# Patient Record
Sex: FEMALE | Race: WHITE | NOT HISPANIC OR LATINO | Employment: OTHER | URBAN - METROPOLITAN AREA
[De-identification: names, ages, dates, MRNs, and addresses within clinical notes are randomized per-mention and may not be internally consistent; named-entity substitution may affect disease eponyms.]

---

## 2017-01-11 ENCOUNTER — RECORDS - HEALTHEAST (OUTPATIENT)
Dept: LAB | Facility: CLINIC | Age: 77
End: 2017-01-11

## 2017-01-11 LAB
CHOLEST SERPL-MCNC: 233 MG/DL
FASTING STATUS PATIENT QL REPORTED: NO
HDLC SERPL-MCNC: 64 MG/DL
LDLC SERPL CALC-MCNC: 151 MG/DL
TRIGL SERPL-MCNC: 91 MG/DL

## 2017-04-21 ENCOUNTER — RECORDS - HEALTHEAST (OUTPATIENT)
Dept: LAB | Facility: CLINIC | Age: 77
End: 2017-04-21

## 2017-04-21 LAB
CHOLEST SERPL-MCNC: 229 MG/DL
FASTING STATUS PATIENT QL REPORTED: NO
HDLC SERPL-MCNC: 63 MG/DL
LDLC SERPL CALC-MCNC: 150 MG/DL
TRIGL SERPL-MCNC: 80 MG/DL

## 2017-07-13 ENCOUNTER — OFFICE VISIT - HEALTHEAST (OUTPATIENT)
Dept: VASCULAR SURGERY | Facility: CLINIC | Age: 77
End: 2017-07-13

## 2017-07-13 DIAGNOSIS — M21.42 ACQUIRED BILATERAL FLAT FEET: ICD-10-CM

## 2017-07-13 DIAGNOSIS — I89.0 ACQUIRED LYMPHEDEMA OF LEG: ICD-10-CM

## 2017-07-13 DIAGNOSIS — I87.2 VENOUS INSUFFICIENCY OF BOTH LOWER EXTREMITIES: ICD-10-CM

## 2017-07-13 DIAGNOSIS — M21.41 ACQUIRED BILATERAL FLAT FEET: ICD-10-CM

## 2017-07-13 DIAGNOSIS — I87.8 VENOUS STASIS: ICD-10-CM

## 2017-07-13 DIAGNOSIS — M79.89 SWELLING OF LIMB: ICD-10-CM

## 2017-07-13 DIAGNOSIS — L90.5 SCAR CONDITION AND FIBROSIS OF SKIN: ICD-10-CM

## 2017-07-13 ASSESSMENT — MIFFLIN-ST. JEOR: SCORE: 1530.9

## 2017-12-18 ENCOUNTER — OFFICE VISIT - HEALTHEAST (OUTPATIENT)
Dept: VASCULAR SURGERY | Facility: CLINIC | Age: 77
End: 2017-12-18

## 2017-12-18 DIAGNOSIS — M79.605 LEG PAIN, LEFT: ICD-10-CM

## 2017-12-18 DIAGNOSIS — I87.2 VENOUS INSUFFICIENCY OF BOTH LOWER EXTREMITIES: ICD-10-CM

## 2017-12-18 DIAGNOSIS — Z87.828 HISTORY OF BURN, THIRD DEGREE: ICD-10-CM

## 2017-12-18 DIAGNOSIS — L03.116 LEFT LEG CELLULITIS: ICD-10-CM

## 2017-12-18 DIAGNOSIS — E66.812 CLASS 2 OBESITY IN ADULT: ICD-10-CM

## 2017-12-18 DIAGNOSIS — I89.0 ACQUIRED LYMPHEDEMA OF LEG: ICD-10-CM

## 2017-12-18 ASSESSMENT — MIFFLIN-ST. JEOR: SCORE: 1594.41

## 2017-12-19 ENCOUNTER — COMMUNICATION - HEALTHEAST (OUTPATIENT)
Dept: VASCULAR SURGERY | Facility: CLINIC | Age: 77
End: 2017-12-19

## 2017-12-20 ENCOUNTER — COMMUNICATION - HEALTHEAST (OUTPATIENT)
Dept: VASCULAR SURGERY | Facility: CLINIC | Age: 77
End: 2017-12-20

## 2017-12-20 ENCOUNTER — AMBULATORY - HEALTHEAST (OUTPATIENT)
Dept: VASCULAR SURGERY | Facility: CLINIC | Age: 77
End: 2017-12-20

## 2017-12-20 ENCOUNTER — COMMUNICATION - HEALTHEAST (OUTPATIENT)
Dept: SURGERY | Facility: CLINIC | Age: 77
End: 2017-12-20

## 2017-12-20 DIAGNOSIS — B99.9 INFECTION: ICD-10-CM

## 2018-03-12 ENCOUNTER — COMMUNICATION - HEALTHEAST (OUTPATIENT)
Dept: TELEHEALTH | Facility: CLINIC | Age: 78
End: 2018-03-12

## 2018-03-12 ENCOUNTER — OFFICE VISIT - HEALTHEAST (OUTPATIENT)
Dept: VASCULAR SURGERY | Facility: CLINIC | Age: 78
End: 2018-03-12

## 2018-03-12 DIAGNOSIS — M21.41 ACQUIRED BILATERAL FLAT FEET: ICD-10-CM

## 2018-03-12 DIAGNOSIS — M79.89 SWELLING OF LIMB: ICD-10-CM

## 2018-03-12 DIAGNOSIS — I89.0 ACQUIRED LYMPHEDEMA OF LEG: ICD-10-CM

## 2018-03-12 DIAGNOSIS — L03.116 LEFT LEG CELLULITIS: ICD-10-CM

## 2018-03-12 DIAGNOSIS — I87.2 VENOUS INSUFFICIENCY OF BOTH LOWER EXTREMITIES: ICD-10-CM

## 2018-03-12 DIAGNOSIS — I87.8 VENOUS STASIS: ICD-10-CM

## 2018-03-12 DIAGNOSIS — M21.42 ACQUIRED BILATERAL FLAT FEET: ICD-10-CM

## 2018-03-12 DIAGNOSIS — M79.605 LEG PAIN, BILATERAL: ICD-10-CM

## 2018-03-12 DIAGNOSIS — M79.604 LEG PAIN, BILATERAL: ICD-10-CM

## 2018-04-13 ENCOUNTER — OFFICE VISIT - HEALTHEAST (OUTPATIENT)
Dept: SURGERY | Facility: CLINIC | Age: 78
End: 2018-04-13

## 2018-04-13 DIAGNOSIS — G47.33 OSA ON CPAP: ICD-10-CM

## 2018-04-13 DIAGNOSIS — M19.90 OSTEOARTHRITIS: ICD-10-CM

## 2018-04-13 DIAGNOSIS — I10 HYPERTENSION: ICD-10-CM

## 2018-04-13 ASSESSMENT — MIFFLIN-ST. JEOR: SCORE: 1557.89

## 2018-04-16 ENCOUNTER — RECORDS - HEALTHEAST (OUTPATIENT)
Dept: LAB | Facility: CLINIC | Age: 78
End: 2018-04-16

## 2018-04-16 LAB
ANION GAP SERPL CALCULATED.3IONS-SCNC: 10 MMOL/L (ref 5–18)
BUN SERPL-MCNC: 14 MG/DL (ref 8–28)
CALCIUM SERPL-MCNC: 9.7 MG/DL (ref 8.5–10.5)
CHLORIDE BLD-SCNC: 107 MMOL/L (ref 98–107)
CHOLEST SERPL-MCNC: 251 MG/DL
CO2 SERPL-SCNC: 24 MMOL/L (ref 22–31)
CREAT SERPL-MCNC: 0.66 MG/DL (ref 0.6–1.1)
FASTING STATUS PATIENT QL REPORTED: YES
GFR SERPL CREATININE-BSD FRML MDRD: >60 ML/MIN/1.73M2
GLUCOSE BLD-MCNC: 86 MG/DL (ref 70–125)
HDLC SERPL-MCNC: 57 MG/DL
LDLC SERPL CALC-MCNC: 174 MG/DL
MAGNESIUM SERPL-MCNC: 1.9 MG/DL (ref 1.8–2.6)
POTASSIUM BLD-SCNC: 4.3 MMOL/L (ref 3.5–5)
PTH-INTACT SERPL-MCNC: 61 PG/ML (ref 10–86)
SODIUM SERPL-SCNC: 141 MMOL/L (ref 136–145)
TRIGL SERPL-MCNC: 102 MG/DL
VIT B12 SERPL-MCNC: 864 PG/ML (ref 213–816)

## 2018-04-17 LAB — 25(OH)D3 SERPL-MCNC: 67.6 NG/ML (ref 30–80)

## 2018-05-16 ENCOUNTER — OFFICE VISIT - HEALTHEAST (OUTPATIENT)
Dept: SURGERY | Facility: CLINIC | Age: 78
End: 2018-05-16

## 2018-05-16 DIAGNOSIS — M81.0 OSTEOPOROSIS, UNSPECIFIED OSTEOPOROSIS TYPE, UNSPECIFIED PATHOLOGICAL FRACTURE PRESENCE: ICD-10-CM

## 2018-05-16 DIAGNOSIS — E66.9 OBESITY (BMI 30-39.9): ICD-10-CM

## 2018-05-16 DIAGNOSIS — Z71.3 DIETARY COUNSELING: ICD-10-CM

## 2018-05-16 ASSESSMENT — MIFFLIN-ST. JEOR: SCORE: 1547.91

## 2018-05-17 ENCOUNTER — AMBULATORY - HEALTHEAST (OUTPATIENT)
Dept: CARDIOLOGY | Facility: CLINIC | Age: 78
End: 2018-05-17

## 2018-05-17 ENCOUNTER — RECORDS - HEALTHEAST (OUTPATIENT)
Dept: ADMINISTRATIVE | Facility: OTHER | Age: 78
End: 2018-05-17

## 2018-05-18 ENCOUNTER — OFFICE VISIT - HEALTHEAST (OUTPATIENT)
Dept: CARDIOLOGY | Facility: CLINIC | Age: 78
End: 2018-05-18

## 2018-05-18 DIAGNOSIS — E78.5 HYPERLIPIDEMIA, UNSPECIFIED HYPERLIPIDEMIA TYPE: ICD-10-CM

## 2018-05-18 DIAGNOSIS — R06.09 DOE (DYSPNEA ON EXERTION): ICD-10-CM

## 2018-05-18 ASSESSMENT — MIFFLIN-ST. JEOR: SCORE: 1552.44

## 2018-05-30 ENCOUNTER — HOSPITAL ENCOUNTER (OUTPATIENT)
Dept: CARDIOLOGY | Facility: CLINIC | Age: 78
Discharge: HOME | End: 2018-05-30
Attending: INTERNAL MEDICINE

## 2018-05-30 DIAGNOSIS — E78.5 HYPERLIPIDEMIA, UNSPECIFIED HYPERLIPIDEMIA TYPE: ICD-10-CM

## 2018-05-30 DIAGNOSIS — R06.09 DOE (DYSPNEA ON EXERTION): ICD-10-CM

## 2018-05-30 DIAGNOSIS — R06.09 OTHER FORMS OF DYSPNEA: ICD-10-CM

## 2018-05-30 LAB
AORTIC ROOT: 3.4 CM
AORTIC VALVE MEAN VELOCITY: 123 CM/S
ASCENDING AORTA: 3.4 CM
AV CUSP SEPERATION: 2 CM
AV CUSP SEPERATION: 2 CM
AV DIMENSIONLESS INDEX VTI: 0.8
AV MEAN GRADIENT: 7 MMHG
AV PEAK GRADIENT: 12.8 MMHG
AV VALVE AREA: 2.7 CM2
AV VELOCITY RATIO: 0.8
BSA FOR ECHO PROCEDURE: 2.24 M2
CV ECHO HEIGHT: 65.5 IN
CV ECHO WEIGHT: 240 LBS
DOP CALC AO PEAK VEL: 179 CM/S
DOP CALC AO VTI: 37.8 CM
DOP CALC LVOT AREA: 3.46 CM2
DOP CALC LVOT DIAMETER: 2.1 CM
DOP CALC LVOT PEAK VEL: 143 CM/S
DOP CALC LVOT STROKE VOLUME: 102.5 CM3
DOP CALC MV VTI: 43.9 CM
DOP CALCLVOT PEAK VEL VTI: 29.6 CM
EJECTION FRACTION: 71 % (ref 55–75)
FRACTIONAL SHORTENING: 38.3 % (ref 28–44)
INTERVENTRICULAR SEPTUM IN END DIASTOLE: 1.4 CM (ref 0.6–0.9)
IVS/PW RATIO: 1.1
LA AREA 1: 19 CM2
LA AREA 2: 19 CM2
LEFT ATRIUM LENGTH: 4.5 CM
LEFT ATRIUM SIZE: 4.1 CM
LEFT ATRIUM VOLUME INDEX: 30.4 ML/M2
LEFT ATRIUM VOLUME: 68.2 ML
LEFT VENTRICLE CARDIAC INDEX: 2.7 L/MIN/M2
LEFT VENTRICLE CARDIAC OUTPUT: 6.1 L/MIN
LEFT VENTRICLE DIASTOLIC VOLUME INDEX: 25.9 CM3/M2 (ref 34–74)
LEFT VENTRICLE DIASTOLIC VOLUME: 58 CM3 (ref 46–106)
LEFT VENTRICLE HEART RATE: 60 BPM
LEFT VENTRICLE MASS INDEX: 112.2 G/M2
LEFT VENTRICLE SYSTOLIC VOLUME INDEX: 7.6 CM3/M2 (ref 11–31)
LEFT VENTRICLE SYSTOLIC VOLUME: 17 CM3 (ref 14–42)
LEFT VENTRICULAR INTERNAL DIMENSION IN DIASTOLE: 4.7 CM (ref 3.8–5.2)
LEFT VENTRICULAR INTERNAL DIMENSION IN SYSTOLE: 2.9 CM (ref 2.2–3.5)
LEFT VENTRICULAR MASS: 251.4 G
LEFT VENTRICULAR OUTFLOW TRACT MEAN GRADIENT: 3 MMHG
LEFT VENTRICULAR OUTFLOW TRACT MEAN VELOCITY: 81 CM/S
LEFT VENTRICULAR OUTFLOW TRACT PEAK GRADIENT: 8 MMHG
LEFT VENTRICULAR POSTERIOR WALL IN END DIASTOLE: 1.3 CM (ref 0.6–0.9)
LV STROKE VOLUME INDEX: 45.7 ML/M2
MITRAL VALVE DECELERATION SLOPE: 2660 MM/S2
MITRAL VALVE E/A RATIO: 0.7
MITRAL VALVE MEAN INFLOW VELOCITY: 72.3 CM/S
MITRAL VALVE PEAK VELOCITY: 136 CM/S
MITRAL VALVE PRESSURE HALF-TIME: 119 MS
MV AREA VTI: 2.33 CM2
MV AVERAGE E/E' RATIO: 21.6 CM/S
MV DECELERATION TIME: 257 MS
MV E'TISSUE VEL-LAT: 4.87 CM/S
MV E'TISSUE VEL-MED: 3.02 CM/S
MV LATERAL E/E' RATIO: 17.5
MV MEAN GRADIENT: 3 MMHG
MV MEDIAL E/E' RATIO: 28.3
MV PEAK A VELOCITY: 121 CM/S
MV PEAK E VELOCITY: 85.4 CM/S
MV PEAK GRADIENT: 7.4 MMHG
MV VALVE AREA BY CONTINUITY EQUATION: 2.3 CM2
MV VALVE AREA PRESSURE 1/2 METHOD: 1.8 CM2
NUC REST DIASTOLIC VOLUME INDEX: 3840 LBS
NUC REST SYSTOLIC VOLUME INDEX: 65.5 IN
TRICUSPID REGURGITATION PEAK PRESSURE GRADIENT: 30.9 MMHG
TRICUSPID VALVE ANULAR PLANE SYSTOLIC EXCURSION: 2.3 CM
TRICUSPID VALVE PEAK REGURGITANT VELOCITY: 278 CM/S

## 2018-05-30 ASSESSMENT — MIFFLIN-ST. JEOR: SCORE: 1552.44

## 2018-06-15 ENCOUNTER — OFFICE VISIT - HEALTHEAST (OUTPATIENT)
Dept: SURGERY | Facility: CLINIC | Age: 78
End: 2018-06-15

## 2018-06-15 DIAGNOSIS — E78.00 HYPERCHOLESTEROLEMIA: ICD-10-CM

## 2018-06-15 DIAGNOSIS — E66.812 CLASS 2 OBESITY IN ADULT: ICD-10-CM

## 2018-06-21 ENCOUNTER — RECORDS - HEALTHEAST (OUTPATIENT)
Dept: ADMINISTRATIVE | Facility: OTHER | Age: 78
End: 2018-06-21

## 2018-06-25 ENCOUNTER — OFFICE VISIT - HEALTHEAST (OUTPATIENT)
Dept: CARDIOLOGY | Facility: CLINIC | Age: 78
End: 2018-06-25

## 2018-06-25 DIAGNOSIS — I50.30 (HFPEF) HEART FAILURE WITH PRESERVED EJECTION FRACTION (H): ICD-10-CM

## 2018-06-25 ASSESSMENT — MIFFLIN-ST. JEOR: SCORE: 1547.91

## 2018-06-26 ENCOUNTER — COMMUNICATION - HEALTHEAST (OUTPATIENT)
Dept: CARDIOLOGY | Facility: CLINIC | Age: 78
End: 2018-06-26

## 2018-06-26 DIAGNOSIS — I50.30 (HFPEF) HEART FAILURE WITH PRESERVED EJECTION FRACTION (H): ICD-10-CM

## 2018-06-27 ENCOUNTER — COMMUNICATION - HEALTHEAST (OUTPATIENT)
Dept: CARDIOLOGY | Facility: CLINIC | Age: 78
End: 2018-06-27

## 2018-06-27 DIAGNOSIS — I50.30 (HFPEF) HEART FAILURE WITH PRESERVED EJECTION FRACTION (H): ICD-10-CM

## 2018-07-02 ENCOUNTER — RECORDS - HEALTHEAST (OUTPATIENT)
Dept: LAB | Facility: CLINIC | Age: 78
End: 2018-07-02

## 2018-07-02 LAB
CHOLEST SERPL-MCNC: 199 MG/DL
FASTING STATUS PATIENT QL REPORTED: YES
HDLC SERPL-MCNC: 52 MG/DL
LDLC SERPL CALC-MCNC: 134 MG/DL
TRIGL SERPL-MCNC: 66 MG/DL

## 2018-07-12 ENCOUNTER — COMMUNICATION - HEALTHEAST (OUTPATIENT)
Dept: ADMINISTRATIVE | Facility: CLINIC | Age: 78
End: 2018-07-12

## 2018-07-13 ENCOUNTER — OFFICE VISIT - HEALTHEAST (OUTPATIENT)
Dept: SURGERY | Facility: CLINIC | Age: 78
End: 2018-07-13

## 2018-07-13 DIAGNOSIS — Z71.3 DIETARY COUNSELING: ICD-10-CM

## 2018-07-13 DIAGNOSIS — E66.9 OBESITY (BMI 30-39.9): ICD-10-CM

## 2018-07-13 ASSESSMENT — MIFFLIN-ST. JEOR: SCORE: 1516.16

## 2018-09-26 ENCOUNTER — AMBULATORY - HEALTHEAST (OUTPATIENT)
Dept: CARDIOLOGY | Facility: CLINIC | Age: 78
End: 2018-09-26

## 2018-09-26 ENCOUNTER — RECORDS - HEALTHEAST (OUTPATIENT)
Dept: ADMINISTRATIVE | Facility: OTHER | Age: 78
End: 2018-09-26

## 2018-10-01 ENCOUNTER — OFFICE VISIT - HEALTHEAST (OUTPATIENT)
Dept: CARDIOLOGY | Facility: CLINIC | Age: 78
End: 2018-10-01

## 2018-10-01 DIAGNOSIS — E78.2 MIXED HYPERLIPIDEMIA: ICD-10-CM

## 2018-10-01 LAB
CHOLEST SERPL-MCNC: 233 MG/DL
FASTING STATUS PATIENT QL REPORTED: YES
HDLC SERPL-MCNC: 64 MG/DL
LDLC SERPL CALC-MCNC: 154 MG/DL
TRIGL SERPL-MCNC: 75 MG/DL

## 2018-10-01 ASSESSMENT — MIFFLIN-ST. JEOR: SCORE: 1571.95

## 2018-10-19 ENCOUNTER — RECORDS - HEALTHEAST (OUTPATIENT)
Dept: LAB | Facility: CLINIC | Age: 78
End: 2018-10-19

## 2018-10-19 LAB
ANION GAP SERPL CALCULATED.3IONS-SCNC: 12 MMOL/L (ref 5–18)
BUN SERPL-MCNC: 18 MG/DL (ref 8–28)
CALCIUM SERPL-MCNC: 9.9 MG/DL (ref 8.5–10.5)
CHLORIDE BLD-SCNC: 107 MMOL/L (ref 98–107)
CO2 SERPL-SCNC: 22 MMOL/L (ref 22–31)
CREAT SERPL-MCNC: 0.73 MG/DL (ref 0.6–1.1)
GFR SERPL CREATININE-BSD FRML MDRD: >60 ML/MIN/1.73M2
GLUCOSE BLD-MCNC: 99 MG/DL (ref 70–125)
POTASSIUM BLD-SCNC: 3.8 MMOL/L (ref 3.5–5)
SODIUM SERPL-SCNC: 141 MMOL/L (ref 136–145)

## 2019-01-23 ENCOUNTER — OFFICE VISIT - HEALTHEAST (OUTPATIENT)
Dept: SURGERY | Facility: CLINIC | Age: 79
End: 2019-01-23

## 2019-01-23 DIAGNOSIS — Z91.81 AT HIGH RISK FOR INJURY RELATED TO FALL: ICD-10-CM

## 2019-01-23 DIAGNOSIS — M81.6 LOCALIZED OSTEOPOROSIS WITHOUT CURRENT PATHOLOGICAL FRACTURE: ICD-10-CM

## 2019-01-23 ASSESSMENT — MIFFLIN-ST. JEOR: SCORE: 1547

## 2019-02-01 ENCOUNTER — OFFICE VISIT - HEALTHEAST (OUTPATIENT)
Dept: PHYSICAL THERAPY | Facility: REHABILITATION | Age: 79
End: 2019-02-01

## 2019-02-01 DIAGNOSIS — M54.5 CHRONIC BILATERAL LOW BACK PAIN, WITH SCIATICA PRESENCE UNSPECIFIED: ICD-10-CM

## 2019-02-01 DIAGNOSIS — M81.0 OSTEOPOROSIS: ICD-10-CM

## 2019-02-01 DIAGNOSIS — G89.29 CHRONIC BILATERAL LOW BACK PAIN, WITH SCIATICA PRESENCE UNSPECIFIED: ICD-10-CM

## 2019-02-01 DIAGNOSIS — R29.818 DIFFICULTY BALANCING: ICD-10-CM

## 2019-02-01 DIAGNOSIS — Z91.81 AT RISK FOR FALLS: ICD-10-CM

## 2019-03-01 ENCOUNTER — COMMUNICATION - HEALTHEAST (OUTPATIENT)
Dept: PHYSICAL THERAPY | Facility: REHABILITATION | Age: 79
End: 2019-03-01

## 2019-03-04 ENCOUNTER — OFFICE VISIT - HEALTHEAST (OUTPATIENT)
Dept: VASCULAR SURGERY | Facility: CLINIC | Age: 79
End: 2019-03-04

## 2019-03-04 DIAGNOSIS — I87.2 VENOUS INSUFFICIENCY OF BOTH LOWER EXTREMITIES: ICD-10-CM

## 2019-03-04 DIAGNOSIS — L90.5 SCAR CONDITION AND FIBROSIS OF SKIN: ICD-10-CM

## 2019-03-04 DIAGNOSIS — M81.0 AGE-RELATED OSTEOPOROSIS WITHOUT CURRENT PATHOLOGICAL FRACTURE: ICD-10-CM

## 2019-03-04 DIAGNOSIS — I89.0 ACQUIRED LYMPHEDEMA OF LEG: ICD-10-CM

## 2019-03-04 DIAGNOSIS — R26.89 FUNCTIONAL GAIT ABNORMALITY: ICD-10-CM

## 2019-03-04 DIAGNOSIS — G60.9 IDIOPATHIC PERIPHERAL NEUROPATHY: ICD-10-CM

## 2019-03-04 DIAGNOSIS — M21.41 ACQUIRED BILATERAL FLAT FEET: ICD-10-CM

## 2019-03-04 DIAGNOSIS — E66.01 MORBID OBESITY (H): ICD-10-CM

## 2019-03-04 DIAGNOSIS — M21.42 ACQUIRED BILATERAL FLAT FEET: ICD-10-CM

## 2019-03-04 ASSESSMENT — MIFFLIN-ST. JEOR: SCORE: 1570.59

## 2019-04-24 ENCOUNTER — AMBULATORY - HEALTHEAST (OUTPATIENT)
Dept: LAB | Facility: CLINIC | Age: 79
End: 2019-04-24

## 2019-04-24 ENCOUNTER — OFFICE VISIT - HEALTHEAST (OUTPATIENT)
Dept: SURGERY | Facility: CLINIC | Age: 79
End: 2019-04-24

## 2019-04-24 DIAGNOSIS — R26.89 IMBALANCE: ICD-10-CM

## 2019-04-24 DIAGNOSIS — G62.9 NEUROPATHY: ICD-10-CM

## 2019-04-24 DIAGNOSIS — R63.5 WEIGHT GAIN: ICD-10-CM

## 2019-04-24 DIAGNOSIS — T56.1X4A TOXIC EFFECT OF MERCURY AND ITS COMPOUNDS, UNDETERMINED, INITIAL ENCOUNTER: ICD-10-CM

## 2019-04-24 DIAGNOSIS — T56.0X4A TOXIC EFFECT OF LEAD AND ITS COMPOUNDS, UNDETERMINED, INITIAL ENCOUNTER: ICD-10-CM

## 2019-04-24 DIAGNOSIS — T57.0X4A TOXIC EFFECT OF ARSENIC AND ITS COMPOUNDS, UNDETERMINED, INITIAL ENCOUNTER: ICD-10-CM

## 2019-04-24 DIAGNOSIS — Z77.018 EXPOSURE TO HEAVY METALS: ICD-10-CM

## 2019-04-24 DIAGNOSIS — T56.5X4A TOXIC EFFECT OF ZINC AND ITS COMPOUNDS, UNDETERMINED, INITIAL ENCOUNTER: ICD-10-CM

## 2019-04-24 ASSESSMENT — MIFFLIN-ST. JEOR: SCORE: 1552.44

## 2019-04-26 ENCOUNTER — RECORDS - HEALTHEAST (OUTPATIENT)
Dept: LAB | Facility: CLINIC | Age: 79
End: 2019-04-26

## 2019-04-26 ENCOUNTER — AMBULATORY - HEALTHEAST (OUTPATIENT)
Dept: LAB | Facility: CLINIC | Age: 79
End: 2019-04-26

## 2019-04-26 DIAGNOSIS — G62.9 NEUROPATHY: ICD-10-CM

## 2019-04-26 DIAGNOSIS — R63.5 WEIGHT GAIN: ICD-10-CM

## 2019-04-26 DIAGNOSIS — T56.5X4A TOXIC EFFECT OF ZINC AND ITS COMPOUNDS, UNDETERMINED, INITIAL ENCOUNTER: ICD-10-CM

## 2019-04-26 LAB
ANION GAP SERPL CALCULATED.3IONS-SCNC: 10 MMOL/L (ref 5–18)
BUN SERPL-MCNC: 19 MG/DL (ref 8–28)
CALCIUM SERPL-MCNC: 9.8 MG/DL (ref 8.5–10.5)
CHLORIDE BLD-SCNC: 109 MMOL/L (ref 98–107)
CHOLEST SERPL-MCNC: 222 MG/DL
CO2 SERPL-SCNC: 23 MMOL/L (ref 22–31)
CREAT SERPL-MCNC: 0.69 MG/DL (ref 0.6–1.1)
FASTING STATUS PATIENT QL REPORTED: ABNORMAL
FOLATE SERPL-MCNC: 18.9 NG/ML
GFR SERPL CREATININE-BSD FRML MDRD: >60 ML/MIN/1.73M2
GLUCOSE BLD-MCNC: 85 MG/DL (ref 70–125)
HDLC SERPL-MCNC: 66 MG/DL
LDLC SERPL CALC-MCNC: 142 MG/DL
MAGNESIUM SERPL-MCNC: 2.2 MG/DL (ref 1.8–2.6)
POTASSIUM BLD-SCNC: 4.5 MMOL/L (ref 3.5–5)
SODIUM SERPL-SCNC: 142 MMOL/L (ref 136–145)
TRIGL SERPL-MCNC: 70 MG/DL

## 2019-04-27 LAB
PYRIDOXAL PHOS SERPL-SCNC: 272.7 NMOL/L (ref 20–125)
VIT C SERPL-MCNC: 120 UMOL/L (ref 23–114)

## 2019-04-29 LAB
25(OH)D3 SERPL-MCNC: 68 NG/ML (ref 30–80)
ARSENIC, WHOLE BLOOD: <10 NG/ML
LAB SAMPLE TYPE: NORMAL
LAB STATE REPORTED TO: NORMAL
LEAD, WHOLE BLOOD - HISTORICAL: <1 UG/DL
MERCURY, WHOLE BLOOD - HISTORICAL: <5 NG/ML
SPECIMEN STATUS: NORMAL

## 2019-04-30 LAB
CORTIS SAL-MCNC: 0.44 UG/DL
VIT B1 PYROPHOSHATE BLD-SCNC: 114 NMOL/L (ref 70–180)
ZINC SERPL-MCNC: 79 UG/DL (ref 60–120)

## 2019-05-01 ENCOUNTER — COMMUNICATION - HEALTHEAST (OUTPATIENT)
Dept: SURGERY | Facility: CLINIC | Age: 79
End: 2019-05-01

## 2019-06-25 ENCOUNTER — OFFICE VISIT - HEALTHEAST (OUTPATIENT)
Dept: SURGERY | Facility: CLINIC | Age: 79
End: 2019-06-25

## 2019-06-25 DIAGNOSIS — Z71.3 NUTRITIONAL COUNSELING: ICD-10-CM

## 2019-06-25 DIAGNOSIS — E56.9 VITAMIN DEFICIENCY: ICD-10-CM

## 2019-06-25 DIAGNOSIS — E66.9 OBESITY WITH BODY MASS INDEX OF 30.0-39.9: ICD-10-CM

## 2019-06-25 ASSESSMENT — MIFFLIN-ST. JEOR: SCORE: 1551.99

## 2019-08-15 RX ORDER — PERPHENAZINE 16 MG
600 TABLET ORAL DAILY
COMMUNITY
End: 2023-03-15

## 2019-08-15 RX ORDER — FUROSEMIDE 20 MG
20 TABLET ORAL DAILY
COMMUNITY

## 2019-08-15 RX ORDER — LOSARTAN POTASSIUM 50 MG/1
50 TABLET ORAL 2 TIMES DAILY
COMMUNITY

## 2019-08-15 RX ORDER — BIOTIN 1 MG
1000 TABLET ORAL DAILY
COMMUNITY
End: 2023-03-15

## 2019-08-15 RX ORDER — PYRIDOXINE HCL (VITAMIN B6) 100 MG
100 TABLET ORAL DAILY
COMMUNITY
End: 2023-03-15

## 2019-08-15 RX ORDER — FOLIC ACID 0.8 MG
800 TABLET ORAL DAILY
COMMUNITY
End: 2023-03-15

## 2019-08-16 ENCOUNTER — ANESTHESIA EVENT (OUTPATIENT)
Dept: SURGERY | Facility: CLINIC | Age: 79
End: 2019-08-16
Payer: MEDICARE

## 2019-08-16 ENCOUNTER — ANESTHESIA (OUTPATIENT)
Dept: SURGERY | Facility: CLINIC | Age: 79
End: 2019-08-16
Payer: MEDICARE

## 2019-08-16 ENCOUNTER — HOSPITAL ENCOUNTER (OUTPATIENT)
Facility: CLINIC | Age: 79
Discharge: HOME | End: 2019-08-16
Attending: PODIATRIST | Admitting: PODIATRIST
Payer: MEDICARE

## 2019-08-16 VITALS
TEMPERATURE: 94.8 F | WEIGHT: 243 LBS | DIASTOLIC BLOOD PRESSURE: 105 MMHG | SYSTOLIC BLOOD PRESSURE: 177 MMHG | RESPIRATION RATE: 12 BRPM | HEIGHT: 66 IN | HEART RATE: 64 BPM | BODY MASS INDEX: 39.05 KG/M2 | OXYGEN SATURATION: 97 %

## 2019-08-16 DIAGNOSIS — L60.0 INGROWING NAIL: Primary | ICD-10-CM

## 2019-08-16 PROCEDURE — 36000052 ZZH SURGERY LEVEL 2 EA 15 ADDTL MIN: Performed by: PODIATRIST

## 2019-08-16 PROCEDURE — 25000125 ZZHC RX 250: Performed by: NURSE ANESTHETIST, CERTIFIED REGISTERED

## 2019-08-16 PROCEDURE — 71000012 ZZH RECOVERY PHASE 1 LEVEL 1 FIRST HR: Performed by: PODIATRIST

## 2019-08-16 PROCEDURE — 37000009 ZZH ANESTHESIA TECHNICAL FEE, EACH ADDTL 15 MIN: Performed by: PODIATRIST

## 2019-08-16 PROCEDURE — 37000008 ZZH ANESTHESIA TECHNICAL FEE, 1ST 30 MIN: Performed by: PODIATRIST

## 2019-08-16 PROCEDURE — 25000128 H RX IP 250 OP 636: Performed by: PODIATRIST

## 2019-08-16 PROCEDURE — 27210794 ZZH OR GENERAL SUPPLY STERILE: Performed by: PODIATRIST

## 2019-08-16 PROCEDURE — 40000170 ZZH STATISTIC PRE-PROCEDURE ASSESSMENT II: Performed by: PODIATRIST

## 2019-08-16 PROCEDURE — 27210995 ZZH RX 272: Performed by: PODIATRIST

## 2019-08-16 PROCEDURE — 25000132 ZZH RX MED GY IP 250 OP 250 PS 637: Mod: GY | Performed by: ANESTHESIOLOGY

## 2019-08-16 PROCEDURE — 71000027 ZZH RECOVERY PHASE 2 EACH 15 MINS: Performed by: PODIATRIST

## 2019-08-16 PROCEDURE — 36000050 ZZH SURGERY LEVEL 2 1ST 30 MIN: Performed by: PODIATRIST

## 2019-08-16 PROCEDURE — 25000125 ZZHC RX 250: Performed by: PODIATRIST

## 2019-08-16 PROCEDURE — 25000128 H RX IP 250 OP 636: Performed by: NURSE ANESTHETIST, CERTIFIED REGISTERED

## 2019-08-16 PROCEDURE — 25800030 ZZH RX IP 258 OP 636: Performed by: NURSE ANESTHETIST, CERTIFIED REGISTERED

## 2019-08-16 RX ORDER — FENTANYL CITRATE 50 UG/ML
INJECTION, SOLUTION INTRAMUSCULAR; INTRAVENOUS PRN
Status: DISCONTINUED | OUTPATIENT
Start: 2019-08-16 | End: 2019-08-16

## 2019-08-16 RX ORDER — GINSENG 100 MG
CAPSULE ORAL
Status: DISCONTINUED
Start: 2019-08-16 | End: 2019-08-16 | Stop reason: HOSPADM

## 2019-08-16 RX ORDER — HYDROCODONE BITARTRATE AND ACETAMINOPHEN 5; 325 MG/1; MG/1
1-2 TABLET ORAL EVERY 4 HOURS PRN
Qty: 10 TABLET | Refills: 0 | Status: SHIPPED | OUTPATIENT
Start: 2019-08-16 | End: 2021-08-25

## 2019-08-16 RX ORDER — PROPOFOL 10 MG/ML
INJECTION, EMULSION INTRAVENOUS CONTINUOUS PRN
Status: DISCONTINUED | OUTPATIENT
Start: 2019-08-16 | End: 2019-08-16

## 2019-08-16 RX ORDER — BUPIVACAINE HYDROCHLORIDE AND EPINEPHRINE 5; 5 MG/ML; UG/ML
INJECTION, SOLUTION EPIDURAL; INTRACAUDAL; PERINEURAL
Status: DISCONTINUED
Start: 2019-08-16 | End: 2019-08-16 | Stop reason: WASHOUT

## 2019-08-16 RX ORDER — NALOXONE HYDROCHLORIDE 0.4 MG/ML
.1-.4 INJECTION, SOLUTION INTRAMUSCULAR; INTRAVENOUS; SUBCUTANEOUS
Status: DISCONTINUED | OUTPATIENT
Start: 2019-08-16 | End: 2019-08-16 | Stop reason: HOSPADM

## 2019-08-16 RX ORDER — ONDANSETRON 2 MG/ML
INJECTION INTRAMUSCULAR; INTRAVENOUS PRN
Status: DISCONTINUED | OUTPATIENT
Start: 2019-08-16 | End: 2019-08-16

## 2019-08-16 RX ORDER — FENTANYL CITRATE 50 UG/ML
25-50 INJECTION, SOLUTION INTRAMUSCULAR; INTRAVENOUS
Status: DISCONTINUED | OUTPATIENT
Start: 2019-08-16 | End: 2019-08-16 | Stop reason: HOSPADM

## 2019-08-16 RX ORDER — EPHEDRINE SULFATE 50 MG/ML
INJECTION, SOLUTION INTRAMUSCULAR; INTRAVENOUS; SUBCUTANEOUS PRN
Status: DISCONTINUED | OUTPATIENT
Start: 2019-08-16 | End: 2019-08-16

## 2019-08-16 RX ORDER — ONDANSETRON 2 MG/ML
4 INJECTION INTRAMUSCULAR; INTRAVENOUS EVERY 30 MIN PRN
Status: DISCONTINUED | OUTPATIENT
Start: 2019-08-16 | End: 2019-08-16 | Stop reason: HOSPADM

## 2019-08-16 RX ORDER — TRIAMCINOLONE ACETONIDE 40 MG/ML
INJECTION, SUSPENSION INTRA-ARTICULAR; INTRAMUSCULAR PRN
Status: DISCONTINUED | OUTPATIENT
Start: 2019-08-16 | End: 2019-08-16 | Stop reason: HOSPADM

## 2019-08-16 RX ORDER — MEPERIDINE HYDROCHLORIDE 25 MG/ML
12.5 INJECTION INTRAMUSCULAR; INTRAVENOUS; SUBCUTANEOUS
Status: DISCONTINUED | OUTPATIENT
Start: 2019-08-16 | End: 2019-08-16 | Stop reason: HOSPADM

## 2019-08-16 RX ORDER — SODIUM CHLORIDE, SODIUM LACTATE, POTASSIUM CHLORIDE, CALCIUM CHLORIDE 600; 310; 30; 20 MG/100ML; MG/100ML; MG/100ML; MG/100ML
INJECTION, SOLUTION INTRAVENOUS CONTINUOUS PRN
Status: DISCONTINUED | OUTPATIENT
Start: 2019-08-16 | End: 2019-08-16

## 2019-08-16 RX ORDER — TRIAMCINOLONE ACETONIDE 40 MG/ML
INJECTION, SUSPENSION INTRA-ARTICULAR; INTRAMUSCULAR
Status: DISCONTINUED
Start: 2019-08-16 | End: 2019-08-16 | Stop reason: HOSPADM

## 2019-08-16 RX ORDER — OXYCODONE HYDROCHLORIDE 5 MG/1
5 TABLET ORAL EVERY 4 HOURS PRN
Status: DISCONTINUED | OUTPATIENT
Start: 2019-08-16 | End: 2019-08-16 | Stop reason: HOSPADM

## 2019-08-16 RX ORDER — MAGNESIUM HYDROXIDE 1200 MG/15ML
LIQUID ORAL PRN
Status: DISCONTINUED | OUTPATIENT
Start: 2019-08-16 | End: 2019-08-16 | Stop reason: HOSPADM

## 2019-08-16 RX ORDER — DEXAMETHASONE SODIUM PHOSPHATE 4 MG/ML
INJECTION, SOLUTION INTRA-ARTICULAR; INTRALESIONAL; INTRAMUSCULAR; INTRAVENOUS; SOFT TISSUE PRN
Status: DISCONTINUED | OUTPATIENT
Start: 2019-08-16 | End: 2019-08-16

## 2019-08-16 RX ORDER — BACITRACIN ZINC 500 [USP'U]/G
OINTMENT TOPICAL PRN
Status: DISCONTINUED | OUTPATIENT
Start: 2019-08-16 | End: 2019-08-16 | Stop reason: HOSPADM

## 2019-08-16 RX ORDER — SODIUM CHLORIDE, SODIUM LACTATE, POTASSIUM CHLORIDE, CALCIUM CHLORIDE 600; 310; 30; 20 MG/100ML; MG/100ML; MG/100ML; MG/100ML
INJECTION, SOLUTION INTRAVENOUS CONTINUOUS
Status: DISCONTINUED | OUTPATIENT
Start: 2019-08-16 | End: 2019-08-16 | Stop reason: HOSPADM

## 2019-08-16 RX ORDER — CEFAZOLIN SODIUM 2 G/100ML
2 INJECTION, SOLUTION INTRAVENOUS
Status: COMPLETED | OUTPATIENT
Start: 2019-08-16 | End: 2019-08-16

## 2019-08-16 RX ORDER — BUPIVACAINE HYDROCHLORIDE 5 MG/ML
INJECTION, SOLUTION PERINEURAL PRN
Status: DISCONTINUED | OUTPATIENT
Start: 2019-08-16 | End: 2019-08-16 | Stop reason: HOSPADM

## 2019-08-16 RX ORDER — GLYCOPYRROLATE 0.2 MG/ML
INJECTION, SOLUTION INTRAMUSCULAR; INTRAVENOUS PRN
Status: DISCONTINUED | OUTPATIENT
Start: 2019-08-16 | End: 2019-08-16

## 2019-08-16 RX ORDER — CEFAZOLIN SODIUM 1 G/3ML
1 INJECTION, POWDER, FOR SOLUTION INTRAMUSCULAR; INTRAVENOUS SEE ADMIN INSTRUCTIONS
Status: DISCONTINUED | OUTPATIENT
Start: 2019-08-16 | End: 2019-08-16 | Stop reason: HOSPADM

## 2019-08-16 RX ORDER — BUPIVACAINE HYDROCHLORIDE 5 MG/ML
INJECTION, SOLUTION EPIDURAL; INTRACAUDAL
Status: DISCONTINUED
Start: 2019-08-16 | End: 2019-08-16 | Stop reason: HOSPADM

## 2019-08-16 RX ORDER — LOSARTAN POTASSIUM 50 MG/1
50 TABLET ORAL ONCE
Status: DISCONTINUED | OUTPATIENT
Start: 2019-08-16 | End: 2019-08-16 | Stop reason: HOSPADM

## 2019-08-16 RX ORDER — HYDROMORPHONE HYDROCHLORIDE 1 MG/ML
.3-.5 INJECTION, SOLUTION INTRAMUSCULAR; INTRAVENOUS; SUBCUTANEOUS EVERY 10 MIN PRN
Status: DISCONTINUED | OUTPATIENT
Start: 2019-08-16 | End: 2019-08-16 | Stop reason: HOSPADM

## 2019-08-16 RX ORDER — ISOPROPYL ALCOHOL 70 ML/100ML
LIQUID TOPICAL PRN
Status: DISCONTINUED | OUTPATIENT
Start: 2019-08-16 | End: 2019-08-16 | Stop reason: HOSPADM

## 2019-08-16 RX ORDER — ONDANSETRON 4 MG/1
4 TABLET, ORALLY DISINTEGRATING ORAL EVERY 30 MIN PRN
Status: DISCONTINUED | OUTPATIENT
Start: 2019-08-16 | End: 2019-08-16 | Stop reason: HOSPADM

## 2019-08-16 RX ADMIN — Medication 5 MG: at 08:23

## 2019-08-16 RX ADMIN — CEFAZOLIN SODIUM 2 G: 2 INJECTION, SOLUTION INTRAVENOUS at 07:45

## 2019-08-16 RX ADMIN — GLYCOPYRROLATE 0.1 MG: 0.2 INJECTION, SOLUTION INTRAMUSCULAR; INTRAVENOUS at 08:07

## 2019-08-16 RX ADMIN — FENTANYL CITRATE 50 MCG: 50 INJECTION, SOLUTION INTRAMUSCULAR; INTRAVENOUS at 07:38

## 2019-08-16 RX ADMIN — Medication 5 MG: at 08:17

## 2019-08-16 RX ADMIN — DEXAMETHASONE SODIUM PHOSPHATE 4 MG: 4 INJECTION, SOLUTION INTRA-ARTICULAR; INTRALESIONAL; INTRAMUSCULAR; INTRAVENOUS; SOFT TISSUE at 07:45

## 2019-08-16 RX ADMIN — Medication 5 MG: at 08:07

## 2019-08-16 RX ADMIN — OXYCODONE HYDROCHLORIDE 5 MG: 5 TABLET ORAL at 09:42

## 2019-08-16 RX ADMIN — ONDANSETRON 4 MG: 2 INJECTION INTRAMUSCULAR; INTRAVENOUS at 07:45

## 2019-08-16 RX ADMIN — PROPOFOL 100 MCG/KG/MIN: 10 INJECTION, EMULSION INTRAVENOUS at 07:41

## 2019-08-16 RX ADMIN — SODIUM CHLORIDE, POTASSIUM CHLORIDE, SODIUM LACTATE AND CALCIUM CHLORIDE: 600; 310; 30; 20 INJECTION, SOLUTION INTRAVENOUS at 07:38

## 2019-08-16 RX ADMIN — GLYCOPYRROLATE 0.1 MG: 0.2 INJECTION, SOLUTION INTRAMUSCULAR; INTRAVENOUS at 08:04

## 2019-08-16 ASSESSMENT — MIFFLIN-ST. JEOR: SCORE: 1583.99
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
## 2019-08-16 NOTE — ANESTHESIA PREPROCEDURE EVALUATION
Anesthesia Pre-Procedure Evaluation    Patient: Leslie Manriquez   MRN: [id number] : 1940          Preoperative Diagnosis: INGROWN TOE NAILS; PLANTAR FASCITIS RIGHT HEEL    Procedure(s):  MATRIXECTOMY BILATERAL FEET; TOES 1,2,3,4; BOTH NAIL BORDERS  CORTIZONE INJECTION RIGHT HEEL    Past Medical History:   Diagnosis Date     Chronic acquired lymphedema      Falls      Fibrocystic breast disease      Gastroesophageal reflux disease      Hyperlipidemia      Hypertension      Mitral valve disorder      Obese      Skin cancer      Sleep apnea      Thyroid nodule      Past Surgical History:   Procedure Laterality Date     EYE SURGERY      cataract     GI SURGERY       GYN SURGERY      hysterectomy     ORTHOPEDIC SURGERY Left     knee replacement       Anesthesia Evaluation     .             ROS/MED HX    ENT/Pulmonary:     (+)sleep apnea, uses CPAP , . .    Neurologic:       Cardiovascular:     (+) Dyslipidemia, hypertension----. : . . . :. .       METS/Exercise Tolerance:     Hematologic:         Musculoskeletal:         GI/Hepatic:     (+) GERD (asymptomatic, rare, no meds)       Renal/Genitourinary:         Endo:     (+) Obesity, .      Psychiatric:         Infectious Disease:         Malignancy:         Other:                          Physical Exam  Normal systems: dental    Airway   Mallampati: II  TM distance: >3 FB  Neck ROM: full    Dental     Cardiovascular   Rhythm and rate: regular      Pulmonary             No results found for: WBC, HGB, HCT, PLT, CRP, SED, NA, POTASSIUM, CHLORIDE, CO2, BUN, CR, GLC, DAMARIS, PHOS, MAG, ALBUMIN, PROTTOTAL, ALT, AST, GGT, ALKPHOS, BILITOTAL, BILIDIRECT, LIPASE, AMYLASE, KRISTIN, PTT, INR, FIBR, TSH, T4, T3, HCG, HCGS, CKTOTAL, CKMB, TROPN    Preop Vitals  BP Readings from Last 3 Encounters:   19 (!) 164/69    Pulse Readings from Last 3 Encounters:   No data found for Pulse      Resp Readings from Last 3 Encounters:   19 18    SpO2 Readings from Last 3 Encounters:  "  08/16/19 95%      Temp Readings from Last 1 Encounters:   08/16/19 36.4  C (97.6  F) (Oral)    Ht Readings from Last 1 Encounters:   08/16/19 1.676 m (5' 6\")      Wt Readings from Last 1 Encounters:   08/16/19 110.2 kg (243 lb)    Estimated body mass index is 39.22 kg/m  as calculated from the following:    Height as of this encounter: 1.676 m (5' 6\").    Weight as of this encounter: 110.2 kg (243 lb).       Anesthesia Plan      History & Physical Review  History and physical reviewed and following examination; no interval change.    ASA Status:  2 .    NPO Status:  > 8 hours    Plan for MAC Reason for MAC:  Deep or markedly invasive procedure (G8)         Postoperative Care  Postoperative pain management:  Oral pain medications and IV analgesics.      Consents  Anesthetic plan, risks, benefits and alternatives discussed with:  Patient..                 Ganga Keene MD  "

## 2019-08-16 NOTE — OR NURSING
Dr. Keene updated regarding Blood pressure. Pt did not take losartan today. Order obtained for 50 mg of Losartan STAT and to hold pt for at least 30 min to watch for improvement in blood pressure.

## 2019-08-16 NOTE — OR NURSING
Pt refuses to stay, Requests to go home and take home med. Pt asymptomatic. Spoke with Dr. Keene, Ok with pt going home and taking home Losartan as soon as possible and lay down and relax. RN informed patient to recheck blood pressure at home in 30min-1hr after taking home losartan to verify improvement. Pt verbalizes upstanding and agrees to plan.

## 2019-08-16 NOTE — DISCHARGE INSTRUCTIONS
Same Day Surgery Discharge Instructions for  Sedation and General Anesthesia       It's not unusual to feel dizzy, light-headed or faint for up to 24 hours after surgery or while taking pain medication.  If you have these symptoms: sit for a few minutes before standing and have someone assist you when you get up to walk or use the bathroom.      You should rest and relax for the next 24 hours. We recommend you make arrangements to have an adult stay with you for at least 24 hours after your discharge.  Avoid hazardous and strenuous activity.      DO NOT DRIVE any vehicle or operate mechanical equipment for 24 hours following the end of your surgery.  Even though you may feel normal, your reactions may be affected by the medication you have received.      Do not drink alcoholic beverages for 24 hours following surgery.       Slowly progress to your regular diet as you feel able. It's not unusual to feel nauseated and/or vomit after receiving anesthesia.  If you develop these symptoms, drink clear liquids (apple juice, ginger ale, broth, 7-up, etc. ) until you feel better.  If your nausea and vomiting persists for 24 hours, please notify your surgeon.        All narcotic pain medications, along with inactivity and anesthesia, can cause constipation. Drinking plenty of liquids and increasing fiber intake will help.      For any questions of a medical nature, call your surgeon.      Do not make important decisions for 24 hours.      If you had general anesthesia, you may have a sore throat for a couple of days related to the breathing tube used during surgery.  You may use Cepacol lozenges to help with this discomfort.  If it worsens or if you develop a fever, contact your surgeon.       If you feel your pain is not well managed with the pain medications prescribed by your surgeon, please contact your surgeon's office to let them know so they can address your concerns.     **If you have questions or concerns about your  procedure,  call Dr. Kam at [phone]**    Start soaks tomorrow with soap that was given twice a day.  Able to drive tomorrow.  Weight bearing   Make sure to schedule follow up appointment on Monday for sometime next week    Last dose of pain medication given at 9:45. You are able to take your hydrocodone with acetaminophen at 1:45pm

## 2019-08-16 NOTE — ANESTHESIA POSTPROCEDURE EVALUATION
Patient: Leslie Manriquez    Procedure(s):  MATRIXECTOMY BILATERAL FEET; TOES 1,2,3,4 ON LEFT FOOT,  1,2 ON RIGHT FOOT;  BOTH NAIL BORDERS  CORTIZONE INJECTION RIGHT HEEL    Diagnosis:INGROWN TOE NAILS; PLANTAR FASCITIS RIGHT HEEL  Diagnosis Additional Information: No value filed.    Anesthesia Type:  MAC    Note:  Anesthesia Post Evaluation    Patient location during evaluation: PACU  Patient participation: Able to fully participate in evaluation  Level of consciousness: awake and alert  Pain management: adequate  Airway patency: patent  Cardiovascular status: acceptable and hemodynamically stable  Respiratory status: acceptable  Hydration status: euvolemic  PONV: none     Anesthetic complications: None          Last vitals:  Vitals:    08/16/19 1039 08/16/19 1045 08/16/19 1048   BP: (!) 185/108 (!) 190/101 (!) 177/105   Pulse: 69  64   Resp:      Temp:      SpO2:            Electronically Signed By: Ganga Keene MD  August 16, 2019  4:56 PM

## 2019-08-16 NOTE — OP NOTE
Procedure Date: 08/16/2019      PROCEDURE:  Matrixectomy at digits 1, 2, 3 and 4, left foot and 1 and 2, right foot, and cortisone injection, right heel.      SURGEON:  Opal Kam DPM      ANESTHESIA:  Monitored anesthesia care with local anesthetic consisting of 20 mL of 0.5% Marcaine plain.      PROCEDURE:  Matrixectomy as noted above and injection as noted above.      PREOPERATIVE DIAGNOSIS:  Onychocryptosis, digits 1, 2, 3, and 4, left foot and 1 and 2, right foot; plantar fasciitis, right foot.      HEMOSTASIS:  Direct pressure.      ESTIMATED BLOOD LOSS:  3 mL.      MATERIALS:  None.      INJECTABLES:  None.      COMPLICATIONS:  None.      JUSTIFICATION FOR PROCEDURE:  The patient is an 79-year-old female with a long history of painful ingrown toenails on most of her digits.  She has chronic onychomycosis and onychocryptosis, which has been very painful for her for many years.  She feels she has exhausted conservative measures on this and is interested today in having all of the affected toes treated at the same time.  She has also agreed to a cortisone injection in her right heel.      PROCEDURE IN DETAIL:  The patient was identified, brought into the operating room and placed on the operating table in the supine position.  After the induction of anesthesia, both feet were scrubbed, prepped and draped in the usual sterile technique.      Procedure #1:  Attention was directed to the left hallux where incurvated margins were noted at medial and lateral nail plate.  Using a Hingham elevator, the nail was freed of its soft tissue attachments, and then the nail nipper was used to resect the incurvated and hypertrophic nail plate, both medially and laterally.  Attention was then directed to the matrix area, where aggressive curettage was performed to remove all remaining matrix cells here, and the application of carbolic acid 89% was used to ablate the matrix here.  The sites on the nail were then irrigated  with rubbing alcohol.  This procedure was performed on digits 1, 2, 3 and 4 of the left foot and 1 and 2 on the right foot.  The toes were dressed with bacitracin ointment, Adaptic, 4 x 4 gauze, cast padding and Ace bandage.  At this time, an injection was given at the right heel at the level of the insertion of the medial band of the plantar fascia using Kenalog 40, 0.5 mL of this and 1 mL of 0.5% Marcaine plain.  The ends the procedures for Debra Hutton.  The patient tolerated the procedure and the anesthesia well and was transported to recovery with vital signs stable and vascular status intact to bilateral feet.  Following a period of postoperative monitoring, the patient will be discharged home, was given postoperative instructions for daily soaking of her toes and will follow up in my office next week.         SADIQ ANG DPM             D: 2019   T: 2019   MT: DYLAN      Name:     DEBRA HUTTON   MRN:      [id number]        Account:        [id number]   :      1940           Procedure Date: 2019      Document: [id number]

## 2019-10-25 ENCOUNTER — OFFICE VISIT - HEALTHEAST (OUTPATIENT)
Dept: SURGERY | Facility: CLINIC | Age: 79
End: 2019-10-25

## 2019-10-25 DIAGNOSIS — E66.01 OBESITY, CLASS III, BMI 40-49.9 (MORBID OBESITY) (H): ICD-10-CM

## 2019-10-25 DIAGNOSIS — M19.90 ARTHRITIS: ICD-10-CM

## 2019-10-25 ASSESSMENT — MIFFLIN-ST. JEOR: SCORE: 1573.76

## 2019-11-11 ENCOUNTER — RECORDS - HEALTHEAST (OUTPATIENT)
Dept: ADMINISTRATIVE | Facility: OTHER | Age: 79
End: 2019-11-11

## 2019-11-11 ENCOUNTER — COMMUNICATION - HEALTHEAST (OUTPATIENT)
Dept: VASCULAR SURGERY | Facility: CLINIC | Age: 79
End: 2019-11-11

## 2019-11-11 ENCOUNTER — AMBULATORY - HEALTHEAST (OUTPATIENT)
Dept: CARDIOLOGY | Facility: CLINIC | Age: 79
End: 2019-11-11

## 2019-11-11 ENCOUNTER — OFFICE VISIT - HEALTHEAST (OUTPATIENT)
Dept: VASCULAR SURGERY | Facility: CLINIC | Age: 79
End: 2019-11-11

## 2019-11-11 DIAGNOSIS — I87.8 VENOUS STASIS: ICD-10-CM

## 2019-11-11 DIAGNOSIS — E66.01 CLASS 2 SEVERE OBESITY DUE TO EXCESS CALORIES WITH SERIOUS COMORBIDITY AND BODY MASS INDEX (BMI) OF 39.0 TO 39.9 IN ADULT (H): ICD-10-CM

## 2019-11-11 DIAGNOSIS — S81.811A NONINFECTED SKIN TEAR OF LEG, RIGHT, INITIAL ENCOUNTER: ICD-10-CM

## 2019-11-11 DIAGNOSIS — E66.812 CLASS 2 SEVERE OBESITY DUE TO EXCESS CALORIES WITH SERIOUS COMORBIDITY AND BODY MASS INDEX (BMI) OF 39.0 TO 39.9 IN ADULT (H): ICD-10-CM

## 2019-11-11 DIAGNOSIS — I89.0 ACQUIRED LYMPHEDEMA OF LEG: ICD-10-CM

## 2019-11-11 DIAGNOSIS — G60.9 IDIOPATHIC PERIPHERAL NEUROPATHY: ICD-10-CM

## 2019-11-11 DIAGNOSIS — I87.2 VENOUS INSUFFICIENCY OF BOTH LOWER EXTREMITIES: ICD-10-CM

## 2019-11-11 ASSESSMENT — MIFFLIN-ST. JEOR: SCORE: 1567.19

## 2019-11-14 ENCOUNTER — OFFICE VISIT - HEALTHEAST (OUTPATIENT)
Dept: CARDIOLOGY | Facility: CLINIC | Age: 79
End: 2019-11-14

## 2019-11-14 DIAGNOSIS — I10 ESSENTIAL HYPERTENSION: ICD-10-CM

## 2019-11-14 ASSESSMENT — MIFFLIN-ST. JEOR: SCORE: 1567.19

## 2019-11-15 ENCOUNTER — COMMUNICATION - HEALTHEAST (OUTPATIENT)
Dept: CARDIOLOGY | Facility: CLINIC | Age: 79
End: 2019-11-15

## 2019-11-15 DIAGNOSIS — I10 ESSENTIAL HYPERTENSION: ICD-10-CM

## 2020-02-12 ENCOUNTER — RECORDS - HEALTHEAST (OUTPATIENT)
Dept: LAB | Facility: CLINIC | Age: 80
End: 2020-02-12

## 2020-02-12 LAB — POTASSIUM BLD-SCNC: 4.2 MMOL/L (ref 3.5–5)

## 2020-04-03 ENCOUNTER — OFFICE VISIT - HEALTHEAST (OUTPATIENT)
Dept: SURGERY | Facility: CLINIC | Age: 80
End: 2020-04-03

## 2020-04-03 DIAGNOSIS — E66.01 MORBID OBESITY (H): ICD-10-CM

## 2020-05-04 ENCOUNTER — RECORDS - HEALTHEAST (OUTPATIENT)
Dept: LAB | Facility: CLINIC | Age: 80
End: 2020-05-04

## 2020-05-04 LAB
ANION GAP SERPL CALCULATED.3IONS-SCNC: 9 MMOL/L (ref 5–18)
BUN SERPL-MCNC: 18 MG/DL (ref 8–28)
CALCIUM SERPL-MCNC: 9.8 MG/DL (ref 8.5–10.5)
CHLORIDE BLD-SCNC: 108 MMOL/L (ref 98–107)
CHOLEST SERPL-MCNC: 240 MG/DL
CO2 SERPL-SCNC: 26 MMOL/L (ref 22–31)
CREAT SERPL-MCNC: 0.69 MG/DL (ref 0.6–1.1)
FASTING STATUS PATIENT QL REPORTED: ABNORMAL
GFR SERPL CREATININE-BSD FRML MDRD: >60 ML/MIN/1.73M2
GLUCOSE BLD-MCNC: 88 MG/DL (ref 70–125)
HDLC SERPL-MCNC: 55 MG/DL
LDLC SERPL CALC-MCNC: 167 MG/DL
POTASSIUM BLD-SCNC: 4.7 MMOL/L (ref 3.5–5)
SODIUM SERPL-SCNC: 143 MMOL/L (ref 136–145)
TRIGL SERPL-MCNC: 91 MG/DL

## 2020-05-05 LAB — 25(OH)D3 SERPL-MCNC: 58.4 NG/ML (ref 30–80)

## 2020-06-10 ENCOUNTER — COMMUNICATION - HEALTHEAST (OUTPATIENT)
Dept: CARDIOLOGY | Facility: CLINIC | Age: 80
End: 2020-06-10

## 2020-06-11 ENCOUNTER — COMMUNICATION - HEALTHEAST (OUTPATIENT)
Dept: VASCULAR SURGERY | Facility: CLINIC | Age: 80
End: 2020-06-11

## 2020-06-17 ENCOUNTER — OFFICE VISIT - HEALTHEAST (OUTPATIENT)
Dept: VASCULAR SURGERY | Facility: CLINIC | Age: 80
End: 2020-06-17

## 2020-06-17 DIAGNOSIS — I89.0 ACQUIRED LYMPHEDEMA OF LEG: ICD-10-CM

## 2020-06-17 DIAGNOSIS — I87.2 VENOUS INSUFFICIENCY OF BOTH LOWER EXTREMITIES: ICD-10-CM

## 2020-06-17 DIAGNOSIS — G60.9 IDIOPATHIC PERIPHERAL NEUROPATHY: ICD-10-CM

## 2020-06-17 DIAGNOSIS — L97.211 ULCER OF RIGHT CALF, LIMITED TO BREAKDOWN OF SKIN (H): ICD-10-CM

## 2020-11-23 ENCOUNTER — AMBULATORY - HEALTHEAST (OUTPATIENT)
Dept: CARDIOLOGY | Facility: CLINIC | Age: 80
End: 2020-11-23

## 2020-11-23 ENCOUNTER — RECORDS - HEALTHEAST (OUTPATIENT)
Dept: ADMINISTRATIVE | Facility: OTHER | Age: 80
End: 2020-11-23

## 2020-11-30 ENCOUNTER — TRANSFERRED RECORDS (OUTPATIENT)
Dept: HEALTH INFORMATION MANAGEMENT | Facility: CLINIC | Age: 80
End: 2020-11-30

## 2020-11-30 ENCOUNTER — RECORDS - HEALTHEAST (OUTPATIENT)
Dept: LAB | Facility: CLINIC | Age: 80
End: 2020-11-30

## 2020-11-30 LAB
ANION GAP SERPL CALCULATED.3IONS-SCNC: 9 MMOL/L (ref 5–18)
BUN SERPL-MCNC: 24 MG/DL (ref 8–28)
CALCIUM SERPL-MCNC: 9.6 MG/DL (ref 8.5–10.5)
CHLORIDE BLD-SCNC: 105 MMOL/L (ref 98–107)
CHOLEST SERPL-MCNC: 206 MG/DL
CO2 SERPL-SCNC: 28 MMOL/L (ref 22–31)
CREAT SERPL-MCNC: 0.69 MG/DL (ref 0.6–1.1)
FASTING STATUS PATIENT QL REPORTED: YES
GFR SERPL CREATININE-BSD FRML MDRD: >60 ML/MIN/1.73M2
GLUCOSE BLD-MCNC: 100 MG/DL (ref 70–125)
HDLC SERPL-MCNC: 61 MG/DL
LDLC SERPL CALC-MCNC: 124 MG/DL
POTASSIUM BLD-SCNC: 4 MMOL/L (ref 3.5–5)
SODIUM SERPL-SCNC: 142 MMOL/L (ref 136–145)
TRIGL SERPL-MCNC: 105 MG/DL

## 2020-12-01 ENCOUNTER — AMBULATORY - HEALTHEAST (OUTPATIENT)
Dept: CARDIOLOGY | Facility: CLINIC | Age: 80
End: 2020-12-01

## 2020-12-12 ENCOUNTER — RECORDS - HEALTHEAST (OUTPATIENT)
Dept: LAB | Facility: CLINIC | Age: 80
End: 2020-12-12

## 2020-12-12 LAB
SARS-COV-2 PCR COMMENT: NORMAL
SARS-COV-2 RNA SPEC QL NAA+PROBE: NEGATIVE
SARS-COV-2 VIRUS SPECIMEN SOURCE: NORMAL

## 2020-12-14 ENCOUNTER — COMMUNICATION - HEALTHEAST (OUTPATIENT)
Dept: CARDIOLOGY | Facility: CLINIC | Age: 80
End: 2020-12-14

## 2020-12-14 DIAGNOSIS — I10 ESSENTIAL HYPERTENSION: ICD-10-CM

## 2020-12-28 ENCOUNTER — AMBULATORY - HEALTHEAST (OUTPATIENT)
Dept: CARDIOLOGY | Facility: CLINIC | Age: 80
End: 2020-12-28

## 2020-12-30 ENCOUNTER — AMBULATORY - HEALTHEAST (OUTPATIENT)
Dept: CARDIOLOGY | Facility: CLINIC | Age: 80
End: 2020-12-30

## 2020-12-30 ENCOUNTER — RECORDS - HEALTHEAST (OUTPATIENT)
Dept: ADMINISTRATIVE | Facility: OTHER | Age: 80
End: 2020-12-30

## 2021-01-03 ENCOUNTER — HEALTH MAINTENANCE LETTER (OUTPATIENT)
Age: 81
End: 2021-01-03

## 2021-01-06 ENCOUNTER — OFFICE VISIT - HEALTHEAST (OUTPATIENT)
Dept: CARDIOLOGY | Facility: CLINIC | Age: 81
End: 2021-01-06

## 2021-01-06 DIAGNOSIS — I10 ESSENTIAL HYPERTENSION: ICD-10-CM

## 2021-01-27 ENCOUNTER — AMBULATORY - HEALTHEAST (OUTPATIENT)
Dept: VASCULAR SURGERY | Facility: CLINIC | Age: 81
End: 2021-01-27

## 2021-01-27 DIAGNOSIS — I87.2 VENOUS INSUFFICIENCY: ICD-10-CM

## 2021-02-02 ENCOUNTER — OFFICE VISIT - HEALTHEAST (OUTPATIENT)
Dept: VASCULAR SURGERY | Facility: CLINIC | Age: 81
End: 2021-02-02

## 2021-02-02 DIAGNOSIS — I87.2 VENOUS INSUFFICIENCY OF BOTH LOWER EXTREMITIES: ICD-10-CM

## 2021-02-02 DIAGNOSIS — M79.669 PAIN AND SWELLING OF LOWER LEG, UNSPECIFIED LATERALITY: ICD-10-CM

## 2021-02-02 DIAGNOSIS — M79.89 PAIN AND SWELLING OF LOWER LEG, UNSPECIFIED LATERALITY: ICD-10-CM

## 2021-02-22 ENCOUNTER — COMMUNICATION - HEALTHEAST (OUTPATIENT)
Dept: CARDIOLOGY | Facility: CLINIC | Age: 81
End: 2021-02-22

## 2021-02-22 DIAGNOSIS — I10 ESSENTIAL HYPERTENSION: ICD-10-CM

## 2021-03-05 ENCOUNTER — HOSPITAL ENCOUNTER (OUTPATIENT)
Dept: CARDIOLOGY | Facility: CLINIC | Age: 81
Discharge: HOME | End: 2021-03-05
Attending: INTERNAL MEDICINE

## 2021-03-05 DIAGNOSIS — I10 ESSENTIAL HYPERTENSION: ICD-10-CM

## 2021-03-05 LAB
AORTIC ROOT: 2.5 CM
AORTIC VALVE MEAN VELOCITY: 111 CM/S
AV DIMENSIONLESS INDEX VTI: 0.7
AV MEAN GRADIENT: 5 MMHG
AV PEAK GRADIENT: 7.3 MMHG
AV VALVE AREA: 2.2 CM2
AV VELOCITY RATIO: 0.8
BSA FOR ECHO PROCEDURE: 2.26 M2
CV BLOOD PRESSURE: ABNORMAL MMHG
CV ECHO HEIGHT: 65 IN
CV ECHO WEIGHT: 245 LBS
DOP CALC AO PEAK VEL: 135 CM/S
DOP CALC AO VTI: 38.7 CM
DOP CALC LVOT AREA: 3.14 CM2
DOP CALC LVOT DIAMETER: 2 CM
DOP CALC LVOT PEAK VEL: 108 CM/S
DOP CALC LVOT STROKE VOLUME: 86.7 CM3
DOP CALC MV VTI: 40.4 CM
DOP CALCLVOT PEAK VEL VTI: 27.6 CM
EJECTION FRACTION: 67 % (ref 55–75)
FRACTIONAL SHORTENING: 45.4 % (ref 28–44)
INTERVENTRICULAR SEPTUM IN END DIASTOLE: 1.39 CM (ref 0.6–0.9)
IVS/PW RATIO: 1.1
LA AREA 1: 22.5 CM2
LEFT ATRIUM LENGTH: 5.58 CM
LEFT ATRIUM SIZE: 4.4 CM
LEFT ATRIUM TO AORTIC ROOT RATIO: 1.76 NO UNITS
LEFT VENTRICLE CARDIAC INDEX: 1.9 L/MIN/M2
LEFT VENTRICLE CARDIAC OUTPUT: 4.2 L/MIN
LEFT VENTRICLE DIASTOLIC VOLUME INDEX: 34.5 CM3/M2 (ref 29–61)
LEFT VENTRICLE DIASTOLIC VOLUME: 78 CM3 (ref 46–106)
LEFT VENTRICLE HEART RATE: 49 BPM
LEFT VENTRICLE MASS INDEX: 147.7 G/M2
LEFT VENTRICLE SYSTOLIC VOLUME INDEX: 11.5 CM3/M2 (ref 8–24)
LEFT VENTRICLE SYSTOLIC VOLUME: 26 CM3 (ref 14–42)
LEFT VENTRICULAR INTERNAL DIMENSION IN DIASTOLE: 5.73 CM (ref 3.8–5.2)
LEFT VENTRICULAR INTERNAL DIMENSION IN SYSTOLE: 3.13 CM (ref 2.2–3.5)
LEFT VENTRICULAR MASS: 333.7 G
LEFT VENTRICULAR OUTFLOW TRACT MEAN GRADIENT: 3 MMHG
LEFT VENTRICULAR OUTFLOW TRACT MEAN VELOCITY: 75.3 CM/S
LEFT VENTRICULAR OUTFLOW TRACT PEAK GRADIENT: 5 MMHG
LEFT VENTRICULAR POSTERIOR WALL IN END DIASTOLE: 1.26 CM (ref 0.6–0.9)
LV STROKE VOLUME INDEX: 38.3 ML/M2
MITRAL VALVE E/A RATIO: 1
MITRAL VALVE MEAN INFLOW VELOCITY: 78.2 CM/S
MITRAL VALVE PEAK VELOCITY: 123 CM/S
MV AREA VTI: 2.15 CM2
MV AVERAGE E/E' RATIO: 12.7 CM/S
MV DECELERATION TIME: 201 MS
MV E'TISSUE VEL-LAT: 9.26 CM/S
MV E'TISSUE VEL-MED: 5.95 CM/S
MV LATERAL E/E' RATIO: 10.4
MV MEAN GRADIENT: 3 MMHG
MV MEDIAL E/E' RATIO: 16.3
MV PEAK A VELOCITY: 101 CM/S
MV PEAK E VELOCITY: 96.7 CM/S
MV PEAK GRADIENT: 6.1 MMHG
MV VALVE AREA BY CONTINUITY EQUATION: 2.1 CM2
NUC REST DIASTOLIC VOLUME INDEX: 3920 LBS
NUC REST SYSTOLIC VOLUME INDEX: 65 IN
TRICUSPID REGURGITATION PEAK PRESSURE GRADIENT: 33.4 MMHG
TRICUSPID VALVE ANULAR PLANE SYSTOLIC EXCURSION: 1.9 CM
TRICUSPID VALVE PEAK REGURGITANT VELOCITY: 289 CM/S

## 2021-03-05 ASSESSMENT — MIFFLIN-ST. JEOR: SCORE: 1567.19

## 2021-03-08 ENCOUNTER — COMMUNICATION - HEALTHEAST (OUTPATIENT)
Dept: CARDIOLOGY | Facility: CLINIC | Age: 81
End: 2021-03-08

## 2021-04-19 ENCOUNTER — OFFICE VISIT - HEALTHEAST (OUTPATIENT)
Dept: VASCULAR SURGERY | Facility: CLINIC | Age: 81
End: 2021-04-19

## 2021-04-19 ENCOUNTER — RECORDS - HEALTHEAST (OUTPATIENT)
Dept: VASCULAR ULTRASOUND | Facility: CLINIC | Age: 81
End: 2021-04-19

## 2021-04-19 ENCOUNTER — COMMUNICATION - HEALTHEAST (OUTPATIENT)
Dept: VASCULAR SURGERY | Facility: CLINIC | Age: 81
End: 2021-04-19

## 2021-04-19 DIAGNOSIS — E66.01 CLASS 2 SEVERE OBESITY DUE TO EXCESS CALORIES WITH SERIOUS COMORBIDITY AND BODY MASS INDEX (BMI) OF 39.0 TO 39.9 IN ADULT (H): ICD-10-CM

## 2021-04-19 DIAGNOSIS — I87.2 VENOUS INSUFFICIENCY (CHRONIC) (PERIPHERAL): ICD-10-CM

## 2021-04-19 DIAGNOSIS — G89.29 CHRONIC PAIN OF RIGHT KNEE: ICD-10-CM

## 2021-04-19 DIAGNOSIS — M79.89 LEG SWELLING: ICD-10-CM

## 2021-04-19 DIAGNOSIS — E66.812 CLASS 2 SEVERE OBESITY DUE TO EXCESS CALORIES WITH SERIOUS COMORBIDITY AND BODY MASS INDEX (BMI) OF 39.0 TO 39.9 IN ADULT (H): ICD-10-CM

## 2021-04-19 DIAGNOSIS — I89.0 ACQUIRED LYMPHEDEMA OF LEG: ICD-10-CM

## 2021-04-19 DIAGNOSIS — I87.2 VENOUS INSUFFICIENCY OF BOTH LOWER EXTREMITIES: ICD-10-CM

## 2021-04-19 DIAGNOSIS — I87.303 VENOUS HYPERTENSION OF LOWER EXTREMITY, BILATERAL: ICD-10-CM

## 2021-04-19 DIAGNOSIS — G60.9 IDIOPATHIC PERIPHERAL NEUROPATHY: ICD-10-CM

## 2021-04-19 DIAGNOSIS — M79.89 OTHER SPECIFIED SOFT TISSUE DISORDERS: ICD-10-CM

## 2021-04-19 DIAGNOSIS — M79.669 PAIN IN UNSPECIFIED LOWER LEG: ICD-10-CM

## 2021-04-19 DIAGNOSIS — M25.561 CHRONIC PAIN OF RIGHT KNEE: ICD-10-CM

## 2021-05-04 ENCOUNTER — RECORDS - HEALTHEAST (OUTPATIENT)
Dept: ADMINISTRATIVE | Facility: OTHER | Age: 81
End: 2021-05-04

## 2021-05-26 ENCOUNTER — RECORDS - HEALTHEAST (OUTPATIENT)
Dept: LAB | Facility: CLINIC | Age: 81
End: 2021-05-26

## 2021-05-26 LAB
ALBUMIN SERPL-MCNC: 3.9 G/DL (ref 3.5–5)
ALP SERPL-CCNC: 65 U/L (ref 45–120)
ALT SERPL W P-5'-P-CCNC: 41 U/L (ref 0–45)
ANION GAP SERPL CALCULATED.3IONS-SCNC: 12 MMOL/L (ref 5–18)
AST SERPL W P-5'-P-CCNC: NORMAL U/L
BILIRUB SERPL-MCNC: 0.6 MG/DL (ref 0–1)
BUN SERPL-MCNC: 22 MG/DL (ref 8–28)
CALCIUM SERPL-MCNC: 9 MG/DL (ref 8.5–10.5)
CHLORIDE BLD-SCNC: 105 MMOL/L (ref 98–107)
CHOLEST SERPL-MCNC: 168 MG/DL
CO2 SERPL-SCNC: 23 MMOL/L (ref 22–31)
CREAT SERPL-MCNC: 0.66 MG/DL (ref 0.6–1.1)
FASTING STATUS PATIENT QL REPORTED: YES
GFR SERPL CREATININE-BSD FRML MDRD: >60 ML/MIN/1.73M2
GLUCOSE BLD-MCNC: 88 MG/DL (ref 70–125)
HDLC SERPL-MCNC: 66 MG/DL
LDLC SERPL CALC-MCNC: 88 MG/DL
POTASSIUM BLD-SCNC: NORMAL MMOL/L
PROT SERPL-MCNC: 7.1 G/DL (ref 6–8)
SODIUM SERPL-SCNC: 140 MMOL/L (ref 136–145)
TRIGL SERPL-MCNC: 68 MG/DL

## 2021-05-27 VITALS — SYSTOLIC BLOOD PRESSURE: 150 MMHG | HEART RATE: 60 BPM | TEMPERATURE: 97.8 F | DIASTOLIC BLOOD PRESSURE: 84 MMHG

## 2021-05-27 LAB — 25(OH)D3 SERPL-MCNC: 59.1 NG/ML (ref 30–80)

## 2021-05-28 NOTE — PATIENT INSTRUCTIONS - HE
Plan:  1. Given difficulty in seeing meaningful weight loss over the last year, I'd recommend a switch to more of a dietician led intervention with a goal of optimizing your strength/independence and quality of life.  Your resting metabolic rate of 1569 kcal suggests that following a 1300 kcal diet, if getting 60-80 grams of protein daily (20-25 grams per meal) should produce some weight loss but maintain strength.  3 meals daily,  by about 5 hours would be ideal and getting to bed within 4-5 hours after supper can curb extra snacking and make it easier to avoid any evening food beyond your supper.  Hydrate well with 50-70 oz of water daily, ideally taken in between meals and no closer than 60 minutes prior to bed to avoid night time urination urges and improve sleep quality.    2. Continuing to get 2 days a week or more of some gentle strength training and walking/stationary bike or pool based aerobic activity and consideration for gentle movement/stretching exercises such as Armand Chi or chair Yoga for seniors are helpful for maintaining balance/joint movements and stress reduction.  Those sorts of activities can be done daily.  [employer] is a great resource for beginning videos for each of these and are free. [employer] gyms also have such programming.     3. Given previous heavy metal issues in the past and ongoing neuropathy issues along with higher than average fish intake, heavy metal screening, B vitamins, other nutritional tests ordered today.    4. Salivary cortisol test take sample after waking before you get out of bed for most accurate results.      . Some thoughts on ongoing weight maintenance [facility]  WEIGHT MAINTENANCE STRATEGIES AFTER WEIGHT LOSS SEASON    According to the National Weight Control Registry there are several things that people who have lost weight and kept it off have in common. Some of them are...    1. 3 MEALS A DAY:  Make sure you are eating 3 meals each day.  No skipping  "meals.  80% of people who skip meals are overweight or obese.  Missing meals slows the metabolism, making it harder to maintain a healthy weight.  Starting the day with some protein (10-20 Grams minimum like an egg and some yogurt) is crucial for avoiding afternoon hunger.     2. FOOD DIARY:  Much like keeping a ledger for your checkbook, keeping a food and exercise diary helps you \"keep track\" of the balance of energy (calories) in and energy out. It also helps you recognize potential unhealthy deviations from healthy patterns before they become habit. It's a nice way to monitor whether you are getting the protein, fiber, and other nutrients that your body needs.  It also provides a reference for figuring out why things are getting off track and learning how YOUR body loses/hold on to it's weight.  This tool is cheap and research has proven it to be one of the biggest helps when making a diet/behavior change.    3. FOLLOW-UP:  Studies show that those who follow up with their health professional regularly maintained their weight loss and those who are \"lost to follow-up \"are at risk for regain.  Moreover, it is essential to monitor vitamin levels with laboratory studies for life following gastric bypass surgery. If your frustrated or feeling defeated that is the time to work with us rather than stop the program.    4.  HIGH FIBER/LOW FAT:  Lean sources of protein (skim milk, skinless, baked or broiled chicken breast, fish, etc.)  will help you meet your protein needs while fruits, vegetables, and whole grains will help you get the fiber that your body needs.  This is heart healthy eating and helps to keep calorie levels in balance.  Some fat is important in maintaining good so 2-3 tablespoons of olive oil, 1 oz of unflavored nuts (almonds, brazil nuts, walnuts) daily is a good way to get the good stuff or taking a teaspoon of fish oil daily ([employer] is a good brand, lemon flavored isn't too fishy).    5.  " "8,000 STEPS PER DAY AND AT LEAST TWICE WEEKLY STRENGTH TRAINING:  This is a \"weight maintenance dose. \"  It is essential to get your steps in every day, 7 days a week.  You don't have to \"work out \" 7 days a week, but throughout the day, getting 8000 steps will help you maintain the weight you have lost. Trying to make at least 2359-8545 steps daily at a moderate to brisk pace (about to miss the bus pace). Parking far away, taking the stairs instead of the elevator, and pacing while on the phone are some ways to help achieve this goal.  A good rule of thumb is that it takes about 100 kilocalories of exercise each day for every 5% reduction in weight during weight loss season to off set the slowed metabolism and reduce the risk of weight regain.  Since we can usually only restrict our diet so much for so long, exercise makes it easier to maintain on the days that we're not perfect with our diet.    6.  Eat at home 90% OF THE TIME:  People who maintain a healthy weight eat at home, or meal prepared at home, 90% of the time.  Studies show that people consume an average of 770 fernandez when eating out at a restaurant and 440 fernandez when eating a meal prepared at home.  This equates to almost 35 pounds of excess weight for a person who eats out once a day for 1 year.      7.  Have a reward.  People that are working towards a major habit/behavior change like weight loss do better and complete the program when they have a reward they are working towards.  It should be special, something you wouldn't otherwise allow your self and be a non-food related reward. For example: a trip, a piece of jewelry, a special outfit, an adventure or outing (ideally one that uses your fitter/new body), a new bicycle...Ideally, it celebrates your weight loss and promotes a healthy life style.    8.  Eat Breakfast, bigger is better.  A study last year showed that people lose more weight if they have a large Breakfast, medium Lunch and smaller Dinner " "rather than vice versa.  When fueled well during the day, we tend to move more and snack less in the evening hours.  Try it!    OTHER HELPFUL HABITS    -Minimize liquid calories.  Water is hiral, unsweetened or brewed tea is also minimal calorie.   Try to minimize reliance on artificial sweeteners, less or none is probably better.    -Avoiding \"mindless\" eating, i.e., eating at the TV, and the car, in front of the computer.  Eating should have a purpose of nourishment rather than something you do when bored.   By avoiding distracted eating, this generally cuts portions/servings/snacks 10-15%.      -For treats, stop eating when you no longer taste the treat.  Think about each bite and enjoy each bite rather than racing to the finish.    -Protect your sleep and Manage your stress.  Getting out for 20-60 minutes of walking/exercise/cycling/gardening/yardwork etc is a great way to shed the frustrations of the day and improve stress reduction and bedtime relaxation.    -Weigh Yourself every day if possible when trying to lose weight. Do it at the same time everyday, ideally in the morning before or after getting out of the shower.  Weight loss bounces up and down but over a 5-6 day period a downtrend should occur and if not, go back to the food and activity tracking and give me a call if any mysteries as to why things aren't going well and we'll look at your serving size estimations or other factors that might be impeding weight loss.    -Let supper be your last food of the day and stick to water in the evenings at least 5 days out of the week.  Having a 12-14 hour period of fasting from supper to breakfast is quite beneficial for health/longevity/blood sugar levels and weight management as long as you're adequately fueled the other 12 hours of the day.    -For people with anxiety, stress or relaxation problems, I recommend the following breathing technique:    4,7,8 breathing is a non medication based way to decrease " stress and anxiety symptoms, improve sleeping issues.  It can be done as often as necessary but I recommend at to perform it idealy at bedtime and first thing in the morning to keep your stress response in a reasonable range.  To perform 4, 7, 8 breathin.  Place the tip of your tongue lightly behind the gum of your front teeth.   2.  Breath in through your mouth for 4 seconds.  3.  Hold your breath for 7 seconds.  4.  Keeping the tongue in place, breath out through your mouth with a slight hissing noise for 8 seconds.    Repeat 4 times.  Do this when going to bed and waking up each day, or if any high stress situation arises and your stress hormones will come down.          OPTIMIZING YOUR METABOLISM FOR LIFE    1.  MUSCLE MAINTENANCE: Muscle burns calories up to 70% better than fat.  As we age our body composition changes. We lose muscle mass.  Weight training can help us keep and even build muscle mass.  Dumbbells, pushups, rubber band training, weight machines are all examples of ways to keep and/or build muscle mass.    2.  MOVE: 8000 steps daily has been shown to be a weight maintenance dose.  Aim for 10,000 steps each day. Helpfull habits include taking the stairs instead of the elevator when possible, parking at the far end of the parking lot, pacing while on the phone, and taking the dog for a walk.  Of course using a treadmill, stair climber, elliptical , and bicycle are all ways of getting 10,000 steps.  If you track activity it helps motivate further activity. It's recommended to make 20-25% of your steps each day at a brisk pace (about to miss the bus pace).  If you've lost a lot of weight, we need about  kcal of exercise most days of the week for every 5% total body weight reduction achieved to reduce the risk of regaining significant weight.  There are many activity/calorie counters available on the Internet for free.    3.  3 MEALS EACH DAY: Make sure to get your 3 meals each day.   "Skipping breakfast, working through your lunch, or not eating dinner will lead to a slowing of your metabolism, increased hunger and difficulty w/ portion control/cravings. Studies show that 80% of people who skip meals are overweight or obese. Using food as medicine throughout the day to balance hunger drives/urges helps greatly.    4.  ADEQUATE PROTEIN INTAKE: Getting adequate protein is beneficial for a number of reasons: to aid the healing process, to blunt cravings immediately after eating and for a period of time after eating, to help keep blood sugars level, and to help you maintain your muscle mass.  See #1 above.  Eggs/meat/yogurt (greek/low carb) in the a.m.  Tuna/meat/fish/yogurt at lunch.  Meat, fish, beans, lentils are all good dinner options or other meals.  Women should get 60-90 grams of protein daily, Men  grams depending on size/muscle mass and goals as well as health history/kidney function.      10 Rules to Live By    1. Come back to earth   Try to choose the least processed forms of food--fruits, vegetables, whole grains, and high-fiber carbohydrates. Learn about \"the dirty dozen and clean fifteen\" regarding which foods may be best to spend extra on organic sourced foods.    2. Eat a rainbow often   Eat fruits or vegetables with each meal. Choose a wide variety of colors for the biggest benefit.    3. Choose lean protein  Include a lean protein source (15-20gm) with each meal. Snacks: pair a protein source with carbohydrate ie: sting cheese with a pear; peanut butter with an apple; or tuna with whole grain crackers.  If you can afford it, grass fed animal proteins have a healthier fat content than grain fed animals.    4. Pick fats that give you something back   Include healthy fats in your diet, such as olive oil, tree nuts such as almonds, walnuts, brazil nuts, cashews, pecans and pistachios; seeds (sunflower/pumpkin/[place]),  avocado, and fish (salmon,sardines, tuna)  It's hard to overeat " these if in their natural forms. Generally, the more you eat, the healthier you are and the rasheed you feel.    5. Eat breakfast every day!  Start your day out right. Include a protein source, whole grains, with fruit or vegetable.     6. Choose 3 foods group with each meal  Aim for all three nutrients (whole carbs, lean protein, and healthy fat).  Example: Turkey sandwich (Whole wheat bread, 2oz turkey breast, 1 slice reduced fat cheese) with small side salad.    7. Stay hydrated   Dehydration means decreased performance (0.5-1.0 fluid ounces [fl oz]×body weight=fl oz of water/day). Start drinking fluids early and often!    8. Do not waste your workout   Have a pre workout snack, if you didn t just eat a balanced meal. After the workout or game, if it s longer than 60 minutes of activity, have a carbohydrate/protein recovery snack, such as 16-24 fl oz of low-fat chocolate milk or greek yogurt, followed by a balanced meal of protein and vegetable and whole grains. Don't overestimate your needs.    9. Supplement wisely  Fuel first and supplement second. If you are not getting what you need through food; and before you take any supplement, check with a registered dietitian or physician for further evaluation. He or she can recommend additional supplementation if needed, based upon your individual intake and lab results. Supplements are not for everyone and can do more harm than good without proper medical advisement.    10.  Sleep   The body recovers and repairs best when at rest! Aim for 7-8 hours of sleep.  If you snore loudly, wake but don't feel refreshed or fall asleep during the day, need many naps, you may have a sleep disorder or sleep apnea and a sleep study may be helpful.      The 80/20 rule for Maintenance  Each meal and snack is an opportunity to fuel your body optimally. Choose foods that are best for you 80% of the time and incorporate some of those foods that are maybe not the best, but may maybe your  "favorite, 20% of the time once you've reached your weight goals!        What makes a person succeed with dramatic and sustained weight loss?    It's being at the right point in your life where you feel the need to lose the weight, not because anyone told you so but because of a voice inside of you that says, \"I am ready for this\".  You're now at a point where you may be feeling anxious, irritable and when you look in the mirror you do not recognize the person looking back.  Your true self is buried somewhere in that reflexion and you want to free it again.  This is the sort of motivation that leads to success, and it comes from you.    Because the only person that can lose that extra weight is you, I like my patients to focus on the mindset of being in Weight Loss Season.  This gives you permission to make the changes necessary to be consistent with the diet/activity and behavior changes that lead to successful, healthy weight loss.  Nearly any diet plan can work for weight loss, but keeping it healthy and nutrient based to prevent deficiencies/hair loss/fatigue or irritability is vital.  If you have a plan you want to try, we'll work with you to make sure no adjustments are needed to keep you healthy through your weight loss season and working with our Bariatric Nutritionists you'll be given expert guidance to customize your diet plan to suit your particular needs. If you don't have your own ideas in mind, we are always happy to suggest well researched and validated plans that provide enough food to prevent hunger but still tap into your excess fat reserves and lose weight in a sustainable fashion.  There is great evidence that lean protein/healthy fat intake with good fiber intake while minimizing simple starches/carbs produces reliable/sustainable weight loss in most people. But some feel more connected to an intermittent fasting/fast mimicking or ketogenic diet.  These protocols can be hard for many to stick " "with and that's why we prefer the protein/healthy fat focused diets but if these alternative strategies appeal to you, we can work with you to optimize your knowledge and results with these tools.    Losing weight is a temporary commitment, but you need to be \"All In\" to have a good weight loss season.  To avoid frustration, you have to be willing to be nearly perfect 19 out of 20 days or even better than that. But, weight loss season is generally only 4-8 months in length. After that length of time, it can be hard to maintain a negative calorie balance and our brain, motivations and metabolism will usually bring you to a plateau that cannot be broken in this modern world where other commitments start to take priority. That's when we look to stabilize the weight loss you've achieved.  If you've reached your goal by that time, fantastic, and job well done.  If there is more to go, then after a few months of stabilization, we can usually attack that previous plateau and break into new territory.    Because of this time limitation, we want to really get to work right away and get into a sustainable routine ASAP.  One of the best predictors of how much weight you're going to lose throughout the season is how much you lose in the first 6 weeks, so prepare well and jump in with both feet.      Occasionally, people may feel like they cannot commit fully to the changes necessary and may want to change one thing at a time and \"get used to\" the idea of losing weight.  That is OK because that is where they are in their life, and they cannot fully commit for any number of reasons.  It's part of that internal motivation and they just haven't reached PRIORTY NUMBER ONE status yet. It's possible that what they need is more time to reach that point and I am always willing to work with people that want to \"dabble\", but understand, the amount of success obtained with half measures, is much less than half results. But Behavior Change " "cannot occur until a person goes through the contemplation and preparation phases necessary to have successful action and we are more than willing to give you targets to move along the spectrum and get to that point where you feel ready to  commit fully to the weight loss season.      As you go through your plan, look for things to keep your motivation rolling.  The most successful people have a goal or target/reward that they are working towards.  Having a reward that celebrates your new fitness, mobility and energy is the best sort because it will encourage you to do well with the weight maintenance phase and long term lifestyle changes that promotes keeping the weight off for the long term.  Usually, \"getting healthier\" or improving blood tests or losing weight so your clothes fit better is not as internally motivating as having a tangible reward.  A more motivating reward is one that isn't food based, is affordable, but something special:  Something you won't be getting unless you achieve your goal.   It s important to keep to the rule of success:  in order to get the reward, your goal MUST be achieved. Write this reward down, where you can look at it daily and keep it in the front of your mind as you go through your weight loss season and it will help keep you on track.    Tools that help change behavior are vital for success. The most studied and most supported tool for weight loss is nothing more than writing down your food and weight every day.  Every Day.  Accurately and completely.  When you commit to weight loss season, this information tells you whether you're getting ENOUGH food to fuel your weight loss properly as well as teaches you the interaction between different foods you eat and how your body responds with weight loss.  You'll see that sometimes after a heavy workout you don't see the scale move until 2-3 days later.  How saltier meals (chili for example) may make you retain water for 4-5 days " before you see the weight come off, you'll get used to the mini-plateaus that develop after a good 3-8 lb drop in weight as well as how you break through if you keep working the diet as you should.    Weight loss is not a linear process, there are mini ups and downs.  Learning how your body loses weight and getting comfortable with that is very rewarding. The act of writing words on paper also solidifies your will power and commitment to the season of weight loss and that by itself changes your brain chemistry/appetite, motivation and prepares you for maximal success.      Behavior change is all about getting into a new routine.  The old habits and routine have to change because without changing the circumstances of how you gained your weight, it's unlikely you'll enjoy satisfying results. If you have snacking habits, like every time you walk through the kitchen you grab a little something, well, that habit has to change and be replaced by a new habit.  It can be something as simple as keeping a doodle pad on the counter that you make a few scribbles and then walk through the kitchen having not opened the cupboard, or starting with a glass of water and leaving the kitchen without anything else, or checking your food journal to see how many calories you have left for the day.  Boredom is the enemy as are the old habits. Break new ground and try to push those old habits into a deep hole.      Finally, exercise always helps.  While not mandatory to lose weight, every little bit helps and exercise has so many other benefits that to not work it into your plan is to miss out on all the mood, sleep, stress and general health benefits that come from making yourself a little short of breath and sweaty at least 3-4 days out of the week.  The metabolism and calorie burn benefits aside, almost every chronic ailment in medicine gets better with proper, aerobic exercise.  Allow yourself to start slow and let your body prepare  "itself to accept harder training 4-6 weeks down the road, but start now and commit to a plan.  Whether you have the means to hire a , join a gym or just walk out your front door or go down to your basement for a video workout, get into a exercise routine and  after 3 weeks of at least 3 times a week exercise you should be at a point where you can slowly start ramping up 10% each week to our goal of at least 150-300minutes weekly of aerobic exercise and at least twice weekly resistance training/strenghtening with weights/bands or body weight exercises.     I am a big fan of modifying the free training plan, \"Couch to 5k\" for almost all of my patients. Just type it into Embedded Chat or look it up on your smart phone sunil store.  To modify the plan,  you can use the training plan for whatever aerobic activity you do (bike/treadmill/elliptical/rower/pool/etc). During the \"jog\" intervals you just move a little faster or harder, or increase the tension or incline.  You use those little intervals to switch up the workout and recruit more muscles and pump the blood a little more and then recover again in the \"walk\" intervals by slowing down, decreasing the incline or turning down the tension.  3-4 days a week is not that much to ask and the benefits are enormous.  Start slow and develop the base from which you can then build on and reduce the risk of injury.  It's much more important 2-4 months from now to be enjoying your exercise then it is to over exert yourself at the start and hurting yourself.  Starting slowly allows your body to accept the training better down the road when the exercise becomes crucial for weight maintenance.  Without exercise down the road during your maintenance phase, all this hard work you are about to put in can be undone. It usually takes about 100-300 calories a day of exercise to maintain a weight loss and our focus during weight loss season is to generate the routine/activities and hobbies " that make that enjoyable/sustainable.    Thanks for taking this first and most important step in your weight loss season.  Commit to it and we will cheerlead you all the way to success.  When things get tough or off track we'll offer guidance and analysis and when you reach your goal we'll celebrate your success.  In the end, it is all about your success and what you do with it.      Andrei Myles MD  [facility]  [phone]      To help lose weight in a safe way and sustainable way, I'd like to start you on a 1300 calorie diet each day.  Understanding that every 3500 calorie deficit adds up to a pound of weight reduction, with the combination of light aerobic exercise, some modest weight training and diet, we should be able to lose around 1 to 2.5lbs weekly depending on your starting height and weight.    Hunger and fatigue are the enemies of weight loss and behavior change in general.  We become much more habit and reactive in our eating when we're hungry and/or tired and frequently have less self control.  It's not a personal failing, it's just body chemistry.   We can combat this by trying to avoid being tired and hungry at the same time.  To help this,  try to front load your calories in the first 10 hours of you day so you get into the fatigued evening hours reasonably full and you can control impulses/mindless eating a lot better and avoid those bedtime snacks/evening treats that the tired brain craves.    Weight loss goes through ups and downs and plateaus but if you stay on the program, you will enjoy success.   Commit to the process, try to be good at least 19 days out of 20 and continue to think about why you're doing this and what you're working towards. If you haven't thought of your reward for hitting your weight loss goals, think about it now. Using these little victory bribes along the way helps a lot.    Finding a diet that is satisfying and repeatable-- day in and  day out, improves success.  An outline of how to break up the day's food is given below.  It is a starting point and example of what a 1300 calorie diet looks like and you can modify the listed foods to suite your particular tastes, but pay attention to portions.   Do not skip breakfast as it will leave you hungry and lead to overeating at some point during the rest of the day.  If you can make Supper the last food for the day more days of the week then not it can help.  That means that those first 10-12 hours of the day and hitting your protein/intake targets for all three meals is vital to your success and evening hunger.    Start reading labels so you know that you're getting what you think you are and start measuring foods so you can eventually look at a portion and understand how it will provide the fuel you want.    Prepping raw veggies after you buy them (washing and putting into bags/tupperware for easy access), cooking several chicken breasts for the next 2-3 lunches and generally being able to grab and go what will keep you on target when time is short will greatly aid your success.  Prepare and plan and success will follow.    Read labels:  for protein portions/yogurt, protein bars etc looking for items with more than 10 grams of protein and less than 10 grams of sugar is very helpful.  Frozen meals should have at least 18 grams of protein, under 10 grams of sugar as well (typically around 300 calories).    Please note: if you've had previous bariatric surgery: wait 20-30 minutes before/after eating to drink your beverages to avoid early fullness/dumping syndrome/worsening malabsorption, early loss of fullness and hunger.  Start with eating your protein first, slow down your meals and chew thoroughly.          1300 calorie diet:  Think Big Breakfast, Medium Lunch, smaller dinner.    Breakfast goal of 300 calories-350 calories (egg, 1/3 to 1/2 cup cooked old fashioned oatmeal or steel cut oats and  berries,3-4oz greek yogurt.)Glass of water and if you like coffee (black) or tea.        Mid morning Snack or part of lunch, about 2-2.5 hrs later: 100 calories (cottage cheese/string cheese/ fruit or banana) and a handful of cruchy/green veggies (cucumber/celery/green peppers/broccoli).  Small glass of water    Lunch:  300 calories (tuna with little perkins (no bread), apple, salad with drizzle of olive oil/balsamic vinegar for example)  Water    Snack:  100 calories.  4-5 oz Greek Yogurt with at least 17 grams of protein per serving.    Or Cottage cheese (lower sodium version preferable). Get this in about 2 hrs before you plan to have dinner. Protein drinks with at least 15-20 grams of protein and less than 6 grams of sugar could be used here to hit protein goals and decrease afternoon/evening hunger.  Glass of water    Dinner:  350 calories (4 oz meat or fish with cooked veggies or salad with minimal dressing, one piece of bread).  Glass of water or unsweetened tea with lemon    Snack: 100 calories Medium Apple or Small handful of nuts, about 2/3 to 3/4 an ounce (almonds/walnuts/cashews or pistachios are ideal).   Glass of water.    Try to avoid all soda and juice (low sodium V8 ok once a day).   You can do it!    Choose an activity that is fun/interesting and available to you such as  Going for a swim for 15 minutes, walking 40 minutes, elliptical 20 minutes or cycling 20 minutes as many days a week as you can.  If those times are too long for your fitness level, start at 10 minutes of movement and each week try to increase by 2 minutes each week.  The first 70 minutes a week (10 minutes a day only) of exercise drops the mortality rate of a sedentary person 30%!!!!  Our goal is to work up to 150 minutes or more per week of moderate to vigorous exercise  to optimize metabolism and prevent weight regain during maintenance.     10 minutes of weight lifting can be helpful as well or using some body weight exercises  like wall squats, wall pushups, seat presses, yoga moves. At least twice weekly helps maintain a good strength to weight ratio, more days is better.    If you are into strenuous weight lifting or prolonged exercise, use an online calculator for how many calories you've burned and if your exercise is lasting over 60 minutes, replace 25-30% of those calories burned immediately after exercise .  For the more limited exercise (less than 500 calories burned), there is no need to add extra food unless you notice a lot of hunger on your food journal.  Usually  Even a  calorie load after longer workouts is more than adequate.  For longer efforts, hunger will increase if you don't refuel afterwards and can get meal plan off track due to hunger, so replenish immediately after the workout to keep on the diet plan and feeling good.    Exercise example:  If you burned 1000 calories during the exercise, immediately (within 30 minutes) have a snack/replacement beverage totaling 250-300 calories and ideally have a 3:1 ratio of carbohydrate grams to protein grams to keep muscles ready for exercise the next day.  Have your next, full meal within 2-3 hours of exercise.    Tips for success:  KEEP A FOOD JOURNAL and a log of daily weights.  1.  Prepare proteins ahead of time (broil chicken breasts in bulk so you can grab and go), steel cut oats can be stored in casserole dish/bowl in the fridge for quick scoop in the morning and rewarm in microwave, make use of crock pot recipes (watch salt content).    2.  Drink a 8-12 oz glass of water every 2-3 hours when awake.  We often mistake hunger for thirst, especially when losing weight.    3. Remember your Reward and Motivation when things get hard.    4.  Weigh yourself every morning and record, you'll stay on track better and learn how your body loses weight. Don't worry about 1 or 2 day patterns, but when on track you'll notice good trend downward of weight over 3-4 day  "segments.    5. Call or use [employer] messaging if problems/concerns .    6.  Find a handful of meals/foods that keep you on track and get into a boring routine that is sustainable for you.    7.  Take a complete multivitamin just to make sure all micronutrients are adequate during weight loss.    8. If losing hair/brittle nails it usually means you are not taking enough protein.  Minimum goal is 60 grams daily of protein, most people with normal kidneys do well with upwards of 100-120 grams/day of protein. Consider taking Biotin as supplement or a \"Hair and Nail\" multivitamin.  If you are hypothyroid and losing hair, see you doctor for a check up of your levels if you haven't had one recently.    9.  Getting adequate sleep is very important for starting your day properly, when we are sleep deprived, our morning appetite is suppressed and without eating an adequate breakfast, we overeat later in the day when we're tired.  Aim for 7 hours of sleep nightly if possible.  If you sleep is disturbed, perform some introspection on stressors/depression/anxiety/PTSD events or possible sleep disorders and we can trouble shoot solutions/evaulations if issues persist.    Exercise during the day, meditative breathing before bed and after waking and removing the television from the bedroom are easy ways to improve quality of sleep.  Most people can tolerate 1-5 mg of melatonin about an 60-90 minutes before they plan to go to bed if these measures aren't helping.    10. Relaxation.  Controlled breathing exercises can lower stress levels in the brain.  One technique is 4, 7, 8 breathing:  Place the tip of your tongue behind your front teeth, breath in through your mouth for 4 seconds, Hold it for 7 seconds and breath out slowly, making a leaky tire noise for 8 seconds.  Repeat 4 times.  Ideally do at the start and end of your day or if feeling stressed.  It works and it's why meditation/yoga/martial arts are often very breath based " activities.  There are breathing techniques for alertness as well as relaxation out there and they can be quite helpful.
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
## 2021-05-28 NOTE — TELEPHONE ENCOUNTER
Left message reporting good lab values and no need for new/additional or decreased supplementation. Cortisol levels also within normal range/no excess and no suggestion for cushing's. Previous heavy metal exposure does not appear to be ongoing.  Andrei Myles MD
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
## 2021-05-31 VITALS — BODY MASS INDEX: 36.1 KG/M2 | HEIGHT: 67 IN | WEIGHT: 230 LBS

## 2021-05-31 VITALS — BODY MASS INDEX: 38.3 KG/M2 | WEIGHT: 244 LBS | HEIGHT: 67 IN

## 2021-06-01 VITALS — WEIGHT: 236.6 LBS | BODY MASS INDEX: 38.77 KG/M2

## 2021-06-01 VITALS — HEIGHT: 66 IN | WEIGHT: 241.2 LBS | BODY MASS INDEX: 38.76 KG/M2

## 2021-06-01 VITALS — BODY MASS INDEX: 37.28 KG/M2 | HEIGHT: 66 IN | WEIGHT: 232 LBS

## 2021-06-01 VITALS — WEIGHT: 242 LBS | BODY MASS INDEX: 37.9 KG/M2

## 2021-06-01 VITALS — WEIGHT: 239 LBS | HEIGHT: 66 IN | BODY MASS INDEX: 38.41 KG/M2

## 2021-06-01 VITALS — BODY MASS INDEX: 38.57 KG/M2 | HEIGHT: 66 IN | WEIGHT: 240 LBS

## 2021-06-01 VITALS — HEIGHT: 66 IN | WEIGHT: 240 LBS | BODY MASS INDEX: 38.57 KG/M2

## 2021-06-02 VITALS — HEIGHT: 66 IN | WEIGHT: 244 LBS | BODY MASS INDEX: 39.21 KG/M2

## 2021-06-02 VITALS — WEIGHT: 238.8 LBS | BODY MASS INDEX: 38.38 KG/M2 | HEIGHT: 66 IN

## 2021-06-02 VITALS — BODY MASS INDEX: 39.26 KG/M2 | WEIGHT: 244.3 LBS | HEIGHT: 66 IN
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
## 2021-06-03 VITALS
DIASTOLIC BLOOD PRESSURE: 70 MMHG | SYSTOLIC BLOOD PRESSURE: 124 MMHG | OXYGEN SATURATION: 94 % | BODY MASS INDEX: 39.32 KG/M2 | WEIGHT: 244.7 LBS | HEIGHT: 66 IN | RESPIRATION RATE: 14 BRPM | HEART RATE: 63 BPM

## 2021-06-03 VITALS
DIASTOLIC BLOOD PRESSURE: 76 MMHG | BODY MASS INDEX: 40.82 KG/M2 | WEIGHT: 245 LBS | HEIGHT: 65 IN | RESPIRATION RATE: 16 BRPM | HEART RATE: 68 BPM | SYSTOLIC BLOOD PRESSURE: 142 MMHG

## 2021-06-03 VITALS
DIASTOLIC BLOOD PRESSURE: 72 MMHG | BODY MASS INDEX: 40.82 KG/M2 | TEMPERATURE: 97.7 F | HEART RATE: 75 BPM | SYSTOLIC BLOOD PRESSURE: 118 MMHG | HEIGHT: 65 IN | WEIGHT: 245 LBS | RESPIRATION RATE: 20 BRPM

## 2021-06-03 VITALS — BODY MASS INDEX: 38.57 KG/M2 | WEIGHT: 240 LBS | HEIGHT: 66 IN

## 2021-06-03 VITALS — BODY MASS INDEX: 38.56 KG/M2 | WEIGHT: 239.9 LBS | HEIGHT: 66 IN
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
## 2021-06-03 NOTE — TELEPHONE ENCOUNTER
Patient left the clinic before wound care could be completed on left calf skin tear. Called patient and left voicemail asking her to call back with who her home care agency is, if she has it.      Update: Dr. Le said patient does not have home care and can change wound on own three times a week. If patient calls back she may need help ordering supplies.
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
## 2021-06-03 NOTE — PATIENT INSTRUCTIONS - HE
- overall, I am glad to hear that you seem to be doing better than when I first met you  - I think we can now start a low dose beta blocker - this may modestly help with your shortness of breath on exertion, but continued weight loss is still very much desirable  - also, at any point in the future, whenever you are ready, I would be open to starting you on a statin (still my recommendation)  - lt's follow up in 1 year (or as needed) and we will re-check an echo then

## 2021-06-05 VITALS
OXYGEN SATURATION: 98 % | SYSTOLIC BLOOD PRESSURE: 128 MMHG | BODY MASS INDEX: 39.94 KG/M2 | WEIGHT: 240 LBS | DIASTOLIC BLOOD PRESSURE: 80 MMHG | TEMPERATURE: 97.8 F | HEART RATE: 50 BPM

## 2021-06-05 VITALS — HEIGHT: 65 IN | WEIGHT: 245 LBS | BODY MASS INDEX: 40.82 KG/M2

## 2021-06-07 NOTE — PATIENT INSTRUCTIONS - HE
Plan: Continue mindful eating, aiming for 1300 fernandez daily with 60 to 80 g of protein.  Incorporate walking regularly 5 to 15 minutes to start with a goal of adding 1 minute duration each week until able to sustain 35 to 40-minute walks.  When her gym reopens she will continue to work with her  for weightbearing exercise to further improve her metabolic burn as well as protect bone density.    To help lose weight in a safe way and sustainable way, I'd like to start you on a 1300 calorie diet each day.  Understanding that every 3500 calorie deficit adds up to a pound of weight reduction, with the combination of light aerobic exercise, some modest weight training and diet, we should be able to lose around 1 to 2.5lbs weekly depending on your starting height and weight and exercise routine with this goal intake.    Hunger and fatigue are the enemies of weight loss and behavior change in general.  We become much more reactive in our eating when we're hungry and tired and decrease our levels of self control.  It's not a personal failing, it's just body chemistry.   We can combat this by trying to avoid being tired and hungry at the same time.  To help this,  try to front load your calories in the first 10 hours of you day so you get into the fatigued evening hours reasonably full and you can control impulses/mindless eating a lot better and avoid those bedtime snacks/evening treats that the tired brain craves.  If you can getting your exercise in the beginning of your day has also been shown to have superior results (but anytime is better than none).    Weight loss goes through ups and downs and plateaus but if you stay on the program, you will enjoy success.   Commit to the process, try to be good at least 19 days out of 20 and continue to think about why you're doing this and what you're working towards. If you haven't thought of your reward for hitting your weight loss goals, think about it now. Using these  little victory bribes along the way helps a lot.    Finding a diet that is satisfying and repeatable-- day in and day out, improves success.  An outline of how to break up the day's food is given below.  It is a starting point and example of what a 1300 calorie diet looks like.  You can modify the listed foods to suite your particular tastes, but pay attention to portions and protein content.   Do not skip breakfast on this plan as it will leave you hungry and lead to overeating at some point during the rest of the day.  If you can make supper the last food for the day more days of the week then not, it will help a lot.  That means that the intake during those first 10-12 hours of the day and hitting your protein/intake targets for all three meals is vital to your success and evening hunger.    Start reading labels so you know that you're getting what you think you are and start measuring foods so you can eventually look at a portion and understand how it will provide the fuel you want.    Prepping raw veggies after you buy them (washing and putting into bags/tupperware for easy access), cooking several chicken breasts/proteins for the next 2-3 lunches and generally being able to grab and go what will keep you on target when time is short will greatly aid your success.  Prepare and plan and success will follow.    Read labels:  for protein portions/yogurt, protein bars etc looking for items with more than 10 grams of protein and less than 10 grams of sugar is very helpful.  Frozen meals should have at least 18 grams of protein, under 10 grams of sugar as well (typically around 300-380 calories).    Please note: if you've had previous bariatric surgery: wait 20-30 minutes before/after eating to drink your beverages to avoid early fullness/dumping syndrome/worsening malabsorption, early loss of fullness and hunger.  Start with eating your protein first, slow down your meals and chew thoroughly.          1300 calorie  diet:  Think Big Breakfast, Medium Lunch, smaller dinner.    Breakfast goal of 300 calories-350 calories (egg, 1/3 to 1/2 cup cooked old fashioned oatmeal or steel cut oats and berries,3-4oz greek yogurt.)Glass of water and if you like coffee (black) or tea.        Mid morning Snack or part of lunch, about 2-2.5 hrs later: 100 calories (cottage cheese/string cheese/ fruit or banana) and a handful of cruchy/green veggies (cucumber/celery/green peppers/broccoli).  Small glass of water    Lunch:  300 calories (3.5-4 oz tuna with little perkins (no bread), apple, salad with drizzle of olive oil/balsamic vinegar for example)  Water    Snack:  100 calories.  4-5 oz Greek Yogurt with at least 17 grams of protein per serving.    Or Cottage cheese (lower sodium version preferable). Get this in about 2 hrs before you plan to have dinner. Protein drinks with at least 15-20 grams of protein and less than 6 grams of sugar could be used here to hit protein goals and decrease afternoon/evening hunger.  Glass of water    Dinner:  350 calories (4 oz meat or fish with cooked veggies or salad with minimal dressing, one piece of bread).  Glass of water or unsweetened tea with lemon  Dessert: 100 calories Medium Apple or Small handful of nuts, about 2/3 of an ounce (almonds/walnuts/cashews or pistachios are ideal).   Glass of water.    Try to avoid all soda and juice (low sodium V8 ok once a day).   You can do it!    Choose an activity that is fun/interesting and available to you such as  Going for a swim for 15 minutes, walking 40 minutes, elliptical 20 minutes or cycling 20 minutes as many days a week as you can.  If those times are too long for your fitness level, start at 10 minutes of movement and each week try to increase by 2 minutes each week.  The first 70 minutes a week (10 minutes a day only) of exercise drops the mortality rate of a sedentary person 30%!!!!  Our goal is to work up to 150 minutes or more per week of moderate to  vigorous exercise  to optimize metabolism and prevent weight regain during maintenance. If time is short and your fitness allows it, high intensity interval training can be a nice way to cut the workout time in half:  warm up 2-4 minutes then 3-6 intervals of increased intensity effort (70% of max heart rate) followed by an equal amount or more of recovery before repeating, then 1-3 minutes of cooldown.     10 minutes of weight lifting can be helpful as well or using some body weight exercises like wall squats, pushups (with assistance as needed or standing/wall pushups), seat presses, yoga moves. At least twice weekly helps maintain a good strength to weight ratio, more days is better.  Crowdly is a great resource for free video demonstrations.    If you are into strenuous weight lifting or prolonged exercise, use an online calculator for how many calories you've burned and if your exercise is lasting over 60 minutes, replace 20-30% of those calories burned immediately after exercise .  For the more limited exercise (less than 500 calories burned), there is no need to add extra food unless you notice a lot of hunger on your food journal.  Usually  Even a  calorie load after longer workouts is more than adequate.  For longer efforts, hunger will increase if you don't refuel afterwards and can get meal plan off track due to hunger, so replenish immediately after the workout to keep on the diet plan and feeling good.    Exercise example:  If you burned 1000 calories during the exercise, immediately (within 30 minutes) have a snack/replacement beverage totaling 200-300 calories and ideally have a 3:1 ratio of carbohydrate grams to protein grams to keep muscles ready for exercise the next day.  Have your next, full meal within 2-3 hours of exercise.    Tips for success:  KEEP A FOOD JOURNAL and a log of daily weights.  Pencil and paper works fine for most. Otherwise, Myfitnesspal, fitbit, spark, Loseit, garmin are  "all good tracker apps/programs or websites for food/fitness.  Remembering to ask yourself, \"How did my nourishment affect me today\" and comparing \"good days\" to \"hungry days\" to solidify what helps your body, in your life, feel it's best while losing weight.    1.  Prepare proteins ahead of time (broil chicken breasts in bulk so you can grab and go), steel cut oats can be stored in casserole dish/bowl in the fridge for quick scoop in the morning and rewarm in microwave, make use of crock pot recipes (watch salt content).    2.  Drink a 8-12 oz glass of water every 2-3 hours when awake.  We often mistake hunger for thirst, especially when losing weight.    3. Remember your Reward and Motivation when things get hard.    4.  Weigh yourself every morning and record, you'll stay on track better and learn how your body loses weight. Don't worry about 1 or 2 day patterns, but when on track you'll notice good trend downward of weight over 3-4 day segments.    5. Call or use [employer] messaging if problems/concerns .    6.  Find a handful of meals/foods that keep you on track and get into a boring routine that is sustainable for you.    7.  Take a complete multivitamin just to make sure all micronutrients are adequate during weight loss.    8. If losing hair/brittle nails it often means you are not taking enough protein.  Minimum goal is 60 grams daily of protein, most people with normal kidneys do well with upwards of 100-120 grams/day of protein. Consider taking Biotin as supplement or a \"Hair and Nail\" multivitamin.  If you are hypothyroid and losing hair, see you doctor for a check up of your levels if you haven't had one recently.    9.  Getting adequate sleep is very important for starting your day properly, when we are sleep deprived, our morning appetite is suppressed and without eating an adequate breakfast, we overeat later in the day when we're tired.  Our body heals in our sleep and our mental and immune health " depends on this rest.  Aim for 7-8 hours of sleep nightly if possible.  If you sleep is disturbed, perform some introspection on stressors/depression/anxiety/PTSD events or possible sleep disorders and we can trouble shoot solutions/evaulations if issues persist.    Exercise during the day, meditative breathing before bed and after waking and removing the television from the bedroom are easy ways to improve quality of sleep.      10. Relaxation.  Controlled breathing exercises can lower stress levels in the brain.  One technique is 4, 7, 8 breathing:  Place the tip of your tongue behind your front teeth, breath in through your mouth for 4 seconds, Hold it for 7 seconds and breath out slowly, making a leaky tire noise for 8 seconds.  Repeat 4 times.  Ideally do at the start and end of your day or if feeling stressed.  It works and it's why meditation/yoga/martial arts are often very breath based activities.  There are breathing techniques for alertness as well as relaxation out there and they can be quite helpful.      Protein Supplements    Look for (per serving):  15-30 grams protein  Less than 10 grams total carbohydrate    Product Type Serving Calories Protein Grams Sugar Grams Where Available   Premier Protein Shake 1 can 160 30 1 [place]/[employer]   AdvantEdge Carb (EAS) Shake 1 can 110 17 0 [facility], [employer], Grocery/Pharmacy   Pure Protein Shake 1 can 110 20 1 Target,  [place]   Slim Fast   High Protein Shake 1 can 190 20 1 [facility], Target, Grocery/Pharmacy   Atkins Advantage Shake 1 can 160 15-17 1 [facility], Target, Grocery/Pharmacy   Isopure Zero Carb Juice   bottle 80 20 0 [employer], vitamin shoppe, online   Muscle Milk Light Shake 1 can 110 15 0 [facility], [employer], [facility], Grocery/Pharmacy    Whey Shake 1 bottle 100 18 2 www.designerwhey.com   Unjury Protein Powder 1 scoop 90 20 0 www.BlockSpring.[place]ie  1-844.409.2590   Ace Nutrition (gluten-free) Powder 1 scoop 180 27 2 www.Resolvyx PharmaceuticalskenCoverMyMedstionOne [place]    Nectar Protein Powder  (fruit-flavors) 1 scoop or packet 90 23 0 www.dietdirect.com  www.bariatriceating.com  [facility], Vitamin Shoppe    Whey Powder 1 scoop 100 18 2 Target, [facility], [facility], [email or web address] [email or web address].[email or web address]   Myoplex Lite Powder 1 scoop 180 25 1 www.Elevator Labs.com  [facility], [employer]  [facility]   Nikhil Skinner Whey Protein Powder Powder 1 scoop 110 25 0 [facility], Vitamin Shoppe, etc     Protein powders can be used and many brands exist that can provide the 20-30 grams of protein per serving: [facility], Manolo's Whey, BiPro [place], BulletProof Collagen and many more are examples in many stores. Cost can be a concern.    LEAN PROTEIN SOURCES  Getting 20-30 grams of protein, 3 meals daily, is appropriate for most people, some need more but more than about 40 grams per meal is not useful.  General rule is drinking one ounce of water per gram of protein eaten over the course of the day:  70 grams of protein each day, drink 70 oz of water.  Protein Source Portion Calories Grams of Protein                           Nonfat, plain Greek yogurt    (10 grams sugar or less) 3/4 cup (6 oz)  12-17   Light Yogurt (10 grams sugar or less) 3/4 cup (6 oz)  6-8   Protein Shake 1 shake 110-180 15-30   Skim/1% Milk or lactose-free milk 1 cup ( 8 oz)  8   Plain or light, flavored soymilk 1 cup  7-8   Plain or light, hemp milk 1 cup 110 6   Fat Free or 1% Cottage Cheese 1/2 cup 90 15   Part skim ricotta cheese 1/2 cup 100 14   Part skim or reduced fat cheese slices 1 ounce 65-80 8     Mozzarella String Cheese 1 80 8   Canned tuna, chicken, crab or salmon  (canned in water)  1/2 cup 100 15-20   White fish (broiled, grilled, baked) 3 ounces 100 21   [place]/Tuna (broiled, grilled, baked) 3 ounces 150-180 21   Shrimp, Scallops, Lobster, Crab 3 ounces 100 21   Pork loin, Pork Tenderloin 3 ounces 150 21   Boneless, skinless chicken /turkey breast                          (broiled, grilled, baked) 3 ounces 120 21   [place] Buffalo,  and Venison 3 ounces 120 21   Lean cuts of red meat and pork (sirloin,   round, tenderloin, flank, ground 93%-96%) 3 ounces 170 21   Lean or Extra Lean Ground Turkey 1/2 cup 150 20   90-95% Lean [place] Burger 1 jacek 140-180 21   Low-fat casserole with lean meat 3/4 cup 200 17   Luncheon Meats                                                        (turkey, lean ham, roast beef, chicken) 3 ounces 100 21   Egg (boiled, poached, scrambled) 1 Egg 60 7   Egg Substitute 1/2 cup 70 10   Nuts (limit to 1 serving per day)  3 Tbsp. 150 7   Nut Bressler (peanut, almond)  Limit to 1 serving or less daily 1 Tbsp. 90 4   Soy Burger (varies) 1  15   Garbanzo, Black, Mariscal Beans 1/2 cup 110 7   Refried Beans 1/2 cup 100 7   Kidney and Lima beans 1/2 cup 110 7   Tempeh 3 oz 175 18   Vegan crumbles 1/2 cup 100 14   Tofu 1/2 cup 110 14   Chili (beans and extra lean beef or turkey) 1 cup 200 23   Lentil Stew/Soup 1 cup 150 12   Black Bean Soup 1 cup 175 12

## 2021-06-07 NOTE — PROGRESS NOTES
"Debra Manriquez is a 80 y.o. female who is being evaluated via a billable telephone visit.      The patient has been notified of following:     \"This telephone visit will be conducted via a call between you and your physician/provider. We have found that certain health care needs can be provided without the need for a physical exam.  This service lets us provide the care you need with a short phone conversation.  If a prescription is necessary we can send it directly to your pharmacy.  If lab work is needed we can place an order for that and you can then stop by our lab to have the test done at a later time.    If during the course of the call the physician/provider feels a telephone visit is not appropriate, you will not be charged for this service.\"     Patient has given verbal consent to a Telephone visit? Yes    Debra Manriquez complains of    Chief Complaint   Patient presents with     Bariatric       I have reviewed and updated the patient's Past Medical History, Social History, Family History and Medication List.    ALLERGIES  Lisinopril; Metoclopramide; and Reglan [metoclopramide hcl]    Additional provider notes: Phone call today was to review progress and nonsurgical weight loss.  She is not using any medications due to her age.  She has self prescribed numerous nutritional supplements that she tolerates well.  She has been followed by her endocrinologist for her osteoporosis and is continuing to receive Prolia injections along with calcium and vitamin D supplementation.  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  She did not relate what her weight was down to.  Her exercise has been limited as she is getting up and running with her remote teaching responsibilities but she feels capable of taking on a " walk next week when the weather turns nice and using her Nordic walking sticks to assist her balance.    Started eating more calcium foods. Plans to get bone scan next year. Getting Prolia w/ Endocrine clinic.  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 Has lost 5 lbs thus far.  Exercise is suffering as her gym closed. But has nordic walking sticks and plans to start walking Monday.   Teaching on line  Plan: Continue mindful eating, aiming for 1300 fernandez daily with 60 to 80 g of protein.  Incorporate walking regularly 5 to 15 minutes to start with a goal of adding 1 minute duration each week until able to sustain 35 to 40-minute walks.  When her gym reopens she will continue to work with her  for weightbearing exercise to further improve her metabolic burn as well as protect bone density.    Phone call duration: 10 minutes    NATHANIEL Garcia MD

## 2021-06-08 NOTE — PROGRESS NOTES
"Debra Manriquez is a 80 y.o. female who is being evaluated via a billable video visit.      The patient has been notified of following:     \"This video visit will be conducted via a call between you and your physician/provider. We have found that certain health care needs can be provided without the need for an in-person physical exam.  This service lets us provide the care you need with a video conversation.  If a prescription is necessary we can send it directly to your pharmacy.  If lab work is needed we can place an order for that and you can then stop by our lab to have the test done at a later time.    Video visits are billed at different rates depending on your insurance coverage. Please reach out to your insurance provider with any questions.    If during the course of the call the physician/provider feels a video visit is not appropriate, you will not be charged for this service.\"    Patient has given verbal consent to a Video visit? Yes    Will anyone else be joining your video visit? No          Additional provider notes: in chart    Video-Visit Details    Type of service:  Video Visit    Originating Location (pt. Location): Home    Distant Location (provider location):  [facility] [place]      Platform used for Video Visit: Kan Pierre LPN    "

## 2021-06-08 NOTE — TELEPHONE ENCOUNTER
----- Message from Kristin Cash sent at 6/10/2020  9:15 AM CDT -----      Caller: Patient    Primary cardiologist: Dr. Mcallister    Detailed reason for call: Patient is wondering how soon she can get tested. She wants to try and lower her cholesterol naturally and is wondering how long that takes. Please advise    Best phone number: [phone]  Best time to contact: today  Ok to leave a detailed message? yes  Device? no
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
## 2021-06-08 NOTE — TELEPHONE ENCOUNTER
Called pt and discussed her concerns. Pt states her pcp Dr. Mcpherson had prescribed atorvastatin 10mg daily d/t recent lipid panel being elevated. Pt was hesitent on starting it. Pt thought she could work on her diet and exercise to better her values. Discussed eating a heart healthy diet and exercise. Pt has decided to try adding the atorvastatin to her medication regimen since she is not sure she would be able to fully work on lifestyle modifications that may help her numbers. Pt was instructed to contact Dr. Mcpherson after starting atorvastatin if she develops new onset muscle aches and pains. Patient verbalizes understanding and agrees with plan. No further questions or concerns at this time.

## 2021-06-08 NOTE — PROGRESS NOTES
Patient requested to have AVS sent to [email or web address], this was done. She will call back after reviewing and verify what wound care supplies she needs ordered.

## 2021-06-10 ENCOUNTER — COMMUNICATION - HEALTHEAST (OUTPATIENT)
Dept: CARDIOLOGY | Facility: CLINIC | Age: 81
End: 2021-06-10

## 2021-06-10 DIAGNOSIS — I10 ESSENTIAL HYPERTENSION: ICD-10-CM
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
## 2021-06-14 NOTE — PROGRESS NOTES
This is a new consult for Varicose Veins. The patient has varicose veins that are problematic in bilateral legs. Symptoms patient has been experiencing are discoloration and  swelling. Patient  has been wearing compression stockings.     Discoloration  is present.  Pt  has been using pain medication or antiinflammatory's.

## 2021-06-14 NOTE — PATIENT INSTRUCTIONS - HE
I am glad to hear how well you've been doing.    As discussed, let's get an echo, add rosuvastatin 5mg daily and re-check labs in 6-8 weeks    If tolerated,  can help further adjust its dose

## 2021-06-14 NOTE — PATIENT INSTRUCTIONS - HE
Swelling and Compression Therapy    Swelling in the legs can be caused by many reasons. No matter what the reason, treatment usually includes some type of compression. This may be done with a support sock, dressing, ace wrap, or layered wraps.     It is important to treat the swelling for many reasons. If the swelling is not treated you may develop blisters that can lead to ulcerations. This is caused when extra fluid goes into tissue causing damage and blocking blood flow to the tissue.     It is important that you wear your compression every day, including days that you will be seen in clinic.     Compression is often the most important part of treating leg wounds. Without controlling the swelling it is often not possible to heal wounds.     Going without compression for even brief periods of time can be damaging to your legs and your health.  Your compression should be put on first thing in the morning. Take the compression off at night only when instructed by your care provider to do so. Sometimes wearing compression 24 hours a day will be recommended.       If you are having difficulty wearing your compression it is important to notify your care provider so that other options may be reviewed.    Please call us if you have any questions [phone].    Thank you for choosing [facility].

## 2021-06-15 NOTE — TELEPHONE ENCOUNTER
Patient called with results of echo and Dr. Morel's recommendations.   She knows to get her labs drawn soon, she plans to go in on Friday of this week.  Patient to call with any concerns or follow up as planned in a year.        Erin Montanez RN, BSN  Valve Clinic Coordinator  [facility]  [phone]  03/08/21 2:55 PM     ------------------------------------------------------------------------------------------------------------------------------------  Julien Mcallister MD Murphy, Christian, RN Chrissy,      Echo still looking unchanged and reassuring.   Let's plan for her to check lipid profile/LFT's as discussed, to adjust statin dose if she is tolerating it.   Follow up as planned in 1 year or as needed.      Thx!   Julien

## 2021-06-16 PROBLEM — C44.310 BASAL CELL CARCINOMA OF FACE: Status: ACTIVE | Noted: 2020-06-17

## 2021-06-16 PROBLEM — E66.01 MORBID OBESITY (H): Status: ACTIVE | Noted: 2019-03-04

## 2021-06-16 PROBLEM — G62.9 PERIPHERAL NEUROPATHY: Status: ACTIVE | Noted: 2019-03-04

## 2021-06-16 PROBLEM — I45.10 RBBB: Status: ACTIVE | Noted: 2020-06-17

## 2021-06-16 PROBLEM — N32.81 OVERACTIVE BLADDER: Status: ACTIVE | Noted: 2020-06-17

## 2021-06-16 PROBLEM — S81.811A NONINFECTED SKIN TEAR OF LEG, RIGHT, INITIAL ENCOUNTER: Status: ACTIVE | Noted: 2019-11-11

## 2021-06-16 PROBLEM — R26.89 FUNCTIONAL GAIT ABNORMALITY: Status: ACTIVE | Noted: 2019-03-04

## 2021-06-16 PROBLEM — M79.605 LEG PAIN, LEFT: Status: ACTIVE | Noted: 2017-12-18

## 2021-06-16 PROBLEM — I87.303 VENOUS HYPERTENSION OF LOWER EXTREMITY, BILATERAL: Status: ACTIVE | Noted: 2021-04-19

## 2021-06-16 PROBLEM — I89.0 LYMPHEDEMA: Status: ACTIVE | Noted: 2020-06-17

## 2021-06-16 PROBLEM — R91.1 SOLITARY PULMONARY NODULE: Status: ACTIVE | Noted: 2020-06-17

## 2021-06-16 PROBLEM — G47.33 OBSTRUCTIVE SLEEP APNEA: Status: ACTIVE | Noted: 2020-06-17

## 2021-06-16 PROBLEM — L97.211: Status: ACTIVE | Noted: 2020-06-17
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
## 2021-06-16 NOTE — TELEPHONE ENCOUNTER
Marianela called from [facility]. She would like to know how many grams pt should use per application for the Diclofenac gel. Please call back for clarification [phone].

## 2021-06-16 NOTE — PATIENT INSTRUCTIONS - HE
Continue compression socks and continue to regularly update.   Continue work on exercise.  Okay to start to use biotab compression pump. Call company to help with settings and usage.  Referral to Dr. Corbin for right knee pain. Diclofenac arthritis gel 1% to try.     Follow up in 6 months, or when needed.   Watch for any increased redness, pain, temperature, drainage and/or any new ulcerations in the leg(s).  Call the clinic with any questions and/or concerns.      Referral to Dr. Devora Corbin  [phone]  [facility]      To order compression socks over the counter through amazon.com or other online site:     Compression needed:  20-30 mmHg    Length:  knee high    Toe Piece:  closed toe    Measures:        Inches (in)  Centimeters (cm)    Ankle  R- 9.2   L - 9.8  R - 23.4   L - 25   Calf (Widest Part)  R - 44.9   L - 44.1  R - 44.9   L- 44.1

## 2021-06-16 NOTE — PROGRESS NOTES
3 month f/u leg swelling. Does wear knee high compression 20-30 mmhg daily. Swelling continues with weight.  Weight up from 212lbs over past year with prednisone course. Has started to exercise and has an apt with bariatrics.
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
## 2021-06-18 NOTE — PATIENT INSTRUCTIONS - HE
Patient Instructions by Luna Crouch, RN at 6/17/2020  9:15 AM     Author: Luna Crouch RN Service: -- Author Type: Registered Nurse    Filed: 6/17/2020 10:40 AM Encounter Date: 6/17/2020 Status: Addendum    : Luna Crouch RN (Registered Nurse)    Related Notes: Original Note by Luna Crouch RN (Registered Nurse) filed at 6/17/2020 10:33 AM       Continue wearing compression, and exercise.  Velcro compression will be easier to get on than compression stockings.    Wound Care:  Right anterior shin ulcer:  Cleanse with diluted Hibiclens wash, (30mls hibiclens to 500mls normal saline)  Rinse with saline and pat dry.  Apply adaptic/oil emulsion dressing,   Cover with foam with silicone border dressing.   Then put on your compression.   Change every 2-3 days until closed.    Follow up with Dr. Le when needed.        Today we ordered wound care supplies for you from [facility]. To reorder supplies or if you have any questions about your order please call [employer] Customer Service at [phone]    If you would like to purchase velcro compression wraps over the counter through [employer]:    Recommended Brand: Compreflex Lite Transition. See sizing chart below. You may call our clinic at [phone] for questions about measuring.
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
## 2021-06-21 NOTE — LETTER
Letter by Donal Hair MD at      Author: Donal Hair MD Service: -- Author Type: --    Filed:  Encounter Date: 2/2/2021 Status: (Other)         Yoanna Mcpherson MD  [address]  [place] [address]                                  February 2, 2021    Patient: Debra Manriquez   MR Number: [id number]   YOB: 1940   Date of Visit: 2/2/2021     Dear Dr. Ky MD:    Thank you for referring Debra Manriquez to me for evaluation. Below are the relevant portions of my assessment and plan of care.    If you have questions, please do not hesitate to call me. I look forward to following Debra along with you.    Sincerely,        Donal Hair MD          CC  No Recipients  Donal Hair MD  2/2/2021  5:42 PM  Sign when Signing Visit      [facility] Vein Consult      Assessment:     1. spider veins, bilateral   2. Leg swelling, bilateral   3. History of lymphedema issues and has seen Dr. Le    Plan:     1. Treatment options of conservative therapy of stockings use, exercise, weight loss, elevating legs when possible.    2. Script for compression stockings 20-30 mm hg  3. Ultrasound to evaluate legs for incompetency of both deep and superficial system . Will order at follow up with Dr. Le for lymphedema and I will get back to her with the results  4. Surgical treatment, discussed briefly today  5. Follow up: with Dr. Le with an ultrasound.   6. Call for any questions concerns or issues    Subjective:      Debra Manriquez is a 80 y.o. female  who was referred by Yoanna Mcpherson MD  for evaluation of varicose veins. Symptoms include pain, aching, fatigue, burning, edema and dermatitis. Patient has history of leg selling, pain and vein issues that have progressed. Pain and symptoms have affected daily living and work activities needing medications. Here for evaluation today. Stocking use with compression stockings of 20-30 mm hg or greater for greater then 3  months    Allergies:Lisinopril, Metoclopramide, and Reglan [metoclopramide hcl]    Past Medical History:   Diagnosis Date   ? Edema    ? Fibrocystic breast disease    ? Foot pain    ? GERD (gastroesophageal reflux disease)    ? Lymphedema    ? JADYN (obstructive sleep apnea)    ? Osteoarthritis of knee     hx of knee replacement and pending second   ? Osteopenia    ? Skin cancer, basal cell    ? Thyroid nodule    ? Vitamin B deficiency        Past Surgical History:   Procedure Laterality Date   ? CATARACT EXTRACTION  2011, 2012   ? COLONOSCOPY     ? PARTIAL HYSTERECTOMY     ? TOTAL KNEE ARTHROPLASTY Left 2011   ? VARICOSE VEIN SURGERY Bilateral 1975       Current Outpatient Medications   Medication Sig   ? acetylcysteine (NAC) 600 mg cap capsule Take 1,800 mg by mouth daily.          ? alpha lipoic acid 200 mg cap Take 400 mg by mouth.          ? ascorbic acid, vitamin C, (VITAMIN C) 500 MG tablet Take 500 mg by mouth.   ? astaxanthin 4 mg cap Take 12 mg by mouth.          ? atorvastatin (LIPITOR) 10 MG tablet Take 10 mg by mouth at bedtime.   ? BERB SPARKS/HERBAL COMPLEX NO.18 (BERBERINE-HERBAL COMB NO.18 ORAL) Take 500 mg by mouth daily.          ? biotin 1 mg cap Take by mouth.   ? cephalexin (KEFLEX) 500 MG capsule    ? cholecalciferol, vitamin D3, 5,000 unit Tab Take 10,000 Units by mouth.   ? coenzyme Q10 (CO Q-10) 200 mg capsule Take 800 mg by mouth.          ? cyanocobalamin (VITAMIN B-12) 100 MCG tablet Take 6,000 mcg by mouth daily.          ? doxycycline (VIBRA-TABS) 100 MG tablet Take 100 mg by mouth daily.   ? folic acid (FOLVITE) 1 MG tablet Take 800 mcg by mouth.          ? furosemide (LASIX) 20 MG tablet Take 1 tablet (20 mg total) by mouth daily.   ? hydrocortisone 2.5 % cream APPLY AS DIRECTED TO RASH ON FACE 3X/DAY FOR WK1, 2X/DAY FOR WK 2 & 3X/DAY FOR WK3 THEN STOP   ? levothyroxine (SYNTHROID, LEVOTHROID) 175 MCG tablet Take 175 mcg by mouth.   ? losartan (COZAAR) 50 MG tablet Take 50 mg by mouth  daily.    ? metoprolol succinate (TOPROL-XL) 25 MG Take 1 tablet (25 mg total) by mouth daily.   ? omega 3-dha-epa-fish oil (FISH OIL) 150-217-840 mg CpDR Take 6,000 mg by mouth daily.          ? RESVERATROL, BULK, MISC Use 2,800 mg As Directed daily.          ? rosuvastatin (CRESTOR) 5 MG tablet Take 1 tablet (5 mg total) by mouth at bedtime.   ? selenium 100 mcg Tab Take 200 mcg by mouth.          ? sour cherry extract (TART CHERRY EXTRACT) 1,000 mg cap Take 1,000 mg by mouth.          ? TURMERIC ROOT EXTRACT ORAL Take by mouth.   ? vit C-rutin-hesper-bioflv-stewart 1,000-50-50 mg TbER Take by mouth.   ? VITAMIN A ORAL Take 10,000 Units by mouth.   ? vitamin E 400 UNIT capsule Take 800 Units by mouth daily as needed.          ? VITAMIN K2 ORAL Take 150 mcg by mouth.              Family History   Problem Relation Age of Onset   ? No Medical Problems Mother    ? No Medical Problems Father    ? Alzheimer's disease Maternal Uncle    ? Heart disease Sister    ? Heart disease Brother    ? No Medical Problems Daughter    ? No Medical Problems Son    ? No Medical Problems Brother    ? No Medical Problems Son    ? No Medical Problems Son         reports that she has never smoked. She has never used smokeless tobacco. She reports current alcohol use of about 0.8 standard drinks of alcohol per week. She reports that she does not use drugs.      Review of Systems:  Pertinent items are noted in HPI.  A 12 point comprehensive review of systems was negative except as noted.   Patient has symptomatic veins and changes of bilateral legs. These have progressed to the point of causing symptoms on a daily basis. This causes issues with daily activities and chores such as washing dishes, vacuuming and standing for long lengths of time       Objective:     Vitals:    02/02/21 1146   BP: 150/84   Pulse: 60   Temp: 97.8  F (36.6  C)   TempSrc: Oral     There is no height or weight on file to calculate BMI.    EXAM:  GENERAL: This is a  well-developed 80 y.o. female who appears her stated age  HEAD: normocephalic  HEENT: Pupils equal and reactive bilaterally  MOUTH: mucus membranes intact. Normal dentation  CARDIAC: RRR without murmur  CHEST/LUNG:  Clear to auscultation bilaterally  ABDOMEN: Soft, nontender, nondistended, no masses noted   NEUROLOGIC: Focally intact, nonfocal, alert and oriented x 3  INTEGUMENT: No open lesions or ulcers  VASCULAR: Pulses intact, symmetrical upper and lower extremities. There areskin changes consistent with chronic venous insufficiency. Varicose veins present in bilateral greater saphenous distribution. Spider veins present bilateral.                Imaging:    pending    Donal Hair MD  General Surgery [phone]  Vascular Surgery [phone]

## 2021-06-22 ENCOUNTER — COMMUNICATION - HEALTHEAST (OUTPATIENT)
Dept: CARDIOLOGY | Facility: CLINIC | Age: 81
End: 2021-06-22

## 2021-06-22 DIAGNOSIS — R00.1 BRADYCARDIA: ICD-10-CM

## 2021-06-23 NOTE — PATIENT INSTRUCTIONS - HE
"Plan:  1. Great job with reducing weight since your peak this Fall/early Winter.  Continue mindful eating, optimizing protein to 20 grams per meal, three times daily and hydrating well with 64 oz of water daily.    2. Referral to PT to help w/ balance/strength training to avoid falls.    3. As far as supplements, I'd advocate for continuing your good fish oil, fiber, probiotic with 25 billion CFUs such as [employer] TherBiotic (keep refrigerated and don't take with hot foods/drinks), vitamin D at [id number] IUs daily.       What makes a person succeed with dramatic and sustained weight loss?    It's being at the right point in your life where you feel the need to lose the weight, not because anyone told you so but because of a voice inside of you that says, \"I am ready for this\".  You're now at a point where you may be feeling anxious, irritable and when you look in the mirror you do not recognize the person looking back.  Your true self is buried somewhere in that reflexion and you want to free it again.  This is the sort of motivation that leads to success, and it comes from you.    Because the only person that can lose that extra weight is you, I like my patients to focus on the mindset of being in Weight Loss Season.  This gives you permission to make the changes necessary to be consistent with the diet/activity and behavior changes that lead to successful, healthy weight loss.  Nearly any diet plan can work for weight loss, but keeping it healthy and nutrient based to prevent deficiencies/hair loss/fatigue or irritability is vital.  If you have a plan you want to try, we'll work with you to make sure no adjustments are needed to keep you healthy through your weight loss season and working with our Bariatric Nutritionists you'll be given expert guidance to customize your diet plan to suit your particular needs. If you don't have your own ideas in mind, we are always happy to suggest well researched and " "validated plans that provide enough food to prevent hunger but still tap into your excess fat reserves and lose weight in a sustainable fashion.  There is great evidence that lean protein/healthy fat intake with good fiber intake while minimizing simple starches/carbs produces reliable/sustainable weight loss in most people. But some feel more connected to an intermittent fasting/fast mimicking or ketogenic diet.  These protocols can be hard for many to stick with and that's why we prefer the protein/healthy fat focused diets but if these alternative strategies appeal to you, we can work with you to optimize your knowledge and results with these tools.    Losing weight is a temporary commitment, but you need to be \"All In\" to have a good weight loss season.  To avoid frustration, you have to be willing to be nearly perfect 19 out of 20 days or even better than that. But, weight loss season is generally only 4-8 months in length. After that length of time, it can be hard to maintain a negative calorie balance and our brain, motivations and metabolism will usually bring you to a plateau that cannot be broken in this modern world where other commitments start to take priority. That's when we look to stabilize the weight loss you've achieved.  If you've reached your goal by that time, fantastic, and job well done.  If there is more to go, then after a few months of stabilization, we can usually attack that previous plateau and break into new territory.    Because of this time limitation, we want to really get to work right away and get into a sustainable routine ASAP.  One of the best predictors of how much weight you're going to lose throughout the season is how much you lose in the first 6 weeks, so prepare well and jump in with both feet.      Occasionally, people may feel like they cannot commit fully to the changes necessary and may want to change one thing at a time and \"get used to\" the idea of losing weight.  " "That is OK because that is where they are in their life, and they cannot fully commit for any number of reasons.  It's part of that internal motivation and they just haven't reached PRIORTY NUMBER ONE status yet. It's possible that what they need is more time to reach that point and I am always willing to work with people that want to \"dabble\", but understand, the amount of success obtained with half measures, is much less than half results. But Behavior Change cannot occur until a person goes through the contemplation and preparation phases necessary to have successful action and we are more than willing to give you targets to move along the spectrum and get to that point where you feel ready to  commit fully to the weight loss season.      As you go through your plan, look for things to keep your motivation rolling.  The most successful people have a goal or target/reward that they are working towards.  Having a reward that celebrates your new fitness, mobility and energy is the best sort because it will encourage you to do well with the weight maintenance phase and long term lifestyle changes that promotes keeping the weight off for the long term.  Usually, \"getting healthier\" or improving blood tests or losing weight so your clothes fit better is not as internally motivating as having a tangible reward.  A more motivating reward is one that isn't food based, is affordable, but something special:  Something you won't be getting unless you achieve your goal.   It s important to keep to the rule of success:  in order to get the reward, your goal MUST be achieved. Write this reward down, where you can look at it daily and keep it in the front of your mind as you go through your weight loss season and it will help keep you on track.    Tools that help change behavior are vital for success. The most studied and most supported tool for weight loss is nothing more than writing down your food and weight every day.  Every " Day.  Accurately and completely.  When you commit to weight loss season, this information tells you whether you're getting ENOUGH food to fuel your weight loss properly as well as teaches you the interaction between different foods you eat and how your body responds with weight loss.  You'll see that sometimes after a heavy workout you don't see the scale move until 2-3 days later.  How saltier meals (chili for example) may make you retain water for 4-5 days before you see the weight come off, you'll get used to the mini-plateaus that develop after a good 3-8 lb drop in weight as well as how you break through if you keep working the diet as you should.    Weight loss is not a linear process, there are mini ups and downs.  Learning how your body loses weight and getting comfortable with that is very rewarding. The act of writing words on paper also solidifies your will power and commitment to the season of weight loss and that by itself changes your brain chemistry/appetite, motivation and prepares you for maximal success.      Behavior change is all about getting into a new routine.  The old habits and routine have to change because without changing the circumstances of how you gained your weight, it's unlikely you'll enjoy satisfying results. If you have snacking habits, like every time you walk through the kitchen you grab a little something, well, that habit has to change and be replaced by a new habit.  It can be something as simple as keeping a doodle pad on the counter that you make a few scribbles and then walk through the kitchen having not opened the cupboard, or starting with a glass of water and leaving the kitchen without anything else, or checking your food journal to see how many calories you have left for the day.  Boredom is the enemy as are the old habits. Break new ground and try to push those old habits into a deep hole.      Finally, exercise always helps.  While not mandatory to lose weight,  "every little bit helps and exercise has so many other benefits that to not work it into your plan is to miss out on all the mood, sleep, stress and general health benefits that come from making yourself a little short of breath and sweaty at least 3-4 days out of the week.  The metabolism and calorie burn benefits aside, almost every chronic ailment in medicine gets better with proper, aerobic exercise.  Allow yourself to start slow and let your body prepare itself to accept harder training 4-6 weeks down the road, but start now and commit to a plan.  Whether you have the means to hire a , join a gym or just walk out your front door or go down to your basement for a video workout, get into a exercise routine and  after 3 weeks of at least 3 times a week exercise you should be at a point where you can slowly start ramping up 10% each week to our goal of at least 150-300minutes weekly of aerobic exercise and at least twice weekly resistance training/strenghtening with weights/bands or body weight exercises.     I am a big fan of modifying the free training plan, \"Couch to 5k\" for almost all of my patients. Just type it into [employer] or look it up on your smart phone sunil store.  To modify the plan,  you can use the training plan for whatever aerobic activity you do (bike/treadmill/elliptical/rower/pool/etc). During the \"jog\" intervals you just move a little faster or harder, or increase the tension or incline.  You use those little intervals to switch up the workout and recruit more muscles and pump the blood a little more and then recover again in the \"walk\" intervals by slowing down, decreasing the incline or turning down the tension.  3-4 days a week is not that much to ask and the benefits are enormous.  Start slow and develop the base from which you can then build on and reduce the risk of injury.  It's much more important 2-4 months from now to be enjoying your exercise then it is to over exert yourself at " the start and hurting yourself.  Starting slowly allows your body to accept the training better down the road when the exercise becomes crucial for weight maintenance.  Without exercise down the road during your maintenance phase, all this hard work you are about to put in can be undone. It usually takes about 100-300 calories a day of exercise to maintain a weight loss and our focus during weight loss season is to generate the routine/activities and hobbies that make that enjoyable/sustainable.    Thanks for taking this first and most important step in your weight loss season.  Commit to it and we will cheerlead you all the way to success.  When things get tough or off track we'll offer guidance and analysis and when you reach your goal we'll celebrate your success.  In the end, it is all about your success and what you do with it.      Andrei Myles MD  [facility]  [phone]    Exercise Guidance    Nearly everything that bothers us gets better when the proper amount of exercise can be done in the proper amounts.  Getting to that level safely and without injury is the key.  When it comes to weight loss, exercise is especially important in maintaining the weight loss as one of the harsh realities is that weight loss slows our metabolism.  Our brain always remembers our heaviest weight and seeks to return us to that level as it doesn't understand the concept of having too much energy, only not having enough.  As such, when we lose weight, the brain thinks we're ill or in a famine and dials back our metabolism to limit further weight loss.  This is why exercise is so important in keeping the weight off and is the main reason people have some weight regain from their low weight point after weight loss.  We have to make up that 10-20% of calories not being burned and because we can restrict our diet only so much, exercise becomes very important in our long term healthy weigh  maintenance.   Generally, for every 5% body weight reduction in their weight loss season, a person needs to add  kilocalories of exercise in their daily routine to keep that weight off for the long term.  This is why it's vital to be starting your fitness regimen during weight loss season, it becomes so vital for long term success in maintaining that weight loss.    Additionally, all sorts of good enzymes and genes turn on with exercise and our stress, sleep, mood and bodies feel better when we can get to the point of making ourselves a little sweaty and short of breath 35-50 minutes most days of the week.    Who isn't ready for exercise? Well, if you get severe dizziness/palpitations, chest pain or short of breath/faint with even minimal activity like walking across a room or you're having to pause while going up a flight of stairs, then getting your heart and/or lungs fully evaluated prior to starting an exercise regimen is recommended. Everyone else can probably start a program, but everyone may start at a different point:  Some can set a 5-10 minute walking goal and others will be able to ride their bike for an hour.  Start with where you're at and look to add 10% more each week until you're at that 150 inutes or more a week goal.    If you like to count steps, the 10,000 steps per day does correlate well with weight maintenance but try to make at least 20-25% of those steps at a brisk pace (like you are about to miss your bus).
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
## 2021-06-24 NOTE — PROGRESS NOTES
[facility] Discharge Summary  Patient Name: Debra Manriquez  Date: 3/7/2019  Referral Diagnosis:  At high risk for injury related to fall   Localized osteoporosis without current pathological fracture   Referring provider:Andrei Myles MD  Visit Diagnosis:   1. Difficulty balancing     2. At risk for falls     3. Osteoporosis     4. Chronic bilateral low back pain, with sciatica presence unspecified         Goals:  Pt. will demonstrate/verbalize independence in self-management of condition in : 6 weeks  Pt. will be independent with home exercise program in : 6 weeks  Pt. will show improved balance for safer : ambulation;standing;for household ambulation;for community ambulation;in 6 weeks  Pt. will be able to walk : 10 minutes;with less difficulty;for household mobility;for community mobility;in 6 weeks    No Data Recorded    Patient was seen for 1 visit on 2/1/19 with 1 missed appointments.  The patient discontinued therapy, did not return.   Patient's current status is not known.     Therapy will be discontinued at this time.  The patient will need a new referral to resume.    Thank you for your referral.  Cindy Limon  3/7/2019  1:39 PM

## 2021-06-24 NOTE — PATIENT INSTRUCTIONS - HE
"New walker prescription  Swelling in the legs can be caused by many reasons. No matter what the reason, treatment usually includes some type of compression. You should wear your compression socks as much as you can. Your compression should be put on first thing in the morning. Take the compression off at night. It is especially important to wear them with long periods of sitting/standing, long car rides or if you will be flying. Going without compression for even brief periods of time can be damaging to your legs and your health.  Compression socks should get replaced usually every 4-6 months. They do not need to be worn at night while in bed. If we have seen you in the last year, we can refill your sock prescription, otherwise your primary care provider is able to refill them for you. Call us with any problems or questions.   Compression Velcro may need to be replaced every 9-12 months.  Often the liners and socks need to be replaced every three or four months.  The neoprene velcro may last up to 2 years.  If the velcro becomes worn a tailor may be able to repair it for you inexpensively.    If you do a lot of standing, it is good to do calf raises to help keep the blood pumping. If you sit a lot at work, it is good to get up periodically to walk around. Elevation of the foot of your bed 4-6\" helps the blood return back to where it is needed.   Please call us if you have any questions [phone]    Thank you for choosing [facility].  "

## 2021-06-26 NOTE — TELEPHONE ENCOUNTER
Call to patient regarding mychart message about bradycardia. Patient reports feeling very well. Denies recent chest pain, dizziness, lightheadedness, shortness of breath. She reports she had 1-2 episodes of dizziness/lightheadedness about 2 months ago but attributes this to dehydration. Her blood pressures have been stable;    6/20: 139/77, HR 51  6/21: 135/73, HR 42  6/22: 136/76, HR 44    She reports on 6/19 her HR was as low as 39 at 10 am. Her oxygen saturation remains above 97% and she reports no shortness of breath. She takes her metoprolol at night before bed. Informed patient information would be passed on to Dr. Mcallister for review and writer will return call with any recommendations. Verbalized understanding. No further questions at this time. [facility]

## 2021-06-26 NOTE — TELEPHONE ENCOUNTER
Call to patient with recommendations below. She verbalized she will hold her metoprolol starting today. She reports HR today of 47 and BP of 126/73, continues to deny symptoms. Informed her of plan for RASHIDA. She prefers [place] for . Notified her scheduling will reach out to her to arrange. Encouraged the patient to return call if she develops any new symptoms or has questions or concerns. RASHIDA order placed. Verbalized understanding, no further questions at this time. [facility]    ----- Message -----  From: Julien Mcallister MD  Sent: 6/23/2021   7:58 AM CDT  To: Rachel Jacobo, RN    Rachel,    My sense would be to try to hold her BB, but would also get RASHIDA monitor.    Thank you!  Julein
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
## 2021-06-28 ENCOUNTER — HOSPITAL ENCOUNTER (OUTPATIENT)
Dept: CARDIOLOGY | Facility: CLINIC | Age: 81
Discharge: HOME | End: 2021-06-28
Attending: INTERNAL MEDICINE
Payer: MEDICARE

## 2021-06-28 DIAGNOSIS — R00.1 BRADYCARDIA: ICD-10-CM

## 2021-06-30 NOTE — PROGRESS NOTES
"Progress Notes by Julien Mcallister MD at 1/6/2021  7:50 AM     Author: Julien Mcallister MD Service: -- Author Type: Physician    Filed: 1/6/2021  8:26 AM Encounter Date: 1/6/2021 Status: Signed    : Julien Mcallister MD (Physician)           The patient has been notified of following:     \"This video visit will be conducted via a call between you and your physician/provider. We have found that certain health care needs can be provided without the need for an in-person physical exam.  This service lets us provide the care you need with a video conversation.  If a prescription is necessary we can send it directly to your pharmacy.  If lab work is needed we can place an order for that and you can then stop by our lab to have the test done at a later time.      Patient has given verbal consent to a Video visit? Yes    HEART CARE VIDEO ENCOUNTER        The patient has chosen to have the visit conducted as a video visit, to reduce risk of exposure given the current status of Coronavirus in our community. This video visit is being conducted via a call between the patient and physician/provider. Health care needs are being provided without a physical exam.     Assessment/Recommendations   Assessment/Plan: 80F w/ HFPEF, HTN, HL, lymphedema, obesity, and goiter here for f/u of diastolic HF.      Plan:  # ROMAN/HFPEF - much improved atypical sxs., which may have had more to do with her weight and deconditioning rather than angina. Improved/resolved since last visit  - TTE screen 2 years ago showed preserved ventricular and valvular function but diastolic dysfxn.   - continue w/ risk factor modfication  - continue lasix 20mg daily and it has helped w/ pedal edema, ARB and BB  - check TTE now     # HTN -  control w/ losartan/furosemide/low dose BB      # HL - previously elevated LDL, good HDL  - doing well w/ diet and exercise, had myalgias with low dose atorva, but open to trying rosuva  - I think given her age, risk " factors of HTN, HL, thyroid disease, there will still be mild anti-inflammatory benefit in plaque stabilization from statin  - we discussed this at length and she is willing to try rosuva 5, and if tolerated check lipido profile/LFT's in 2 mos, and attempt to further up-titrate w/  from then on     F/u in 12 mos or as needed      Follow Up Plan:  12 months  I have reviewed the note as documented.  This accurately captures the substance of my conversation with the patient.    Total time of video between patient and provider was 30 minutes   Start Time:755  Stop Time:825    Originating Location (pt. Location): Home    Distant Location (provider location):  [facility]     Mode of Communication:  Video Conference via [employer] switched to phone due to connection issues       History of Present Illness/Subjective    Debra Manriquez is a 80 y.o. female who is being evaluated via a billable video visit and has consented to a video visit. Debra Manriquez has a history of HFPEF, HTN, HL, lymphedema, obesity, and goiter here for f/u of diastolic HF.    Overall, she has been doing well, denies significant ROMAN, CP, shortness of breath, orthopnea/PND, edema, N/V/D, F/C. She is undergoing thyroidectomy tomorrow for a nodule. Did not tolerate atorvastatin due to muscle aches.     I have reviewed and updated the patient's Past Medical History, Social History, Family History and Medication List.     Physical Examination performed via live video encounter Review of Systems   General Appearance:   no distress, normal body habitus, upright.   ENT/Mouth: membranes moist, no nasal discharge or bleeding gums.  Normal head shape, no evidence of injury or laceration.     EYES:  no scleral icterus, normal conjunctivae   Neck: no evidence of thyromegaly.  Supple   Chest/Lungs:   No audible wheezing equal chest wall expansion. Non labored breathing.  No cough.   Cardiovascular:   No evidence of elevated  jugular venous pressure.  No evidence of pitting edema bilaterally    Abdomen:  no evidence of abdominal distention. No observe juandice.     Extremities: no cyanosis or clubbing noted.    Skin: no xanthelasma, normal skin color. No evidence of facial lacerations.      Neurologic: Normal arm motion bilateral, no tremors.  No evidence of focal defect.       Psychiatric: alert and oriented x3, calm                                               Medical History  Surgical History Family History Social History   Past Medical History:   Diagnosis Date   ? Edema    ? Fibrocystic breast disease    ? Foot pain    ? GERD (gastroesophageal reflux disease)    ? Lymphedema    ? JADYN (obstructive sleep apnea)    ? Osteoarthritis of knee     hx of knee replacement and pending second   ? Osteopenia    ? Skin cancer, basal cell    ? Thyroid nodule    ? Vitamin B deficiency     Past Surgical History:   Procedure Laterality Date   ? CATARACT EXTRACTION  2011, 2012   ? COLONOSCOPY     ? PARTIAL HYSTERECTOMY     ? TOTAL KNEE ARTHROPLASTY Left 2011   ? VARICOSE VEIN SURGERY Bilateral 1975    Family History   Problem Relation Age of Onset   ? No Medical Problems Mother    ? No Medical Problems Father    ? Alzheimer's disease Maternal Uncle    ? Heart disease Sister    ? Heart disease Brother    ? No Medical Problems Daughter    ? No Medical Problems Son    ? No Medical Problems Brother    ? No Medical Problems Son    ? No Medical Problems Son       Social History     Socioeconomic History   ? Marital status:      Spouse name: Not on file   ? Number of children: Not on file   ? Years of education: Not on file   ? Highest education level: Not on file   Occupational History   ? Not on file   Social Needs   ? Financial resource strain: Not on file   ? Food insecurity     Worry: Not on file     Inability: Not on file   ? Transportation needs     Medical: Not on file     Non-medical: Not on file   Tobacco Use   ? Smoking status: Never  Smoker   ? Smokeless tobacco: Never Used   Substance and Sexual Activity   ? Alcohol use: Yes     Alcohol/week: 0.8 standard drinks     Types: 1 Standard drinks or equivalent per week     Comment: 1-2 glasses per month   ? Drug use: No   ? Sexual activity: Yes   Lifestyle   ? Physical activity     Days per week: Not on file     Minutes per session: Not on file   ? Stress: Not on file   Relationships   ? Social connections     Talks on phone: Not on file     Gets together: Not on file     Attends Voodoo service: Not on file     Active member of club or organization: Not on file     Attends meetings of clubs or organizations: Not on file     Relationship status: Not on file   ? Intimate partner violence     Fear of current or ex partner: Not on file     Emotionally abused: Not on file     Physically abused: Not on file     Forced sexual activity: Not on file   Other Topics Concern   ? Not on file   Social History Narrative    . 4 kids.  Teaches college English, reading.           Medications  Allergies   Current Outpatient Medications   Medication Sig Dispense Refill   ? alpha lipoic acid 200 mg cap Take 400 mg by mouth.            ? astaxanthin 4 mg cap Take 12 mg by mouth.            ? BERB SPARKS/HERBAL COMPLEX NO.18 (BERBERINE-HERBAL COMB NO.18 ORAL) Take 500 mg by mouth daily.            ? biotin 1 mg cap Take by mouth.     ? cholecalciferol, vitamin D3, 5,000 unit Tab Take 10,000 Units by mouth.     ? coenzyme Q10 (CO Q-10) 200 mg capsule Take 800 mg by mouth.            ? cyanocobalamin (VITAMIN B-12) 100 MCG tablet Take 6,000 mcg by mouth daily.            ? doxycycline (VIBRA-TABS) 100 MG tablet Take 100 mg by mouth daily.     ? folic acid (FOLVITE) 1 MG tablet Take 800 mcg by mouth.            ? furosemide (LASIX) 20 MG tablet Take 1 tablet (20 mg total) by mouth daily. 90 tablet 1   ? hydrocortisone 2.5 % cream APPLY AS DIRECTED TO RASH ON FACE 3X/DAY FOR WK1, 2X/DAY FOR WK 2 & 3X/DAY FOR WK3 THEN  STOP     ? losartan (COZAAR) 50 MG tablet Take 50 mg by mouth daily.      ? metoprolol succinate (TOPROL-XL) 25 MG Take 1 tablet (25 mg total) by mouth daily. 90 tablet 0   ? omega 3-dha-epa-fish oil (FISH OIL) 150-217-840 mg CpDR Take 6,000 mg by mouth daily.            ? RESVERATROL, BULK, MISC Use 2,800 mg As Directed daily.            ? selenium 100 mcg Tab Take 200 mcg by mouth.            ? sour cherry extract (TART CHERRY EXTRACT) 1,000 mg cap Take 1,000 mg by mouth.            ? TURMERIC ROOT EXTRACT ORAL Take by mouth.     ? vit C-rutin-hesper-bioflv-stewart 1,000-50-50 mg TbER Take by mouth.     ? VITAMIN A ORAL Take 10,000 Units by mouth.     ? vitamin E 400 UNIT capsule Take 800 Units by mouth daily as needed.            ? VITAMIN K2 ORAL Take 150 mcg by mouth.            ? acetylcysteine (NAC) 600 mg cap capsule Take 1,800 mg by mouth daily.            ? atorvastatin (LIPITOR) 10 MG tablet Take 10 mg by mouth at bedtime.       No current facility-administered medications for this visit.     Allergies   Allergen Reactions   ? Lisinopril Cough   ? Metoclopramide Other (See Comments)     depression   ? Reglan [Metoclopramide Hcl] Other (See Comments)     depression         Lab Results    Chemistry/lipid CBC Cardiac Enzymes/BNP/TSH/INR   Lab Results   Component Value Date    CHOL 206 (H) 11/30/2020    HDL 61 11/30/2020    LDLCALC 124 11/30/2020    TRIG 105 11/30/2020    CREATININE 0.69 11/30/2020    BUN 24 11/30/2020    K 4.0 11/30/2020     11/30/2020     11/30/2020    CO2 28 11/30/2020    No results found for: WBC, HGB, HCT, MCV, PLT Lab Results   Component Value Date    TSH 0.57 04/21/2017        Julien Mcallister
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
## 2021-06-30 NOTE — PROGRESS NOTES
Progress Notes by Donal Hair MD at 2/2/2021 11:20 AM     Author: Donal Hair MD Service: -- Author Type: Physician    Filed: 2/2/2021  5:43 PM Encounter Date: 2/2/2021 Status: Signed    : Donal Hair MD (Physician)           [facility]      Assessment:     1. spider veins, bilateral   2. Leg swelling, bilateral   3. History of lymphedema issues and has seen Dr. Le    Plan:     1. Treatment options of conservative therapy of stockings use, exercise, weight loss, elevating legs when possible.    2. Script for compression stockings 20-30 mm hg  3. Ultrasound to evaluate legs for incompetency of both deep and superficial system . Will order at follow up with Dr. Le for lymphedema and I will get back to her with the results  4. Surgical treatment, discussed briefly today  5. Follow up: with Dr. Le with an ultrasound.   6. Call for any questions concerns or issues    Subjective:      Debra Manriquez is a 80 y.o. female  who was referred by Yoanna Mcpherson MD  for evaluation of varicose veins. Symptoms include pain, aching, fatigue, burning, edema and dermatitis. Patient has history of leg selling, pain and vein issues that have progressed. Pain and symptoms have affected daily living and work activities needing medications. Here for evaluation today. Stocking use with compression stockings of 20-30 mm hg or greater for greater then 3 months    Allergies:Lisinopril, Metoclopramide, and Reglan [metoclopramide hcl]    Past Medical History:   Diagnosis Date   ? Edema    ? Fibrocystic breast disease    ? Foot pain    ? GERD (gastroesophageal reflux disease)    ? Lymphedema    ? JADYN (obstructive sleep apnea)    ? Osteoarthritis of knee     hx of knee replacement and pending second   ? Osteopenia    ? Skin cancer, basal cell    ? Thyroid nodule    ? Vitamin B deficiency        Past Surgical History:   Procedure Laterality Date   ? CATARACT EXTRACTION  2011,  2012   ? COLONOSCOPY     ? PARTIAL HYSTERECTOMY     ? TOTAL KNEE ARTHROPLASTY Left 2011   ? VARICOSE VEIN SURGERY Bilateral 1975       Current Outpatient Medications   Medication Sig   ? acetylcysteine (NAC) 600 mg cap capsule Take 1,800 mg by mouth daily.          ? alpha lipoic acid 200 mg cap Take 400 mg by mouth.          ? ascorbic acid, vitamin C, (VITAMIN C) 500 MG tablet Take 500 mg by mouth.   ? astaxanthin 4 mg cap Take 12 mg by mouth.          ? atorvastatin (LIPITOR) 10 MG tablet Take 10 mg by mouth at bedtime.   ? BERB SPARKS/HERBAL COMPLEX NO.18 (BERBERINE-HERBAL COMB NO.18 ORAL) Take 500 mg by mouth daily.          ? biotin 1 mg cap Take by mouth.   ? cephalexin (KEFLEX) 500 MG capsule    ? cholecalciferol, vitamin D3, 5,000 unit Tab Take 10,000 Units by mouth.   ? coenzyme Q10 (CO Q-10) 200 mg capsule Take 800 mg by mouth.          ? cyanocobalamin (VITAMIN B-12) 100 MCG tablet Take 6,000 mcg by mouth daily.          ? doxycycline (VIBRA-TABS) 100 MG tablet Take 100 mg by mouth daily.   ? folic acid (FOLVITE) 1 MG tablet Take 800 mcg by mouth.          ? furosemide (LASIX) 20 MG tablet Take 1 tablet (20 mg total) by mouth daily.   ? hydrocortisone 2.5 % cream APPLY AS DIRECTED TO RASH ON FACE 3X/DAY FOR WK1, 2X/DAY FOR WK 2 & 3X/DAY FOR WK3 THEN STOP   ? levothyroxine (SYNTHROID, LEVOTHROID) 175 MCG tablet Take 175 mcg by mouth.   ? losartan (COZAAR) 50 MG tablet Take 50 mg by mouth daily.    ? metoprolol succinate (TOPROL-XL) 25 MG Take 1 tablet (25 mg total) by mouth daily.   ? omega 3-dha-epa-fish oil (FISH OIL) 150-217-840 mg CpDR Take 6,000 mg by mouth daily.          ? RESVERATROL, BULK, MISC Use 2,800 mg As Directed daily.          ? rosuvastatin (CRESTOR) 5 MG tablet Take 1 tablet (5 mg total) by mouth at bedtime.   ? selenium 100 mcg Tab Take 200 mcg by mouth.          ? sour cherry extract (TART CHERRY EXTRACT) 1,000 mg cap Take 1,000 mg by mouth.          ? TURMERIC ROOT EXTRACT ORAL Take by  mouth.   ? vit C-rutin-hesper-bioflv-stewart 1,000-50-50 mg TbER Take by mouth.   ? VITAMIN A ORAL Take 10,000 Units by mouth.   ? vitamin E 400 UNIT capsule Take 800 Units by mouth daily as needed.          ? VITAMIN K2 ORAL Take 150 mcg by mouth.              Family History   Problem Relation Age of Onset   ? No Medical Problems Mother    ? No Medical Problems Father    ? Alzheimer's disease Maternal Uncle    ? Heart disease Sister    ? Heart disease Brother    ? No Medical Problems Daughter    ? No Medical Problems Son    ? No Medical Problems Brother    ? No Medical Problems Son    ? No Medical Problems Son         reports that she has never smoked. She has never used smokeless tobacco. She reports current alcohol use of about 0.8 standard drinks of alcohol per week. She reports that she does not use drugs.      Review of Systems:  Pertinent items are noted in HPI.  A 12 point comprehensive review of systems was negative except as noted.   Patient has symptomatic veins and changes of bilateral legs. These have progressed to the point of causing symptoms on a daily basis. This causes issues with daily activities and chores such as washing dishes, vacuuming and standing for long lengths of time       Objective:     Vitals:    02/02/21 1146   BP: 150/84   Pulse: 60   Temp: 97.8  F (36.6  C)   TempSrc: Oral     There is no height or weight on file to calculate BMI.    EXAM:  GENERAL: This is a well-developed 80 y.o. female who appears her stated age  HEAD: normocephalic  HEENT: Pupils equal and reactive bilaterally  MOUTH: mucus membranes intact. Normal dentation  CARDIAC: RRR without murmur  CHEST/LUNG:  Clear to auscultation bilaterally  ABDOMEN: Soft, nontender, nondistended, no masses noted   NEUROLOGIC: Focally intact, nonfocal, alert and oriented x 3  INTEGUMENT: No open lesions or ulcers  VASCULAR: Pulses intact, symmetrical upper and lower extremities. There areskin changes consistent with chronic venous  insufficiency. Varicose veins present in bilateral greater saphenous distribution. Spider veins present bilateral.                Imaging:    pending    Donal Hair MD  General Surgery [phone]  Vascular Surgery [phone]

## 2021-07-03 NOTE — ADDENDUM NOTE
Addendum Note by Luna Justice, RN at 6/17/2020  9:15 AM     Author: Luna Justice RN Service: -- Author Type: Registered Nurse    Filed: 6/17/2020 10:41 AM Encounter Date: 6/17/2020 Status: Signed    : Luna Justice RN (Registered Nurse)    Addended by: LUNA JUSTICE on: 6/17/2020 10:41 AM        Modules accepted: Orders

## 2021-08-06 ENCOUNTER — TELEPHONE (OUTPATIENT)
Dept: CARDIOLOGY | Facility: CLINIC | Age: 81
End: 2021-08-06

## 2021-08-06 NOTE — TELEPHONE ENCOUNTER
----- Message from Julien Mcallister MD sent at 8/5/2021  8:23 AM CDT -----  Rachel,    I reviewed her [facility] chart, RASHIDA results. It seems that her HR's are better since we held the BB, but on the other had, she has had more atrial ectopy. I don't think that she quite warrant a pacemaker, and my sense would be to continue holding her BB, but let's also have her see one of our EP colleagues, to consider options.    Thanks a ton!    Julien

## 2021-08-13 NOTE — TELEPHONE ENCOUNTER
Phone call to patient to discuss results/recommendations of RASHIDA per Dr. Mcallister. Patient in agreement with plan to have office visit with EP NP to discuss options. Patient transferred to scheduling to arrange for appointment, preference for [facility]. No further questions at this time. KRYSTLE

## 2021-08-18 ENCOUNTER — LAB REQUISITION (OUTPATIENT)
Dept: LAB | Facility: CLINIC | Age: 81
End: 2021-08-18
Payer: MEDICARE

## 2021-08-18 LAB
ALBUMIN SERPL-MCNC: 3.6 G/DL (ref 3.5–5)
ALP SERPL-CCNC: 72 U/L (ref 45–120)
ALT SERPL W P-5'-P-CCNC: 30 U/L (ref 0–45)
ANION GAP SERPL CALCULATED.3IONS-SCNC: 10 MMOL/L (ref 5–18)
AST SERPL W P-5'-P-CCNC: 24 U/L (ref 0–40)
BILIRUB SERPL-MCNC: 0.5 MG/DL (ref 0–1)
BUN SERPL-MCNC: 20 MG/DL (ref 8–28)
CALCIUM SERPL-MCNC: 9.7 MG/DL (ref 8.5–10.5)
CHLORIDE BLD-SCNC: 108 MMOL/L (ref 98–107)
CO2 SERPL-SCNC: 23 MMOL/L (ref 22–31)
CREAT SERPL-MCNC: 0.7 MG/DL (ref 0.6–1.1)
GFR SERPL CREATININE-BSD FRML MDRD: 80 ML/MIN/1.73M2
GLUCOSE BLD-MCNC: 96 MG/DL (ref 70–125)
POTASSIUM BLD-SCNC: 4.2 MMOL/L (ref 3.5–5)
PROT SERPL-MCNC: 6.3 G/DL (ref 6–8)
SODIUM SERPL-SCNC: 141 MMOL/L (ref 136–145)
T4 FREE SERPL-MCNC: 1.54 NG/DL (ref 0.7–1.8)
TSH SERPL DL<=0.005 MIU/L-ACNC: 0.05 UIU/ML (ref 0.3–5)

## 2021-08-18 PROCEDURE — 84443 ASSAY THYROID STIM HORMONE: CPT | Performed by: FAMILY MEDICINE

## 2021-08-18 PROCEDURE — 80053 COMPREHEN METABOLIC PANEL: CPT | Mod: ORL | Performed by: FAMILY MEDICINE

## 2021-08-18 PROCEDURE — 84439 ASSAY OF FREE THYROXINE: CPT | Mod: ORL | Performed by: FAMILY MEDICINE

## 2021-08-25 ENCOUNTER — OFFICE VISIT (OUTPATIENT)
Dept: CARDIOLOGY | Facility: CLINIC | Age: 81
End: 2021-08-25
Payer: MEDICARE

## 2021-08-25 VITALS
WEIGHT: 255.5 LBS | DIASTOLIC BLOOD PRESSURE: 70 MMHG | HEART RATE: 67 BPM | RESPIRATION RATE: 24 BRPM | BODY MASS INDEX: 41.06 KG/M2 | SYSTOLIC BLOOD PRESSURE: 134 MMHG | HEIGHT: 66 IN

## 2021-08-25 DIAGNOSIS — I47.19 ATRIAL TACHYCARDIA, PAROXYSMAL (H): ICD-10-CM

## 2021-08-25 DIAGNOSIS — G47.33 OBSTRUCTIVE SLEEP APNEA: Primary | ICD-10-CM

## 2021-08-25 PROCEDURE — 99215 OFFICE O/P EST HI 40 MIN: CPT | Performed by: NURSE PRACTITIONER

## 2021-08-25 RX ORDER — CYANOCOBALAMIN (VITAMIN B-12) 500 MCG
400 LOZENGE ORAL DAILY
COMMUNITY
End: 2023-03-15

## 2021-08-25 RX ORDER — LEVOTHYROXINE SODIUM 125 UG/1
TABLET ORAL
COMMUNITY
Start: 2021-08-18 | End: 2021-11-10

## 2021-08-25 RX ORDER — ROSUVASTATIN CALCIUM 5 MG/1
5 TABLET, COATED ORAL
COMMUNITY
Start: 2021-01-06 | End: 2023-02-13

## 2021-08-25 RX ORDER — KRILL/OM-3/DHA/EPA/PHOSPHO/AST 500-110 MG
1 CAPSULE ORAL DAILY
COMMUNITY
Start: 2021-03-12 | End: 2023-03-15

## 2021-08-25 RX ORDER — ASCORBIC ACID 500 MG
500 TABLET ORAL DAILY
COMMUNITY
End: 2023-03-15

## 2021-08-25 ASSESSMENT — MIFFLIN-ST. JEOR: SCORE: 1630.69
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
## 2021-08-25 NOTE — PATIENT INSTRUCTIONS
Debra Manriquez,    It was a pleasure to see you today at the [facility].     My recommendations after this visit include:    No medication changes needed today    Monitor yourself for any other heart rate issues including fast heart rate or slow heart rate.    Please buy a new pulse oximeter- watch for irregular heart rhythm.    Call me if you have dizziness, racing heart, palpitations or other concerns related to heart rhythm      My contact information:  Frances Mcbride CNP  After Hours or Scheduling  [phone]  My Nurses phone number [phone]- normal business hours
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
## 2021-10-13 ENCOUNTER — TELEPHONE (OUTPATIENT)
Dept: CARDIOLOGY | Facility: CLINIC | Age: 81
End: 2021-10-13

## 2021-10-13 NOTE — TELEPHONE ENCOUNTER
----- Message from Elaina Gutierrez sent at 10/13/2021 10:17 AM CDT -----  Regarding: MY PATIENT  General phone call:    Caller: PATIENT    Primary cardiologist: MY    Detailed reason for call: PATIENT HAS A PORTABLE ECG MONITOR AT HOME, IT READS BRADYCARDIA. 50'S-60'S. ASYMPTOMATIC MOST OF THE TIME. PLEASE CALL AND ADVISE.     Best phone number: [phone]    Best time to contact: ANY    Ok to leave a detailedmessage? YES    Device? NO    Additional Info:

## 2021-10-13 NOTE — TELEPHONE ENCOUNTER
"Phone call to patient to follow-up on information below. She states she has new portable ECG monitor at home. Generally her heart rate runs around 50-60. Her highest heart rate in the last month and a half was 88 and lowest was 51.     On 10/7 her monitor did say arrhythmia. Since 9/29, her ECG monitor has ready bradycardia or arrhythmia. She had one episode of \"VPB bigeminy\" on her monitor on 9/28. She reports she had one episode of dizziness in the last month and a half that resolved spontaneously in seconds. She reports feeling very well otherwise.     Her synthroid was decreased recently from 125 mcg to 112 mcg. Otherwise, there have been no changes. Informed patient this information would be forwarded to Dr. Mcallister for review and writer would return call with recommendations. Verbalized understanding, no further questions at this time. [facility]   "

## 2021-10-13 NOTE — TELEPHONE ENCOUNTER
Phone call to patient. Informed of plan to follow-up with Frances Mcbride NP in [facility] and with Dr. Mcallister as planned in January. Scheduling will reach out to patient closer to one month alfonzo to arrange for follow-up. Message sent to sched to arrange for follow-up. Patient agreeable to plan, verbalized understanding, no further questions at this time. [facility]     ----- Message -----  From: Julien Mcallister MD  Sent: 10/13/2021  12:25 PM CDT  To: Rachel Jacobo, RN    Rachel,    I agree, what is described does not warrant a PPM. Let's have her f/unit(s) w/ Frances w/in 1 mos, and w/ me in January as planned.     Thx!    Julien

## 2021-10-18 ENCOUNTER — TELEPHONE (OUTPATIENT)
Dept: CARDIOLOGY | Facility: CLINIC | Age: 81
End: 2021-10-18

## 2021-10-18 NOTE — TELEPHONE ENCOUNTER
"Phone call to patient with recommendations per Frances Mcbride NP below. Patient verbalized they will bring monitor with box and instructions into appointment with Frances Mcbride, scheduled for 11/10/21. She will return call to [facility] if she experiences cardiac symptoms in the mean time. Verbalized understanding, no further questions or concerns at this time. [facility]     ----- Message -----  From: Frances Mcbride APRN CNP  Sent: 10/18/2021  12:31 PM CDT  To: JEAN CLAUDE Fuller, thank you    In regards to this patient she wore a 30-day heart monitor which I reviewed with her in clinic and it was very reassuring.  I am not sure what her home monitor is indicating but if she is asymptomatic she can follow-up with me and bring in her monitor along with the box/instructions so I can try to figure out what it is telling her.  But ultimately I am not worried after getting 30 days worth of data from us unless symptoms have since developed.    Thank you, Frances  ----- Message -----  From: Rachel Jacobo RN  Sent: 10/18/2021  12:05 PM CDT  To: PHAM Oh CNP    ----- Message from Rachel Jacobo RN sent at 10/18/2021 12:05 PM CDT -----  Frances, this patient has called me twice in the past two weeks to discuss her new portable monitor and their findings. Dr. Mcallister recommended she follow-up with you in the next month. She has no symptoms, but monitor reports \"VPB bigeminy\" and arrhythmia. Any recommendations before she sees you in follow-up (11/10/21)? Thanks!    Rachel WALTER RN  "

## 2021-10-18 NOTE — TELEPHONE ENCOUNTER
"VM left for writer to return call to patient. Phone call to patient to follow-up. Patient continues to report monitor at home states VPB bigeminy, with heart rate averaging 55-70s. Occasionally monitor reports arrhythmia, missed beats. She reports the monitor type is an \"E-made portable echo monitor.\" She reports feeling well with no new symptoms/change of symptoms since we last spoke, no shortness of breath, chest pain, dizziness/lightheadedness, palpitations. Transferred patient to scheduling to arrange for follow-up with Frances Mcbride NP in next 3-4 weeks, not yet scheduled. Will forward information to Frances Mcbride NP for review/recommendations in the meantime. Patient verbalized understanding, no further questions at this time. [facility]   "

## 2021-11-10 ENCOUNTER — OFFICE VISIT (OUTPATIENT)
Dept: CARDIOLOGY | Facility: CLINIC | Age: 81
End: 2021-11-10
Payer: MEDICARE

## 2021-11-10 VITALS
DIASTOLIC BLOOD PRESSURE: 74 MMHG | RESPIRATION RATE: 16 BRPM | HEART RATE: 74 BPM | SYSTOLIC BLOOD PRESSURE: 136 MMHG | WEIGHT: 252 LBS | BODY MASS INDEX: 40.67 KG/M2

## 2021-11-10 DIAGNOSIS — I10 ESSENTIAL HYPERTENSION: ICD-10-CM

## 2021-11-10 DIAGNOSIS — R00.1 SINUS BRADYCARDIA: Primary | ICD-10-CM

## 2021-11-10 DIAGNOSIS — I47.19 ATRIAL TACHYCARDIA, PAROXYSMAL (H): ICD-10-CM

## 2021-11-10 PROCEDURE — 99214 OFFICE O/P EST MOD 30 MIN: CPT | Performed by: NURSE PRACTITIONER

## 2021-11-10 RX ORDER — LEVOTHYROXINE SODIUM 112 UG/1
112 TABLET ORAL DAILY
COMMUNITY
Start: 2021-10-10

## 2021-11-10 NOTE — PATIENT INSTRUCTIONS
Debra Manriquez,    It was a pleasure to see you today at the [facility].     My recommendations after this visit include:    Bradycardia please call if your heart is racing for an hour or more or ok to go to urgent care or ER.  Call if lightheaded, dizzy or more off balance.    To followup with Frances Mcbride NP in 1 year with 24-hour Holter 2 weeks prior to visit.      My contact information:  Renard Guardado CNP  After Hours or Scheduling  [phone]  My Nurse---Giulia Miles [phone]
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
## 2022-01-30 ENCOUNTER — HEALTH MAINTENANCE LETTER (OUTPATIENT)
Age: 82
End: 2022-01-30

## 2022-02-17 ENCOUNTER — OFFICE VISIT (OUTPATIENT)
Dept: VASCULAR SURGERY | Facility: CLINIC | Age: 82
End: 2022-02-17
Attending: STUDENT IN AN ORGANIZED HEALTH CARE EDUCATION/TRAINING PROGRAM
Payer: MEDICARE

## 2022-02-17 VITALS
HEART RATE: 76 BPM | SYSTOLIC BLOOD PRESSURE: 132 MMHG | WEIGHT: 250 LBS | DIASTOLIC BLOOD PRESSURE: 80 MMHG | BODY MASS INDEX: 40.35 KG/M2

## 2022-02-17 DIAGNOSIS — E66.01 CLASS 2 SEVERE OBESITY DUE TO EXCESS CALORIES WITH SERIOUS COMORBIDITY AND BODY MASS INDEX (BMI) OF 39.0 TO 39.9 IN ADULT (H): ICD-10-CM

## 2022-02-17 DIAGNOSIS — E66.812 CLASS 2 SEVERE OBESITY DUE TO EXCESS CALORIES WITH SERIOUS COMORBIDITY AND BODY MASS INDEX (BMI) OF 39.0 TO 39.9 IN ADULT (H): ICD-10-CM

## 2022-02-17 DIAGNOSIS — I87.2 VENOUS INSUFFICIENCY OF BOTH LOWER EXTREMITIES: Primary | ICD-10-CM

## 2022-02-17 DIAGNOSIS — I87.303 VENOUS HYPERTENSION OF LOWER EXTREMITY, BILATERAL: ICD-10-CM

## 2022-02-17 DIAGNOSIS — I89.0 ACQUIRED LYMPHEDEMA OF LEG: ICD-10-CM

## 2022-02-17 PROCEDURE — 99215 OFFICE O/P EST HI 40 MIN: CPT | Performed by: STUDENT IN AN ORGANIZED HEALTH CARE EDUCATION/TRAINING PROGRAM

## 2022-02-17 ASSESSMENT — PAIN SCALES - GENERAL: PAINLEVEL: NO PAIN (0)

## 2022-02-17 NOTE — PATIENT INSTRUCTIONS
[facility] (Please call to make an appointment)    Formerly [facility]  [address], [address]  [place], [place].  Phone: [phone]          SWELLING/LYMPHEDEMA THERAPY TREATMENT     - What to expect your first visit     Based on your initial evaluation, you will have an individualized program designed by a certified lymphedema therapist, with specialized training in swelling.     It will consist of discussing your prior activity level, goals and limitations.     The therapist will assess your edema, evaluate for swelling, ,measure your motion and strength.      - Treatment usually includes     Swelling education and prevention strategies.     Swelling/lymphedema massage- A special decompressive massage to help improve the lymphatic drainage.     Swelling/lymphatic stimulation exercises     Bandaging or compression garments- To provide increased tissue pressure in the swollen extremity.     Home exercise program instruction     Stretching exercises     Education in how to independently manage edema.        - Follow up care     ONCE FORMAL THERAPY HAS BEEN COMPLETED, THE PATIENT WILL BE EXPECTED TO WEAR THE APPROPRIATE COMPRESSION BANDAGES, BE CONSISTENT WITH THE SKIN CARE PROGRAM  AND PERFORM THE PRESCRIBED SELF -MASSAGE AND /OR EXERCISES AS PRESCRIBED.
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
## 2022-02-23 ENCOUNTER — TELEPHONE (OUTPATIENT)
Dept: CARDIOLOGY | Facility: CLINIC | Age: 82
End: 2022-02-23
Payer: MEDICARE

## 2022-02-23 NOTE — TELEPHONE ENCOUNTER
Health Call Center    Phone Message    May a detailed message be left on voicemail: yes     Reason for Call: Other: Pt would like to see Gloria Guardado but according to our protocols, pt sees Dr. Mcallister who is an intervential provider and does not work w/Gloria Guardado.  Pt wants to know how she can see Gloria Guardado instead of waiting for Dr. Mcallister's appt in May.     Action Taken: Message routed to:  [facility] ([facility] cardio    Travel Screening: Not Applicable

## 2022-02-24 NOTE — TELEPHONE ENCOUNTER
Phone call to patient. She verbalized she will continue to hold her appointment with Dr. Mcallister at this time since it is an annual follow-up with her primary Cardiologist. No further questions or concerns at this time. KRYSTLE

## 2022-03-07 ENCOUNTER — HOSPITAL ENCOUNTER (OUTPATIENT)
Dept: PHYSICAL THERAPY | Facility: CLINIC | Age: 82
Setting detail: THERAPIES SERIES
End: 2022-03-07
Attending: STUDENT IN AN ORGANIZED HEALTH CARE EDUCATION/TRAINING PROGRAM
Payer: MEDICARE

## 2022-03-07 DIAGNOSIS — E66.812 CLASS 2 SEVERE OBESITY DUE TO EXCESS CALORIES WITH SERIOUS COMORBIDITY AND BODY MASS INDEX (BMI) OF 39.0 TO 39.9 IN ADULT (H): ICD-10-CM

## 2022-03-07 DIAGNOSIS — E66.01 CLASS 2 SEVERE OBESITY DUE TO EXCESS CALORIES WITH SERIOUS COMORBIDITY AND BODY MASS INDEX (BMI) OF 39.0 TO 39.9 IN ADULT (H): ICD-10-CM

## 2022-03-07 DIAGNOSIS — I89.0 ACQUIRED LYMPHEDEMA OF LEG: ICD-10-CM

## 2022-03-07 DIAGNOSIS — I87.303 VENOUS HYPERTENSION OF LOWER EXTREMITY, BILATERAL: ICD-10-CM

## 2022-03-07 DIAGNOSIS — I87.2 VENOUS INSUFFICIENCY OF BOTH LOWER EXTREMITIES: ICD-10-CM

## 2022-03-07 PROCEDURE — 97162 PT EVAL MOD COMPLEX 30 MIN: CPT | Mod: GP | Performed by: PHYSICAL THERAPIST

## 2022-03-07 PROCEDURE — 97535 SELF CARE MNGMENT TRAINING: CPT | Mod: GP | Performed by: PHYSICAL THERAPIST
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
## 2022-03-08 ENCOUNTER — HOSPITAL ENCOUNTER (OUTPATIENT)
Dept: PHYSICAL THERAPY | Facility: CLINIC | Age: 82
Setting detail: THERAPIES SERIES
End: 2022-03-08
Attending: STUDENT IN AN ORGANIZED HEALTH CARE EDUCATION/TRAINING PROGRAM
Payer: MEDICARE

## 2022-03-08 DIAGNOSIS — I89.0 ACQUIRED LYMPHEDEMA OF LEG: ICD-10-CM

## 2022-03-08 DIAGNOSIS — I87.2 VENOUS INSUFFICIENCY OF BOTH LOWER EXTREMITIES: ICD-10-CM

## 2022-03-08 DIAGNOSIS — E66.01 CLASS 2 SEVERE OBESITY DUE TO EXCESS CALORIES WITH SERIOUS COMORBIDITY AND BODY MASS INDEX (BMI) OF 39.0 TO 39.9 IN ADULT (H): ICD-10-CM

## 2022-03-08 DIAGNOSIS — E66.812 CLASS 2 SEVERE OBESITY DUE TO EXCESS CALORIES WITH SERIOUS COMORBIDITY AND BODY MASS INDEX (BMI) OF 39.0 TO 39.9 IN ADULT (H): ICD-10-CM

## 2022-03-08 DIAGNOSIS — I87.303 VENOUS HYPERTENSION OF LOWER EXTREMITY, BILATERAL: ICD-10-CM

## 2022-03-08 PROCEDURE — 97161 PT EVAL LOW COMPLEX 20 MIN: CPT | Mod: GP | Performed by: PHYSICAL THERAPIST

## 2022-03-08 NOTE — PROGRESS NOTES
[facility]                                                                                   OUTPATIENT PHYSICAL THERAPY FUNCTIONAL EVALUATION  PLAN OF TREATMENT FOR OUTPATIENT REHABILITATION  (COMPLETE FOR INITIAL CLAIMS ONLY)  Patient's Last Name, First Name, M.I.  YOB: 1940  Debra Manriquez     Provider's Name   [facility]   Medical Record No.  [id number]     Start of Care Date:  03/08/22   Onset Date:  02/17/22 (progressive balance difficulties in last 2 years)   Type:     _X__PT   ____OT  ____SLP Medical Diagnosis:        PT Diagnosis:  falls risk, decreased balance confidence Visits from SOC:  1                              __________________________________________________________________________________  Plan of Treatment/Functional Goals:  balance training, bed mobility training, gait training, motor coordination training, neuromuscular re-education, ROM, strengthening, stretching, transfer training, manual therapy     Iontophoresis, TENS, Electrical Stimulation/Russion Stimulation, Ultrasound, Cryotherapy     GOALS  TUG/Fall risk  Patient will lower her TUG score to <13.5 sec indicating a decreased risk of falls for independent living.  06/05/22    BBS/decreased postural control  Patient will improve her score on the BBS to 31/56 pts indicating a significant change in postural control with ADLs.  06/05/22    Knee pain  Patient will be able to stand for >5' w/o report of knee pain >3/10 in order to complete more housekeeping duties w/o assistance.  06/05/22    HEP  Patient will report adherence to a HEP addressing deficits in balance and knee pain 5/7 days per week in order to develop healthy exercise habits and reinforce movements that are safe, effective, and efficient.  06/05/22    Therapy Frequency:  1 time/week   Predicted Duration of Therapy  Intervention:  up to 12 weeks (12 total visits)    Franklin Cantu, PT                                    I CERTIFY THE NEED FOR THESE SERVICES FURNISHED UNDER        THIS PLAN OF TREATMENT AND WHILE UNDER MY CARE     (Physician co-signature of this document indicates review and certification of the therapy plan).                Certification Date From:    3/8/22  Certification Date To:   6/5/22    Referring Provider:  Homero Cruz MD    Initial Assessment  See Epic Evaluation- Start of Care Date: 03/08/22
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
## 2022-03-15 ENCOUNTER — HOSPITAL ENCOUNTER (OUTPATIENT)
Dept: PHYSICAL THERAPY | Facility: CLINIC | Age: 82
Setting detail: THERAPIES SERIES
Discharge: HOME | End: 2022-03-15
Attending: STUDENT IN AN ORGANIZED HEALTH CARE EDUCATION/TRAINING PROGRAM
Payer: MEDICARE

## 2022-03-15 PROCEDURE — 97110 THERAPEUTIC EXERCISES: CPT | Mod: GP | Performed by: PHYSICAL THERAPIST

## 2022-03-15 PROCEDURE — 97112 NEUROMUSCULAR REEDUCATION: CPT | Mod: GP | Performed by: PHYSICAL THERAPIST

## 2022-03-16 ENCOUNTER — LAB REQUISITION (OUTPATIENT)
Dept: LAB | Facility: CLINIC | Age: 82
End: 2022-03-16
Payer: MEDICARE

## 2022-03-16 DIAGNOSIS — E03.9 HYPOTHYROIDISM, UNSPECIFIED: ICD-10-CM

## 2022-03-16 DIAGNOSIS — I87.8 OTHER SPECIFIED DISORDERS OF VEINS: ICD-10-CM

## 2022-03-16 DIAGNOSIS — M85.80 OTHER SPECIFIED DISORDERS OF BONE DENSITY AND STRUCTURE, UNSPECIFIED SITE: ICD-10-CM

## 2022-03-16 LAB
ALBUMIN SERPL-MCNC: 3.8 G/DL (ref 3.5–5)
ALP SERPL-CCNC: 60 U/L (ref 45–120)
ALT SERPL W P-5'-P-CCNC: 32 U/L (ref 0–45)
ANION GAP SERPL CALCULATED.3IONS-SCNC: 11 MMOL/L (ref 5–18)
AST SERPL W P-5'-P-CCNC: 25 U/L (ref 0–40)
BILIRUB SERPL-MCNC: 0.7 MG/DL (ref 0–1)
BUN SERPL-MCNC: 16 MG/DL (ref 8–28)
CALCIUM SERPL-MCNC: 10.2 MG/DL (ref 8.5–10.5)
CHLORIDE BLD-SCNC: 103 MMOL/L (ref 98–107)
CO2 SERPL-SCNC: 27 MMOL/L (ref 22–31)
CREAT SERPL-MCNC: 0.72 MG/DL (ref 0.6–1.1)
GFR SERPL CREATININE-BSD FRML MDRD: 82 ML/MIN/1.73M2
GLUCOSE BLD-MCNC: 93 MG/DL (ref 70–125)
POTASSIUM BLD-SCNC: 4.1 MMOL/L (ref 3.5–5)
PROT SERPL-MCNC: 6.7 G/DL (ref 6–8)
SODIUM SERPL-SCNC: 141 MMOL/L (ref 136–145)
TSH SERPL DL<=0.005 MIU/L-ACNC: 2.55 UIU/ML (ref 0.3–5)

## 2022-03-16 PROCEDURE — 84443 ASSAY THYROID STIM HORMONE: CPT | Mod: ORL | Performed by: PHYSICIAN ASSISTANT

## 2022-03-16 PROCEDURE — 80053 COMPREHEN METABOLIC PANEL: CPT | Mod: ORL | Performed by: PHYSICIAN ASSISTANT

## 2022-03-16 PROCEDURE — 82306 VITAMIN D 25 HYDROXY: CPT | Mod: ORL | Performed by: PHYSICIAN ASSISTANT

## 2022-03-17 LAB — DEPRECATED CALCIDIOL+CALCIFEROL SERPL-MC: 67 UG/L (ref 30–80)

## 2022-03-21 ENCOUNTER — HOSPITAL ENCOUNTER (OUTPATIENT)
Dept: PHYSICAL THERAPY | Facility: CLINIC | Age: 82
Setting detail: THERAPIES SERIES
Discharge: HOME | End: 2022-03-21
Payer: MEDICARE

## 2022-03-21 PROCEDURE — 97140 MANUAL THERAPY 1/> REGIONS: CPT | Mod: GP | Performed by: PHYSICAL THERAPIST

## 2022-03-21 PROCEDURE — 97110 THERAPEUTIC EXERCISES: CPT | Mod: GP | Performed by: PHYSICAL THERAPIST

## 2022-03-21 PROCEDURE — 97535 SELF CARE MNGMENT TRAINING: CPT | Mod: GP | Performed by: PHYSICAL THERAPIST

## 2022-03-28 ENCOUNTER — HOSPITAL ENCOUNTER (OUTPATIENT)
Dept: PHYSICAL THERAPY | Facility: CLINIC | Age: 82
Setting detail: THERAPIES SERIES
Discharge: HOME | End: 2022-03-28
Attending: STUDENT IN AN ORGANIZED HEALTH CARE EDUCATION/TRAINING PROGRAM
Payer: MEDICARE

## 2022-03-28 PROCEDURE — 97140 MANUAL THERAPY 1/> REGIONS: CPT | Mod: GP | Performed by: PHYSICAL THERAPIST

## 2022-03-28 PROCEDURE — 97535 SELF CARE MNGMENT TRAINING: CPT | Mod: GP | Performed by: PHYSICAL THERAPIST

## 2022-04-04 ENCOUNTER — HOSPITAL ENCOUNTER (OUTPATIENT)
Dept: PHYSICAL THERAPY | Facility: CLINIC | Age: 82
Setting detail: THERAPIES SERIES
Discharge: HOME | End: 2022-04-04
Attending: FAMILY MEDICINE
Payer: MEDICARE

## 2022-04-04 PROCEDURE — 97140 MANUAL THERAPY 1/> REGIONS: CPT | Mod: GP | Performed by: PHYSICAL THERAPIST

## 2022-04-04 PROCEDURE — 97535 SELF CARE MNGMENT TRAINING: CPT | Mod: GP | Performed by: PHYSICAL THERAPIST

## 2022-04-08 ENCOUNTER — HOSPITAL ENCOUNTER (OUTPATIENT)
Dept: PHYSICAL THERAPY | Facility: CLINIC | Age: 82
Setting detail: THERAPIES SERIES
Discharge: HOME | End: 2022-04-08
Attending: STUDENT IN AN ORGANIZED HEALTH CARE EDUCATION/TRAINING PROGRAM
Payer: MEDICARE

## 2022-04-08 PROCEDURE — 97110 THERAPEUTIC EXERCISES: CPT | Mod: GP | Performed by: PHYSICAL THERAPIST

## 2022-04-22 ENCOUNTER — HOSPITAL ENCOUNTER (OUTPATIENT)
Dept: PHYSICAL THERAPY | Facility: CLINIC | Age: 82
Setting detail: THERAPIES SERIES
Discharge: HOME | End: 2022-04-22
Attending: STUDENT IN AN ORGANIZED HEALTH CARE EDUCATION/TRAINING PROGRAM
Payer: MEDICARE

## 2022-04-22 PROCEDURE — 97110 THERAPEUTIC EXERCISES: CPT | Mod: GP | Performed by: PHYSICAL THERAPIST

## 2022-05-02 ENCOUNTER — HOSPITAL ENCOUNTER (OUTPATIENT)
Dept: PHYSICAL THERAPY | Facility: CLINIC | Age: 82
Setting detail: THERAPIES SERIES
Discharge: HOME | End: 2022-05-02
Attending: STUDENT IN AN ORGANIZED HEALTH CARE EDUCATION/TRAINING PROGRAM
Payer: MEDICARE

## 2022-05-02 PROCEDURE — 97535 SELF CARE MNGMENT TRAINING: CPT | Mod: GP | Performed by: PHYSICAL THERAPIST

## 2022-05-02 PROCEDURE — 97140 MANUAL THERAPY 1/> REGIONS: CPT | Mod: GP | Performed by: PHYSICAL THERAPIST

## 2022-05-09 ENCOUNTER — OFFICE VISIT (OUTPATIENT)
Dept: CARDIOLOGY | Facility: CLINIC | Age: 82
End: 2022-05-09
Payer: MEDICARE

## 2022-05-09 VITALS
DIASTOLIC BLOOD PRESSURE: 70 MMHG | RESPIRATION RATE: 16 BRPM | SYSTOLIC BLOOD PRESSURE: 114 MMHG | HEART RATE: 50 BPM | WEIGHT: 241.2 LBS | OXYGEN SATURATION: 97 % | BODY MASS INDEX: 38.93 KG/M2

## 2022-05-09 DIAGNOSIS — I10 ESSENTIAL HYPERTENSION: Primary | ICD-10-CM

## 2022-05-09 PROCEDURE — 99214 OFFICE O/P EST MOD 30 MIN: CPT | Performed by: INTERNAL MEDICINE

## 2022-05-09 NOTE — PROGRESS NOTES
HEART CARE ENCOUNTER CONSULTATON NOTE      [facility]  [phone]      Assessment/Recommendations   Assessment/Plan: 82F w/ HFPEF, HTN, HL, lymphedema, obesity, and goiter here for f/u of diastolic HF.      Plan:  # ROMAN/HFPEF - stable atypical sxs., which may have had more to do with her weight and deconditioning rather than angina. Improved/resolved since last visit  - TTE screen last year showed preserved ventricular and valvular function but diastolic dysfxn. - will check one now  - continue w/ risk factor modfication  - continue lasix 20mg daily and it has helped w/ pedal edema, ARB and BB     # HTN -  control w/ losartan/furosemide/low dose BB      # HL - previously elevated LDL, good HDL  - doing well w/ diet and exercise, had myalgias with low dose atorva, but did well w/ low dose rosuva  - I think given her age, risk factors of HTN, HL, thyroid disease, there will still be mild anti-inflammatory benefit in plaque stabilization from statin       F/u in 12 mos or as needed         History of Present Illness/Subjective    HPI: Debra Manriquez is a 82 year old female w/ HFPEF, HTN, HL, lymphedema, obesity, and goiter here for f/u of diastolic HF.    She is overall doing well from CV standpoint, denies CP/SOB/orthopnea/PND/edema/syncope/NV/D/F/C.  She had a marked improvement in lymphedema after follow up in lymphedema clinic. She has a varicose vein ablation planned this spring/summer.  She does get winded with activity of walking, but can swim and cycle essentially with no limitations.     Recent Echocardiogram Results:  1. Normal left ventricular size and systolic performance with a visually estimated ejection fraction of 60-65%.   2. There is mild concentric increase in left ventricular wall thickness.   3. There is trace aortic insufficiency.   4. Normal right ventricular size and systolic performance.   5. There is mild left atrial enlargement.      When compared to the prior  real-time echocardiogram dated 30 May 2018, the pulmonary artery pressure estimate obtained is slightly less on the current examination.  Otherwise, the findings are felt to be fairly similar on both examinations.    Recent Coronary Angiogram Results:  None       Physical Examination  Review of Systems   Vitals: There were no vitals taken for this visit.  BMI= There is no height or weight on file to calculate BMI.  Wt Readings from Last 3 Encounters:   02/17/22 113.4 kg (250 lb)   11/10/21 114.3 kg (252 lb)   08/25/21 115.9 kg (255 lb 8 oz)       General Appearance:   no distress, normal body habitus   ENT/Mouth: membranes moist, no oral lesions or bleeding gums.      EYES:  no scleral icterus, normal conjunctivae   Neck: no carotid bruits or thyromegaly   Chest/Lungs:   lungs are clear to auscultation, no rales or wheezing, no sternal scar, equal chest wall expansion    Cardiovascular:   Regular. Normal first and second heart sounds with no murmurs, rubs, or gallops; the carotid, radial and posterior tibial pulses are intact, Jugular venous pressure 6, 1+ edema bilaterally    Abdomen:  no organomegaly, masses, bruits, or tenderness; bowel sounds are present   Extremities: no cyanosis or clubbing   Skin: no xanthelasma, warm.    Neurologic: normal  bilateral, no tremors     Psychiatric: alert and oriented x3, calm        Please refer above for cardiac ROS details.        Medical History  Surgical History Family History Social History   Past Medical History:   Diagnosis Date     Chronic acquired lymphedema      Edema      Falls      Fibrocystic breast disease      Fibrocystic breast disease      Foot pain      Gastroesophageal reflux disease      GERD (gastroesophageal reflux disease)      Hyperlipidemia      Hypertension      Lymphedema      Mitral valve disorder      Obese      JADYN (obstructive sleep apnea)      Osteoarthritis of knee     hx of knee replacement and pending second     Osteopenia      Skin cancer       Skin cancer, basal cell      Sleep apnea      Thyroid nodule      Thyroid nodule      Vitamin B deficiency      Past Surgical History:   Procedure Laterality Date     CATARACT EXTRACTION  2011, 2012     COLONOSCOPY       EXCISE TOENAIL(S) Bilateral 8/16/2019    Procedure: MATRIXECTOMY BILATERAL FEET; TOES 1,2,3,4 ON LEFT FOOT,  1,2 ON RIGHT FOOT;  BOTH NAIL BORDERS;  Surgeon: Opal Kam DPM;  Location:  OR     EYE SURGERY      cataract     GI SURGERY       GYN SURGERY      hysterectomy     INJECT STEROID (LOCATION) Right 8/16/2019    Procedure: CORTIZONE INJECTION RIGHT HEEL;  Surgeon: Opal Kam DPM;  Location:  OR     ORTHOPEDIC SURGERY Left     knee replacement     PARTIAL HYSTERECTOMY       STRIP VEIN Bilateral 1975     TOTAL KNEE ARTHROPLASTY Left 2011     Family History   Problem Relation Age of Onset     No Known Problems Mother      No Known Problems Father      Alzheimer Disease Maternal Uncle      Heart Disease Sister      Heart Disease Brother      No Known Problems Daughter      No Known Problems Son      No Known Problems Brother      No Known Problems Son      No Known Problems Son         Social History     Socioeconomic History     Marital status:      Spouse name: Not on file     Number of children: Not on file     Years of education: Not on file     Highest education level: Not on file   Occupational History     Not on file   Tobacco Use     Smoking status: Never Smoker     Smokeless tobacco: Never Used   Substance and Sexual Activity     Alcohol use: Yes     Alcohol/week: 0.8 standard drinks     Comment: Alcoholic Drinks/day: 1-2 glasses per month     Drug use: No     Sexual activity: Yes   Other Topics Concern     Parent/sibling w/ CABG, MI or angioplasty before 65F 55M? Not Asked   Social History Narrative    . 4 kids.  Teaches college English, reading.      Social Determinants of Health     Financial Resource Strain: Not on file   Food  Insecurity: Not on file   Transportation Needs: Not on file   Physical Activity: Not on file   Stress: Not on file   Social Connections: Not on file   Intimate Partner Violence: Not on file   Housing Stability: Not on file           Medications  Allergies   Current Outpatient Medications   Medication Sig Dispense Refill     acetylcysteine (N-ACETYL CYSTEINE) 600 MG CAPS capsule Take 1,800 mg by mouth       alpha-lipoic acid 600 MG capsule Take by mouth daily       biotin 1000 MCG TABS tablet Take 1,000 mcg by mouth daily       cholecalciferol (VITAMIN D3) 5000 units TABS tablet Take 2 tablets by mouth daily       Coenzyme Q10 400 MG CAPS Take 800 mg by mouth daily       denosumab (PROLIA) 60 MG/ML SOSY injection Inject 60 mg Subcutaneous every 6 months       folic acid 0.8 MG CAPS Take by mouth daily       furosemide (LASIX) 20 MG tablet Take 20 mg by mouth daily       Krill Oil (OMEGA-3) 500 MG CAPS Take 1 capsule by mouth       levothyroxine (SYNTHROID/LEVOTHROID) 112 MCG tablet        losartan (COZAAR) 50 MG tablet Take 50 mg by mouth daily       MAGNESIUM PO Take 2,000 mg by mouth daily       Menaquinone-7 (VITAMIN K2 PO) Take 100 mg by mouth daily       Omega-3 Fatty Acids (FISH OIL EXTRA STRENGTH PO) Take 1,600 mg by mouth 2 times daily       rosuvastatin (CRESTOR) 5 MG tablet Take 5 mg by mouth       Selenium 100 MCG TABS Take by mouth daily       Thiamine 50 MG CAPS 2 capsules       TURMERIC PO Take 1,250 mg by mouth daily       vitamin B complex with vitamin C (VITAMIN  B COMPLEX) tablet Take 1 tablet by mouth daily       vitamin B-12 (CYANOCOBALAMIN) 100 MCG tablet Take 6,000 mcg by mouth       vitamin C (ASCORBIC ACID) 500 MG tablet Take 500 mg by mouth       vitamin E 400 units TABS Take 400 Units by mouth daily         Allergies   Allergen Reactions     Lisinopril Cough     Reglan [Metoclopramide] Other (See Comments)     depression          Lab Results    Chemistry/lipid CBC Cardiac Enzymes/BNP/TSH/INR    Recent Labs   Lab Test 05/26/21  1100   CHOL 168   HDL 66   LDL 88   TRIG 68     Recent Labs   Lab Test 05/26/21  1100 11/30/20  0900 05/04/20  1149   LDL 88 124 167*     Recent Labs   Lab Test 03/16/22  1359      POTASSIUM 4.1   CHLORIDE 103   CO2 27   GLC 93   BUN 16   CR 0.72   GFRESTIMATED 82   DAMARIS 10.2     Recent Labs   Lab Test 03/16/22  1359 08/18/21  0942 05/26/21  1100   CR 0.72 0.70 0.66     No results for input(s): A1C in the last 39025 hours.       No results for input(s): WBC, HGB, HCT, MCV, PLT in the last 44688 hours.  No results for input(s): HGB in the last 86928 hours. No results for input(s): TROPONINI in the last 62918 hours.  No results for input(s): BNP, NTBNPI, NTBNP in the last 99136 hours.  Recent Labs   Lab Test 03/16/22  1359   TSH 2.55     No results for input(s): INR in the last 21968 hours.     Julien Mcallister MD

## 2022-05-09 NOTE — LETTER
5/9/2022    Yoanna Mcpherson MD  [facility] [address]  [place] [address]    RE: Debra Manriquez       Dear Colleague,     I had the pleasure of seeing Debra Manriquez in the [facility].    HEART CARE ENCOUNTER CONSULTATON NOTE      [facility]  [phone]      Assessment/Recommendations   Assessment/Plan: 82F w/ HFPEF, HTN, HL, lymphedema, obesity, and goiter here for f/u of diastolic HF.      Plan:  # ROMAN/HFPEF - stable atypical sxs., which may have had more to do with her weight and deconditioning rather than angina. Improved/resolved since last visit  - TTE screen last year showed preserved ventricular and valvular function but diastolic dysfxn. - will check one now  - continue w/ risk factor modfication  - continue lasix 20mg daily and it has helped w/ pedal edema, ARB and BB     # HTN -  control w/ losartan/furosemide/low dose BB      # HL - previously elevated LDL, good HDL  - doing well w/ diet and exercise, had myalgias with low dose atorva, but did well w/ low dose rosuva  - I think given her age, risk factors of HTN, HL, thyroid disease, there will still be mild anti-inflammatory benefit in plaque stabilization from statin       F/u in 12 mos or as needed         History of Present Illness/Subjective    HPI: Debra Manriquez is a 82 year old female w/ HFPEF, HTN, HL, lymphedema, obesity, and goiter here for f/u of diastolic HF.    She is overall doing well from CV standpoint, denies CP/SOB/orthopnea/PND/edema/syncope/NV/D/F/C.  She had a marked improvement in lymphedema after follow up in lymphedema clinic. She has a varicose vein ablation planned this spring/summer.  She does get winded with activity of walking, but can swim and cycle essentially with no limitations.     Recent Echocardiogram Results:  1. Normal left ventricular size and systolic performance with a visually estimated ejection fraction of 60-65%.   2. There is mild concentric  increase in left ventricular wall thickness.   3. There is trace aortic insufficiency.   4. Normal right ventricular size and systolic performance.   5. There is mild left atrial enlargement.      When compared to the prior real-time echocardiogram dated 30 May 2018, the pulmonary artery pressure estimate obtained is slightly less on the current examination.  Otherwise, the findings are felt to be fairly similar on both examinations.    Recent Coronary Angiogram Results:  None       Physical Examination  Review of Systems   Vitals: There were no vitals taken for this visit.  BMI= There is no height or weight on file to calculate BMI.  Wt Readings from Last 3 Encounters:   02/17/22 113.4 kg (250 lb)   11/10/21 114.3 kg (252 lb)   08/25/21 115.9 kg (255 lb 8 oz)       General Appearance:   no distress, normal body habitus   ENT/Mouth: membranes moist, no oral lesions or bleeding gums.      EYES:  no scleral icterus, normal conjunctivae   Neck: no carotid bruits or thyromegaly   Chest/Lungs:   lungs are clear to auscultation, no rales or wheezing, no sternal scar, equal chest wall expansion    Cardiovascular:   Regular. Normal first and second heart sounds with no murmurs, rubs, or gallops; the carotid, radial and posterior tibial pulses are intact, Jugular venous pressure 6, 1+ edema bilaterally    Abdomen:  no organomegaly, masses, bruits, or tenderness; bowel sounds are present   Extremities: no cyanosis or clubbing   Skin: no xanthelasma, warm.    Neurologic: normal  bilateral, no tremors     Psychiatric: alert and oriented x3, calm        Please refer above for cardiac ROS details.        Medical History  Surgical History Family History Social History   Past Medical History:   Diagnosis Date     Chronic acquired lymphedema      Edema      Falls      Fibrocystic breast disease      Fibrocystic breast disease      Foot pain      Gastroesophageal reflux disease      GERD (gastroesophageal reflux disease)       Hyperlipidemia      Hypertension      Lymphedema      Mitral valve disorder      Obese      JADYN (obstructive sleep apnea)      Osteoarthritis of knee     hx of knee replacement and pending second     Osteopenia      Skin cancer      Skin cancer, basal cell      Sleep apnea      Thyroid nodule      Thyroid nodule      Vitamin B deficiency      Past Surgical History:   Procedure Laterality Date     CATARACT EXTRACTION  2011, 2012     COLONOSCOPY       EXCISE TOENAIL(S) Bilateral 8/16/2019    Procedure: MATRIXECTOMY BILATERAL FEET; TOES 1,2,3,4 ON LEFT FOOT,  1,2 ON RIGHT FOOT;  BOTH NAIL BORDERS;  Surgeon: Opal Kam DPM;  Location:  OR     EYE SURGERY      cataract     GI SURGERY       GYN SURGERY      hysterectomy     INJECT STEROID (LOCATION) Right 8/16/2019    Procedure: CORTIZONE INJECTION RIGHT HEEL;  Surgeon: Opal Kam DPM;  Location:  OR     ORTHOPEDIC SURGERY Left     knee replacement     PARTIAL HYSTERECTOMY       STRIP VEIN Bilateral 1975     TOTAL KNEE ARTHROPLASTY Left 2011     Family History   Problem Relation Age of Onset     No Known Problems Mother      No Known Problems Father      Alzheimer Disease Maternal Uncle      Heart Disease Sister      Heart Disease Brother      No Known Problems Daughter      No Known Problems Son      No Known Problems Brother      No Known Problems Son      No Known Problems Son         Social History     Socioeconomic History     Marital status:      Spouse name: Not on file     Number of children: Not on file     Years of education: Not on file     Highest education level: Not on file   Occupational History     Not on file   Tobacco Use     Smoking status: Never Smoker     Smokeless tobacco: Never Used   Substance and Sexual Activity     Alcohol use: Yes     Alcohol/week: 0.8 standard drinks     Comment: Alcoholic Drinks/day: 1-2 glasses per month     Drug use: No     Sexual activity: Yes   Other Topics Concern     Parent/sibling  w/ CABG, MI or angioplasty before 65F 55M? Not Asked   Social History Narrative    . 4 kids.  Teaches college English, reading.      Social Determinants of Health     Financial Resource Strain: Not on file   Food Insecurity: Not on file   Transportation Needs: Not on file   Physical Activity: Not on file   Stress: Not on file   Social Connections: Not on file   Intimate Partner Violence: Not on file   Housing Stability: Not on file           Medications  Allergies   Current Outpatient Medications   Medication Sig Dispense Refill     acetylcysteine (N-ACETYL CYSTEINE) 600 MG CAPS capsule Take 1,800 mg by mouth       alpha-lipoic acid 600 MG capsule Take by mouth daily       biotin 1000 MCG TABS tablet Take 1,000 mcg by mouth daily       cholecalciferol (VITAMIN D3) 5000 units TABS tablet Take 2 tablets by mouth daily       Coenzyme Q10 400 MG CAPS Take 800 mg by mouth daily       denosumab (PROLIA) 60 MG/ML SOSY injection Inject 60 mg Subcutaneous every 6 months       folic acid 0.8 MG CAPS Take by mouth daily       furosemide (LASIX) 20 MG tablet Take 20 mg by mouth daily       Krill Oil (OMEGA-3) 500 MG CAPS Take 1 capsule by mouth       levothyroxine (SYNTHROID/LEVOTHROID) 112 MCG tablet        losartan (COZAAR) 50 MG tablet Take 50 mg by mouth daily       MAGNESIUM PO Take 2,000 mg by mouth daily       Menaquinone-7 (VITAMIN K2 PO) Take 100 mg by mouth daily       Omega-3 Fatty Acids (FISH OIL EXTRA STRENGTH PO) Take 1,600 mg by mouth 2 times daily       rosuvastatin (CRESTOR) 5 MG tablet Take 5 mg by mouth       Selenium 100 MCG TABS Take by mouth daily       Thiamine 50 MG CAPS 2 capsules       TURMERIC PO Take 1,250 mg by mouth daily       vitamin B complex with vitamin C (VITAMIN  B COMPLEX) tablet Take 1 tablet by mouth daily       vitamin B-12 (CYANOCOBALAMIN) 100 MCG tablet Take 6,000 mcg by mouth       vitamin C (ASCORBIC ACID) 500 MG tablet Take 500 mg by mouth       vitamin E 400 units TABS Take  400 Units by mouth daily         Allergies   Allergen Reactions     Lisinopril Cough     Reglan [Metoclopramide] Other (See Comments)     depression          Lab Results    Chemistry/lipid CBC Cardiac Enzymes/BNP/TSH/INR   Recent Labs   Lab Test 05/26/21  1100   CHOL 168   HDL 66   LDL 88   TRIG 68     Recent Labs   Lab Test 05/26/21  1100 11/30/20  0900 05/04/20  1149   LDL 88 124 167*     Recent Labs   Lab Test 03/16/22  1359      POTASSIUM 4.1   CHLORIDE 103   CO2 27   GLC 93   BUN 16   CR 0.72   GFRESTIMATED 82   DAMARIS 10.2     Recent Labs   Lab Test 03/16/22  1359 08/18/21  0942 05/26/21  1100   CR 0.72 0.70 0.66     No results for input(s): A1C in the last 56987 hours.       No results for input(s): WBC, HGB, HCT, MCV, PLT in the last 64351 hours.  No results for input(s): HGB in the last 01145 hours. No results for input(s): TROPONINI in the last 46616 hours.  No results for input(s): BNP, NTBNPI, NTBNP in the last 30463 hours.  Recent Labs   Lab Test 03/16/22  1359   TSH 2.55     No results for input(s): INR in the last 91183 hours.     Julien Mcallister MD    Thank you for allowing me to participate in the care of your patient.      Sincerely,   Julien Mcallister MD   [facility]  cc: No referring provider defined for this encounter.

## 2022-05-09 NOTE — PATIENT INSTRUCTIONS
It is absolutely great to see you both today!      As discussed, I'm glad you've been doing fairly well from cardiovascular standpoint    As you are embarking on the venous insufficiency and orthopedic work up, let's re-check an echo    Continue current meds    Continue and increase exercise regimen, as tolerated    Follow up in 1 year or as needed

## 2022-06-01 NOTE — PROGRESS NOTES
[facility]    Outpatient Physical Therapy Discharge Note  Patient: Debra Manriquez  : 1940    Beginning/End Dates of Reporting Period:  3/7/22 to 22    Referring Provider: Dr. 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 She has only had one for the superficial veins in the past but was told they are not leaky. She wants PT records to take to the vascular appt. Pt has been in the pool.    Objective Measurements:  Objective Measure: R LE volume 10-40  Details: 3004.3mL, -10.2%  Objective Measure: L LE Volume 10-40cm  Details: 3041.59mL, -11.9%  Objective Measure: Skin/edema  Details: R foot girth bigger at MTPs.  [place] on shins, 1+ pitting otherwise not change    Goals:  Goal Identifier home management   Goal Description Pt will list 3 things that she can do throughout the day to better manage swelling (walk breaks when sitting, elevation, lying rests, more water, less salt, ankle pumps)   Target Date 22   Date Met  22   Progress (detail required for progress note): pump, elevate during the day, home program     Goal Identifier Home Program   Goal Description Pt will be independent with edema home program including exercise and MLD to better manage LE edema.     Target Date 22   Date Met  22   Progress (detail required for progress note):       Goal Identifier Volume   Goal Description Pt will have 5% reduction of B LE volume to improve skin health and prepare for compression.   Target Date 22   Date Met  22   Progress (detail required for progress note):       Plan:  Discharge from therapy.    Discharge:    Reason for Discharge: Patient has met all goals.    Equipment Issued: Pt recommend to use her current stockings and pump, replace stockings every 6mo    Discharge Plan: Patient  to continue home program.

## 2022-07-08 ENCOUNTER — HOSPITAL ENCOUNTER (OUTPATIENT)
Dept: PHYSICAL THERAPY | Facility: CLINIC | Age: 82
Setting detail: THERAPIES SERIES
Discharge: HOME | End: 2022-07-08
Attending: STUDENT IN AN ORGANIZED HEALTH CARE EDUCATION/TRAINING PROGRAM
Payer: MEDICARE

## 2022-07-08 PROCEDURE — 97110 THERAPEUTIC EXERCISES: CPT | Mod: GP | Performed by: PHYSICAL THERAPIST

## 2022-07-08 PROCEDURE — 97750 PHYSICAL PERFORMANCE TEST: CPT | Mod: GP | Performed by: PHYSICAL THERAPIST

## 2022-07-08 NOTE — PROGRESS NOTES
07/08/22 1600   Signing Clinician's Name / Credentials   Signing clinician's name / credentials Bettina Lua, DPT   Cavanaugh Balance Scale (JHONATAN MEMBRENO, EMILIA S, FABIANO GALVIN, AUGUSTO DAVALOS: MEASURING BALANCE IN THE ELDERLY: VALIDATION OF AN INSTRUMENT. CAN. J. PUB. HEALTH, JULY/AUGUST SUPPLEMENT 2:S7-11, 1992.)   Sit To Stand 3   Standing Unsupported 2   Sitting Unsupported 4   Stand to Sit 3   Transfers 2   Standing with Eyes Closed 2  (6 sec)   Standing Unsupported, Feet Together 1   Reach Forward With Outstretched Arm 3   Retrieve Object From Floor 3   Turning to Look Behind 3   Turn 360 Degrees 2   Placing Alternate Foot on Stool (4-6 inches) 1   Unsupported Tandem Stand (Demonstrate to Subject) 1   One Leg Stand 0   Total Score (A score of 45 or less has been correlated with an increased risk of falls)   Total Score (out of 56) 30     Cavanaugh Balance Scale (BBS) Cutoff Scores: A score of < 45 /56 indicates an increased risk for falls.     The BBS is a measure of static and dynamic standing balance that has been validated in community dwelling elderly individuals and individuals who have Parkinson's Disease, MS, and those who are s/p CVA and TBI. The test is administered without an assistive device. Scores from the Cavanaugh are used to determine the probability of falling based on the patient's previous history of falls and their test performance.     Minimal Detectable Change = 6.5   & Minimal Detectable Change (Parkinson's Disease) = 5 according to Roly & Phucey 2008    Assessment (rationale for performing, application to patient s function & care plan): Cavanaugh scale performed to assess balance and risk for falls as well as progress toward her goals. She demonstrates an improvement from the last assessment of 23/56 to 30/56 today, goal progressed. She continues to be at an increased risk for falls.   (Minutes billed as physical performance test): 12

## 2022-07-08 NOTE — PROGRESS NOTES
[facility]    OUTPATIENT PHYSICAL THERAPY  PLAN OF TREATMENT FOR OUTPATIENT REHABILITATION AND PROGRESS NOTE           Patient's Last Name, First Name, Debra Magaña Date of Birth  1940   Provider's Name  [facility] Medical Record No.  [id number]    Onset Date  02/17/22 (progressive balance difficulties in last 2 years) Start of Care Date  03/08/22   Type:     _X_PT   ___OT   ___SLP Medical Diagnosis  balance difficulties   PT Diagnosis  falls risk, decreased balance confidence Plan of Treatment  Frequency/Duration: 1x per week for up to 90 days  Certification date from 7/8/22 to 10/05/22     Goals:  Goal Identifier TUG/Fall risk   Goal Description Patient will lower her TUG score to <13.5 sec indicating a decreased risk of falls for independent living.   Target Date 06/05/22   Date Met  07/08/22   Progress (detail required for progress note): 7/8/22: 11.52 sec with 4WW, goal met     Goal Identifier BBS/decreased postural control, progressed from initial goal of 31/56   Goal Description Patient will improve her score on the BBS to 38/56 pts indicating a significant change in postural control with ADLs.   Target Date 10/05/22   Date Met      Progress (detail required for progress note): 7/8/22: Improved from 23/56 to 30/56, but she is still at an increased risk for falls     Goal Identifier Knee pain   Goal Description Patient will be able to stand for >5' w/o report of knee pain >3/10 in order to complete more housekeeping duties w/o assistance.   Target Date 06/05/22   Date Met  07/08/22   Progress (detail required for progress note): 7/8/22: Patient notes resolution of knee pain with vein treatment     Goal Identifier HEP   Goal Description Patient will report adherence to a HEP addressing deficits in balance and knee pain 5/7 days per week in order to  develop healthy exercise habits and reinforce movements that are safe, effective, and efficient.   Target Date 10/05/22   Date Met      Progress (detail required for progress note): 7/8/22: noncompliant but improved knee pain     Goal Identifier Standing tolerance   Goal Description Patient will demonstrate ability to stand with no UE support for >5 minutes to demonstrate improved strength and activity tolerance for functional mobility.   Target Date 10/05/22   Date Met      Progress (detail required for progress note): 7/8/22: 1 min and 22 sec     Beginning/End Dates of Progress Note Reporting Period:  3/8/22 to 7/8/22    Progress Toward Goals:   Progress limited due to poor compliance and attendance to physical therapy. Patient primary limited by significant knee pain and deconditioning up to the this point, with only 5 visits completed between evaluation to now. Patient recently notes significant improvement in knee pain and would like to resume physical therapy to work on balance and deconditioning.     Client Self (Subjective) Report for Progress Note Reporting Period: Patient reports her lymphedema is more under control, wearing knee high compression stockings. She also had a treatment for her veins which went very well. Her knee pain has improved since the vein treatment. She is now much more aware of her imbalance since then. She is going to get a knee xray, hasn't had one since 2017.           I CERTIFY THE NEED FOR THESE SERVICES FURNISHED UNDER        THIS PLAN OF TREATMENT AND WHILE UNDER MY CARE     (Physician co-signature of this document indicates review and certification of the therapy plan).                Referring Provider: MD Bettina Pitt, PT

## 2022-07-15 ENCOUNTER — HOSPITAL ENCOUNTER (OUTPATIENT)
Dept: CARDIOLOGY | Facility: CLINIC | Age: 82
Discharge: HOME | End: 2022-07-15
Attending: INTERNAL MEDICINE | Admitting: INTERNAL MEDICINE
Payer: MEDICARE

## 2022-07-15 ENCOUNTER — HOSPITAL ENCOUNTER (OUTPATIENT)
Dept: PHYSICAL THERAPY | Facility: CLINIC | Age: 82
Setting detail: THERAPIES SERIES
Discharge: HOME | End: 2022-07-15
Attending: STUDENT IN AN ORGANIZED HEALTH CARE EDUCATION/TRAINING PROGRAM
Payer: MEDICARE

## 2022-07-15 DIAGNOSIS — I10 ESSENTIAL HYPERTENSION: ICD-10-CM

## 2022-07-15 DIAGNOSIS — I47.19 ATRIAL TACHYCARDIA, PAROXYSMAL (H): Primary | ICD-10-CM

## 2022-07-15 PROCEDURE — 93306 TTE W/DOPPLER COMPLETE: CPT

## 2022-07-15 PROCEDURE — 93306 TTE W/DOPPLER COMPLETE: CPT | Mod: 26 | Performed by: INTERNAL MEDICINE

## 2022-07-15 PROCEDURE — 97110 THERAPEUTIC EXERCISES: CPT | Mod: GP,GT | Performed by: PHYSICAL THERAPIST

## 2022-07-15 NOTE — PROGRESS NOTES
Debra Manriquez is a 82 year old female who is being seen via a billable video visit.      Patient has given verbal consent for Video visit? Yes    Video Start Time: 2:28pm    Telehealth Visit Details    Type of Service:  Telehealth    Video End Time (time video stopped): 2:54pm    Originating Location (pt. location): Home    Additional Participants in Telehealth Visit: None    Distant Location (provider location):  [facility]     Mode of Communication (Audio Visual or Audio Only):  Audio visual    Bettina Lua, PT  July 15, 2022

## 2022-07-15 NOTE — PROGRESS NOTES
Annual visit with MY ordered for in May 2023 per last ov  [place]. [place] [place]

## 2022-08-18 ENCOUNTER — LAB REQUISITION (OUTPATIENT)
Dept: LAB | Facility: CLINIC | Age: 82
End: 2022-08-18
Payer: MEDICARE

## 2022-08-18 DIAGNOSIS — G62.9 POLYNEUROPATHY, UNSPECIFIED: ICD-10-CM

## 2022-08-18 PROCEDURE — 86334 IMMUNOFIX E-PHORESIS SERUM: CPT | Mod: ORL | Performed by: PATHOLOGY

## 2022-08-18 PROCEDURE — 84207 ASSAY OF VITAMIN B-6: CPT | Mod: ORL | Performed by: FAMILY MEDICINE

## 2022-08-19 LAB — PROT PATTERN SERPL IFE-IMP: NORMAL

## 2022-08-19 PROCEDURE — 86334 IMMUNOFIX E-PHORESIS SERUM: CPT | Mod: 26

## 2022-08-26 LAB — PYRIDOXAL PHOS SERPL-SCNC: 318.7 NMOL/L

## 2022-09-18 ENCOUNTER — HEALTH MAINTENANCE LETTER (OUTPATIENT)
Age: 82
End: 2022-09-18

## 2022-09-22 ENCOUNTER — TRANSFERRED RECORDS (OUTPATIENT)
Dept: HEALTH INFORMATION MANAGEMENT | Facility: CLINIC | Age: 82
End: 2022-09-22

## 2022-09-29 ENCOUNTER — OFFICE VISIT (OUTPATIENT)
Dept: VASCULAR SURGERY | Facility: CLINIC | Age: 82
End: 2022-09-29
Attending: PHYSICAL MEDICINE & REHABILITATION
Payer: MEDICARE

## 2022-09-29 VITALS
DIASTOLIC BLOOD PRESSURE: 70 MMHG | BODY MASS INDEX: 40.67 KG/M2 | RESPIRATION RATE: 20 BRPM | WEIGHT: 252 LBS | SYSTOLIC BLOOD PRESSURE: 116 MMHG | HEART RATE: 56 BPM | TEMPERATURE: 97.8 F

## 2022-09-29 DIAGNOSIS — M79.89 LEG SWELLING: Primary | ICD-10-CM

## 2022-09-29 DIAGNOSIS — I87.303 VENOUS HYPERTENSION OF LOWER EXTREMITY, BILATERAL: ICD-10-CM

## 2022-09-29 DIAGNOSIS — G89.29 CHRONIC PAIN OF RIGHT KNEE: ICD-10-CM

## 2022-09-29 DIAGNOSIS — I87.2 VENOUS INSUFFICIENCY OF BOTH LOWER EXTREMITIES: ICD-10-CM

## 2022-09-29 DIAGNOSIS — E66.01 OBESITY, CLASS III, BMI 40-49.9 (MORBID OBESITY) (H): ICD-10-CM

## 2022-09-29 DIAGNOSIS — I89.0 ACQUIRED LYMPHEDEMA OF LEG: ICD-10-CM

## 2022-09-29 DIAGNOSIS — M25.561 CHRONIC PAIN OF RIGHT KNEE: ICD-10-CM

## 2022-09-29 PROCEDURE — G0463 HOSPITAL OUTPT CLINIC VISIT: HCPCS

## 2022-09-29 PROCEDURE — 99214 OFFICE O/P EST MOD 30 MIN: CPT | Mod: GC | Performed by: PHYSICAL MEDICINE & REHABILITATION

## 2022-09-29 ASSESSMENT — PAIN SCALES - GENERAL: PAINLEVEL: NO PAIN (0)
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
## 2022-09-29 NOTE — PROGRESS NOTES
Using Biotab compression pump.     Compression stockings daily.    Had venaseal (Dr. Gio Wilde)    Cortisone injection in right leg.     No pain in legs

## 2022-09-29 NOTE — PATIENT INSTRUCTIONS
Continue bilateral closed-toe knee high compression and update regularly    We will order new compression pump. Continue current pump in the meantime.     Continue home exercise program     Continue to work on weight management     The staff at [facility] [place] strives to provide excellent care and personal attention. The [place] location may have availability with a new provider at a later date. Please call [phone] when closer to your recommended follow up to see if this option is available. Otherwise you may continue your care with [facility].    [facility] [phone]  [place] [facility] [phone]

## 2022-09-29 NOTE — PROGRESS NOTES
Faxed facesheet, progress note, and order to [facility] for Flexitouch. Patient has contact information already.

## 2022-10-05 NOTE — ANESTHESIA CARE TRANSFER NOTE
Patient: Leslie Manriquez    Procedure(s):  MATRIXECTOMY BILATERAL FEET; TOES 1,2,3,4 ON LEFT FOOT,  1,2 ON RIGHT FOOT;  BOTH NAIL BORDERS  CORTIZONE INJECTION RIGHT HEEL    Diagnosis: INGROWN TOE NAILS; PLANTAR FASCITIS RIGHT HEEL  Diagnosis Additional Information: No value filed.    Anesthesia Type:   MAC     Note:  Airway :Face Mask  Patient transferred to:PACU  Handoff Report: Identifed the Patient, Identified the Reponsible Provider, Reviewed the pertinent medical history, Discussed the surgical course, Reviewed Intra-OP anesthesia mangement and issues during anesthesia, Set expectations for post-procedure period and Allowed opportunity for questions and acknowledgement of understanding      Vitals: (Last set prior to Anesthesia Care Transfer)    CRNA VITALS  8/16/2019 0818 - 8/16/2019 0855      8/16/2019             Resp Rate (set):  10                Electronically Signed By: PHAM Syed CRNA  August 16, 2019  8:55 AM   Pt contacted office wants to know if he should take the prophylactic antibiotic for an up coming dental exam bc he has been on doxycycline for 5 days due to a sinus infection.

## 2022-11-09 ENCOUNTER — TRANSFERRED RECORDS (OUTPATIENT)
Dept: HEALTH INFORMATION MANAGEMENT | Facility: CLINIC | Age: 82
End: 2022-11-09

## 2022-11-15 ENCOUNTER — TELEPHONE (OUTPATIENT)
Dept: CARDIOLOGY | Facility: CLINIC | Age: 82
End: 2022-11-15

## 2022-11-15 NOTE — TELEPHONE ENCOUNTER
[facility] Call Center    Phone Message    May a detailed message be left on voicemail: yes     Reason for Call: Other: Pt has been prescribed many meds at [facility] room and she would like to know from Gloria if pt should keep taking all the meds.      Action Taken: Message routed to:  [facility] ([facility] cardio    Travel Screening: Not Applicable     Thank you!  [facility]

## 2022-11-15 NOTE — TELEPHONE ENCOUNTER
Pt calling for Renard last seen 11/2021  Pt requested her records from urgent care to be sent her about 1 hr ago and nothing has been received yet  Pt is having issue with bad knee cant walk well is bone on bone will see ortho for injections 11/29  Pt was seen in urgent care given meds she started and stopped   Pt looking for advice from Renard on what she can take  Reviewed with pt we need to see records will watch for them review what was done with Renard and get back to her  Pt is agreeable with this

## 2022-11-16 ENCOUNTER — TELEPHONE (OUTPATIENT)
Dept: VASCULAR SURGERY | Facility: CLINIC | Age: 82
End: 2022-11-16

## 2022-11-16 ENCOUNTER — TRANSFERRED RECORDS (OUTPATIENT)
Dept: HEALTH INFORMATION MANAGEMENT | Facility: CLINIC | Age: 82
End: 2022-11-16

## 2022-11-16 NOTE — TELEPHONE ENCOUNTER
Reviewed call with Renard pt has an upcoming appt 12/7  Reviewed records still not here from urgent care  Renard did say pt can use ibuprofen  For her aches she has normal kidney function  Pt agrees to plan and has my direct number if needed

## 2022-11-16 NOTE — TELEPHONE ENCOUNTER
Patient is calling stating that the order for the compression pump hasn't been filled yet, due to lack of information sent to the company.  She is asking that we send office notes to [employer].

## 2022-12-03 ENCOUNTER — TRANSFERRED RECORDS (OUTPATIENT)
Dept: HEALTH INFORMATION MANAGEMENT | Facility: CLINIC | Age: 82
End: 2022-12-03

## 2022-12-07 ENCOUNTER — OFFICE VISIT (OUTPATIENT)
Dept: CARDIOLOGY | Facility: CLINIC | Age: 82
End: 2022-12-07
Attending: INTERNAL MEDICINE
Payer: MEDICARE

## 2022-12-07 VITALS
RESPIRATION RATE: 16 BRPM | OXYGEN SATURATION: 95 % | DIASTOLIC BLOOD PRESSURE: 60 MMHG | BODY MASS INDEX: 40.35 KG/M2 | HEART RATE: 68 BPM | WEIGHT: 250 LBS | SYSTOLIC BLOOD PRESSURE: 112 MMHG

## 2022-12-07 DIAGNOSIS — I48.0 PAROXYSMAL ATRIAL FIBRILLATION (H): Primary | ICD-10-CM

## 2022-12-07 DIAGNOSIS — R00.1 SINUS BRADYCARDIA: ICD-10-CM

## 2022-12-07 DIAGNOSIS — I47.19 ATRIAL TACHYCARDIA, PAROXYSMAL (H): ICD-10-CM

## 2022-12-07 DIAGNOSIS — I10 ESSENTIAL HYPERTENSION: ICD-10-CM

## 2022-12-07 PROCEDURE — 99215 OFFICE O/P EST HI 40 MIN: CPT | Performed by: NURSE PRACTITIONER

## 2022-12-07 RX ORDER — GABAPENTIN 100 MG/1
CAPSULE ORAL PRN
Status: ON HOLD | COMMUNITY
Start: 2022-11-29 | End: 2023-03-07

## 2022-12-07 RX ORDER — METOPROLOL TARTRATE 25 MG/1
12.5 TABLET, FILM COATED ORAL
COMMUNITY
Start: 2022-11-04 | End: 2022-12-07

## 2022-12-07 NOTE — PATIENT INSTRUCTIONS
Debra Manriquez,    It was a pleasure to see you today at the [facility].     My recommendations after this visit include:    No metoprolol as it could precipitate a need for a pacemaker.    Start Eliquis 5 mg 1 tab orally every 12 hours with or without food.    To followup with me in 1 month.      My contact information:  Renard Guardado, HEMAL  After Hours or Scheduling  [phone]  My Nurse---Giulia Miles [phone]
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
## 2022-12-12 ENCOUNTER — TRANSFERRED RECORDS (OUTPATIENT)
Dept: HEALTH INFORMATION MANAGEMENT | Facility: CLINIC | Age: 82
End: 2022-12-12

## 2022-12-28 ENCOUNTER — TELEPHONE (OUTPATIENT)
Dept: CARDIOLOGY | Facility: CLINIC | Age: 82
End: 2022-12-28

## 2022-12-28 DIAGNOSIS — I48.0 PAROXYSMAL ATRIAL FIBRILLATION (H): Primary | ICD-10-CM

## 2022-12-28 NOTE — TELEPHONE ENCOUNTER
Pt was called back spoke to her daughter Blanca  Reviewed Renard's note and instructed for Xarelto roll over  Blanca understood repeated instructions back   Renard placed rx to [facility] on [address] at pt's request  They have my direct number if needed and also number given for care connect for Xarelto 8

## 2022-12-28 NOTE — TELEPHONE ENCOUNTER
Patient states that she called [employer] this morning as she has not received her compression pump. She is not sure what information is still needed in order to deliver the pump, but said she was told by [employer] that they need to speak with Dr. Le's team before it can be delivered.   Pt PH: [phone]  [employer]: [phone]

## 2022-12-28 NOTE — TELEPHONE ENCOUNTER
Sent email message to Yousuf at [facility] to inquire on the status of the compression pump. Notified Yousuf that tomorrow is Dr. Le's last day in clinic. Awaiting response.    Also left voicemail for Yousuf.    Spoke to Debra and let her know that we are reaching out to the representative from [employer].

## 2022-12-28 NOTE — TELEPHONE ENCOUNTER
Patient states Eliquis is too expensive for her to stay on.  Wants to switch to other DOAC.  Prescription sent in and my nurses to call her and talk her through how to switch from Eliquis to Xarelto.  Will be given number for select program as well.

## 2022-12-28 NOTE — TELEPHONE ENCOUNTER
"Yousuf from [facility] responded that because documentation cannot be provided required for medicare coverage an option is for Debra is to do the \"patient assistance\" program. After speaking with Debra she would like to proceed with that.    Sent email to Yousuf to let him know that she would like to proceed and follow up with Debra regarding this.   "

## 2023-01-03 NOTE — PROGRESS NOTES
[facility]    Outpatient Physical Therapy Discharge Note  Patient: Debra Manriquez  : 1940    Beginning/End Dates of Reporting Period:  3/8/22 to 7/15/22    Referring Provider: Homero Cruz MD    Therapy Diagnosis: falls risk, decreased balance confidence     Client Self Report: Patient reports she started having calf pain over the last 2 days, thought she had a blood clot and went to get it checked out. She notes no blood clot but they think she might have strained her muscle. She notes compliance to her exercises but she has also been using a LE compression machine for her edema because the person that normally helps edgardo her compression socks is out of town. She is not sure what made her calf hurt, but it definitely hurts with heel raises. Her knee has been a little more sore, about a 3/10 today. 1/3/23: Patient failed to return to PT, discharge at this time with progress made toward all goals.    Goals:  Goal Identifier TUG/Fall risk   Goal Description Patient will lower her TUG score to <13.5 sec indicating a decreased risk of falls for independent living.   Target Date 22   Date Met  22   Progress (detail required for progress note): 22: 11.52 sec with 4WW, goal met     Goal Identifier BBS/decreased postural control, progressed from initial goal of 31/56   Goal Description Patient will improve her score on the BBS to 38/56 pts indicating a significant change in postural control with ADLs.   Target Date 10/05/22   Date Met      Progress (detail required for progress note): 22: Improved from 23/56 to 30/56, but she is still at an increased risk for falls     Goal Identifier Knee pain   Goal Description Patient will be able to stand for >5' w/o report of knee pain >3/10 in order to complete more housekeeping duties w/o assistance.   Target Date 22   Date Met  22    Progress (detail required for progress note): 7/8/22: Patient notes resolution of knee pain with vein treatment     Goal Identifier HEP   Goal Description Patient will report adherence to a HEP addressing deficits in balance and knee pain 5/7 days per week in order to develop healthy exercise habits and reinforce movements that are safe, effective, and efficient.   Target Date 10/05/22   Date Met      Progress (detail required for progress note): 7/8/22: noncompliant but improved knee pain     Goal Identifier Standing tolerance   Goal Description Patient will demonstrate ability to stand with no UE support for >5 minutes to demonstrate improved strength and activity tolerance for functional mobility.   Target Date 10/05/22   Date Met      Progress (detail required for progress note): 7/8/22: 1 min and 22 sec; 7/15/22: Patient reports she is up to 1 min and 50 seconds     Plan:  Discharge from therapy.    Discharge:    Reason for Discharge: Patient has failed to schedule further appointments, progress made toward all goals.     Equipment Issued: None    Discharge Plan: Patient to continue home program.

## 2023-01-18 RX ORDER — METOPROLOL TARTRATE 25 MG/1
25 TABLET, FILM COATED ORAL 2 TIMES DAILY
COMMUNITY
End: 2023-01-25

## 2023-01-25 ENCOUNTER — OFFICE VISIT (OUTPATIENT)
Dept: CARDIOLOGY | Facility: CLINIC | Age: 83
End: 2023-01-25
Payer: MEDICARE

## 2023-01-25 VITALS
DIASTOLIC BLOOD PRESSURE: 66 MMHG | BODY MASS INDEX: 40.38 KG/M2 | SYSTOLIC BLOOD PRESSURE: 138 MMHG | WEIGHT: 250.2 LBS | HEART RATE: 67 BPM | RESPIRATION RATE: 16 BRPM | OXYGEN SATURATION: 98 %

## 2023-01-25 DIAGNOSIS — I48.0 PAROXYSMAL ATRIAL FIBRILLATION (H): ICD-10-CM

## 2023-01-25 DIAGNOSIS — I48.0 PAROXYSMAL ATRIAL FIBRILLATION (H): Primary | ICD-10-CM

## 2023-01-25 DIAGNOSIS — G47.33 OBSTRUCTIVE SLEEP APNEA: ICD-10-CM

## 2023-01-25 PROCEDURE — 99214 OFFICE O/P EST MOD 30 MIN: CPT | Performed by: NURSE PRACTITIONER

## 2023-01-25 PROCEDURE — 99207 PR NO BILLABLE SERVICE THIS VISIT: CPT | Performed by: NURSE PRACTITIONER
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
## 2023-01-25 NOTE — LETTER
1/25/2023    Yoanna Mcpherson MD  [address]  [place] [address]    RE: Debra Manriquez       Dear Colleague,     I had the pleasure of seeing Debra Manriquez in the [facility].  Note this visit was a tandem visit with Claudia Yoo NP and myself. See her note for details.    Thank you for allowing me to participate in the care of your patient.      Sincerely,     PHAM Mar CNP     [facility]  cc:   PHAM Mar CNP  [address] [facility] [address] [facility] [address]  [place] [address]

## 2023-01-25 NOTE — PATIENT INSTRUCTIONS
Debra Manriquez,    It was a pleasure to see you today at the [facility].     My recommendations after this visit include:  Continue with your Current Xarelto dose of 20 mg daily  Meet with Dr. Mcallister on 2/13/2023 for your visit prior to knee surgery on March 7th  Keep track of your atrial fibrillation episodes as you have been along with heart rate tracking per your Apple Watch  Continue using your CPAP nightly as you have been, great job :)  Follow up with me again in 6 months to discuss Watchman and check in on those heart rates etc        Claudia Yoo CNP  [facility], Electrophysiology  [phone]  EP nurses [phone]

## 2023-02-13 ENCOUNTER — OFFICE VISIT (OUTPATIENT)
Dept: CARDIOLOGY | Facility: CLINIC | Age: 83
End: 2023-02-13
Payer: MEDICARE

## 2023-02-13 VITALS
DIASTOLIC BLOOD PRESSURE: 60 MMHG | SYSTOLIC BLOOD PRESSURE: 112 MMHG | HEART RATE: 60 BPM | HEIGHT: 66 IN | RESPIRATION RATE: 16 BRPM | BODY MASS INDEX: 40.02 KG/M2 | WEIGHT: 249 LBS

## 2023-02-13 DIAGNOSIS — I48.0 PAROXYSMAL ATRIAL FIBRILLATION (H): Primary | ICD-10-CM

## 2023-02-13 DIAGNOSIS — E78.5 HYPERLIPIDEMIA LDL GOAL <70: ICD-10-CM

## 2023-02-13 PROCEDURE — 99214 OFFICE O/P EST MOD 30 MIN: CPT | Performed by: INTERNAL MEDICINE

## 2023-02-13 RX ORDER — ROSUVASTATIN CALCIUM 20 MG/1
20 TABLET, COATED ORAL DAILY
Qty: 30 TABLET | Refills: 12 | Status: SHIPPED | OUTPATIENT
Start: 2023-02-13 | End: 2024-04-29

## 2023-02-13 RX ORDER — GABAPENTIN 300 MG/1
1200 CAPSULE ORAL AT BEDTIME
COMMUNITY
Start: 2023-01-30 | End: 2023-04-15

## 2023-02-13 NOTE — PROGRESS NOTES
HEART CARE ENCOUNTER CONSULTATON NOTE      [facility]  [phone]      Assessment/Recommendations   Assessment/Plan: 82F w/ HFPEF, AF, HTN, HL, lymphedema, obesity, and goiter here for f/u of diastolic HF.      Plan:  # Plan for total knee replacement - she will be at an intermediate risk w/ an intermediate risk surgery  - she has no angina, no ADHF, thinks she is able to do a flight of stairs if it weren't for her knee  - not on BB due to baseline borderline HR, but will increase rosuva to a higher dose  - she already has a plan in place for apixaban hold per Ortho, no need for bridging    # ROMAN/HFPEF - stable atypical sxs., which may have had more to do with her weight and deconditioning rather than angina. Improved/resolved since last visit  - TTE screen last year showed preserved ventricular and valvular function but diastolic dysfxn. - will check one now  - continue w/ risk factor modfication  - continue lasix 20mg daily and it has helped w/ pedal edema, ARB and BB     # AF - new diagnosis in 11/22, [id number] score of 4 for female status, age over 75, hypertension and on Xarelto 20 mg daily    # HTN -  control w/ losartan/furosemide/low dose BB      # HL - previously elevated LDL, good HDL  - doing well w/ diet and exercise, had myalgias with low dose atorva, but did well w/ low dose rosuva  - I think given her age, risk factors of HTN, HL, thyroid disease, there will still be mild anti-inflammatory benefit in plaque stabilization from statin, increase to 20   - re-check FLP/LFT in 2 mos, if stale, titrate up to 40     F/u in 12 mos or as needed          History of Present Illness/Subjective    HPI: Debra Manriquez is a 82 year old female w/ HFPEF, AF, HTN, HL, lymphedema, obesity, and goiter here for f/u of diastolic HF.    In 11/22 she was found to be in AF on her iWatch, spontaneously converted 3 times so far. She was transiently on metoprolol, since stopped due to borderline  "BP's/HR's. She was also started on rivaroxaban.   She has developed knee pain and is now getting ready for surgery (TKR). She denies CP, SOB, ROMAN isu better w/ physical therapy limited due to knee pain. No SOB when she talks (did have that in Nov), no orthopnea/PND/edema/syncope/N/V/D/F/C.    Most recent EKG w/ SB at 57, no obvious ischemic changes.     Recent Echocardiogram Results:  1. Normal left ventricular size and systolic performance with a visually  estimated ejection fraction of 65%.  2. There is mild concentric increase in left ventricular wall thickness.  3. There is mild aortic insufficiency.  4. Normal right ventricular size and systolic performance.     When compared to the prior real-time echocardiogram dated 5 March 2021, the  findings are felt to be fairly similar on both examinations.    Recent Coronary Angiogram Results:  None     Physical Examination  Review of Systems   Vitals: /60 (BP Location: Right arm, Patient Position: Sitting, Cuff Size: Adult Large)   Pulse 60   Resp 16   Ht 1.676 m (5' 6\")   Wt 112.9 kg (249 lb)   BMI 40.19 kg/m    BMI= Body mass index is 40.19 kg/m .  Wt Readings from Last 3 Encounters:   02/13/23 112.9 kg (249 lb)   01/25/23 113.5 kg (250 lb 3.2 oz)   12/07/22 113.4 kg (250 lb)       General Appearance:   no distress, normal body habitus   ENT/Mouth: membranes moist, no oral lesions or bleeding gums.      EYES:  no scleral icterus, normal conjunctivae   Neck: no carotid bruits or thyromegaly   Chest/Lungs:   lungs are clear to auscultation, no rales or wheezing, no sternal scar, equal chest wall expansion    Cardiovascular:   Regular. Normal first and second heart sounds with no murmurs, rubs, or gallops; the carotid, radial and posterior tibial pulses are intact, Jugular venous pressure  6, no edema bilaterally    Abdomen:  no organomegaly, masses, bruits, or tenderness; bowel sounds are present   Extremities: no cyanosis or clubbing   Skin: no xanthelasma, " warm.    Neurologic: normal  bilateral, no tremors     Psychiatric: alert and oriented x3, calm        Please refer above for cardiac ROS details.        Medical History  Surgical History Family History Social History   Past Medical History:   Diagnosis Date     Antiplatelet or antithrombotic long-term use      Chronic acquired lymphedema      Edema      Falls      Fibrocystic breast disease      Fibrocystic breast disease      Foot pain      Gastroesophageal reflux disease      GERD (gastroesophageal reflux disease)      Hyperlipidemia      Hypertension      Irregular heart beat      Lymphedema      Mitral valve disorder      Obese      JADYN (obstructive sleep apnea)      Osteoarthritis of knee     hx of knee replacement and pending second     Osteopenia      Skin cancer      Skin cancer, basal cell      Sleep apnea      Thyroid nodule      Thyroid nodule      Vitamin B deficiency      Past Surgical History:   Procedure Laterality Date     CATARACT EXTRACTION  2011, 2012     COLONOSCOPY       EXCISE TOENAIL(S) Bilateral 8/16/2019    Procedure: MATRIXECTOMY BILATERAL FEET; TOES 1,2,3,4 ON LEFT FOOT,  1,2 ON RIGHT FOOT;  BOTH NAIL BORDERS;  Surgeon: Opal Kam DPM;  Location:  OR     EYE SURGERY      cataract     GI SURGERY       GYN SURGERY      hysterectomy     INJECT STEROID (LOCATION) Right 8/16/2019    Procedure: CORTIZONE INJECTION RIGHT HEEL;  Surgeon: Opal Kam DPM;  Location:  OR     ORTHOPEDIC SURGERY Left     knee replacement     PARTIAL HYSTERECTOMY       STRIP VEIN Bilateral 1975     TOTAL KNEE ARTHROPLASTY Left 2011     Family History   Problem Relation Age of Onset     No Known Problems Mother      No Known Problems Father      Alzheimer Disease Maternal Uncle      Heart Disease Sister      Heart Disease Brother      No Known Problems Daughter      No Known Problems Son      No Known Problems Brother      No Known Problems Son      No Known Problems Son          Social History     Socioeconomic History     Marital status:      Spouse name: Not on file     Number of children: Not on file     Years of education: Not on file     Highest education level: Not on file   Occupational History     Not on file   Tobacco Use     Smoking status: Never     Smokeless tobacco: Never   Substance and Sexual Activity     Alcohol use: Yes     Alcohol/week: 0.8 standard drinks     Comment: Alcoholic Drinks/day: 1-2 glasses per month     Drug use: No     Sexual activity: Yes   Other Topics Concern     Parent/sibling w/ CABG, MI or angioplasty before 65F 55M? Not Asked   Social History Narrative    . 4 kids.  Teaches college English, reading.      Social Determinants of Health     Financial Resource Strain: Not on file   Food Insecurity: Not on file   Transportation Needs: Not on file   Physical Activity: Not on file   Stress: Not on file   Social Connections: Not on file   Intimate Partner Violence: Not on file   Housing Stability: Not on file           Medications  Allergies   Current Outpatient Medications   Medication Sig Dispense Refill     acetylcysteine (N-ACETYL CYSTEINE) 600 MG CAPS capsule Take 1,800 mg by mouth       alpha-lipoic acid 600 MG capsule Take by mouth daily       apixaban ANTICOAGULANT (ELIQUIS) 5 MG tablet Take 5 mg by mouth 2 times daily       biotin 1000 MCG TABS tablet Take 1,000 mcg by mouth daily       cholecalciferol (VITAMIN D3) 5000 units TABS tablet Take 2 tablets by mouth daily       Coenzyme Q10 400 MG CAPS Take 800 mg by mouth daily       denosumab (PROLIA) 60 MG/ML SOSY injection Inject 60 mg Subcutaneous every 6 months       Digestive Aids Mixture (PROTEOLYTIC ENZYMES PO) Take 3 tablets by mouth daily       folic acid 0.8 MG CAPS Take by mouth daily       furosemide (LASIX) 20 MG tablet Take 20 mg by mouth daily       gabapentin (NEURONTIN) 100 MG capsule as needed       gabapentin (NEURONTIN) 300 MG capsule TAKE 2 CAPSULES BY MOUTH UP TO 2-3  TIMES A DAY       Krill Oil (OMEGA-3) 500 MG CAPS Take 1 capsule by mouth       levothyroxine (SYNTHROID/LEVOTHROID) 112 MCG tablet daily       losartan (COZAAR) 50 MG tablet Take 50 mg by mouth 2 times daily       MAGNESIUM PO Take 2,000 mg by mouth daily       Menaquinone-7 (VITAMIN K2 PO) Take 100 mg by mouth daily       Multiple Vitamins-Minerals (GNP IMMUNE SUPPORT PO) Take 1 Tablespoonful by mouth daily       Omega-3 Fatty Acids (FISH OIL EXTRA STRENGTH PO) Take 1,600 mg by mouth 2 times daily       RESVERATROL PO Take 1,200 mg by mouth daily       rivaroxaban ANTICOAGULANT (XARELTO) 20 MG TABS tablet Take 1 tablet (20 mg) by mouth daily (with dinner) Follow instructions given over phone on how to switch from Eliquis to Xarelto 90 tablet 3     rosuvastatin (CRESTOR) 5 MG tablet Take 5 mg by mouth       Selenium 100 MCG TABS Take by mouth daily       Sodium Hyaluronate, oral, (HYALURONIC ACID PO) Take 200 mg by mouth daily       Thiamine 50 MG CAPS daily       TURMERIC PO Take 1,250 mg by mouth daily       UNABLE TO FIND MEDICATION NAME: Mabel nayak vitamin E and zinc       UNABLE TO FIND MEDICATION NAME: complete gut health 2 tablets daily       VITAMIN A PO Take by mouth daily       vitamin B complex with vitamin C (STRESS TAB) tablet Take 1 tablet by mouth daily       vitamin B-12 (CYANOCOBALAMIN) 100 MCG tablet Take 6,000 mcg by mouth       vitamin C (ASCORBIC ACID) 500 MG tablet Take 500 mg by mouth       vitamin E 400 units TABS Take 400 Units by mouth daily         Allergies   Allergen Reactions     Lisinopril Cough     Reglan [Metoclopramide] Other (See Comments)     depression          Lab Results    Chemistry/lipid CBC Cardiac Enzymes/BNP/TSH/INR   Recent Labs   Lab Test 05/26/21  1100   CHOL 168   HDL 66   LDL 88   TRIG 68     Recent Labs   Lab Test 05/26/21  1100 11/30/20  0900 05/04/20  1149   LDL 88 124 167*     Recent Labs   Lab Test 03/16/22  1359      POTASSIUM 4.1   CHLORIDE 103   CO2 27    GLC 93   BUN 16   CR 0.72   GFRESTIMATED 82   DAMARIS 10.2     Recent Labs   Lab Test 03/16/22  1359 08/18/21  0942 05/26/21  1100   CR 0.72 0.70 0.66     No results for input(s): A1C in the last 32282 hours.       No results for input(s): WBC, HGB, HCT, MCV, PLT in the last 48246 hours.  No results for input(s): HGB in the last 34976 hours. No results for input(s): TROPONINI in the last 76282 hours.  No results for input(s): BNP, NTBNPI, NTBNP in the last 65243 hours.  Recent Labs   Lab Test 03/16/22  1359   TSH 2.55     No results for input(s): INR in the last 29794 hours.     Julien Mcallister MD

## 2023-02-13 NOTE — PATIENT INSTRUCTIONS
It is a pleasure seeing you today!    As discussed, let's increase rosuvastatin to 20 and check labs in 2 mos    Otherwise, proceed w/ surgery as planned    Follow up in 1 year or as needed

## 2023-02-13 NOTE — LETTER
2/13/2023    Yoanna Mcpherson MD  [address]  [place] [address]    RE: Debra GARCIA Mitra       Dear Colleague,     I had the pleasure of seeing Debra Manriquez in the [facility].    HEART CARE ENCOUNTER CONSULTATON NOTE      [facility]  [phone]      Assessment/Recommendations   Assessment/Plan: 82F w/ HFPEF, AF, HTN, HL, lymphedema, obesity, and goiter here for f/u of diastolic HF.      Plan:  # Plan for total knee replacement - she will be at an intermediate risk w/ an intermediate risk surgery  - she has no angina, no ADHF, thinks she is able to do a flight of stairs if it weren't for her knee  - not on BB due to baseline borderline HR, but will increase rosuva to a higher dose  - she already has a plan in place for apixaban hold per Ortho, no need for bridging    # ROMAN/HFPEF - stable atypical sxs., which may have had more to do with her weight and deconditioning rather than angina. Improved/resolved since last visit  - TTE screen last year showed preserved ventricular and valvular function but diastolic dysfxn. - will check one now  - continue w/ risk factor modfication  - continue lasix 20mg daily and it has helped w/ pedal edema, ARB and BB     # AF - new diagnosis in 11/22, [id number] score of 4 for female status, age over 75, hypertension and on Xarelto 20 mg daily    # HTN -  control w/ losartan/furosemide/low dose BB      # HL - previously elevated LDL, good HDL  - doing well w/ diet and exercise, had myalgias with low dose atorva, but did well w/ low dose rosuva  - I think given her age, risk factors of HTN, HL, thyroid disease, there will still be mild anti-inflammatory benefit in plaque stabilization from statin, increase to 20   - re-check FLP/LFT in 2 mos, if stale, titrate up to 40     F/u in 12 mos or as needed          History of Present Illness/Subjective    HPI: Debra Manriquez is a 82 year old female w/ HFPEF, AF, HTN, HL, lymphedema, obesity,  "and goiter here for f/u of diastolic HF.    In 11/22 she was found to be in AF on her iWatch, spontaneously converted 3 times so far. She was transiently on metoprolol, since stopped due to borderline BP's/HR's. She was also started on rivaroxaban.   She has developed knee pain and is now getting ready for surgery (TKR). She denies CP, SOB, ROMAN isu better w/ physical therapy limited due to knee pain. No SOB when she talks (did have that in Nov), no orthopnea/PND/edema/syncope/N/V/D/F/C.    Most recent EKG w/ SB at 57, no obvious ischemic changes.     Recent Echocardiogram Results:  1. Normal left ventricular size and systolic performance with a visually  estimated ejection fraction of 65%.  2. There is mild concentric increase in left ventricular wall thickness.  3. There is mild aortic insufficiency.  4. Normal right ventricular size and systolic performance.     When compared to the prior real-time echocardiogram dated 5 March 2021, the  findings are felt to be fairly similar on both examinations.    Recent Coronary Angiogram Results:  None     Physical Examination  Review of Systems   Vitals: /60 (BP Location: Right arm, Patient Position: Sitting, Cuff Size: Adult Large)   Pulse 60   Resp 16   Ht 1.676 m (5' 6\")   Wt 112.9 kg (249 lb)   BMI 40.19 kg/m    BMI= Body mass index is 40.19 kg/m .  Wt Readings from Last 3 Encounters:   02/13/23 112.9 kg (249 lb)   01/25/23 113.5 kg (250 lb 3.2 oz)   12/07/22 113.4 kg (250 lb)       General Appearance:   no distress, normal body habitus   ENT/Mouth: membranes moist, no oral lesions or bleeding gums.      EYES:  no scleral icterus, normal conjunctivae   Neck: no carotid bruits or thyromegaly   Chest/Lungs:   lungs are clear to auscultation, no rales or wheezing, no sternal scar, equal chest wall expansion    Cardiovascular:   Regular. Normal first and second heart sounds with no murmurs, rubs, or gallops; the carotid, radial and posterior tibial pulses are " intact, Jugular venous pressure  6, no edema bilaterally    Abdomen:  no organomegaly, masses, bruits, or tenderness; bowel sounds are present   Extremities: no cyanosis or clubbing   Skin: no xanthelasma, warm.    Neurologic: normal  bilateral, no tremors     Psychiatric: alert and oriented x3, calm        Please refer above for cardiac ROS details.        Medical History  Surgical History Family History Social History   Past Medical History:   Diagnosis Date     Antiplatelet or antithrombotic long-term use      Chronic acquired lymphedema      Edema      Falls      Fibrocystic breast disease      Fibrocystic breast disease      Foot pain      Gastroesophageal reflux disease      GERD (gastroesophageal reflux disease)      Hyperlipidemia      Hypertension      Irregular heart beat      Lymphedema      Mitral valve disorder      Obese      JADYN (obstructive sleep apnea)      Osteoarthritis of knee     hx of knee replacement and pending second     Osteopenia      Skin cancer      Skin cancer, basal cell      Sleep apnea      Thyroid nodule      Thyroid nodule      Vitamin B deficiency      Past Surgical History:   Procedure Laterality Date     CATARACT EXTRACTION  2011, 2012     COLONOSCOPY       EXCISE TOENAIL(S) Bilateral 8/16/2019    Procedure: MATRIXECTOMY BILATERAL FEET; TOES 1,2,3,4 ON LEFT FOOT,  1,2 ON RIGHT FOOT;  BOTH NAIL BORDERS;  Surgeon: Opal Kam DPM;  Location:  OR     EYE SURGERY      cataract     GI SURGERY       GYN SURGERY      hysterectomy     INJECT STEROID (LOCATION) Right 8/16/2019    Procedure: CORTIZONE INJECTION RIGHT HEEL;  Surgeon: Opal Kam DPM;  Location:  OR     ORTHOPEDIC SURGERY Left     knee replacement     PARTIAL HYSTERECTOMY       STRIP VEIN Bilateral 1975     TOTAL KNEE ARTHROPLASTY Left 2011     Family History   Problem Relation Age of Onset     No Known Problems Mother      No Known Problems Father      Alzheimer Disease Maternal Uncle       Heart Disease Sister      Heart Disease Brother      No Known Problems Daughter      No Known Problems Son      No Known Problems Brother      No Known Problems Son      No Known Problems Son         Social History     Socioeconomic History     Marital status:      Spouse name: Not on file     Number of children: Not on file     Years of education: Not on file     Highest education level: Not on file   Occupational History     Not on file   Tobacco Use     Smoking status: Never     Smokeless tobacco: Never   Substance and Sexual Activity     Alcohol use: Yes     Alcohol/week: 0.8 standard drinks     Comment: Alcoholic Drinks/day: 1-2 glasses per month     Drug use: No     Sexual activity: Yes   Other Topics Concern     Parent/sibling w/ CABG, MI or angioplasty before 65F 55M? Not Asked   Social History Narrative    . 4 kids.  Teaches college English, reading.      Social Determinants of Health     Financial Resource Strain: Not on file   Food Insecurity: Not on file   Transportation Needs: Not on file   Physical Activity: Not on file   Stress: Not on file   Social Connections: Not on file   Intimate Partner Violence: Not on file   Housing Stability: Not on file           Medications  Allergies   Current Outpatient Medications   Medication Sig Dispense Refill     acetylcysteine (N-ACETYL CYSTEINE) 600 MG CAPS capsule Take 1,800 mg by mouth       alpha-lipoic acid 600 MG capsule Take by mouth daily       apixaban ANTICOAGULANT (ELIQUIS) 5 MG tablet Take 5 mg by mouth 2 times daily       biotin 1000 MCG TABS tablet Take 1,000 mcg by mouth daily       cholecalciferol (VITAMIN D3) 5000 units TABS tablet Take 2 tablets by mouth daily       Coenzyme Q10 400 MG CAPS Take 800 mg by mouth daily       denosumab (PROLIA) 60 MG/ML SOSY injection Inject 60 mg Subcutaneous every 6 months       Digestive Aids Mixture (PROTEOLYTIC ENZYMES PO) Take 3 tablets by mouth daily       folic acid 0.8 MG CAPS Take by mouth  daily       furosemide (LASIX) 20 MG tablet Take 20 mg by mouth daily       gabapentin (NEURONTIN) 100 MG capsule as needed       gabapentin (NEURONTIN) 300 MG capsule TAKE 2 CAPSULES BY MOUTH UP TO 2-3 TIMES A DAY       Krill Oil (OMEGA-3) 500 MG CAPS Take 1 capsule by mouth       levothyroxine (SYNTHROID/LEVOTHROID) 112 MCG tablet daily       losartan (COZAAR) 50 MG tablet Take 50 mg by mouth 2 times daily       MAGNESIUM PO Take 2,000 mg by mouth daily       Menaquinone-7 (VITAMIN K2 PO) Take 100 mg by mouth daily       Multiple Vitamins-Minerals (GNP IMMUNE SUPPORT PO) Take 1 Tablespoonful by mouth daily       Omega-3 Fatty Acids (FISH OIL EXTRA STRENGTH PO) Take 1,600 mg by mouth 2 times daily       RESVERATROL PO Take 1,200 mg by mouth daily       rivaroxaban ANTICOAGULANT (XARELTO) 20 MG TABS tablet Take 1 tablet (20 mg) by mouth daily (with dinner) Follow instructions given over phone on how to switch from Eliquis to Xarelto 90 tablet 3     rosuvastatin (CRESTOR) 5 MG tablet Take 5 mg by mouth       Selenium 100 MCG TABS Take by mouth daily       Sodium Hyaluronate, oral, (HYALURONIC ACID PO) Take 200 mg by mouth daily       Thiamine 50 MG CAPS daily       TURMERIC PO Take 1,250 mg by mouth daily       UNABLE TO FIND MEDICATION NAME: Mabel nayak vitamin E and zinc       UNABLE TO FIND MEDICATION NAME: complete gut health 2 tablets daily       VITAMIN A PO Take by mouth daily       vitamin B complex with vitamin C (STRESS TAB) tablet Take 1 tablet by mouth daily       vitamin B-12 (CYANOCOBALAMIN) 100 MCG tablet Take 6,000 mcg by mouth       vitamin C (ASCORBIC ACID) 500 MG tablet Take 500 mg by mouth       vitamin E 400 units TABS Take 400 Units by mouth daily         Allergies   Allergen Reactions     Lisinopril Cough     Reglan [Metoclopramide] Other (See Comments)     depression          Lab Results    Chemistry/lipid CBC Cardiac Enzymes/BNP/TSH/INR   Recent Labs   Lab Test 05/26/21  1100   CHOL 168   HDL  66   LDL 88   TRIG 68     Recent Labs   Lab Test 05/26/21  1100 11/30/20  0900 05/04/20  1149   LDL 88 124 167*     Recent Labs   Lab Test 03/16/22  1359      POTASSIUM 4.1   CHLORIDE 103   CO2 27   GLC 93   BUN 16   CR 0.72   GFRESTIMATED 82   DAMARIS 10.2     Recent Labs   Lab Test 03/16/22  1359 08/18/21  0942 05/26/21  1100   CR 0.72 0.70 0.66     No results for input(s): A1C in the last 70440 hours.       No results for input(s): WBC, HGB, HCT, MCV, PLT in the last 08446 hours.  No results for input(s): HGB in the last 75281 hours. No results for input(s): TROPONINI in the last 33433 hours.  No results for input(s): BNP, NTBNPI, NTBNP in the last 40772 hours.  Recent Labs   Lab Test 03/16/22  1359   TSH 2.55     No results for input(s): INR in the last 29278 hours.     Julien Mcallister MD                      Thank you for allowing me to participate in the care of your patient.      Sincerely,     Julien Mcallister MD     [facility]  cc:   No referring provider defined for this encounter.

## 2023-02-22 ENCOUNTER — LAB REQUISITION (OUTPATIENT)
Dept: LAB | Facility: CLINIC | Age: 83
End: 2023-02-22
Payer: MEDICARE

## 2023-02-22 ENCOUNTER — TRANSFERRED RECORDS (OUTPATIENT)
Dept: HEALTH INFORMATION MANAGEMENT | Facility: CLINIC | Age: 83
End: 2023-02-22

## 2023-02-22 DIAGNOSIS — E03.9 HYPOTHYROIDISM, UNSPECIFIED: ICD-10-CM

## 2023-02-22 DIAGNOSIS — I11.0 HYPERTENSIVE HEART DISEASE WITH HEART FAILURE (H): ICD-10-CM

## 2023-02-22 LAB
ANION GAP SERPL CALCULATED.3IONS-SCNC: 14 MMOL/L (ref 7–15)
BUN SERPL-MCNC: 16.7 MG/DL (ref 8–23)
CALCIUM SERPL-MCNC: 9.7 MG/DL (ref 8.8–10.2)
CHLORIDE SERPL-SCNC: 104 MMOL/L (ref 98–107)
CREAT SERPL-MCNC: 0.69 MG/DL (ref 0.51–0.95)
DEPRECATED HCO3 PLAS-SCNC: 25 MMOL/L (ref 22–29)
GFR SERPL CREATININE-BSD FRML MDRD: 85 ML/MIN/1.73M2
GLUCOSE SERPL-MCNC: 103 MG/DL (ref 70–99)
POTASSIUM SERPL-SCNC: 4.2 MMOL/L (ref 3.4–5.3)
SODIUM SERPL-SCNC: 143 MMOL/L (ref 136–145)
TSH SERPL DL<=0.005 MIU/L-ACNC: 1.68 UIU/ML (ref 0.3–4.2)

## 2023-02-22 PROCEDURE — 84443 ASSAY THYROID STIM HORMONE: CPT | Mod: ORL | Performed by: FAMILY MEDICINE

## 2023-02-22 PROCEDURE — 80048 BASIC METABOLIC PNL TOTAL CA: CPT | Mod: ORL | Performed by: FAMILY MEDICINE

## 2023-02-23 NOTE — PROGRESS NOTES
Discharge plan according to [facility]:       01/18/23 1132   Discharge Planning   Patient/Family Anticipates Transition to home;assisted living   Living Arrangements   People in Home facility resident   Type of Residence Assisted Living   Is your private residence a single family home or apartment? Apartment   Number of Stairs, Within Home, Primary none   Once home, are you able to live on one level? Yes   Which level? Main Level   Bathroom Shower/Tub Tub/Shower unit   Equipment Currently Used at Home raised toilet seat;walker, rolling   Support System   Support Systems Home Care Staff   Do you have someone available to stay with you one or two nights once you are home? Yes

## 2023-03-02 NOTE — PLAN OF CARE
Talked with patient about her discharge plan for after surgery. She lives in a senior care facility. Her daughter in law will stay with her for the first 2 days/nights and then she has a caregiver who lives down [place] that is available to her 24/7.

## 2023-03-06 ENCOUNTER — ANESTHESIA EVENT (OUTPATIENT)
Dept: SURGERY | Facility: CLINIC | Age: 83
DRG: 857 | End: 2023-03-06
Payer: MEDICARE

## 2023-03-07 ENCOUNTER — APPOINTMENT (OUTPATIENT)
Dept: PHYSICAL THERAPY | Facility: CLINIC | Age: 83
DRG: 857 | End: 2023-03-07
Attending: ORTHOPAEDIC SURGERY
Payer: MEDICARE

## 2023-03-07 ENCOUNTER — HOSPITAL ENCOUNTER (OUTPATIENT)
Facility: CLINIC | Age: 83
Discharge: HOME | DRG: 857 | End: 2023-03-08
Attending: ORTHOPAEDIC SURGERY | Admitting: ORTHOPAEDIC SURGERY
Payer: MEDICARE

## 2023-03-07 ENCOUNTER — ANESTHESIA (OUTPATIENT)
Dept: SURGERY | Facility: CLINIC | Age: 83
DRG: 857 | End: 2023-03-07
Payer: MEDICARE

## 2023-03-07 DIAGNOSIS — M17.11 PRIMARY OSTEOARTHRITIS OF RIGHT KNEE: Primary | ICD-10-CM

## 2023-03-07 PROBLEM — M17.9 KNEE OSTEOARTHRITIS: Status: ACTIVE | Noted: 2023-03-07

## 2023-03-07 LAB — GLUCOSE BLDC GLUCOMTR-MCNC: 94 MG/DL (ref 70–99)

## 2023-03-07 PROCEDURE — 258N000003 HC RX IP 258 OP 636: Performed by: ANESTHESIOLOGY

## 2023-03-07 PROCEDURE — 120N000001 HC R&B MED SURG/OB

## 2023-03-07 PROCEDURE — 250N000013 HC RX MED GY IP 250 OP 250 PS 637: Performed by: ORTHOPAEDIC SURGERY

## 2023-03-07 PROCEDURE — 250N000011 HC RX IP 250 OP 636: Performed by: ANESTHESIOLOGY

## 2023-03-07 PROCEDURE — 258N000003 HC RX IP 258 OP 636: Performed by: ORTHOPAEDIC SURGERY

## 2023-03-07 PROCEDURE — 250N000011 HC RX IP 250 OP 636: Performed by: NURSE ANESTHETIST, CERTIFIED REGISTERED

## 2023-03-07 PROCEDURE — 97116 GAIT TRAINING THERAPY: CPT | Mod: GP

## 2023-03-07 PROCEDURE — 97161 PT EVAL LOW COMPLEX 20 MIN: CPT | Mod: GP

## 2023-03-07 PROCEDURE — 250N000011 HC RX IP 250 OP 636: Performed by: ORTHOPAEDIC SURGERY

## 2023-03-07 PROCEDURE — 250N000013 HC RX MED GY IP 250 OP 250 PS 637: Performed by: PHYSICIAN ASSISTANT

## 2023-03-07 PROCEDURE — 250N000013 HC RX MED GY IP 250 OP 250 PS 637: Performed by: INTERNAL MEDICINE

## 2023-03-07 PROCEDURE — 360N000077 HC SURGERY LEVEL 4, PER MIN: Performed by: ORTHOPAEDIC SURGERY

## 2023-03-07 PROCEDURE — 370N000017 HC ANESTHESIA TECHNICAL FEE, PER MIN: Performed by: ORTHOPAEDIC SURGERY

## 2023-03-07 PROCEDURE — 250N000009 HC RX 250: Performed by: PHYSICIAN ASSISTANT

## 2023-03-07 PROCEDURE — 710N000010 HC RECOVERY PHASE 1, LEVEL 2, PER MIN: Performed by: ORTHOPAEDIC SURGERY

## 2023-03-07 PROCEDURE — C1713 ANCHOR/SCREW BN/BN,TIS/BN: HCPCS | Performed by: ORTHOPAEDIC SURGERY

## 2023-03-07 PROCEDURE — 97110 THERAPEUTIC EXERCISES: CPT | Mod: GP

## 2023-03-07 PROCEDURE — 250N000009 HC RX 250: Performed by: ORTHOPAEDIC SURGERY

## 2023-03-07 PROCEDURE — 258N000003 HC RX IP 258 OP 636: Performed by: NURSE ANESTHETIST, CERTIFIED REGISTERED

## 2023-03-07 PROCEDURE — 0SRC0J9 REPLACEMENT OF RIGHT KNEE JOINT WITH SYNTHETIC SUBSTITUTE, CEMENTED, OPEN APPROACH: ICD-10-PCS | Performed by: ORTHOPAEDIC SURGERY

## 2023-03-07 PROCEDURE — 250N000011 HC RX IP 250 OP 636: Performed by: PHYSICIAN ASSISTANT

## 2023-03-07 PROCEDURE — 999N000141 HC STATISTIC PRE-PROCEDURE NURSING ASSESSMENT: Performed by: ORTHOPAEDIC SURGERY

## 2023-03-07 PROCEDURE — 99232 SBSQ HOSP IP/OBS MODERATE 35: CPT | Performed by: INTERNAL MEDICINE

## 2023-03-07 PROCEDURE — 250N000009 HC RX 250: Performed by: ANESTHESIOLOGY

## 2023-03-07 PROCEDURE — 272N000001 HC OR GENERAL SUPPLY STERILE: Performed by: ORTHOPAEDIC SURGERY

## 2023-03-07 PROCEDURE — C1776 JOINT DEVICE (IMPLANTABLE): HCPCS | Performed by: ORTHOPAEDIC SURGERY

## 2023-03-07 PROCEDURE — 258N000001 HC RX 258: Performed by: ORTHOPAEDIC SURGERY

## 2023-03-07 DEVICE — IMPLANTABLE DEVICE
Type: IMPLANTABLE DEVICE | Site: KNEE | Status: FUNCTIONAL
Brand: PERSONA®

## 2023-03-07 DEVICE — IMP BONE CEMENT SIMPLEX W/GENTAMICIN 6195-1-001: Type: IMPLANTABLE DEVICE | Site: KNEE | Status: FUNCTIONAL

## 2023-03-07 DEVICE — IMPLANTABLE DEVICE
Type: IMPLANTABLE DEVICE | Site: KNEE | Status: FUNCTIONAL
Brand: PERSONA™

## 2023-03-07 DEVICE — IMPLANTABLE DEVICE
Type: IMPLANTABLE DEVICE | Site: KNEE | Status: FUNCTIONAL
Brand: PERSONA® NATURAL TIBIA®

## 2023-03-07 RX ORDER — LEVOTHYROXINE SODIUM 112 UG/1
112 TABLET ORAL DAILY
Status: DISCONTINUED | OUTPATIENT
Start: 2023-03-07 | End: 2023-03-08 | Stop reason: HOSPADM

## 2023-03-07 RX ORDER — SODIUM CHLORIDE, SODIUM LACTATE, POTASSIUM CHLORIDE, CALCIUM CHLORIDE 600; 310; 30; 20 MG/100ML; MG/100ML; MG/100ML; MG/100ML
INJECTION, SOLUTION INTRAVENOUS CONTINUOUS
Status: DISCONTINUED | OUTPATIENT
Start: 2023-03-07 | End: 2023-03-07 | Stop reason: HOSPADM

## 2023-03-07 RX ORDER — SODIUM CHLORIDE, SODIUM LACTATE, POTASSIUM CHLORIDE, CALCIUM CHLORIDE 600; 310; 30; 20 MG/100ML; MG/100ML; MG/100ML; MG/100ML
INJECTION, SOLUTION INTRAVENOUS CONTINUOUS
Status: DISCONTINUED | OUTPATIENT
Start: 2023-03-07 | End: 2023-03-08 | Stop reason: HOSPADM

## 2023-03-07 RX ORDER — ONDANSETRON 2 MG/ML
INJECTION INTRAMUSCULAR; INTRAVENOUS PRN
Status: DISCONTINUED | OUTPATIENT
Start: 2023-03-07 | End: 2023-03-07

## 2023-03-07 RX ORDER — TRANEXAMIC ACID 650 MG/1
1950 TABLET ORAL ONCE
Status: COMPLETED | OUTPATIENT
Start: 2023-03-07 | End: 2023-03-07

## 2023-03-07 RX ORDER — FENTANYL CITRATE 50 UG/ML
50 INJECTION, SOLUTION INTRAMUSCULAR; INTRAVENOUS EVERY 5 MIN PRN
Status: DISCONTINUED | OUTPATIENT
Start: 2023-03-07 | End: 2023-03-07 | Stop reason: HOSPADM

## 2023-03-07 RX ORDER — AMOXICILLIN 250 MG
1-2 CAPSULE ORAL 2 TIMES DAILY
Qty: 30 TABLET | Refills: 0 | Status: SHIPPED | OUTPATIENT
Start: 2023-03-07 | End: 2023-08-15

## 2023-03-07 RX ORDER — BISACODYL 10 MG
10 SUPPOSITORY, RECTAL RECTAL DAILY PRN
Status: DISCONTINUED | OUTPATIENT
Start: 2023-03-07 | End: 2023-03-08 | Stop reason: HOSPADM

## 2023-03-07 RX ORDER — NALOXONE HYDROCHLORIDE 0.4 MG/ML
0.2 INJECTION, SOLUTION INTRAMUSCULAR; INTRAVENOUS; SUBCUTANEOUS
Status: DISCONTINUED | OUTPATIENT
Start: 2023-03-07 | End: 2023-03-08 | Stop reason: HOSPADM

## 2023-03-07 RX ORDER — ONDANSETRON 2 MG/ML
4 INJECTION INTRAMUSCULAR; INTRAVENOUS EVERY 6 HOURS PRN
Status: DISCONTINUED | OUTPATIENT
Start: 2023-03-07 | End: 2023-03-08 | Stop reason: HOSPADM

## 2023-03-07 RX ORDER — NALOXONE HYDROCHLORIDE 0.4 MG/ML
0.4 INJECTION, SOLUTION INTRAMUSCULAR; INTRAVENOUS; SUBCUTANEOUS
Status: DISCONTINUED | OUTPATIENT
Start: 2023-03-07 | End: 2023-03-08 | Stop reason: HOSPADM

## 2023-03-07 RX ORDER — ONDANSETRON 2 MG/ML
4 INJECTION INTRAMUSCULAR; INTRAVENOUS EVERY 30 MIN PRN
Status: DISCONTINUED | OUTPATIENT
Start: 2023-03-07 | End: 2023-03-07 | Stop reason: HOSPADM

## 2023-03-07 RX ORDER — ACETAMINOPHEN 325 MG/1
650 TABLET ORAL EVERY 4 HOURS PRN
Qty: 100 TABLET | Refills: 0 | Status: SHIPPED | OUTPATIENT
Start: 2023-03-07 | End: 2023-03-16

## 2023-03-07 RX ORDER — ONDANSETRON 4 MG/1
4 TABLET, ORALLY DISINTEGRATING ORAL EVERY 6 HOURS PRN
Status: DISCONTINUED | OUTPATIENT
Start: 2023-03-07 | End: 2023-03-08 | Stop reason: HOSPADM

## 2023-03-07 RX ORDER — MAGNESIUM SULFATE 4 G/50ML
4 INJECTION INTRAVENOUS ONCE
Status: COMPLETED | OUTPATIENT
Start: 2023-03-07 | End: 2023-03-07

## 2023-03-07 RX ORDER — MAGNESIUM HYDROXIDE 1200 MG/15ML
LIQUID ORAL PRN
Status: DISCONTINUED | OUTPATIENT
Start: 2023-03-07 | End: 2023-03-07 | Stop reason: HOSPADM

## 2023-03-07 RX ORDER — FENTANYL CITRATE 50 UG/ML
50 INJECTION, SOLUTION INTRAMUSCULAR; INTRAVENOUS
Status: DISCONTINUED | OUTPATIENT
Start: 2023-03-07 | End: 2023-03-07 | Stop reason: HOSPADM

## 2023-03-07 RX ORDER — PROPOFOL 10 MG/ML
INJECTION, EMULSION INTRAVENOUS CONTINUOUS PRN
Status: DISCONTINUED | OUTPATIENT
Start: 2023-03-07 | End: 2023-03-07

## 2023-03-07 RX ORDER — LANOLIN ALCOHOL/MO/W.PET/CERES
1000 CREAM (GRAM) TOPICAL DAILY
Status: DISCONTINUED | OUTPATIENT
Start: 2023-03-07 | End: 2023-03-07

## 2023-03-07 RX ORDER — POLYETHYLENE GLYCOL 3350 17 G/17G
17 POWDER, FOR SOLUTION ORAL DAILY
Status: DISCONTINUED | OUTPATIENT
Start: 2023-03-08 | End: 2023-03-08 | Stop reason: HOSPADM

## 2023-03-07 RX ORDER — OXYCODONE HYDROCHLORIDE 5 MG/1
5 TABLET ORAL EVERY 4 HOURS PRN
Status: DISCONTINUED | OUTPATIENT
Start: 2023-03-07 | End: 2023-03-08 | Stop reason: HOSPADM

## 2023-03-07 RX ORDER — CEFAZOLIN SODIUM/WATER 2 G/20 ML
2 SYRINGE (ML) INTRAVENOUS SEE ADMIN INSTRUCTIONS
Status: DISCONTINUED | OUTPATIENT
Start: 2023-03-07 | End: 2023-03-07 | Stop reason: HOSPADM

## 2023-03-07 RX ORDER — LIDOCAINE 40 MG/G
CREAM TOPICAL
Status: DISCONTINUED | OUTPATIENT
Start: 2023-03-07 | End: 2023-03-07 | Stop reason: HOSPADM

## 2023-03-07 RX ORDER — FUROSEMIDE 20 MG
20 TABLET ORAL DAILY
Status: DISCONTINUED | OUTPATIENT
Start: 2023-03-07 | End: 2023-03-08 | Stop reason: HOSPADM

## 2023-03-07 RX ORDER — FENTANYL CITRATE 50 UG/ML
25 INJECTION, SOLUTION INTRAMUSCULAR; INTRAVENOUS EVERY 5 MIN PRN
Status: DISCONTINUED | OUTPATIENT
Start: 2023-03-07 | End: 2023-03-07 | Stop reason: HOSPADM

## 2023-03-07 RX ORDER — BUPIVACAINE HYDROCHLORIDE AND EPINEPHRINE 5; 5 MG/ML; UG/ML
INJECTION, SOLUTION PERINEURAL
Status: COMPLETED | OUTPATIENT
Start: 2023-03-07 | End: 2023-03-07

## 2023-03-07 RX ORDER — OXYCODONE HYDROCHLORIDE 5 MG/1
10 TABLET ORAL EVERY 4 HOURS PRN
Status: DISCONTINUED | OUTPATIENT
Start: 2023-03-07 | End: 2023-03-08 | Stop reason: HOSPADM

## 2023-03-07 RX ORDER — GABAPENTIN 300 MG/1
900 CAPSULE ORAL AT BEDTIME
Status: DISCONTINUED | OUTPATIENT
Start: 2023-03-07 | End: 2023-03-08 | Stop reason: HOSPADM

## 2023-03-07 RX ORDER — CEFAZOLIN SODIUM/WATER 2 G/20 ML
2 SYRINGE (ML) INTRAVENOUS
Status: COMPLETED | OUTPATIENT
Start: 2023-03-07 | End: 2023-03-07

## 2023-03-07 RX ORDER — LOSARTAN POTASSIUM 50 MG/1
50 TABLET ORAL 2 TIMES DAILY
Status: DISCONTINUED | OUTPATIENT
Start: 2023-03-07 | End: 2023-03-08 | Stop reason: HOSPADM

## 2023-03-07 RX ORDER — HYDROMORPHONE HCL IN WATER/PF 6 MG/30 ML
0.4 PATIENT CONTROLLED ANALGESIA SYRINGE INTRAVENOUS
Status: DISCONTINUED | OUTPATIENT
Start: 2023-03-07 | End: 2023-03-08 | Stop reason: HOSPADM

## 2023-03-07 RX ORDER — HYDROMORPHONE HCL IN WATER/PF 6 MG/30 ML
0.2 PATIENT CONTROLLED ANALGESIA SYRINGE INTRAVENOUS
Status: DISCONTINUED | OUTPATIENT
Start: 2023-03-07 | End: 2023-03-08 | Stop reason: HOSPADM

## 2023-03-07 RX ORDER — LIDOCAINE 40 MG/G
CREAM TOPICAL
Status: DISCONTINUED | OUTPATIENT
Start: 2023-03-07 | End: 2023-03-08 | Stop reason: HOSPADM

## 2023-03-07 RX ORDER — CEFAZOLIN SODIUM 2 G/100ML
2 INJECTION, SOLUTION INTRAVENOUS EVERY 8 HOURS
Status: COMPLETED | OUTPATIENT
Start: 2023-03-07 | End: 2023-03-07

## 2023-03-07 RX ORDER — LANOLIN ALCOHOL/MO/W.PET/CERES
800 CREAM (GRAM) TOPICAL DAILY
Status: DISCONTINUED | OUTPATIENT
Start: 2023-03-07 | End: 2023-03-07

## 2023-03-07 RX ORDER — OXYCODONE HYDROCHLORIDE 5 MG/1
5-10 TABLET ORAL EVERY 4 HOURS PRN
Qty: 30 TABLET | Refills: 0 | Status: ON HOLD | OUTPATIENT
Start: 2023-03-07 | End: 2023-03-14

## 2023-03-07 RX ORDER — AMOXICILLIN 250 MG
1 CAPSULE ORAL 2 TIMES DAILY
Status: DISCONTINUED | OUTPATIENT
Start: 2023-03-07 | End: 2023-03-08 | Stop reason: HOSPADM

## 2023-03-07 RX ORDER — ROSUVASTATIN CALCIUM 10 MG/1
20 TABLET, COATED ORAL AT BEDTIME
Status: DISCONTINUED | OUTPATIENT
Start: 2023-03-07 | End: 2023-03-08 | Stop reason: HOSPADM

## 2023-03-07 RX ORDER — ACETAMINOPHEN 325 MG/1
975 TABLET ORAL EVERY 8 HOURS
Status: DISCONTINUED | OUTPATIENT
Start: 2023-03-07 | End: 2023-03-08 | Stop reason: HOSPADM

## 2023-03-07 RX ORDER — HYDROMORPHONE HCL IN WATER/PF 6 MG/30 ML
0.4 PATIENT CONTROLLED ANALGESIA SYRINGE INTRAVENOUS EVERY 5 MIN PRN
Status: DISCONTINUED | OUTPATIENT
Start: 2023-03-07 | End: 2023-03-07 | Stop reason: HOSPADM

## 2023-03-07 RX ORDER — ONDANSETRON 4 MG/1
4 TABLET, ORALLY DISINTEGRATING ORAL EVERY 30 MIN PRN
Status: DISCONTINUED | OUTPATIENT
Start: 2023-03-07 | End: 2023-03-07 | Stop reason: HOSPADM

## 2023-03-07 RX ORDER — BUPIVACAINE HYDROCHLORIDE 7.5 MG/ML
INJECTION, SOLUTION INTRASPINAL
Status: COMPLETED | OUTPATIENT
Start: 2023-03-07 | End: 2023-03-07

## 2023-03-07 RX ORDER — PHENYLEPHRINE HCL IN 0.9% NACL 50MG/250ML
PLASTIC BAG, INJECTION (ML) INTRAVENOUS
Status: DISCONTINUED
Start: 2023-03-07 | End: 2023-03-07 | Stop reason: HOSPADM

## 2023-03-07 RX ORDER — ACETAMINOPHEN 325 MG/1
650 TABLET ORAL EVERY 4 HOURS PRN
Status: DISCONTINUED | OUTPATIENT
Start: 2023-03-10 | End: 2023-03-08 | Stop reason: HOSPADM

## 2023-03-07 RX ORDER — HYDROMORPHONE HCL IN WATER/PF 6 MG/30 ML
0.2 PATIENT CONTROLLED ANALGESIA SYRINGE INTRAVENOUS EVERY 5 MIN PRN
Status: DISCONTINUED | OUTPATIENT
Start: 2023-03-07 | End: 2023-03-07 | Stop reason: HOSPADM

## 2023-03-07 RX ORDER — DEXAMETHASONE SODIUM PHOSPHATE 4 MG/ML
INJECTION, SOLUTION INTRA-ARTICULAR; INTRALESIONAL; INTRAMUSCULAR; INTRAVENOUS; SOFT TISSUE PRN
Status: DISCONTINUED | OUTPATIENT
Start: 2023-03-07 | End: 2023-03-07

## 2023-03-07 RX ORDER — PROCHLORPERAZINE MALEATE 5 MG
5 TABLET ORAL EVERY 6 HOURS PRN
Status: DISCONTINUED | OUTPATIENT
Start: 2023-03-07 | End: 2023-03-08 | Stop reason: HOSPADM

## 2023-03-07 RX ADMIN — OXYCODONE HYDROCHLORIDE 5 MG: 5 TABLET ORAL at 14:17

## 2023-03-07 RX ADMIN — GABAPENTIN 900 MG: 300 CAPSULE ORAL at 20:10

## 2023-03-07 RX ADMIN — BUPIVACAINE HYDROCHLORIDE AND EPINEPHRINE BITARTRATE 15 ML: 5; .005 INJECTION, SOLUTION PERINEURAL at 07:16

## 2023-03-07 RX ADMIN — FUROSEMIDE 20 MG: 20 TABLET ORAL at 15:51

## 2023-03-07 RX ADMIN — LEVOTHYROXINE SODIUM 112 MCG: 0.11 TABLET ORAL at 14:17

## 2023-03-07 RX ADMIN — CEFAZOLIN SODIUM 2 G: 2 INJECTION, SOLUTION INTRAVENOUS at 15:51

## 2023-03-07 RX ADMIN — MAGNESIUM SULFATE HEPTAHYDRATE 4 G: 4 INJECTION, SOLUTION INTRAVENOUS at 06:43

## 2023-03-07 RX ADMIN — FENTANYL CITRATE 50 MCG: 50 INJECTION INTRAMUSCULAR; INTRAVENOUS at 07:20

## 2023-03-07 RX ADMIN — BUPIVACAINE HYDROCHLORIDE IN DEXTROSE 1.6 ML: 7.5 INJECTION, SOLUTION SUBARACHNOID at 07:39

## 2023-03-07 RX ADMIN — OXYCODONE HYDROCHLORIDE 5 MG: 5 TABLET ORAL at 21:29

## 2023-03-07 RX ADMIN — SODIUM CHLORIDE, POTASSIUM CHLORIDE, SODIUM LACTATE AND CALCIUM CHLORIDE 1000 ML: 600; 310; 30; 20 INJECTION, SOLUTION INTRAVENOUS at 06:43

## 2023-03-07 RX ADMIN — PHENYLEPHRINE HYDROCHLORIDE 0.5 MCG/KG/MIN: 10 INJECTION INTRAVENOUS at 07:47

## 2023-03-07 RX ADMIN — ROSUVASTATIN CALCIUM 20 MG: 10 TABLET, FILM COATED ORAL at 20:10

## 2023-03-07 RX ADMIN — TRANEXAMIC ACID 1950 MG: 650 TABLET ORAL at 06:13

## 2023-03-07 RX ADMIN — SODIUM CHLORIDE, POTASSIUM CHLORIDE, SODIUM LACTATE AND CALCIUM CHLORIDE: 600; 310; 30; 20 INJECTION, SOLUTION INTRAVENOUS at 12:55

## 2023-03-07 RX ADMIN — SENNOSIDES AND DOCUSATE SODIUM 1 TABLET: 50; 8.6 TABLET ORAL at 20:10

## 2023-03-07 RX ADMIN — ONDANSETRON 4 MG: 2 INJECTION INTRAMUSCULAR; INTRAVENOUS at 08:53

## 2023-03-07 RX ADMIN — DEXAMETHASONE SODIUM PHOSPHATE 10 MG: 4 INJECTION, SOLUTION INTRA-ARTICULAR; INTRALESIONAL; INTRAMUSCULAR; INTRAVENOUS; SOFT TISSUE at 08:51

## 2023-03-07 RX ADMIN — LOSARTAN POTASSIUM 50 MG: 50 TABLET, FILM COATED ORAL at 20:10

## 2023-03-07 RX ADMIN — SENNOSIDES AND DOCUSATE SODIUM 1 TABLET: 50; 8.6 TABLET ORAL at 14:17

## 2023-03-07 RX ADMIN — CEFAZOLIN SODIUM 2 G: 2 INJECTION, SOLUTION INTRAVENOUS at 22:33

## 2023-03-07 RX ADMIN — ACETAMINOPHEN 975 MG: 325 TABLET ORAL at 17:12

## 2023-03-07 RX ADMIN — ACETAMINOPHEN 975 MG: 325 TABLET ORAL at 10:05

## 2023-03-07 RX ADMIN — PROPOFOL 75 MCG/KG/MIN: 10 INJECTION, EMULSION INTRAVENOUS at 07:31

## 2023-03-07 RX ADMIN — MIDAZOLAM HYDROCHLORIDE 2 MG: 1 INJECTION, SOLUTION INTRAMUSCULAR; INTRAVENOUS at 07:20

## 2023-03-07 RX ADMIN — SODIUM CHLORIDE, POTASSIUM CHLORIDE, SODIUM LACTATE AND CALCIUM CHLORIDE: 600; 310; 30; 20 INJECTION, SOLUTION INTRAVENOUS at 09:29

## 2023-03-07 RX ADMIN — OXYCODONE HYDROCHLORIDE 5 MG: 5 TABLET ORAL at 10:05

## 2023-03-07 RX ADMIN — Medication 2 G: at 07:29

## 2023-03-07 ASSESSMENT — ACTIVITIES OF DAILY LIVING (ADL)
ADLS_ACUITY_SCORE: 37
ADLS_ACUITY_SCORE: 26
ADLS_ACUITY_SCORE: 29
ADLS_ACUITY_SCORE: 29
ADLS_ACUITY_SCORE: 41
ADLS_ACUITY_SCORE: 29
ADLS_ACUITY_SCORE: 29
ADLS_ACUITY_SCORE: 37
ADLS_ACUITY_SCORE: 26

## 2023-03-07 NOTE — ANESTHESIA CARE TRANSFER NOTE
Patient: Debra Manriquez    Procedure: Procedure(s):  RIGHT TOTAL KNEE ARTHROPLASTY       Diagnosis: Primary osteoarthritis of right knee [M17.11]  Diagnosis Additional Information: No value filed.    Anesthesia Type:   Spinal     Note:    Oropharynx: oropharynx clear of all foreign objects  Level of Consciousness: awake  Oxygen Supplementation: face mask  Level of Supplemental Oxygen (L/min / FiO2): 8  Independent Airway: airway patency satisfactory and stable  Dentition: dentition unchanged  Vital Signs Stable: post-procedure vital signs reviewed and stable  Report to RN Given: handoff report given  Patient transferred to: PACU    Handoff Report: Identifed the Patient, Identified the Reponsible Provider, Reviewed the pertinent medical history, Discussed the surgical course, Reviewed Intra-OP anesthesia mangement and issues during anesthesia, Set expectations for post-procedure period and Allowed opportunity for questions and acknowledgement of understanding      Vitals:  Vitals Value Taken Time   /54 03/07/23 0916   Temp 36.4  C (97.6  F) 03/07/23 0914   Pulse 59 03/07/23 0918   Resp 20 03/07/23 0918   SpO2 99 % 03/07/23 0918   Vitals shown include unvalidated device data.    Electronically Signed By: PHAM VENTURA CRNA  March 7, 2023  9:20 AM

## 2023-03-07 NOTE — PHARMACY-ADMISSION MEDICATION HISTORY
Pharmacist completed medication history with the patient while in the [facility].  Prior to admission (PTA) med list completed and updated in the electronic medical record (EMR).  Pharmacy Note - Admission Medication History  Pertinent Provider Information: none   ______________________________________________________________________  Prior To Admission (PTA) med list completed and updated in EMR.     Medications Prior to Admission   Medication Sig Dispense Refill Last Dose     acetylcysteine (N-ACETYL CYSTEINE) 600 MG CAPS capsule Take 600 mg by mouth daily   2/24/2023     alpha-lipoic acid 600 MG capsule Take 600 mg by mouth daily   2/24/2023     biotin 1000 MCG TABS tablet Take 1,000 mcg by mouth daily   2/24/2023     cholecalciferol (VITAMIN D3) 5000 units TABS tablet Take 2 tablets by mouth daily   2/24/2023     Coenzyme Q10 400 MG CAPS Take 800 mg by mouth daily   2/24/2023     denosumab (PROLIA) 60 MG/ML SOSY injection Inject 60 mg Subcutaneous every 6 months   november 2022     Digestive Aids Mixture (PROTEOLYTIC ENZYMES PO) Take 3 tablets by mouth daily   2/24/2023     folic acid 800 MCG tablet Take 800 mcg by mouth daily   2/24/2023     furosemide (LASIX) 20 MG tablet Take 20 mg by mouth daily   3/6/2023     gabapentin (NEURONTIN) 300 MG capsule Take 900 mg by mouth At Bedtime   3/6/2023     Krill Oil (OMEGA-3) 500 MG CAPS Take 1 capsule by mouth daily   2/24/2023     levothyroxine (SYNTHROID/LEVOTHROID) 112 MCG tablet Take 112 mcg by mouth daily   3/6/2023     losartan (COZAAR) 50 MG tablet Take 50 mg by mouth 2 times daily   3/6/2023     MAGNESIUM PO Take 2 capsules by mouth 2 times daily   3/6/2023 at (wants to use own supply)     Menaquinone-7 (VITAMIN K2 PO) Take 100 mg by mouth daily   2/24/2023     Multiple Vitamins-Minerals (GNP IMMUNE SUPPORT PO) Take 1 tablet by mouth daily   2/24/2023     Omega-3 Fatty Acids (FISH OIL EXTRA STRENGTH PO) Take 1,600 mg by mouth 2 times daily   2/24/2023     RESVERATROL  PO Take 1,200 mg by mouth daily   2/24/2023     rivaroxaban ANTICOAGULANT (XARELTO) 20 MG TABS tablet Take 1 tablet (20 mg) by mouth daily (with dinner) Follow instructions given over phone on how to switch from Eliquis to Xarelto 90 tablet 3 3/3/2023 at pm     rosuvastatin (CRESTOR) 20 MG tablet Take 1 tablet (20 mg) by mouth daily 30 tablet 12 3/6/2023 at pm     Selenium 100 MCG TABS Take 100 mcg by mouth daily   2/24/2023     Sodium Hyaluronate, oral, (HYALURONIC ACID PO) Take 200 mg by mouth daily   2/24/2023     Thiamine 50 MG CAPS daily   2/24/2023     TURMERIC PO Take 1,250 mg by mouth daily   2/24/2023     UNABLE TO FIND MEDICATION NAME: Mabel root vitamin E and zinc   2/24/2023     UNABLE TO FIND MEDICATION NAME: complete gut health 2 tablets daily   2/24/2023     VITAMIN A PO Take 10,000 Units by mouth daily   2/24/2023     vitamin B complex with vitamin C (STRESS TAB) tablet Take 1 tablet by mouth daily        vitamin B-12 (CYANOCOBALAMIN) 100 MCG tablet Take 6,000 mcg by mouth daily   2/24/2023     vitamin C (ASCORBIC ACID) 500 MG tablet Take 500 mg by mouth daily   2/24/2023     vitamin E 400 units TABS Take 400 Units by mouth daily   2/24/2023         Information source(s): Patient and CareEverywhere/SureScripts  Patient was asked about OTC/herbal products specifically.  PTA med list reflects this.  Based on the pharmacist s assessment, the PTA med list information appears reliable  Allergies were reviewed, assessed, and updated with the patient.    Medications available for use during hospital stay: only wants to take her own Magnesium supplement that she will obtain from home;  OK with holding other supplements.   Thank you for the opportunity to participate in the care of this patient.    The patient was asked about OTC/herbal products specifically and the PTA med list reflects the patient's response.    Allergies were reviewed and assessed with the patient, responses were updated in the  EMR.    Thank you for the opportunity to participate in the care of this patient.    Kelechi Marroquin [facility] 3/7/2023 7:06 AM

## 2023-03-07 NOTE — INTERVAL H&P NOTE
"I have reviewed the surgical (or preoperative) H&P that is linked to this encounter, and examined the patient. There are no significant changes    Clinical Conditions Present on Arrival:  Clinically Significant Risk Factors Present on Admission                  # Drug Induced Coagulation Defect: home medication list includes an anticoagulant medication   # Severe Obesity: Estimated body mass index is 41.44 kg/m  as calculated from the following:    Height as of this encounter: 1.651 m (5' 5\").    Weight as of this encounter: 112.9 kg (249 lb).       "

## 2023-03-07 NOTE — ANESTHESIA PROCEDURE NOTES
"Adductor canal Procedure Note    Pre-Procedure   Staff -        Anesthesiologist:  Rafael Banegas MD       Performed By: anesthesiologist       Location: pre-op       Procedure Start/Stop Times: 3/7/2023 7:16 AM and 3/7/2023 7:20 AM       Pre-Anesthestic Checklist: patient identified, IV checked, site marked, risks and benefits discussed, informed consent, monitors and equipment checked, pre-op evaluation, at physician/surgeon's request and post-op pain management  Timeout:       Correct Patient: Yes        Correct Procedure: Yes        Correct Site: Yes        Correct Position: Yes        Correct Laterality: Yes        Site Marked: Yes  Procedure Documentation  Procedure: Adductor canal       Laterality: right       Patient Position: supine       Skin prep: Chloraprep       Needle Type: insulated       Needle Gauge: 20.        Needle Length (Inches): 6        1. Ultrasound was used to identify targeted nerve, plexus, vascular marker, or fascial plane and place a needle adjacent to it in real-time.       2. Ultrasound was used to visualize the spread of anesthetic in close proximity to the above referenced structure.       3. A permanent image is entered into the patient's record.       4. The visualized anatomic structures appeared normal.       5. There were no apparent abnormal pathologic findings.    Assessment/Narrative         The placement was negative for: blood aspirated, painful injection and site bleeding       Paresthesias: No.       Injection made incrementally with aspirations every 5 mL.    Medication(s) Administered   Bupivacaine 0.5% w/ 1:200K Epi (Other) - Other   15 mL - 3/7/2023 7:16:00 AM  Medication Administration Time: 3/7/2023 7:16 AM      FOR [facility] ([facility]) ONLY:   Pain Team Contact information: please page the Pain Team Via [employer]. Search \"Pain\". During daytime hours, please page the attending first. At night please page the resident first.    "

## 2023-03-07 NOTE — ANESTHESIA POSTPROCEDURE EVALUATION
Patient: Debra Manriquez    Procedure: Procedure(s):  RIGHT TOTAL KNEE ARTHROPLASTY       Anesthesia Type:  Spinal    Note:  Disposition: Inpatient; Admission   Postop Pain Control: Uneventful            Sign Out: Well controlled pain   PONV: No   Neuro/Psych: Uneventful            Sign Out: Acceptable/Baseline neuro status   Airway/Respiratory: Uneventful            Sign Out: Acceptable/Baseline resp. status   CV/Hemodynamics: Uneventful            Sign Out: Acceptable CV status; No obvious hypovolemia; No obvious fluid overload   Other NRE:    DID A NON-ROUTINE EVENT OCCUR?            Last vitals:  Vitals Value Taken Time   /70 03/07/23 1250   Temp 36.2  C (97.1  F) 03/07/23 1250   Pulse 63 03/07/23 1250   Resp 16 03/07/23 1250   SpO2 94 % 03/07/23 1250       Electronically Signed By: Rafael Banegas MD  March 7, 2023  1:50 PM

## 2023-03-07 NOTE — OP NOTE
Operative Report    PATIENT:  Debra Manriquez    DATE OF SURGERY:  3/7/2023    SURGEON  Juan Corbin MD.      FIRST ASSISTANT  Andreas Alcala PA-C  (Expert TRACEY assist was required throughout for patient positioning, soft tissue retraction, appropriate use of knee instrumentation, and patient safety)     PREOPERATIVE DIAGNOSIS  right knee osteoarthritis     POSTOPERATIVE DIAGNOSIS  right knee osteoarthritis.         PROCEDURE  right Total Knee Arthroplasty.         ANESTHESIA  Spinal    SPECIMENS: none     ESTIMATED BLOOD LOSS  150cc     INDICATIONS  Ms. Debra Manriquez is a pleasant 83 year old-year-old female with an ongoing history of increasing and progressive pain in the right knee with severe disability. Pain and disability due to knee arthritis are severely affecting quality of life and ability to perform even simple activities of daily living.  X-rays have shown bone-on-bone degenerative change. Consequently after trying and failing all conservative management of knee arthritis, discussion regarding the risk and benefits of knee replacement was undertaken and the patient elected to proceed.     FINDINGS:  The operative knee showed a severe full-thickness cartilage loss on the femur and tibia in the medial compartment of the knee.  The patellofemoral joint also showed advanced arthritic changes.      IMPLANTS  1. Rashaun, Persona, CR femoral component, size 8 .  2. Rashaun, Persona, tibial component, size E.  3. Rashaun, Persona,  all polyethylene articular surface 11 mm thickness.  UC  4. Rashaun, Persona, all polyethylene patellar button, 35mm diameter      PROCEDURE  Once consent was obtained and the operative site marked in the preop holding area, the patient was brought to the operating room.  Anesthesia was established without difficulty. All bony prominences and the non-operative leg were padded appropriately. The right leg was sterilely prepped and draped in the usual fashion after placement of  a proximal tourniquet.  Tourniquet was  inflated.     A longitudinal incision made over the knee.  Dissection was carried down through the extensor mechanism.  A medial parapatellar arthrotomy  was performed.  The patella was luxed laterally and protected. Standard medial release performed.    An intramedullary guide was used in the femur.  The distal femoral was made at 4 degrees of valgus.  The femoral cutting block  was applied and the rest of the femoral cuts completed. ACL was removed and the PCL was recessed.    Attention was then turned to the tibia.  This was cut using an extramedullary tibial cutting guide perpendicular to its mechanical axis.  The trial tibial and femoral components were then placed and the knee reduced. The patella was resurfaced and found to track well. Flexion and extension gaps were appropriate and varus valgus stability was good.     The knee was copiously irrigated and all bony surfaces dried.  Cement was mixed and all components were cemented and held until firm.  The knee was again trialed.  The  Polyethylene was opened and snapped into place, the locking mechanism was ensured.  The knee was copiously irrigated. The extensor mechanism was closed with # 1 interrupted Vicryl suture. Deep dermis was closed layer-wise of # 2-0 interrupted inverted Vicryl sutures followed by running # 3-0 Monocryl in the skin.  Dressings were applied. The patient tolerated the procedure well and was returned to the postop recovery area in stable condition.          JUAN QUEEN MD    @C(1)@  Juan Queen MD    @C(2)@  Yoanna Mcpherson

## 2023-03-07 NOTE — PROGRESS NOTES
"   03/07/23 1500   Appointment Info   Signing Clinician's Name / Credentials (PT) Patt Dixon PT   Living Environment   People in Home alone   Current Living Arrangements independent living facility   Home Accessibility no concerns   Self-Care   Equipment Currently Used at Home walker, rolling  (4WW)   Activity/Exercise/Self-Care Comment Prior to December 2022, pt reports independent with all mobility using 4WW until her knee \"gave out\" leaving her wheelchair bound since then. Reports she has been having home PT the past month or so to ready her for TKA.   General Information   Onset of Illness/Injury or Date of Surgery 03/07/23   Referring Physician Dr Juan Corbin   Patient/Family Therapy Goals Statement (PT) No more pain.   Pertinent History of Current Problem (include personal factors and/or comorbidities that impact the POC) Admit for R TKA. PMH includes L TKA in 2011   Existing Precautions/Restrictions weight bearing   Weight-Bearing Status - RLE weight-bearing as tolerated   Pain Assessment   Patient Currently in Pain Yes, see Vital Sign flowsheet   Transfers   Impairments Contributing to Impaired Transfers pain;decreased ROM;decreased strength   Comment, (Transfers) Sit<>stand recliner to FWW CGA.   Gait/Stairs (Locomotion)   [place] Level (Gait) contact guard   Assistive Device (Gait) walker, front-wheeled   Distance in Feet 10'   Pattern (Gait) step-through   Deviations/Abnormal Patterns (Gait) antalgic;base of support, wide;gait speed decreased   Clinical Impression   Criteria for Skilled Therapeutic Intervention Yes, treatment indicated   PT Diagnosis (PT) Impaired functional mobility   Influenced by the following impairments weakness, pain   Functional limitations due to impairments transfers, gait   Clinical Presentation (PT Evaluation Complexity) Stable/Uncomplicated   Clinical Presentation Rationale Presents as medically diagnosed.   Clinical Decision Making (Complexity) low complexity "   Planned Therapy Interventions (PT) gait training;home exercise program;patient/family education;ROM (range of motion);strengthening;transfer training   Anticipated Equipment Needs at Discharge (PT) walker, rolling   Risk & Benefits of therapy have been explained evaluation/treatment results reviewed;care plan/treatment goals reviewed;risks/benefits reviewed;participants voiced agreement with care plan;participants included;patient;daughter   Plan of Care Review   Plan of Care Reviewed With patient;family   PT Discharge Planning   PT Plan R TKA protocol.   PT Discharge Recommendation (DC Rec) (S)  home with assist;home with home care physical therapy   PT Rationale for DC Rec Doing well with all mobility, plan is home with assist from daughter-in-law until Thursday then pt has a friend in her building who can help. Recommend home PT for increased strength, activity tolerance and functional mobility.   PT Brief overview of current status Sit<>stand CGA, amb 25' with FWW CGA.

## 2023-03-07 NOTE — ANESTHESIA PROCEDURE NOTES
"Intrathecal injection Procedure Note    Pre-Procedure   Staff -        Anesthesiologist:  Rafael Banegas MD       Performed By: anesthesiologist       Location: OR       Procedure Start/Stop Times: 3/7/2023 7:39 AM and 3/7/2023 7:43 AM       Pre-Anesthestic Checklist: patient identified, IV checked, risks and benefits discussed, informed consent, monitors and equipment checked, pre-op evaluation, at physician/surgeon's request and post-op pain management  Timeout:       Correct Patient: Yes        Correct Procedure: Yes        Correct Site: Yes        Correct Position: Yes   Procedure Documentation  Procedure: intrathecal injection       Patient Position: sitting       Patient Prep/Sterile Barriers: sterile gloves, mask, patient draped       Skin prep: Chloraprep       Insertion Site: L3-4. (midline approach).       Needle Gauge: 25.        Needle Length (Inches): 4        Spinal Needle Type: Pencan       Introducer used       # of attempts: 1 and  # of redirects:     Assessment/Narrative         Paresthesias: No.       CSF fluid: clear.    Medication(s) Administered   0.75% Hyperbaric Bupivacaine (Intrathecal) - Intrathecal   1.6 mL - 3/7/2023 7:39:00 AM  Medication Administration Time: 3/7/2023 7:39 AM      FOR [facility] ([facility]) ONLY:   Pain Team Contact information: please page the Pain Team Via [employer]. Search \"Pain\". During daytime hours, please page the attending first. At night please page the resident first.    "

## 2023-03-07 NOTE — PROGRESS NOTES
"[facility]    Medicine Progress Note - Hospitalist Service    Date of Admission:  3/7/2023    Assessment & Plan   Principal Problem:    Knee osteoarthritis    Assessment:     Plan:     83-year-old female with a history of chronic atrial fibrillation on Xarelto therapy, diagnosis of heart failure preserved ejection fraction but probably asymptomatic has deconditioning and moderate obesity, now is status post knee surgery.     Resume all her home medications except for her supplements, orthopedic surgery cleared her to resume her Xarelto full dose the day after surgery.    I also placed her on remote cardiac telemetry       Diet: Discharge Instruction - Regular Diet Adult  Regular Diet Adult  Room Service    DVT Prophylaxis: Low Risk/Ambulatory with no VTE prophylaxis indicated  Starr Catheter: Not present  Lines: None     Cardiac Monitoring: ACTIVE order. Indication: Syncope- low cardiac risk (24 hours)  Code Status: Full Code      Clinically Significant Risk Factors Present on Admission               # Drug Induced Coagulation Defect: home medication list includes an anticoagulant medication    # Hypertension: home medication list includes antihypertensive(s)      # Severe Obesity: Estimated body mass index is 43.47 kg/m  as calculated from the following:    Height as of this encounter: 1.651 m (5' 5\").    Weight as of this encounter: 118.5 kg (261 lb 3.9 oz).           Disposition Plan      Expected Discharge Date: 03/09/2023                  Alvarado Scott MD  Hospitalist Service  [facility]  Securely message with [employer] (more info)  Text page via [employer] Paging/Directory   ______________________________________________________________________    Interval History       Physical Exam   Vital Signs: Temp: 97.1  F (36.2  C) Temp src: Oral BP: (!) 155/70 Pulse: 63   Resp: 16 SpO2: 94 % O2 Device: None (Room air) Oxygen Delivery: 2 LPM  Weight: 261 lbs 3.92 " oz    No acute distress no JVD

## 2023-03-07 NOTE — PROGRESS NOTES
Pt is tolerating food well. Pt pain is controlled by oral pain pills. Pt's feet are still numb, but movement ankle, knee, lifting hips are strong. Great oral intake of water, saline locked.     Report given. [facility]. Family updated on room change.     To do:  Release all orders  Pt ready to dangle at bedside  Vital set times 1300, 1400, 1500, 1600, q4 starts 2000    Thank you care team for all you do!    Gem Chairez RN

## 2023-03-07 NOTE — TREATMENT PLAN
"Orthopedic Surgery Pre-Op Plan: Debra Manriquez  pre-op review. This is NOT an H&P   Surgeon: Dr. Corbin    Hospital: [facility]  Name of Surgery: Right Total Knee Arthroplasty   Date of Surgery: 3/7/23  H&P: Completed on 2/22/23 by Dr. Yoanna Mcpherson at [facility].   History of ASA, NSAIDS, vitamin and/or herbal supplements within 10 days: Yes- Menaquinone-7 (vitamin k2), Omega Fish Oil, Resveratrol, Turmeric, Vitamin A, Vitamin E, Multivitamins: patient instructed to hold these supplements and vitamins for 7 days before surgery.  History of blood thinners: Yes- on chronic anticoagulation with rivaroxaban (Xarelto) for Atrial Fibrillation. Patient instructed to hold Xarelto for 3 full days before surgery (last dose taken 3/3/23, then held for surgery).     Plan:   1) Discharge Plan: Home POD 1 or POD 2 with assist of daughter-in-law for first 2 days/nights after surgery, then caregiver [place] is available 24/7 if needed. Please see Discharge Planning section near bottom of this note for further details.     2) Atrial Fibrillation: new diagnosis 11/2022. Follows with [facility]. On chronic anticoagulation with rivaroxaban (Xarelto). Patient instructed to hold Xarelto for 3 full days before surgery (last dose taken 3/3/23, then held for surgery).  After surgery, we will plan to resume Xarelto as soon as safe from a bleeding standpoint per Dr. Corbin.    3) Heart Failure with Preserved Ejection Fraction (HFpEF): Stable. Reviewed recent Cardiology visit note with Dr. Morel from 2/13/23: \" Plan for total knee replacement - she will be at an intermediate risk w/ an intermediate risk surgery.\"     Cleared by Cardiology for surgery at intermediate risk. Last Echo 7/15/22 was stable:  1. Normal left ventricular size and systolic performance with a visually  estimated ejection fraction of 65%.  2. There is mild concentric increase in left ventricular wall thickness.  3. There is " mild aortic insufficiency.  4. Normal right ventricular size and systolic performance.     When compared to the prior real-time echocardiogram dated 5 March 2021, the  findings are felt to be fairly similar on both examinations.    4) Hyperlipidemia: On rosuvastatin.    5) Hypertension: Appears well controlled on losartan and furosemide.  Patient instructed to hold losartan and furosemide on the morning of surgery.    6) Obstructive Sleep Apnea: On CPAP.  Patient reminded to bring CPAP machine to the hospital and use it whenever sleeping or napping.    7) Hypothyroidism: On levothyroxine.    8) Morbid Obesity: BMI 40.1, Wt: 250 lbs. I recommend continued efforts at safe weight loss following recovery from surgery. If patient is interested in further assistance with weight loss, please consider referral to [facility]. They offer both non-surgical and surgical evidence-based weight loss strategies. Call [phone] to schedule a consultation to learn more.      9) Lymphedema: Chronic: follows with lymphedema clinic.     Patient appears medically optimized for upcoming surgery. I would recommend Hospitalist Consult to assist with medical management. Please call me below with any questions on this patient.       Review of Systems Notable for: Atrial fibrillation-on chronic anticoagulation with Xarelto, heart failure with preserved ejection fraction-stable, hyperlipidemia, hypertension, obstructive sleep apnea-on CPAP, hypothyroidism, morbid obesity, lymphedema.    Past Medical History:   Past Medical History:   Diagnosis Date     Antiplatelet or antithrombotic long-term use      Chronic acquired lymphedema      Edema      Falls      Fibrocystic breast disease      Fibrocystic breast disease      Foot pain      Gastroesophageal reflux disease      GERD (gastroesophageal reflux disease)      Hyperlipidemia      Hypertension      Irregular heart beat      Lymphedema       Mitral valve disorder      Obese      JADYN (obstructive sleep apnea)      Osteoarthritis of knee     hx of knee replacement and pending second     Osteopenia      Skin cancer      Skin cancer, basal cell      Sleep apnea      Thyroid nodule      Thyroid nodule      Vitamin B deficiency      Past Surgical History:   Procedure Laterality Date     CATARACT EXTRACTION  2011, 2012     COLONOSCOPY       EXCISE TOENAIL(S) Bilateral 8/16/2019    Procedure: MATRIXECTOMY BILATERAL FEET; TOES 1,2,3,4 ON LEFT FOOT,  1,2 ON RIGHT FOOT;  BOTH NAIL BORDERS;  Surgeon: Opal Kam DPM;  Location:  OR     EYE SURGERY      cataract     GI SURGERY       GYN SURGERY      hysterectomy     INJECT STEROID (LOCATION) Right 8/16/2019    Procedure: CORTIZONE INJECTION RIGHT HEEL;  Surgeon: Opal Kam DPM;  Location:  OR     ORTHOPEDIC SURGERY Left     knee replacement     PARTIAL HYSTERECTOMY       STRIP VEIN Bilateral 1975     TOTAL KNEE ARTHROPLASTY Left 2011       Current Medications:  Patient's Medications   New Prescriptions    No medications on file   Previous Medications    ACETYLCYSTEINE (N-ACETYL CYSTEINE) 600 MG CAPS CAPSULE    Take 1,800 mg by mouth    ALPHA-LIPOIC ACID 600 MG CAPSULE    Take by mouth daily    BIOTIN 1000 MCG TABS TABLET    Take 1,000 mcg by mouth daily    CHOLECALCIFEROL (VITAMIN D3) 5000 UNITS TABS TABLET    Take 2 tablets by mouth daily    COENZYME Q10 400 MG CAPS    Take 800 mg by mouth daily    DENOSUMAB (PROLIA) 60 MG/ML SOSY INJECTION    Inject 60 mg Subcutaneous every 6 months    DIGESTIVE AIDS MIXTURE (PROTEOLYTIC ENZYMES PO)    Take 3 tablets by mouth daily    FOLIC ACID 0.8 MG CAPS    Take by mouth daily    FUROSEMIDE (LASIX) 20 MG TABLET    Take 20 mg by mouth daily    GABAPENTIN (NEURONTIN) 100 MG CAPSULE    as needed    GABAPENTIN (NEURONTIN) 300 MG CAPSULE    TAKE 2 CAPSULES BY MOUTH UP TO 2-3 TIMES A DAY    KRILL OIL (OMEGA-3) 500 MG CAPS    Take 1 capsule by mouth     LEVOTHYROXINE (SYNTHROID/LEVOTHROID) 112 MCG TABLET    daily    LOSARTAN (COZAAR) 50 MG TABLET    Take 50 mg by mouth 2 times daily    MAGNESIUM PO    Take 2,000 mg by mouth daily    MENAQUINONE-7 (VITAMIN K2 PO)    Take 100 mg by mouth daily    MULTIPLE VITAMINS-MINERALS (GNP IMMUNE SUPPORT PO)    Take 1 Tablespoonful by mouth daily    OMEGA-3 FATTY ACIDS (FISH OIL EXTRA STRENGTH PO)    Take 1,600 mg by mouth 2 times daily    RESVERATROL PO    Take 1,200 mg by mouth daily    RIVAROXABAN ANTICOAGULANT (XARELTO) 20 MG TABS TABLET    Take 1 tablet (20 mg) by mouth daily (with dinner) Follow instructions given over phone on how to switch from Eliquis to Xarelto    ROSUVASTATIN (CRESTOR) 20 MG TABLET    Take 1 tablet (20 mg) by mouth daily    SELENIUM 100 MCG TABS    Take by mouth daily    SODIUM HYALURONATE, ORAL, (HYALURONIC ACID PO)    Take 200 mg by mouth daily    THIAMINE 50 MG CAPS    daily    TURMERIC PO    Take 1,250 mg by mouth daily    UNABLE TO FIND    MEDICATION NAME: Mabel nayak vitamin E and zinc    UNABLE TO FIND    MEDICATION NAME: complete gut health 2 tablets daily    VITAMIN A PO    Take by mouth daily    VITAMIN B COMPLEX WITH VITAMIN C (STRESS TAB) TABLET    Take 1 tablet by mouth daily    VITAMIN B-12 (CYANOCOBALAMIN) 100 MCG TABLET    Take 6,000 mcg by mouth    VITAMIN C (ASCORBIC ACID) 500 MG TABLET    Take 500 mg by mouth    VITAMIN E 400 UNITS TABS    Take 400 Units by mouth daily   Modified Medications    No medications on file   Discontinued Medications    APIXABAN ANTICOAGULANT (ELIQUIS) 5 MG TABLET    Take 5 mg by mouth 2 times daily       ALLERGIES:  Allergies   Allergen Reactions     Lisinopril Cough     Reglan [Metoclopramide] Other (See Comments)     depression       Social History  Social History     Tobacco Use     Smoking status: Never     Smokeless tobacco: Never   Substance Use Topics     Alcohol use: Yes     Alcohol/week: 0.8 standard drinks     Comment: Alcoholic Drinks/day: 1-2  glasses per month     Drug use: No       Any Abnormal Recent Diagnostics? No    Discharge Planning:   Discharge plan according to [facility]:      Home POD 1 or POD 2 when medically stable for discharge. Daughter-in-law will stay with   her for the first 2 days/nights after surgery to assist. After that patient has a caregiver  At her senior facility that will is available to help her 24/7.     01/18/23 6922   Discharge Planning   Patient/Family Anticipates Transition to home;assisted living   Living Arrangements   People in Home facility resident   Type of Residence Assisted Living   Is your private residence a single family home or apartment? Apartment   Number of Stairs, Within Home, Primary none   Once home, are you able to live on one level? Yes   Which level? Main Level   Bathroom Shower/Tub Tub/Shower unit   Equipment Currently Used at Home raised toilet seat;walker, rolling   Support System   Support Systems Home Care Staff   Do you have someone available to stay with you one or two nights once you are home? Yes       PHAM Whiting, CNP   Advanced Practice Nurse Navigator- Orthopedics  [facility]   Phone: [phone]

## 2023-03-07 NOTE — ANESTHESIA PREPROCEDURE EVALUATION
Anesthesia Pre-Procedure Evaluation    Patient: Debra Manriquez   MRN: [id number] : 1940        Procedure : Procedure(s):  RIGHT TOTAL KNEE ARTHROPLASTY          Past Medical History:   Diagnosis Date     Antiplatelet or antithrombotic long-term use      Chronic acquired lymphedema      Edema      Falls      Fibrocystic breast disease      Fibrocystic breast disease      Foot pain      Gastroesophageal reflux disease      GERD (gastroesophageal reflux disease)      Hyperlipidemia      Hypertension      Irregular heart beat      Lymphedema      Mitral valve disorder      Obese      JADYN (obstructive sleep apnea)      Osteoarthritis of knee     hx of knee replacement and pending second     Osteopenia      Skin cancer      Skin cancer, basal cell      Sleep apnea      Thyroid nodule      Thyroid nodule      Vitamin B deficiency       Past Surgical History:   Procedure Laterality Date     CATARACT EXTRACTION  ,      COLONOSCOPY       EXCISE TOENAIL(S) Bilateral 2019    Procedure: MATRIXECTOMY BILATERAL FEET; TOES 1,2,3,4 ON LEFT FOOT,  1,2 ON RIGHT FOOT;  BOTH NAIL BORDERS;  Surgeon: Opal Kam DPM;  Location:  OR     EYE SURGERY      cataract     GI SURGERY       GYN SURGERY      hysterectomy     INJECT STEROID (LOCATION) Right 2019    Procedure: CORTIZONE INJECTION RIGHT HEEL;  Surgeon: Opal Kam DPM;  Location:  OR     ORTHOPEDIC SURGERY Left     knee replacement     PARTIAL HYSTERECTOMY       STRIP VEIN Bilateral      TOTAL KNEE ARTHROPLASTY Left       Allergies   Allergen Reactions     Lisinopril Cough     Reglan [Metoclopramide] Other (See Comments)     depression      Social History     Tobacco Use     Smoking status: Never     Smokeless tobacco: Never   Substance Use Topics     Alcohol use: Yes     Alcohol/week: 0.8 standard drinks     Comment: Alcoholic Drinks/day: 1-2 glasses per month      Wt Readings from Last 1 Encounters:   23 112.9 kg  (249 lb)        Anesthesia Evaluation            ROS/MED HX  ENT/Pulmonary:  - neg pulmonary ROS   (+) sleep apnea,     Neurologic:  - neg neurologic ROS     Cardiovascular:  - neg cardiovascular ROS   (+) hypertension-----Irregular Heartbeat/Palpitations,     METS/Exercise Tolerance: >4 METS    Hematologic:  - neg hematologic  ROS     Musculoskeletal:  - neg musculoskeletal ROS     GI/Hepatic:  - neg GI/hepatic ROS   (+) GERD,     Renal/Genitourinary:  - neg Renal ROS     Endo:  - neg endo ROS   (+) thyroid problem, Obesity,     Psychiatric/Substance Use:  - neg psychiatric ROS     Infectious Disease:  - neg infectious disease ROS     Malignancy:  - neg malignancy ROS     Other:  - neg other ROS          Physical Exam    Airway        Mallampati: II    Neck ROM: full     Respiratory Devices and Support         Dental           Cardiovascular   cardiovascular exam normal          Pulmonary   pulmonary exam normal                OUTSIDE LABS:  CBC: No results found for: WBC, HGB, HCT, PLT  BMP:   Lab Results   Component Value Date     02/22/2023     03/16/2022    POTASSIUM 4.2 02/22/2023    POTASSIUM 4.1 03/16/2022    CHLORIDE 104 02/22/2023    CHLORIDE 103 03/16/2022    CO2 25 02/22/2023    CO2 27 03/16/2022    BUN 16.7 02/22/2023    BUN 16 03/16/2022    CR 0.69 02/22/2023    CR 0.72 03/16/2022     (H) 02/22/2023    GLC 93 03/16/2022     COAGS: No results found for: PTT, INR, FIBR  POC: No results found for: BGM, HCG, HCGS  HEPATIC:   Lab Results   Component Value Date    ALBUMIN 3.8 03/16/2022    PROTTOTAL 6.7 03/16/2022    ALT 32 03/16/2022    AST 25 03/16/2022    ALKPHOS 60 03/16/2022    BILITOTAL 0.7 03/16/2022     OTHER:   Lab Results   Component Value Date    DAMARIS 9.7 02/22/2023    MAG 2.2 04/26/2019    TSH 1.68 02/22/2023    T4 1.54 08/18/2021       Anesthesia Plan    ASA Status:  3   NPO Status:  NPO Appropriate    Anesthesia Type: Spinal.              Consents    Anesthesia Plan(s) and  associated risks, benefits, and realistic alternatives discussed. Questions answered and patient/representative(s) expressed understanding.     - Discussed: Risks, Benefits and Alternatives for BOTH SEDATION and the PROCEDURE were discussed     - Discussed with:  Patient         Postoperative Care    Pain management: Peripheral nerve block (Single Shot).   PONV prophylaxis: Ondansetron (or other 5HT-3), Dexamethasone or Solumedrol     Comments:                Rafael Banegas MD

## 2023-03-08 ENCOUNTER — APPOINTMENT (OUTPATIENT)
Dept: PHYSICAL THERAPY | Facility: CLINIC | Age: 83
DRG: 857 | End: 2023-03-08
Attending: ORTHOPAEDIC SURGERY
Payer: MEDICARE

## 2023-03-08 ENCOUNTER — APPOINTMENT (OUTPATIENT)
Dept: OCCUPATIONAL THERAPY | Facility: CLINIC | Age: 83
DRG: 857 | End: 2023-03-08
Attending: ORTHOPAEDIC SURGERY
Payer: MEDICARE

## 2023-03-08 VITALS
DIASTOLIC BLOOD PRESSURE: 58 MMHG | TEMPERATURE: 97.3 F | HEIGHT: 65 IN | SYSTOLIC BLOOD PRESSURE: 127 MMHG | HEART RATE: 63 BPM | RESPIRATION RATE: 18 BRPM | WEIGHT: 261.25 LBS | OXYGEN SATURATION: 97 % | BODY MASS INDEX: 43.53 KG/M2

## 2023-03-08 LAB — HGB BLD-MCNC: 12.3 G/DL (ref 11.7–15.7)

## 2023-03-08 PROCEDURE — 258N000003 HC RX IP 258 OP 636: Performed by: ORTHOPAEDIC SURGERY

## 2023-03-08 PROCEDURE — 85018 HEMOGLOBIN: CPT | Performed by: ORTHOPAEDIC SURGERY

## 2023-03-08 PROCEDURE — 97116 GAIT TRAINING THERAPY: CPT | Mod: GP

## 2023-03-08 PROCEDURE — 97530 THERAPEUTIC ACTIVITIES: CPT | Mod: GP

## 2023-03-08 PROCEDURE — 36415 COLL VENOUS BLD VENIPUNCTURE: CPT | Performed by: ORTHOPAEDIC SURGERY

## 2023-03-08 PROCEDURE — 250N000013 HC RX MED GY IP 250 OP 250 PS 637: Performed by: ORTHOPAEDIC SURGERY

## 2023-03-08 PROCEDURE — 250N000013 HC RX MED GY IP 250 OP 250 PS 637

## 2023-03-08 PROCEDURE — 250N000013 HC RX MED GY IP 250 OP 250 PS 637: Performed by: INTERNAL MEDICINE

## 2023-03-08 PROCEDURE — 97535 SELF CARE MNGMENT TRAINING: CPT | Mod: GO

## 2023-03-08 PROCEDURE — 97166 OT EVAL MOD COMPLEX 45 MIN: CPT | Mod: GO

## 2023-03-08 PROCEDURE — 97110 THERAPEUTIC EXERCISES: CPT | Mod: GP

## 2023-03-08 RX ADMIN — LEVOTHYROXINE SODIUM 112 MCG: 0.11 TABLET ORAL at 09:16

## 2023-03-08 RX ADMIN — FUROSEMIDE 20 MG: 20 TABLET ORAL at 09:16

## 2023-03-08 RX ADMIN — SODIUM CHLORIDE, POTASSIUM CHLORIDE, SODIUM LACTATE AND CALCIUM CHLORIDE: 600; 310; 30; 20 INJECTION, SOLUTION INTRAVENOUS at 02:10

## 2023-03-08 RX ADMIN — SENNOSIDES AND DOCUSATE SODIUM 1 TABLET: 50; 8.6 TABLET ORAL at 09:15

## 2023-03-08 RX ADMIN — OXYCODONE HYDROCHLORIDE 10 MG: 5 TABLET ORAL at 10:23

## 2023-03-08 RX ADMIN — ACETAMINOPHEN 975 MG: 325 TABLET ORAL at 09:15

## 2023-03-08 RX ADMIN — LOSARTAN POTASSIUM 50 MG: 50 TABLET, FILM COATED ORAL at 09:16

## 2023-03-08 RX ADMIN — RIVAROXABAN 20 MG: 10 TABLET, FILM COATED ORAL at 10:23

## 2023-03-08 RX ADMIN — ACETAMINOPHEN 975 MG: 325 TABLET ORAL at 01:07

## 2023-03-08 RX ADMIN — POLYETHYLENE GLYCOL 3350 17 G: 17 POWDER, FOR SOLUTION ORAL at 09:15

## 2023-03-08 RX ADMIN — OXYCODONE HYDROCHLORIDE 5 MG: 5 TABLET ORAL at 05:19

## 2023-03-08 ASSESSMENT — ACTIVITIES OF DAILY LIVING (ADL)
ADLS_ACUITY_SCORE: 26
ADLS_ACUITY_SCORE: 28
PREVIOUS_RESPONSIBILITIES: MEAL PREP;HOUSEKEEPING;LAUNDRY;SHOPPING;DRIVING
ADLS_ACUITY_SCORE: 28
ADLS_ACUITY_SCORE: 29
ADLS_ACUITY_SCORE: 26
ADLS_ACUITY_SCORE: 29
ADLS_ACUITY_SCORE: 26

## 2023-03-08 NOTE — PROGRESS NOTES
[facility]      OUTPATIENT PHYSICAL THERAPY EVALUATION  PLAN OF TREATMENT FOR OUTPATIENT REHABILITATION  (COMPLETE FOR INITIAL CLAIMS ONLY)  Patient's Last Name, First Name, M.I.  YOB: 1940  MitraDebra  JOSE                        Provider's Name  [facility] Medical Record No.  [id number]                               Onset Date:  03/07/23   Start of Care Date:         Type:     _X_PT   ___OT   ___SLP Medical Diagnosis:                           PT Diagnosis:  Impaired functional mobility   Visits from SOC:  1   _________________________________________________________________________________  Plan of Treatment/Functional Goals    Planned Interventions: gait training, home exercise program, patient/family education, ROM (range of motion), strengthening, transfer training     Goals: See Physical Therapy Goals on Care Plan in [employer] electronic health record.    Therapy Frequency: Daily  Predicted Duration of Therapy Intervention: 03/08/23  _________________________________________________________________________________    I CERTIFY THE NEED FOR THESE SERVICES FURNISHED UNDER        THIS PLAN OF TREATMENT AND WHILE UNDER MY CARE     (Physician co-signature of this document indicates review and certification of the therapy plan).              Certification date from: (P) 03/07/23, Certification date to: (P) 04/04/23    Referring Physician: Dr Juan Corbin            Initial Assessment        See Physical Therapy evaluation dated   in Epic electronic health record.

## 2023-03-08 NOTE — PLAN OF CARE
Problem: Knee Arthroplasty  Goal: Acceptable Pain Control  Outcome: Progressing  Intervention: Prevent or Manage Pain  Recent Flowsheet Documentation  Taken 3/7/2023 1712 by Kasey Venegas, RN  Pain Management Interventions: medication (see MAR)  Taken 3/7/2023 1417 by Kasey Venegas, RN  Pain Management Interventions: medication (see MAR)  Goal: Effective Urinary Elimination  Outcome: Progressing    Pt alert and oriented x 4. VSS. Lung sounds clear. Denies SOB, chest pain and N/V. Tele- SR with BBB. Report surgical knee pain with ambulation. Pain being controlled with rest, ice and available pain meds. Tolerating regular diet. Voiding spontaneously, but not as frequent as pt is on lasix. Bladder scan <100. Will continue to monitor. Up with assist 1. Alarms on for safety. Calls appropriately.     Patient vital signs are at baseline: Yes  Patient able to ambulate as they were prior to admission or with assist devices provided by therapies during their stay:  Yes  Patient MUST void prior to discharge:  Yes  Patient able to tolerate oral intake:  Yes  Pain has adequate pain control using Oral analgesics:  Yes  Does patient have an identified :  Yes  Has goal D/C date and time been discussed with patient:  Yes

## 2023-03-08 NOTE — DISCHARGE INSTRUCTIONS
[facility] ([facility])  Phone - [phone]   This agency has accepted you for home care. They will call you to arrange the start of care. Anticipate you may be seen tomorrow. (3/9/2023).

## 2023-03-08 NOTE — PROGRESS NOTES
Occupational Therapy Discharge Summary    Reason for therapy discharge:    All goals and outcomes met, no further needs identified.    Progress towards therapy goal(s). See goals on Care Plan in [facility] electronic health record for goal details.  Goals met    Therapy recommendation(s):    Continued therapy is recommended.  Rationale/Recommendations:  Home OT to continue to work on increasing strength and indep for trsfs and ADLS.

## 2023-03-08 NOTE — UTILIZATION REVIEW
Admission Status; Secondary Review Determination   Under the authority of the Utilization Management Committee, the utilization review process indicated a secondary review on Debra Manriquez. The review outcome is based on review of the medical records, discussions with staff, and applying clinical experience noted on the date of the review.   (x) Outpatient Status with extended recovery is appropriate - This patient does not meet hospital inpatient criteria. If this patient's primary payer is Medicare and was admitted as an inpatient, Condition Code 44 should be used and patient status changed to outpatient recovery/Postprocedureal Care.    RATIONALE FOR DETERMINATION   Debra Manriquez is an 83 yr old female with Chronic Afib, HFpEF and obesity BMI 43 who presented for right total knee arthroplasty.  She did well and is discharging after one night.  Patient was admitted to the hospital after the procedure. Patient has Medicare and the procedure is not on the CMS inpatient list. No documented complications or unexpected recovery. Patient can be safely  monitored for bleeding and recover in outpatient/extended recovery setting.  Dr. Corbin/Selina Horton PAC notified of this determination and agreeable to change in status.  The severity of illness, intensity of service provided, expected LOS and risk for adverse outcome doesn't meet inpatient hospital admission.   The information on this document is developed by the utilization review team in order for the business office to ensure compliance. This only denotes the appropriateness of proper admission status and does not reflect the quality of care rendered.   The definitions of Inpatient Status and Observation Status used in making the determination above are those provided in the CMS Coverage Manual, Chapter 1 and Chapter 6, section 70.4.   Sincerely,   Nancy Webb MD  Utilization Review  Physician Advisor  [facility]

## 2023-03-08 NOTE — PLAN OF CARE
Pt cleared for discharge per Ortho & [facility]. Removed PIV, assisted in gathering pt's belongings & reviewed AVS to pt & daughter's verbalized satisfaction & understanding. Hospital staff escorted pt to main entrance & pt discharged to home. Pt left via private vehicle accompanied by family.     Michelle Bernardo, DOVN  Shift: 0700 - 1930

## 2023-03-08 NOTE — PROGRESS NOTES
03/08/23 0730   Appointment Info   Signing Clinician's Name / Credentials (OT) Shaggy Laughlin, OTR/L   Living Environment   People in Home alone   Current Living Arrangements independent living facility   Living Environment Comments Tub/shower with GB and showr chair, RTS with GB   Self-Care   Usual Activity Tolerance good   Current Activity Tolerance moderate   Equipment Currently Used at Home walker, rolling   Instrumental Activities of Daily Living (IADL)   Previous Responsibilities meal prep;housekeeping;laundry;shopping;driving  (Prior to Dec 2022)   General Information   Onset of Illness/Injury or Date of Surgery 03/07/23   Referring Physician Dr. Juan Corbin   Patient/Family Therapy Goal Statement (OT) To return home with previous services.   Additional Occupational Profile Info/Pertinent History of Current Problem Adm for R TKA.  PMH:  Obesity, lynph, A-fib, hyperlip., HTN, JADYN, Hypothyroid   Right Lower Extremity (Weight-bearing Status) weight-bearing as tolerated (WBAT)   Cognitive Status Examination   Orientation Status orientation to person, place and time   Follows Commands WNL   Visual Perception   Visual Impairment/Limitations corrective lenses full-time   Bed Mobility   Bed Mobility supine-sit;sit-supine   Supine-Sit [place] (Bed Mobility) supervision;verbal cues   Sit-Supine [place] (Bed Mobility) supervision;verbal cues   Comment (Bed Mobility) HOB slightly elevated   Transfers   Transfers bed-chair transfer;sit-stand transfer;toilet transfer   Transfer Skill: Bed to Chair/Chair to Bed   Bed-Chair Renick (Transfers) supervision;verbal cues   Assistive Device (Bed-Chair Transfers) rolling walker   Sit-Stand Transfer   Sit-Stand Renick (Transfers) supervision;verbal cues   Assistive Device (Sit-Stand Transfers) walker, front-wheeled   Toilet Transfer   Type (Toilet Transfer) sit-stand;stand-sit   [place] Level (Toilet Transfer) supervision;verbal cues   Assistive  Device (Toilet Transfer) raised toilet seat;grab bars/safety frame;walker, front-wheeled   Balance   Balance Assessment standing balance: static   Balance Comments good   Activities of Daily Living   BADL Assessment/Intervention upper body dressing;lower body dressing   Upper Body Dressing Assessment/Training   [place] Level (Upper Body Dressing) independent   Lower Body Dressing Assessment/Training   Position (Lower Body Dressing) supported sitting   Assistive Devices (Lower Body Dressing) reacher   Comment, (Lower Body Dressing) Will have assist for her compression socks at home.   [place] Level (Lower Body Dressing) minimum assist (75% patient effort)   Clinical Impression   Criteria for Skilled Therapeutic Interventions Met (OT) Yes, treatment indicated   OT Diagnosis decreased indep with ADLs due to TKA   OT Problem List-Impairments impacting ADL problems related to;mobility   Assessment of Occupational Performance 3-5 Performance Deficits   Identified Performance Deficits dressing, bathing, toileting, G/H and trsfs   Planned Therapy Interventions (OT) ADL retraining   Clinical Decision Making Complexity (OT) moderate complexity   Risk & Benefits of therapy have been explained evaluation/treatment results reviewed;participants included;patient;daughter   OT Total Evaluation Time   OT Eval, Moderate Complexity Minutes (43995) 15   OT Goals   Therapy Frequency (OT) Daily   OT Predicted Duration/Target Date for Goal Attainment 03/08/23   OT Goals Lower Body Dressing;Transfers   OT: Lower Body Dressing Minimal assist;Completed   OT: Transfer Modified independent;Completed   Interventions   Interventions Quick Adds Self-Care/Home Management   Self-Care/Home Management   Self-Care/Home Mgmt/ADL, Compensatory, Meal Prep Minutes (22130) 30   Symptoms Noted During/After Treatment (Meal Preparation/Planning Training) increased pain   Treatment Detail/Skilled Intervention Pt sitting up in the chair when  therapist arrived.  Reviewed knee precautions.  Pt understood.  Worked on FB dressing.  Needed min A with socks and shoes.  Will have assist at home.  Wears housecoat style tops.  Worked on trsfs in the room using her FWW.  VC to remember correct hand placement.  Completed trsfs to chair, toilet, bed.  Therapist reviewed tub trsfs and car trsf.  Pt has been practicing these trsfs prior to her surgery.  At end of session, Pt was in her chair with chair alarm on and call light within reach.  DIL present.   [place] Level (Upper Body Dressing Training) independent   Lower Body Dressing Training Assistance minimum assist (75% patient effort)   Lower Body Dressing Training Assistance 1 person assist   Lower Body Dressing Training Assistance - Assistive Device reacher   OT Discharge Planning   OT Plan D/C OT   OT Discharge Recommendation (DC Rec) (S)  home with home care occupational therapy   OT Rationale for DC Rec Pt has been working with PT and OT prior to surgery.  Recommend continued home therapy upon discharge to increase indep and strength for safe trsfs and ADLs.   OT Brief overview of current status Mod indep with trsfs using FWW.  Min A with L/B dressing.   Total Session Time   Timed Code Treatment Minutes 30   Total Session Time (sum of timed and untimed services) 45    [facility]  OUTPATIENT OCCUPATIONAL THERAPY  EVALUATION  PLAN OF TREATMENT FOR OUTPATIENT REHABILITATION  (COMPLETE FOR INITIAL CLAIMS ONLY)  Patient's Last Name, First Name, M.I.  YOB: 1940  Debra Manriquez                          Provider's Name  [facility] Medical Record No.  [id number]                             Onset Date:  03/07/23   Start of Care Date:      Type:     ___PT   _X_OT   ___SLP Medical Diagnosis:                       OT Diagnosis:  decreased indep with ADLs due to TKA Visits from SOC:  1     See note for plan of treatment, functional goals  and certification details    I CERTIFY THE NEED FOR THESE SERVICES FURNISHED UNDER        THIS PLAN OF TREATMENT AND WHILE UNDER MY CARE     (Physician co-signature of this document indicates review and certification of the therapy plan).

## 2023-03-08 NOTE — PROGRESS NOTES
"Orthopedic Progress Note      Assessment: 1 Day Post-Op  S/P Procedure(s):  RIGHT TOTAL KNEE ARTHROPLASTY     Plan:   - Continue PT/OT.   - Weightbearing status: WBAT.  - Anticoagulation: Xarelto, dose this morning normal dose tomorrow night in addition to SCDs, janay stockings and early ambulation.  - Discharge planning: Discharge to home today.     Subjective:  Pain: Well controlled on Tylenol & oxycodone.  Nausea, Vomiting:  No  Chest pain: No  Lightheadedness, Dizziness:  No  Neuro:  Patient denies new onset numbness or paresthesias in right lower extremity     Patient is doing well on POD #1. Ambulating, tolerating oral intake, voiding & pain is controlled with oral medication. Ready for discharge. Hgb 12.3 (pre-op 13.5).     Objective:  BP (!) 158/70 (BP Location: Right arm)   Pulse 59   Temp 97.4  F (36.3  C) (Oral)   Resp 18   Ht 1.651 m (5' 5\")   Wt 118.5 kg (261 lb 3.9 oz)   SpO2 98%   BMI 43.47 kg/m    The patient is A&Ox3. Appears comfortable, sitting up at bedside.  Sensation is intact to light touch & equal bilaterally in the L2 through S1 dermatomes.  Calves are soft and non-tender.  Dorsiflexion and plantar flexion is intact bilaterally.  Appropriate flexion and extension of the toes bilaterally.   Brisk capillary refill in the toes bilaterally.   Palpable right dorsalis pedis pulse.  right knee dressing C/D/I.       Pertinent Labs   Lab Results: personally reviewed.   No results found for: INR, PROTIME  Lab Results   Component Value Date    HGB 12.3 03/08/2023     Lab Results   Component Value Date     02/22/2023    CO2 25 02/22/2023         Report completed by:  Selina Horton PA-C/[facility]    Date: 3/8/2023  Time: 9:58 AM    "

## 2023-03-08 NOTE — PLAN OF CARE
Goal Outcome Evaluation:    Patient vital signs are at baseline: Yes  Patient able to ambulate as they were prior to admission or with assist devices provided by therapies during their stay:  Yes  Patient MUST void prior to discharge:  Yes  Patient able to tolerate oral intake:  Yes  Pain has adequate pain control using Oral analgesics:  Yes  Does patient have an identified :  Yes  Has goal D/C date and time been discussed with patient:  Yes     Pt A&O x4. Pain controlled during this shift with scheduled tylenol, PRN oxycodone and ice. Pt denies N/V, dizziness, sob or chest pain. Pt assist 1-2 . IV LR running @100ml. Pt voiding improved this shift, spontaneous and in comode. Pt on Tele-normal sinus rhythm. Pt calls appropriately and can make needs known. Pt resting comfortably in between cares. discharge plan is to go home w/assistance and OT. RN continue to monitor.

## 2023-03-08 NOTE — PROGRESS NOTES
Physical Therapy Discharge Summary    Reason for therapy discharge:    All goals and outcomes met, no further needs identified.    Progress towards therapy goal(s). See goals on Care Plan in [facility] electronic health record for goal details.  Goals met    Therapy recommendation(s):    Continue home amb and exercise program as instructed. Plan is for home PT.

## 2023-03-08 NOTE — PROGRESS NOTES
Care Management Discharge Note    Discharge Date: 03/09/2023       Discharge Disposition: Home, Home Care    Discharge Services: Home Care    Discharge DME: None    Discharge Transportation: family or friend will provide    Education Provided on the Discharge Plan:  AVS per bedside RN    Persons Notified of Discharge Plans: patient    Patient/Family in Agreement with the Plan: yes    Handoff Referral Completed: Yes    Additional Information:  Chart reviewed. CM met with patient. She is agreeable to home PT. Patient prefers [facility] as she has worked with them in the past. Family is at bedside and will transport home at time of hospital discharge.     Home care referral per patient request  [facility] ([facility]) -    faxed discharge orders. 1:57 PM     OT recommendations: home with home care occupational therapy  PT recommendations:  home with assist;home with home care physical therapy    Noemí Barry RN

## 2023-03-08 NOTE — DISCHARGE SUMMARY
"ORTHOPEDIC DISCHARGE SUMMARY       Debra Manriquez,  1940, MRN [id number]    Admission Date: 3/7/2023      Admission Diagnoses: Primary osteoarthritis of right knee [M17.11]  Knee osteoarthritis [M17.9]     Discharge Date:  23     Post-operative Day:  1 Day Post-Op    Reason for Admission: The patient was admitted for the following: Procedure(s):  RIGHT TOTAL KNEE ARTHROPLASTY    BRIEF HOSPITAL COURSE   Debra Manriquez is a pleasant 83 year old female who underwent the aforementioned procedure with Dr. Corbin on 3/7/23. There were no intraoperative complications and the patient was transferred to the recovery room and later the orthopedic unit in stable condition. Once the patient reached the orthopedic floor our orthopedic pain protocol was implemented along with the following:    Anticoagulation Medications: Xarelto  Therapy: PT and OT  Activity: WBAT  Bracing: None    Consultations during Admission: Hospitalist service for medical management     COMPLICATIONS/SIGNIFICANT FINDINGS    NONE    DISCHARGE INFORMATION   Condition at discharge: Good  Discharge destination: Home  Patient was seen by myself on the date of discharge.    FOLLOW UP CARE   Follow up with orthopedics in 2 weeks or sooner should the need arise. Ortho will continue to manage pain control, post op anticoagulation and incision care.     Follow up with your PCP for management of chronic medical problems and to evaluate for post op medical complications including constipation, nausea/vomiting, DVT/PE, anemia, changes in blood pressure, fevers/chills, urinary retention and atelectasis/pneumonia.     Subjective   Patient is doing well on POD #1. Pain is well controlled with oral medications. Ambulating. Tolerating oral intake.     Physical Exam   BP (!) 158/70 (BP Location: Right arm)   Pulse 59   Temp 97.4  F (36.3  C) (Oral)   Resp 18   Ht 1.651 m (5' 5\")   Wt 118.5 kg (261 lb 3.9 oz)   SpO2 98%   BMI 43.47 kg/m    The patient is " A&Ox3. Appears comfortable, sitting up at bedside.  Sensation is intact to light touch & equal bilaterally in the L2 through S1 dermatomes.  Calves are soft and non-tender.  Dorsiflexion and plantar flexion is intact bilaterally.  Appropriate flexion and extension of the toes bilaterally.   Brisk capillary refill in the toes bilaterally.   Palpable right dorsalis pedis pulse.  right knee dressing C/D/I.     Pertinent Results at Discharge     Hemoglobin   Date/Time Value Ref Range Status   03/08/2023 05:51 AM 12.3 11.7 - 15.7 g/dL Final       Problem List   Principal Problem:    Knee osteoarthritis      Selina Horton PA-C/Dr. Corbin  [facility]  [phone]  Date: 3/8/2023  Time: 10:03 AM

## 2023-03-09 ENCOUNTER — HOSPITAL ENCOUNTER (INPATIENT)
Facility: CLINIC | Age: 83
LOS: 5 days | Discharge: NURSING, REHAB OR LONG-TERM CARE | DRG: 857 | End: 2023-03-14
Attending: EMERGENCY MEDICINE | Admitting: STUDENT IN AN ORGANIZED HEALTH CARE EDUCATION/TRAINING PROGRAM
Payer: MEDICARE

## 2023-03-09 ENCOUNTER — APPOINTMENT (OUTPATIENT)
Dept: ULTRASOUND IMAGING | Facility: CLINIC | Age: 83
DRG: 857 | End: 2023-03-09
Attending: PHYSICIAN ASSISTANT
Payer: MEDICARE

## 2023-03-09 ENCOUNTER — PATIENT OUTREACH (OUTPATIENT)
Dept: CARE COORDINATION | Facility: CLINIC | Age: 83
End: 2023-03-09

## 2023-03-09 DIAGNOSIS — L03.115 CELLULITIS OF RIGHT LOWER EXTREMITY: ICD-10-CM

## 2023-03-09 DIAGNOSIS — Z98.890 RECENT SURGICAL PROCEDURE ON LOWER EXTREMITY: ICD-10-CM

## 2023-03-09 PROBLEM — E78.5 HYPERLIPIDEMIA: Status: ACTIVE | Noted: 2020-06-17

## 2023-03-09 PROBLEM — I48.0 PAROXYSMAL ATRIAL FIBRILLATION (H): Status: ACTIVE | Noted: 2023-03-09

## 2023-03-09 PROBLEM — I10 ESSENTIAL HYPERTENSION: Status: ACTIVE | Noted: 2020-06-17

## 2023-03-09 PROBLEM — I47.19 ATRIAL TACHYCARDIA, PAROXYSMAL (H): Status: ACTIVE | Noted: 2021-08-25

## 2023-03-09 LAB
ANION GAP SERPL CALCULATED.3IONS-SCNC: 9 MMOL/L (ref 5–18)
BUN SERPL-MCNC: 15 MG/DL (ref 8–28)
CALCIUM SERPL-MCNC: 9.1 MG/DL (ref 8.5–10.5)
CHLORIDE BLD-SCNC: 104 MMOL/L (ref 98–107)
CO2 SERPL-SCNC: 25 MMOL/L (ref 22–31)
CREAT SERPL-MCNC: 0.64 MG/DL (ref 0.6–1.1)
ERYTHROCYTE [DISTWIDTH] IN BLOOD BY AUTOMATED COUNT: 14.2 % (ref 10–15)
FLUAV RNA SPEC QL NAA+PROBE: NEGATIVE
FLUBV RNA RESP QL NAA+PROBE: NEGATIVE
GFR SERPL CREATININE-BSD FRML MDRD: 86 ML/MIN/1.73M2
GLUCOSE BLD-MCNC: 95 MG/DL (ref 70–125)
HCT VFR BLD AUTO: 35.9 % (ref 35–47)
HGB BLD-MCNC: 11.8 G/DL (ref 11.7–15.7)
HOLD SPECIMEN: NORMAL
LACTATE SERPL-SCNC: 1.5 MMOL/L (ref 0.7–2)
MCH RBC QN AUTO: 28.1 PG (ref 26.5–33)
MCHC RBC AUTO-ENTMCNC: 32.9 G/DL (ref 31.5–36.5)
MCV RBC AUTO: 86 FL (ref 78–100)
PLATELET # BLD AUTO: 156 10E3/UL (ref 150–450)
POTASSIUM BLD-SCNC: 4.4 MMOL/L (ref 3.5–5)
RBC # BLD AUTO: 4.2 10E6/UL (ref 3.8–5.2)
RSV RNA SPEC NAA+PROBE: NEGATIVE
SARS-COV-2 RNA RESP QL NAA+PROBE: NEGATIVE
SODIUM SERPL-SCNC: 138 MMOL/L (ref 136–145)
WBC # BLD AUTO: 10.2 10E3/UL (ref 4–11)

## 2023-03-09 PROCEDURE — C9803 HOPD COVID-19 SPEC COLLECT: HCPCS

## 2023-03-09 PROCEDURE — 83605 ASSAY OF LACTIC ACID: CPT | Performed by: PHYSICIAN ASSISTANT

## 2023-03-09 PROCEDURE — 96375 TX/PRO/DX INJ NEW DRUG ADDON: CPT

## 2023-03-09 PROCEDURE — 93971 EXTREMITY STUDY: CPT | Mod: RT

## 2023-03-09 PROCEDURE — 99223 1ST HOSP IP/OBS HIGH 75: CPT | Mod: AI | Performed by: STUDENT IN AN ORGANIZED HEALTH CARE EDUCATION/TRAINING PROGRAM

## 2023-03-09 PROCEDURE — 120N000001 HC R&B MED SURG/OB

## 2023-03-09 PROCEDURE — 250N000013 HC RX MED GY IP 250 OP 250 PS 637: Performed by: STUDENT IN AN ORGANIZED HEALTH CARE EDUCATION/TRAINING PROGRAM

## 2023-03-09 PROCEDURE — 87040 BLOOD CULTURE FOR BACTERIA: CPT | Performed by: PHYSICIAN ASSISTANT

## 2023-03-09 PROCEDURE — 250N000011 HC RX IP 250 OP 636: Performed by: STUDENT IN AN ORGANIZED HEALTH CARE EDUCATION/TRAINING PROGRAM

## 2023-03-09 PROCEDURE — 82310 ASSAY OF CALCIUM: CPT | Performed by: PHYSICIAN ASSISTANT

## 2023-03-09 PROCEDURE — 87637 SARSCOV2&INF A&B&RSV AMP PRB: CPT | Performed by: PHYSICIAN ASSISTANT

## 2023-03-09 PROCEDURE — 85027 COMPLETE CBC AUTOMATED: CPT | Performed by: PHYSICIAN ASSISTANT

## 2023-03-09 PROCEDURE — 96365 THER/PROPH/DIAG IV INF INIT: CPT

## 2023-03-09 PROCEDURE — 250N000011 HC RX IP 250 OP 636: Performed by: PHYSICIAN ASSISTANT

## 2023-03-09 PROCEDURE — 99285 EMERGENCY DEPT VISIT HI MDM: CPT | Mod: 25

## 2023-03-09 PROCEDURE — 36415 COLL VENOUS BLD VENIPUNCTURE: CPT | Performed by: PHYSICIAN ASSISTANT

## 2023-03-09 RX ORDER — ONDANSETRON 2 MG/ML
4 INJECTION INTRAMUSCULAR; INTRAVENOUS EVERY 6 HOURS PRN
Status: DISCONTINUED | OUTPATIENT
Start: 2023-03-09 | End: 2023-03-14 | Stop reason: HOSPADM

## 2023-03-09 RX ORDER — ACETAMINOPHEN 325 MG/1
650 TABLET ORAL EVERY 6 HOURS PRN
Status: DISCONTINUED | OUTPATIENT
Start: 2023-03-09 | End: 2023-03-14

## 2023-03-09 RX ORDER — PROCHLORPERAZINE MALEATE 5 MG
5 TABLET ORAL EVERY 6 HOURS PRN
Status: DISCONTINUED | OUTPATIENT
Start: 2023-03-09 | End: 2023-03-14 | Stop reason: HOSPADM

## 2023-03-09 RX ORDER — CEFTRIAXONE 2 G/1
2 INJECTION, POWDER, FOR SOLUTION INTRAMUSCULAR; INTRAVENOUS ONCE
Status: COMPLETED | OUTPATIENT
Start: 2023-03-09 | End: 2023-03-09

## 2023-03-09 RX ORDER — LOSARTAN POTASSIUM 50 MG/1
50 TABLET ORAL 2 TIMES DAILY
Status: DISCONTINUED | OUTPATIENT
Start: 2023-03-09 | End: 2023-03-14 | Stop reason: HOSPADM

## 2023-03-09 RX ORDER — ACETAMINOPHEN 325 MG/1
650 TABLET ORAL EVERY 4 HOURS PRN
Status: DISCONTINUED | OUTPATIENT
Start: 2023-03-09 | End: 2023-03-14 | Stop reason: HOSPADM

## 2023-03-09 RX ORDER — LANOLIN ALCOHOL/MO/W.PET/CERES
800 CREAM (GRAM) TOPICAL DAILY
Status: DISCONTINUED | OUTPATIENT
Start: 2023-03-10 | End: 2023-03-14 | Stop reason: HOSPADM

## 2023-03-09 RX ORDER — CEFTRIAXONE 2 G/1
2 INJECTION, POWDER, FOR SOLUTION INTRAMUSCULAR; INTRAVENOUS EVERY 24 HOURS
Status: DISCONTINUED | OUTPATIENT
Start: 2023-03-10 | End: 2023-03-12

## 2023-03-09 RX ORDER — ONDANSETRON 4 MG/1
4 TABLET, ORALLY DISINTEGRATING ORAL EVERY 6 HOURS PRN
Status: DISCONTINUED | OUTPATIENT
Start: 2023-03-09 | End: 2023-03-14 | Stop reason: HOSPADM

## 2023-03-09 RX ORDER — GABAPENTIN 300 MG/1
900 CAPSULE ORAL AT BEDTIME
Status: DISCONTINUED | OUTPATIENT
Start: 2023-03-09 | End: 2023-03-14 | Stop reason: HOSPADM

## 2023-03-09 RX ORDER — MAGNESIUM OXIDE 400 MG/1
400 TABLET ORAL DAILY
Status: DISCONTINUED | OUTPATIENT
Start: 2023-03-10 | End: 2023-03-14 | Stop reason: HOSPADM

## 2023-03-09 RX ORDER — HYDROMORPHONE HYDROCHLORIDE 1 MG/ML
0.25 INJECTION, SOLUTION INTRAMUSCULAR; INTRAVENOUS; SUBCUTANEOUS ONCE
Status: COMPLETED | OUTPATIENT
Start: 2023-03-09 | End: 2023-03-09

## 2023-03-09 RX ORDER — CEPHALEXIN 500 MG/1
500 CAPSULE ORAL 4 TIMES DAILY
Status: DISCONTINUED | OUTPATIENT
Start: 2023-03-09 | End: 2023-03-09 | Stop reason: ALTCHOICE

## 2023-03-09 RX ORDER — LIDOCAINE 40 MG/G
CREAM TOPICAL
Status: DISCONTINUED | OUTPATIENT
Start: 2023-03-09 | End: 2023-03-14 | Stop reason: HOSPADM

## 2023-03-09 RX ORDER — HYDROMORPHONE HCL IN WATER/PF 6 MG/30 ML
0.2 PATIENT CONTROLLED ANALGESIA SYRINGE INTRAVENOUS EVERY 4 HOURS PRN
Status: DISCONTINUED | OUTPATIENT
Start: 2023-03-09 | End: 2023-03-13

## 2023-03-09 RX ORDER — ROSUVASTATIN CALCIUM 10 MG/1
20 TABLET, COATED ORAL EVERY EVENING
Status: DISCONTINUED | OUTPATIENT
Start: 2023-03-09 | End: 2023-03-14 | Stop reason: HOSPADM

## 2023-03-09 RX ORDER — HYDROMORPHONE HYDROCHLORIDE 1 MG/ML
0.5 INJECTION, SOLUTION INTRAMUSCULAR; INTRAVENOUS; SUBCUTANEOUS ONCE
Status: COMPLETED | OUTPATIENT
Start: 2023-03-09 | End: 2023-03-09

## 2023-03-09 RX ORDER — PYRIDOXINE HCL (VITAMIN B6) 100 MG
100 TABLET ORAL DAILY
Status: DISCONTINUED | OUTPATIENT
Start: 2023-03-10 | End: 2023-03-09

## 2023-03-09 RX ORDER — LEVOTHYROXINE SODIUM 112 UG/1
112 TABLET ORAL DAILY
Status: DISCONTINUED | OUTPATIENT
Start: 2023-03-10 | End: 2023-03-14 | Stop reason: HOSPADM

## 2023-03-09 RX ORDER — PROCHLORPERAZINE 25 MG
12.5 SUPPOSITORY, RECTAL RECTAL EVERY 12 HOURS PRN
Status: DISCONTINUED | OUTPATIENT
Start: 2023-03-09 | End: 2023-03-14 | Stop reason: HOSPADM

## 2023-03-09 RX ADMIN — LOSARTAN POTASSIUM 50 MG: 50 TABLET, FILM COATED ORAL at 21:08

## 2023-03-09 RX ADMIN — RIVAROXABAN 20 MG: 10 TABLET, FILM COATED ORAL at 21:07

## 2023-03-09 RX ADMIN — CEFTRIAXONE SODIUM 2 G: 2 INJECTION, POWDER, FOR SOLUTION INTRAMUSCULAR; INTRAVENOUS at 14:46

## 2023-03-09 RX ADMIN — HYDROMORPHONE HYDROCHLORIDE 0.5 MG: 1 INJECTION, SOLUTION INTRAMUSCULAR; INTRAVENOUS; SUBCUTANEOUS at 17:59

## 2023-03-09 RX ADMIN — ACETAMINOPHEN 650 MG: 325 TABLET ORAL at 20:19

## 2023-03-09 RX ADMIN — ROSUVASTATIN CALCIUM 20 MG: 10 TABLET, FILM COATED ORAL at 21:07

## 2023-03-09 RX ADMIN — HYDROMORPHONE HYDROCHLORIDE 0.2 MG: 0.2 INJECTION, SOLUTION INTRAMUSCULAR; INTRAVENOUS; SUBCUTANEOUS at 22:01

## 2023-03-09 RX ADMIN — GABAPENTIN 900 MG: 300 CAPSULE ORAL at 21:08

## 2023-03-09 RX ADMIN — HYDROMORPHONE HYDROCHLORIDE 0.25 MG: 1 INJECTION, SOLUTION INTRAMUSCULAR; INTRAVENOUS; SUBCUTANEOUS at 14:44

## 2023-03-09 RX ADMIN — Medication 1 MG: at 22:01

## 2023-03-09 RX ADMIN — OXYCODONE HYDROCHLORIDE 2.5 MG: 5 TABLET ORAL at 20:19

## 2023-03-09 ASSESSMENT — ACTIVITIES OF DAILY LIVING (ADL)
ADLS_ACUITY_SCORE: 35
ADLS_ACUITY_SCORE: 39

## 2023-03-09 NOTE — CONSULTS
ORTHOPEDIC CONSULTATION    Consultation  Debra Manriquez,  1940, MRN [id number]    [unfilled]  No admission diagnoses are documented for this encounter.    PCP: Yoanna Mcpherson, [phone]   Code status:  Prior       Extended Emergency Contact Information  Primary Emergency Contact: Blanca Blake  Mobile Phone: [phone]  Relation: Daughter  Secondary Emergency Contact: Blanca Blake   [facility]  Home Phone: [phone]  Mobile Phone: [phone]  Relation: Other         IMPRESSION:  3 days post op from primary TKA, right knee, trouble with mobility at home and increased post-op pain     PLAN:  This patient was discussed with Dr. Corbin, on-call surgeon for [facility] and they are in agreement with the following plan.     - Admit to hospitalist for placement  - TCU placement  - Keflex 500 mg QID will switch to duricef 500 mg BID at discharge    Thank you for including [facility] in the care of Debra Manriquez. It has been a pleasure participating in Debra's care.        CHIEF COMPLAINT: <principal problem not specified>    HISTORY OF PRESENT ILLNESS:  The patient is seen in orthopedic consultation at the request of Frances Fernández PA-C.  The patient is a 83 year old female with moderate pain of the right  knee. The patient reports that she was discharged yesterday and had a lot of trouble at home with family taking care of her.  It took 3 people to get her out of the chair to the bathroom this morning.  She does not feel safe at home and caring for herself at this time due to the amount of people needed to help do daily activities.  She does note some increased redness to the right lower leg as well ultrasound in the ER was negative for DVT.  We will prophylactically treat for postop infection with Keflex 4 times a day 500 mg switching to Duricef 500 mg twice a day at discharge.    PAST MEDICAL HISTORY:  Past Medical History:   Diagnosis Date     Antiplatelet or  antithrombotic long-term use      Chronic acquired lymphedema      Edema      Falls      Fibrocystic breast disease      Fibrocystic breast disease      Foot pain      Gastroesophageal reflux disease      GERD (gastroesophageal reflux disease)      Hyperlipidemia      Hypertension      Irregular heart beat      Lymphedema      Mitral valve disorder      Obese      JADYN (obstructive sleep apnea)      Osteoarthritis of knee     hx of knee replacement and pending second     Osteopenia      Skin cancer      Skin cancer, basal cell      Sleep apnea      Thyroid nodule      Thyroid nodule      Vitamin B deficiency           ALLERGIES:   Review of patient's allergies indicates   Allergies   Allergen Reactions     Lisinopril Cough     Reglan [Metoclopramide] Other (See Comments)     depression         MEDICATIONS UPON ADMISSION:  Medications were reviewed.  They include:   (Not in a hospital admission)        SOCIAL HISTORY:   she  reports that she has never smoked. She has never used smokeless tobacco. She reports current alcohol use of about 0.8 standard drinks per week. She reports that she does not use drugs.    FAMILY HISTORY:  family history includes Alzheimer Disease in her maternal uncle; Heart Disease in her brother and sister; No Known Problems in her brother, daughter, father, mother, son, son, and son.      REVIEW OF SYSTEMS:   Reviewed with patient. See HPI, otherwise negative       PHYSICAL EXAMINATION:  Vitals: Temp:  [98.4  F (36.9  C)] 98.4  F (36.9  C)  Pulse:  [67] 67  Resp:  [16] 16  BP: (150)/(79) 150/79  SpO2:  [97 %] 97 %  General: On examination, the patient is resting comfortably, NAD, awake and alert and oriented to person, place, time, and and general circumstances   SKIN: There is mild erythema to the anterior shin, with normal postop swelling to the lower leg.  Pulses:  dorsalis pedis and posterior tibial pulse is intact and equal bilaterally  Sensation: intact and equal bilaterally to the distal  lower extremities.  Tenderness: No bony tenderness to palpation, no tenderness to the bilateral calfs.  Able to fully range her ankle without any issues.  Contralateral side= Full range of motion, Negative joint instability findings, 5/5 motor groups about the joint, Non-tender.       RADIOGRAPHIC EVALUATION:  Personally reviewed    PERTINENT LABS:  Lab Results: personally reviewed.   Lab Results   Component Value Date     03/09/2023    CO2 25 03/09/2023    BUN 15 03/09/2023     Lab Results   Component Value Date    WBC 10.2 03/09/2023    HGB 11.8 03/09/2023    HCT 35.9 03/09/2023    MCV 86 03/09/2023     03/09/2023         Selina Horton PA-C, TRACE[facility]  Date: 3/9/2023  Time: 2:08 PM    CC1:   No att. providers found    CC2:   Long, Yoanna Kira

## 2023-03-09 NOTE — ED PROVIDER NOTES
Emergency Department Midlevel Supervisory Note     I personally saw the patient and performed a substantive portion of the visit including all aspects of the medical decision making.    ED Course:  5:00 PM Frances Fernández PA-C staffed patient with me. I agree with their assessment and plan of management, and I will see the patient.  5:01 PM I met with the patient to introduce myself, gather additional history, perform my initial exam, and discuss the plan.     Brief HPI:     Debra Manriquez is a 83 year old female who presents for evaluation of right lower leg pain and swelling. She is 2 days post-op from a right knee arthoplasty. She has also noticed some erythema to her right shin that extends partially up to the knee. She is anticoagulated with Xarelto.     I, Radha Lechuga, am serving as a scribe to document services personally performed by Rasheed Ward MD, based on my observations and the provider's statements to me.   I, Rasheed Ward MD attest that Radha Lechuga was acting in a scribe capacity, has observed my performance of the services and has documented them in accordance with my direction.     Brief Physical Exam: BP (!) 169/73   Pulse 73   Temp 98.5  F (36.9  C) (Oral)   Resp 16   Wt 112.5 kg (248 lb)   SpO2 98%   BMI 41.27 kg/m    Constitutional:  Alert, in no acute distress  EYES: Conjunctivae clear  HENT:  Atraumatic, normocephalic  Respiratory:  Respirations even, unlabored, in no acute respiratory distress  Cardiovascular:  Regular rate and rhythm, good peripheral perfusion  GI: Soft, nondistended, nontender, no palpable masses, no rebound, no guarding   Musculoskeletal:   Lower extremity postop knee with some swelling, wound incision healing properly  Integument: Warm, Dry, No erythema, No rash.   Neurologic:  Alert & oriented, no focal deficits noted  Psych: Normal mood and affect     MDM:  Patient brought in the emergency department for postop evaluation.  She is postop recent knee  placement.  Patient is having difficulty with ADLs due to the swelling and pain.  She will need TCU placement.  Patient admitted to hospitalist for rehab and TCU placement       1. Cellulitis of right lower extremity    2. Recent surgical procedure on lower extremity        Labs and Imaging:  Results for orders placed or performed during the hospital encounter of 03/09/23   US Lower Extremity Venous Duplex Right    Impression    IMPRESSION:  1.  No deep venous thrombosis in the right lower extremity.   Extra Blood Culture Bottle   Result Value Ref Range    Hold Specimen JIC    Extra Blue Top Tube   Result Value Ref Range    Hold Specimen JIC    Extra Red Top Tube   Result Value Ref Range    Hold Specimen JIC    Extra Green Top (Lithium Heparin) Tube   Result Value Ref Range    Hold Specimen JIC    Extra Purple Top Tube   Result Value Ref Range    Hold Specimen JIC    Lactic acid whole blood   Result Value Ref Range    Lactic Acid 1.5 0.7 - 2.0 mmol/L   CBC with platelets   Result Value Ref Range    WBC Count 10.2 4.0 - 11.0 10e3/uL    RBC Count 4.20 3.80 - 5.20 10e6/uL    Hemoglobin 11.8 11.7 - 15.7 g/dL    Hematocrit 35.9 35.0 - 47.0 %    MCV 86 78 - 100 fL    MCH 28.1 26.5 - 33.0 pg    MCHC 32.9 31.5 - 36.5 g/dL    RDW 14.2 10.0 - 15.0 %    Platelet Count 156 150 - 450 10e3/uL   Basic metabolic panel   Result Value Ref Range    Sodium 138 136 - 145 mmol/L    Potassium 4.4 3.5 - 5.0 mmol/L    Chloride 104 98 - 107 mmol/L    Carbon Dioxide (CO2) 25 22 - 31 mmol/L    Anion Gap 9 5 - 18 mmol/L    Urea Nitrogen 15 8 - 28 mg/dL    Creatinine 0.64 0.60 - 1.10 mg/dL    Calcium 9.1 8.5 - 10.5 mg/dL    Glucose 95 70 - 125 mg/dL    GFR Estimate 86 >60 mL/min/1.73m2   Asymptomatic Influenza A/B, RSV, & SARS-CoV2 PCR (COVID-19) Nasopharyngeal    Specimen: Nasopharyngeal; Swab   Result Value Ref Range    Influenza A PCR Negative Negative    Influenza B PCR Negative Negative    RSV PCR Negative Negative    SARS CoV2 PCR Negative  Negative     I have reviewed the relevant laboratory and radiology studies      Rasheed Ward MD  [facility] EMERGENCY ROOM  [address] [address] [phone]     Rasheed Ward MD  03/09/23 3957

## 2023-03-09 NOTE — PROGRESS NOTES
Clinic Care Coordination Contact  Care Team Conversations    Blanca called the CC SW to update on the pt's current situation and not being able to care for herself. Pt had surgery and was discharged home, took 3 people to attempt to lift her out of bed today, unsafe at home and her inability to meet her ADL's nor is the family.     CC SW sent update to the pt's PCP.     Patient identified for care management outreach, however patient is not on a value based contract so cannot complete outreach. Will escalate to clinic staff if specific needs or resources are indicated.    Stacy Calabrese, [facility]  Social Work Care Coordinator - [facility]  Care Coordination  [email or web address]  Cell Phone: [phone]  Gender pronouns: she/her  Employed by [employer]

## 2023-03-09 NOTE — ED TRIAGE NOTES
Pt had right knee replacement on Tuesday 3/7.  She lives in independent living but PT believes she needs TCU placement and she is unable to care for herself and feels unsafe at home      Triage Assessment     Row Name 03/09/23 1156       Triage Assessment (Adult)    Airway WDL WDL       Respiratory WDL    Respiratory WDL WDL       Skin Circulation/Temperature WDL    Skin Circulation/Temperature WDL X       Cardiac WDL    Cardiac WDL WDL       Peripheral/Neurovascular WDL    Peripheral Neurovascular WDL WDL       Cognitive/Neuro/Behavioral WDL    Cognitive/Neuro/Behavioral WDL WDL

## 2023-03-09 NOTE — H&P
"[facility]    History and Physical - Hospitalist Service       Date of Admission:  (Not on file)    Assessment & Plan       Debra Manriquez is a 83 year old female with a past medical history of chronic atrial fibrillation on Xarelto and heart failure with preserved ejection fraction who is admitted on 3/9/2023 from home in setting of recent surgery. She was recently hospitalized here 3/7-3/8 for right total knee arthroplasty, and was followed by the internal medicine hospitalist team during stay; she was discharged to back home with home cares.  Unfortunately, her functionality declined and on day of admit, she was unable to complete any ADLs and required 4 people to move her around, prompting ED visit. Orthopedics was made aware and evaluated in the ED and also concerned about cellulitis infection. The patient received one dose of Ceftriaxone and will be admitted with therapies to see and will likely need placement.     On admit, with hypertensive pressures otherwise vitally stable; CBC and BMP and lactate are WNL. US without dvt in lower extremities. Pending blood cultures. Covid19 pending.     ------------------  At 6:41pm: Of note, pt is seen in a boarding ER room, moreover currently in \"Waiting Room\" STATUS so orders are UNABLE to be released; all orders are placed. Both the RN and ER team is made aware; also pending Pharmacy med rec. Writer did order Xarelto for dvt prophylaxis and a preliminary pain regimen (tylenol and for more pain, dilaudid and breakthrough pain oxycodone). Continuing with IV ceftriaxone on admission given cellulitis has progressed over the past few hours. --> Now fixed 8:24 PM     Addendum- medication reconciliation completed 8:24 PM 3/9/2023     -----------------------------------------------  S/p recent right TKA  Cellulitis of right anterior lower leg (inferior and distal to the surgical site)  Assessment: as directly above.  Noted that orthopedics 5 hours " "ago felt the patient was ready to transition to p.o., but per family member and patient, the erythema is worse currently this evening.  As such, we will continue with IV ceftriaxone for now.  Venous stasis and heart failure complicates the healing of the cellulitis.  Plan:  -Admit to inpatient  -Continue with IV antibiotics-2 g of ceftriaxone every 24 hours  -Consult orthopedics, appreciate  -Initial pain plan: Tylenol, and for moderate pain, Dilaudid, and then for severe breakthrough pain, oxycodone   -Further titration per orthopedics  -Continue Xarelto for DVT prophylaxis (as was the plan on discharge) -this has been ordered  -Other medications are currently pending as medication reconciliation has not yet been completed   -As in above summary, admission orders are placed, but to STILL be released when patient is no longer in the \"waiting room status\" (which is not the same as boarding in the ER; writer and RN cannot Release Orders)     Chronic atrial fibrillation on Xarelto  Assessment: on xarelto.  Plan:  - continue; this IS ordered    Chronic heart failure with preserved ejection fraction  Assessment: Does not appear floridly volume up; she does appear to have some venous stasis on top of the heart failure which likely complicates her cellulitis. On po lasix 20mg po daily.   Plan:   -Pending medication reconciliation  -On 3/10, would opt to continue pending renal function    Hypertension  Assessment: Relatively hypertensive on admission likely in setting of pain.  Pain control should help.  On a diuretic of 20 mg Lasix once daily as well as losartan 50 mg twice daily-however, med rec still pending.  Plan:  -Pending med rec, would opt to hold  -Pain control, for now as needed oxy, further titration per orthopedic or hospitalist on 3/10    Hyperlipidemia  Assessment/plan-on Crestor, would resume, pending med rec        Diet:   cardiac w/ no caffeine  DVT Prophylaxis: DOAC- XARELTO- ordered- and Pneumatic " "Compression Devices  Starr Catheter: Not present  Lines: None     Cardiac Monitoring: None  Code Status:   FULL     Clinically Significant Risk Factors Present on Admission                # Drug Induced Coagulation Defect: home medication list includes an anticoagulant medication    # Hypertension: home medication list includes antihypertensive(s)      # Severe Obesity: Estimated body mass index is 41.27 kg/m  as calculated from the following:    Height as of 3/7/23: 1.651 m (5' 5\").    Weight as of this encounter: 112.5 kg (248 lb).           Disposition Plan      Expected Discharge Date: 03/13/2023                  Micah Le MD  Hospitalist Service  [facility]  Securely message with [email or web address] (more info)  Text page via [facility] Paging/Directory     ______________________________________________________________________    Chief Complaint   Leg pain - and per daughter-- \"she isn't able to even get up\"    History is obtained from the patient and the daughter next to her    History of Present Illness   Debra Manriquez is a 83 year old female who with a past medical history of chronic atrial fibrillation on Xarelto and heart failure with preserved ejection fraction who is admitted on 3/9/2023 from home in setting of recent surgery. She was recently hospitalized here 3/7-3/8 for right total knee arthroplasty, and was followed by the internal medicine hospitalist team during stay; she was discharged to back home with home cares.     Unfortunately, her functionality declined and on day of admit, she was unable to complete any ADLs and required 4 people to move her around, prompting ED visit. Orthopedics was made aware and evaluated in the ED and also concerned about cellulitis infection.  Per patient's daughter, the erythema began overnight and has progressively worsened.  She notes that since early afternoon, over the past few hours, the erythema has progressed.  She also is constipated, last BM " was 3 days ago, but no abdominal pain.      The patient notes that there have been no other symptoms.  She denies having any fevers or chills or any chest pain or pain anywhere else or sensory or neurologic changes.  No urinary issues currently.  She notes that she did have some urinary retention a few days ago.  She is full code.  Patient's daughter states that she was surprised that the patient was recommended to be discharged with home cares on 3/8.      Past Medical History    Past Medical History:   Diagnosis Date     Antiplatelet or antithrombotic long-term use      Chronic acquired lymphedema      Edema      Falls      Fibrocystic breast disease      Fibrocystic breast disease      Foot pain      Gastroesophageal reflux disease      GERD (gastroesophageal reflux disease)      Hyperlipidemia      Hypertension      Irregular heart beat      Lymphedema      Mitral valve disorder      Obese      JADYN (obstructive sleep apnea)      Osteoarthritis of knee     hx of knee replacement and pending second     Osteopenia      Skin cancer      Skin cancer, basal cell      Sleep apnea      Thyroid nodule      Thyroid nodule      Vitamin B deficiency        Past Surgical History   Past Surgical History:   Procedure Laterality Date     ARTHROPLASTY KNEE Right 3/7/2023    Procedure: RIGHT TOTAL KNEE ARTHROPLASTY;  Surgeon: Juan Corbin MD;  Location: [facility] Main OR     CATARACT EXTRACTION  2011, 2012     COLONOSCOPY       EXCISE TOENAIL(S) Bilateral 8/16/2019    Procedure: MATRIXECTOMY BILATERAL FEET; TOES 1,2,3,4 ON LEFT FOOT,  1,2 ON RIGHT FOOT;  BOTH NAIL BORDERS;  Surgeon: Opal Kam DPM;  Location:  OR     EYE SURGERY      cataract     GI SURGERY       GYN SURGERY      hysterectomy     INJECT STEROID (LOCATION) Right 8/16/2019    Procedure: CORTIZONE INJECTION RIGHT HEEL;  Surgeon: Opal Kam DPM;  Location:  OR     ORTHOPEDIC SURGERY Left     knee replacement     PARTIAL  HYSTERECTOMY       STRIP VEIN Bilateral 1975     TOTAL KNEE ARTHROPLASTY Left 2011       Prior to Admission Medications   Cannot display prior to admission medications because the patient has not been admitted in this contact.        Review of Systems    The 10 point Review of Systems is negative other than noted in the HPI or here.      Social History   I have reviewed this patient's social history and updated it with pertinent information if needed.  Social History     Tobacco Use     Smoking status: Never     Smokeless tobacco: Never   Vaping Use     Vaping Use: Never used   Substance Use Topics     Alcohol use: Yes     Alcohol/week: 0.8 standard drinks     Comment: Alcoholic Drinks/day: 1-2 glasses per month     Drug use: No       Family History   I have reviewed this patient's family history and updated it with pertinent information if needed.  Family History   Problem Relation Age of Onset     No Known Problems Mother      No Known Problems Father      Alzheimer Disease Maternal Uncle      Heart Disease Sister      Heart Disease Brother      No Known Problems Daughter      No Known Problems Son      No Known Problems Brother      No Known Problems Son      No Known Problems Son        Allergies   Allergies   Allergen Reactions     Lisinopril Cough     Reglan [Metoclopramide] Other (See Comments)     depression        Physical Exam   Vital Signs: Temp: 98.4  F (36.9  C) Temp src: Oral BP: (!) 150/79 Pulse: 67   Resp: 16 SpO2: 97 %      Weight: 248 lbs 0 oz      GENERAL:  Alert, appears comfortable, in no acute distress, appears stated age; in wheelchair in a makeshift ER room, which is actually in the waiting room-writer had a nurse wheel/move the patient over to an isolated location for our discussion/privacy; daughter present   HEAD:  Normocephalic, without obvious abnormality, atraumatic   EYES:  PERRL, conjunctiva/corneas clear, no scleral icterus, EOM's intact   NOSE: Nares normal, septum midline, mucosa  normal, no drainage   THROAT: Lips, mucosa, and tongue normal; teeth and gums normal, mouth moist   NECK: Supple, symmetrical, trachea midline   BACK:   Symmetric, no curvature, ROM normal   LUNGS:   Clear to auscultation bilaterally, no rales, rhonchi, or wheezing, symmetric chest rise on inhalation, respirations unlabored, on room air    CHEST WALL:  No tenderness or conspicuous deformity   HEART:  Regular rate and rhythm, S1 and S2 normal, no murmur, rub, or gallop, no conspicuous jugular venous distention noted, peripheral pulses intact; she does have bilateral calf nonpitting edema, right side is with erythema-see below   ABDOMEN:   Soft, non-tender, bowel sounds auscultated in all four quadrants, no masses, no organomegaly, no rebound or guarding   EXTREMITIES:  Bilateral calf nonpitting edema, right side lower leg skin is significantly more glossy consistent with venous stasis, but there is also a patch of erythema that is very warm to the touch and mildly tender; this cellulitic appearing lesion is noted to be inferior/distal to the bandaged surgical dressing, which is clean, dry, and intact   SKIN: Dry to touch, no exanthems in the visualized areas   NEURO: Alert, oriented x3, moves all four extremities of their own volition    PSYCH: Cooperative and behavior is appropriate        Medical Decision Making       77 MINUTES SPENT BY ME on the date of service doing chart review, history, exam, documentation & further activities per the note.      Data   ------------------------- PAST 24 HR DATA REVIEWED -----------------------------------------------    I have personally reviewed the following data over the past 24 hrs:    10.2  \   11.8   / 156     138 104 15 /  95   4.4 25 0.64 \       Procal: N/A CRP: N/A Lactic Acid: 1.5         Imaging results reviewed over the past 24 hrs:   Recent Results (from the past 24 hour(s))   US Lower Extremity Venous Duplex Right    Narrative    EXAM: US LOWER EXTREMITY VENOUS  DUPLEX RIGHT  LOCATION: [facility]  DATE/TIME: 3/9/2023 1:47 PM    INDICATION: right leg pain and swelling  COMPARISON: 04/05/2022  TECHNIQUE: Venous Duplex ultrasound of the right lower extremity with and without compression, augmentation and duplex. Color flow and spectral Doppler with waveform analysis performed.    FINDINGS: Exam includes the common femoral, femoral, popliteal, and contralateral common femoral veins as well as segmentally visualized deep calf veins and greater saphenous vein.     RIGHT: No deep vein thrombosis. No superficial thrombophlebitis. No popliteal cyst.      Impression    IMPRESSION:  1.  No deep venous thrombosis in the right lower extremity.

## 2023-03-09 NOTE — ED PROVIDER NOTES
EMERGENCY DEPARTMENT ENCOUNTER      NAME: Debra Manriquez  AGE: 83 year old female  YOB: 1940  MRN: [id number]  EVALUATION DATE & TIME: No admission date for patient encounter.    PCP: Yoanna Mcpherson    ED PROVIDER: Frances Fernández PA-C      Chief Complaint   Patient presents with     Leg Pain       FINAL IMPRESSION:  1. Cellulitis of right lower extremity        MEDICAL DECISION MAKING:    Pertinent Labs & Imaging studies reviewed. (See chart for details)  83 year old female with a h/o a fib on xarelto, bilateral edema on furosemide, htn, osteoarthritis POD #2 after right RKA with Dr. Corbni of [facility] presents to the Emergency Department for evaluation of right leg pain and swelling, concern for inability to care for self at home and seeking TCU placement.    On exam she is alert, comfortable appearing and sitting in wheelchair with right leg extended.  Surgical bandage in place over anterior right knee.  There is erythema at the mid right lower leg with significant edema.  The calf is firm, however nontender to palpation and no palpable cord.  Vitals are WNL.    Differential diagnosis includes DVT, surgical wound dehiscence, septic arthritis, superficial thrombophlebitis, positional edema, cellulitis.  CBC and BMP are WNL.  Lactate WNL.  Blood cultures were drawn and are pending.  Right lower extremity venous ultrasound is negative for DVT.  Discussed with [facility] PA who agrees with management for cellulitis and admission for TCU placement.  Patient had PT evaluation today in home and is unable to complete any ADLs independently. One dose of IV rocephin given. Pain managed with dilaudid.     Given inability to care for self at home, will plan for admission. This was discussed with the hospitalist who is in agreement. Pleae see inpatient notes for further management.       Medical Decision Making    History:    Supplemental history from: Documented in chart, if applicable and Family  Member/Significant Other    External Record(s) reviewed: Documented in chart, if applicable.    Work Up:    Chart documentation includes differential considered and any EKGs or imaging independently interpreted by provider, where specified.    In additional to work up documented, I considered the following work up: Documented in chart, if applicable.    External consultation:    Discussion of management with another provider: Documented in chart, if applicable    Complicating factors:    Care impacted by chronic illness: N/A    Care affected by social determinants of health: N/A    Disposition considerations: Admit.    CRITICAL CARE: None    ED COURSE  11:45 PM  Met and evaluated patient I triage. Discussed ED plan.   2:00 PM Spoke with nadira Marks PA for Dr. Corbin who recommends keflex for developing cellulitis  5:00 PM Staffed the patient with Dr. Rasheed Ward  5:30 PM  discussed with hospitalist who agrees with admission, they will be admitted to [facility] surg     MEDICATIONS GIVEN IN THE EMERGENCY:  Medications   cephALEXin (KEFLEX) capsule 500 mg (has no administration in time range)   HYDROmorphone (PF) (DILAUDID) injection 0.5 mg (has no administration in time range)   HYDROmorphone (PF) (DILAUDID) injection 0.25 mg (0.25 mg Intravenous $Given 3/9/23 1444)   cefTRIAXone (ROCEPHIN) 2 g vial to attach to  ml bag for ADULTS or NS 50 ml bag for PEDS (2 g Intravenous $New Bag 3/9/23 1446)       NEW PRESCRIPTIONS STARTED AT TODAY'S ER VISIT  New Prescriptions    No medications on file          =================================================================    HPI    Patient information was obtained from: patient, daughter    Use of Intrepreter: N/A       Debra Manriquez is a 83 year old female who presents for evaluation of right lower leg pain and swelling.  She has 2 days postop from a right total knee replacement.  She has been staying at home, however required for family members to provide care over  the last 24 hours and patient and family are concerned that she is unable to continue to reside at home, they are seeking TCU placement.  She does take furosemide and typically wears compression stockings, however she notes that the swelling to the right lower extremity is much worse than typically.  She also has noticed some redness at the shin which does extend up to the knee partially.  She has not noticed any increased drainage from the surgical site, however she has not yet taken down the surgical bandage.  She has not noticed this to be saturated at all.  She does have a history of A-fib and is anticoagulated with Xarelto, she positive anticoagulation in anticipation of surgery and resumed medication last night.     Per chart review R TKA with Dr. Corbin 3/7/2023 (2 days ago). No intraoperative complications, she was discharged to home with in home PT who followed up today and was concerned about appearance of the RLE. Her surgical bandage was meant to be exchanged tomorrow and she is meant to see ortho in follow up in 2 weeks.       REVIEW OF SYSTEMS   As noted in HPI. All other systems negative.    PAST MEDICAL HISTORY:  Past Medical History:   Diagnosis Date     Antiplatelet or antithrombotic long-term use      Chronic acquired lymphedema      Edema      Falls      Fibrocystic breast disease      Fibrocystic breast disease      Foot pain      Gastroesophageal reflux disease      GERD (gastroesophageal reflux disease)      Hyperlipidemia      Hypertension      Irregular heart beat      Lymphedema      Mitral valve disorder      Obese      JADYN (obstructive sleep apnea)      Osteoarthritis of knee     hx of knee replacement and pending second     Osteopenia      Skin cancer      Skin cancer, basal cell      Sleep apnea      Thyroid nodule      Thyroid nodule      Vitamin B deficiency        PAST SURGICAL HISTORY:  Past Surgical History:   Procedure Laterality Date     ARTHROPLASTY KNEE Right 3/7/2023     Procedure: RIGHT TOTAL KNEE ARTHROPLASTY;  Surgeon: Juan Corbin MD;  Location: [facility] Main OR     CATARACT EXTRACTION  2011, 2012     COLONOSCOPY       EXCISE TOENAIL(S) Bilateral 8/16/2019    Procedure: MATRIXECTOMY BILATERAL FEET; TOES 1,2,3,4 ON LEFT FOOT,  1,2 ON RIGHT FOOT;  BOTH NAIL BORDERS;  Surgeon: Opal Kam DPM;  Location:  OR     EYE SURGERY      cataract     GI SURGERY       GYN SURGERY      hysterectomy     INJECT STEROID (LOCATION) Right 8/16/2019    Procedure: CORTIZONE INJECTION RIGHT HEEL;  Surgeon: Opal Kam DPM;  Location:  OR     ORTHOPEDIC SURGERY Left     knee replacement     PARTIAL HYSTERECTOMY       STRIP VEIN Bilateral 1975     TOTAL KNEE ARTHROPLASTY Left 2011           CURRENT MEDICATIONS:    acetaminophen (TYLENOL) 325 MG tablet  acetylcysteine (N-ACETYL CYSTEINE) 600 MG CAPS capsule  alpha-lipoic acid 600 MG capsule  biotin 1000 MCG TABS tablet  cholecalciferol (VITAMIN D3) 5000 units TABS tablet  Coenzyme Q10 400 MG CAPS  denosumab (PROLIA) 60 MG/ML SOSY injection  Digestive Aids Mixture (PROTEOLYTIC ENZYMES PO)  folic acid 800 MCG tablet  furosemide (LASIX) 20 MG tablet  gabapentin (NEURONTIN) 300 MG capsule  Krill Oil (OMEGA-3) 500 MG CAPS  levothyroxine (SYNTHROID/LEVOTHROID) 112 MCG tablet  losartan (COZAAR) 50 MG tablet  MAGNESIUM PO  Menaquinone-7 (VITAMIN K2 PO)  Multiple Vitamins-Minerals (GNP IMMUNE SUPPORT PO)  Omega-3 Fatty Acids (FISH OIL EXTRA STRENGTH PO)  oxyCODONE (ROXICODONE) 5 MG tablet  RESVERATROL PO  rivaroxaban ANTICOAGULANT (XARELTO) 20 MG TABS tablet  rosuvastatin (CRESTOR) 20 MG tablet  Selenium 100 MCG TABS  senna-docusate (SENOKOT-S/PERICOLACE) 8.6-50 MG tablet  Sodium Hyaluronate, oral, (HYALURONIC ACID PO)  Thiamine 50 MG CAPS  TURMERIC PO  UNABLE TO FIND  UNABLE TO FIND  VITAMIN A PO  vitamin B complex with vitamin C (STRESS TAB) tablet  vitamin B-12 (CYANOCOBALAMIN) 1000 MCG tablet  vitamin C (ASCORBIC ACID)  500 MG tablet  vitamin E 400 units TABS        ALLERGIES:  Allergies   Allergen Reactions     Lisinopril Cough     Reglan [Metoclopramide] Other (See Comments)     depression       FAMILY HISTORY:  Family History   Problem Relation Age of Onset     No Known Problems Mother      No Known Problems Father      Alzheimer Disease Maternal Uncle      Heart Disease Sister      Heart Disease Brother      No Known Problems Daughter      No Known Problems Son      No Known Problems Brother      No Known Problems Son      No Known Problems Son        SOCIAL HISTORY:   Social History     Socioeconomic History     Marital status:      Spouse name: Not on file     Number of children: Not on file     Years of education: Not on file     Highest education level: Not on file   Occupational History     Not on file   Tobacco Use     Smoking status: Never     Smokeless tobacco: Never   Vaping Use     Vaping Use: Never used   Substance and Sexual Activity     Alcohol use: Yes     Alcohol/week: 0.8 standard drinks     Comment: Alcoholic Drinks/day: 1-2 glasses per month     Drug use: No     Sexual activity: Yes   Other Topics Concern     Parent/sibling w/ CABG, MI or angioplasty before 65F 55M? Not Asked   Social History Narrative    . 4 kids.  Teaches college English, reading.      Social Determinants of Health     Financial Resource Strain: Not on file   Food Insecurity: Not on file   Transportation Needs: Not on file   Physical Activity: Not on file   Stress: Not on file   Social Connections: Not on file   Intimate Partner Violence: Not on file   Housing Stability: Not on file         VITALS:  Patient Vitals for the past 24 hrs:   BP Temp Temp src Pulse Resp SpO2 Weight   03/09/23 1155 (!) 150/79 98.4  F (36.9  C) Oral 67 16 97 % 112.5 kg (248 lb)       PHYSICAL EXAM    Physical Exam  Vitals reviewed.   Constitutional:       General: She is not in acute distress.     Appearance: Normal appearance. She is obese. She is  not toxic-appearing.   HENT:      Head: Normocephalic and atraumatic.   Cardiovascular:      Rate and Rhythm: Normal rate.   Pulmonary:      Effort: Pulmonary effort is normal. No respiratory distress.   Musculoskeletal:         General: Swelling and tenderness present.      Cervical back: Normal range of motion.      Right lower leg: Edema present.      Comments: Right knee with surgical bandage in place, no saturation noted on bandage. Mid lower leg with erythema and 3+ edema. Mild warmth. No palpable cord, calf non tender to palpation.    Skin:     General: Skin is warm.      Capillary Refill: Capillary refill takes less than 2 seconds.      Findings: Erythema present.      Comments: Surgical wound appropriately approximated with staples in place. No purulent drainage, warmth.    Neurological:      General: No focal deficit present.      Mental Status: She is alert.            LAB:  All pertinent labs reviewed and interpreted.    Labs Ordered and Resulted from Time of ED Arrival to Time of ED Departure   LACTIC ACID WHOLE BLOOD - Normal       Result Value    Lactic Acid 1.5     CBC WITH PLATELETS - Normal    WBC Count 10.2      RBC Count 4.20      Hemoglobin 11.8      Hematocrit 35.9      MCV 86      MCH 28.1      MCHC 32.9      RDW 14.2      Platelet Count 156     BASIC METABOLIC PANEL - Normal    Sodium 138      Potassium 4.4      Chloride 104      Carbon Dioxide (CO2) 25      Anion Gap 9      Urea Nitrogen 15      Creatinine 0.64      Calcium 9.1      Glucose 95      GFR Estimate 86     INFLUENZA A/B, RSV, & SARS-COV2 PCR   BLOOD CULTURE   BLOOD CULTURE         RADIOLOGY:  Reviewed all pertinent imaging. Please see official radiology report    US Lower Extremity Venous Duplex Right   Final Result   IMPRESSION:   1.  No deep venous thrombosis in the right lower extremity.            Frances Fernández PA-C  Emergency Medicine  [facility] EMERGENCY  ROOM  [address] [place] [id number] [phone]  Dept: [phone]    This note has in part been created with speech recognition technology and may create an occasional, unintended word/grammar substitution. Errors are generally corrected in real time. Please message me via [employer] In Basket if you note any errors requiring clarification.       Frances Fernández PA-C  03/09/23 1525

## 2023-03-10 ENCOUNTER — APPOINTMENT (OUTPATIENT)
Dept: PHYSICAL THERAPY | Facility: CLINIC | Age: 83
DRG: 857 | End: 2023-03-10
Attending: STUDENT IN AN ORGANIZED HEALTH CARE EDUCATION/TRAINING PROGRAM
Payer: MEDICARE

## 2023-03-10 ENCOUNTER — APPOINTMENT (OUTPATIENT)
Dept: OCCUPATIONAL THERAPY | Facility: CLINIC | Age: 83
DRG: 857 | End: 2023-03-10
Attending: STUDENT IN AN ORGANIZED HEALTH CARE EDUCATION/TRAINING PROGRAM
Payer: MEDICARE

## 2023-03-10 LAB
ANION GAP SERPL CALCULATED.3IONS-SCNC: 8 MMOL/L (ref 5–18)
BUN SERPL-MCNC: 15 MG/DL (ref 8–28)
CALCIUM SERPL-MCNC: 8.7 MG/DL (ref 8.5–10.5)
CHLORIDE BLD-SCNC: 105 MMOL/L (ref 98–107)
CO2 SERPL-SCNC: 26 MMOL/L (ref 22–31)
CREAT SERPL-MCNC: 0.56 MG/DL (ref 0.6–1.1)
ERYTHROCYTE [DISTWIDTH] IN BLOOD BY AUTOMATED COUNT: 13.9 % (ref 10–15)
GFR SERPL CREATININE-BSD FRML MDRD: 89 ML/MIN/1.73M2
GLUCOSE BLD-MCNC: 90 MG/DL (ref 70–125)
HCT VFR BLD AUTO: 33.2 % (ref 35–47)
HGB BLD-MCNC: 11 G/DL (ref 11.7–15.7)
MCH RBC QN AUTO: 27.9 PG (ref 26.5–33)
MCHC RBC AUTO-ENTMCNC: 33.1 G/DL (ref 31.5–36.5)
MCV RBC AUTO: 84 FL (ref 78–100)
PLATELET # BLD AUTO: 150 10E3/UL (ref 150–450)
POTASSIUM BLD-SCNC: 4.2 MMOL/L (ref 3.5–5)
RBC # BLD AUTO: 3.94 10E6/UL (ref 3.8–5.2)
SODIUM SERPL-SCNC: 139 MMOL/L (ref 136–145)
WBC # BLD AUTO: 7.9 10E3/UL (ref 4–11)

## 2023-03-10 PROCEDURE — 97530 THERAPEUTIC ACTIVITIES: CPT | Mod: GP

## 2023-03-10 PROCEDURE — 99232 SBSQ HOSP IP/OBS MODERATE 35: CPT | Performed by: EMERGENCY MEDICINE

## 2023-03-10 PROCEDURE — 80048 BASIC METABOLIC PNL TOTAL CA: CPT | Performed by: STUDENT IN AN ORGANIZED HEALTH CARE EDUCATION/TRAINING PROGRAM

## 2023-03-10 PROCEDURE — 97162 PT EVAL MOD COMPLEX 30 MIN: CPT | Mod: GP

## 2023-03-10 PROCEDURE — 250N000013 HC RX MED GY IP 250 OP 250 PS 637: Performed by: EMERGENCY MEDICINE

## 2023-03-10 PROCEDURE — 36415 COLL VENOUS BLD VENIPUNCTURE: CPT | Performed by: STUDENT IN AN ORGANIZED HEALTH CARE EDUCATION/TRAINING PROGRAM

## 2023-03-10 PROCEDURE — 120N000001 HC R&B MED SURG/OB

## 2023-03-10 PROCEDURE — 97535 SELF CARE MNGMENT TRAINING: CPT | Mod: GO

## 2023-03-10 PROCEDURE — 97167 OT EVAL HIGH COMPLEX 60 MIN: CPT | Mod: GO

## 2023-03-10 PROCEDURE — 250N000013 HC RX MED GY IP 250 OP 250 PS 637: Performed by: STUDENT IN AN ORGANIZED HEALTH CARE EDUCATION/TRAINING PROGRAM

## 2023-03-10 PROCEDURE — 85027 COMPLETE CBC AUTOMATED: CPT | Performed by: STUDENT IN AN ORGANIZED HEALTH CARE EDUCATION/TRAINING PROGRAM

## 2023-03-10 PROCEDURE — 250N000011 HC RX IP 250 OP 636: Performed by: STUDENT IN AN ORGANIZED HEALTH CARE EDUCATION/TRAINING PROGRAM

## 2023-03-10 RX ORDER — DOCUSATE SODIUM 100 MG/1
100 CAPSULE, LIQUID FILLED ORAL 2 TIMES DAILY
Status: DISCONTINUED | OUTPATIENT
Start: 2023-03-10 | End: 2023-03-14 | Stop reason: HOSPADM

## 2023-03-10 RX ORDER — SENNOSIDES 8.6 MG
8.6 TABLET ORAL 2 TIMES DAILY
Status: DISCONTINUED | OUTPATIENT
Start: 2023-03-10 | End: 2023-03-14 | Stop reason: HOSPADM

## 2023-03-10 RX ORDER — HYDROXYZINE HYDROCHLORIDE 10 MG/1
10 TABLET, FILM COATED ORAL 3 TIMES DAILY
Status: DISCONTINUED | OUTPATIENT
Start: 2023-03-10 | End: 2023-03-14 | Stop reason: HOSPADM

## 2023-03-10 RX ORDER — POLYETHYLENE GLYCOL 3350 17 G/17G
17 POWDER, FOR SOLUTION ORAL DAILY
Status: DISCONTINUED | OUTPATIENT
Start: 2023-03-10 | End: 2023-03-11

## 2023-03-10 RX ADMIN — HYDROXYZINE HYDROCHLORIDE 10 MG: 10 TABLET ORAL at 19:37

## 2023-03-10 RX ADMIN — HYDROXYZINE HYDROCHLORIDE 10 MG: 10 TABLET ORAL at 14:13

## 2023-03-10 RX ADMIN — ACETAMINOPHEN 650 MG: 325 TABLET ORAL at 16:03

## 2023-03-10 RX ADMIN — OXYCODONE HYDROCHLORIDE 5 MG: 5 TABLET ORAL at 10:15

## 2023-03-10 RX ADMIN — OXYCODONE HYDROCHLORIDE 2.5 MG: 5 TABLET ORAL at 00:41

## 2023-03-10 RX ADMIN — RIVAROXABAN 20 MG: 10 TABLET, FILM COATED ORAL at 18:34

## 2023-03-10 RX ADMIN — POLYETHYLENE GLYCOL 3350 17 G: 17 POWDER, FOR SOLUTION ORAL at 20:21

## 2023-03-10 RX ADMIN — LOSARTAN POTASSIUM 50 MG: 50 TABLET, FILM COATED ORAL at 09:15

## 2023-03-10 RX ADMIN — OXYCODONE HYDROCHLORIDE 2.5 MG: 5 TABLET ORAL at 21:28

## 2023-03-10 RX ADMIN — ROSUVASTATIN CALCIUM 20 MG: 10 TABLET, FILM COATED ORAL at 19:36

## 2023-03-10 RX ADMIN — OXYCODONE HYDROCHLORIDE 2.5 MG: 5 TABLET ORAL at 05:57

## 2023-03-10 RX ADMIN — LOSARTAN POTASSIUM 50 MG: 50 TABLET, FILM COATED ORAL at 19:36

## 2023-03-10 RX ADMIN — Medication 800 MCG: at 09:15

## 2023-03-10 RX ADMIN — GABAPENTIN 900 MG: 300 CAPSULE ORAL at 21:28

## 2023-03-10 RX ADMIN — CEFTRIAXONE SODIUM 2 G: 2 INJECTION, POWDER, FOR SOLUTION INTRAMUSCULAR; INTRAVENOUS at 14:13

## 2023-03-10 RX ADMIN — HYDROXYZINE HYDROCHLORIDE 10 MG: 10 TABLET ORAL at 10:15

## 2023-03-10 RX ADMIN — SENNOSIDES 8.6 MG: 8.6 TABLET, FILM COATED ORAL at 20:21

## 2023-03-10 RX ADMIN — LEVOTHYROXINE SODIUM 112 MCG: 0.11 TABLET ORAL at 05:57

## 2023-03-10 RX ADMIN — MAGNESIUM OXIDE TAB 400 MG (241.3 MG ELEMENTAL MG) 400 MG: 400 (241.3 MG) TAB at 09:15

## 2023-03-10 RX ADMIN — ACETAMINOPHEN 650 MG: 325 TABLET ORAL at 03:41

## 2023-03-10 RX ADMIN — DOCUSATE SODIUM 100 MG: 100 CAPSULE, LIQUID FILLED ORAL at 20:21

## 2023-03-10 RX ADMIN — HYDROMORPHONE HYDROCHLORIDE 0.2 MG: 0.2 INJECTION, SOLUTION INTRAMUSCULAR; INTRAVENOUS; SUBCUTANEOUS at 03:42

## 2023-03-10 RX ADMIN — OXYCODONE HYDROCHLORIDE 5 MG: 5 TABLET ORAL at 14:13

## 2023-03-10 ASSESSMENT — ACTIVITIES OF DAILY LIVING (ADL)
ADLS_ACUITY_SCORE: 45
ADLS_ACUITY_SCORE: 45
ADLS_ACUITY_SCORE: 39
ADLS_ACUITY_SCORE: 45
ADLS_ACUITY_SCORE: 45
DEPENDENT_IADLS:: CLEANING;COOKING;LAUNDRY;SHOPPING;MEAL PREPARATION;TRANSPORTATION
ADLS_ACUITY_SCORE: 45
ADLS_ACUITY_SCORE: 38
ADLS_ACUITY_SCORE: 40
ADLS_ACUITY_SCORE: 38
ADLS_ACUITY_SCORE: 45

## 2023-03-10 NOTE — PROGRESS NOTES
03/10/23 1300   Appointment Info   Signing Clinician's Name / Credentials (PT) Micah Mcgrath, PT, DPT   Living Environment   People in Home alone   Current Living Arrangements independent living facility   Home Accessibility no concerns   Self-Care   Usual Activity Tolerance good   Current Activity Tolerance poor   Equipment Currently Used at Home walker, rolling   Fall history within last six months no   General Information   Onset of Illness/Injury or Date of Surgery 03/09/23   Referring Physician Dr. Reeves   Patient/Family Therapy Goals Statement (PT) Decrease pain with mobility   Pertinent History of Current Problem (include personal factors and/or comorbidities that impact the POC) Re-admist after TKA recently, cellulitis   Existing Precautions/Restrictions weight bearing   Weight-Bearing Status - LLE full weight-bearing   Weight-Bearing Status - RLE weight-bearing as tolerated   Range of Motion (ROM)   ROM Comment WFL, significant decrease in RLE due to pain and TKA   Strength (Manual Muscle Testing)   Strength Comments Generalized weakness, impaired by pain   Bed Mobility   Bed Mobility supine-sit;sit-supine   Clinical Impression   Criteria for Skilled Therapeutic Intervention Yes, treatment indicated   PT Diagnosis (PT) Impaired functional mobility   Influenced by the following impairments Weakness, pain   Functional limitations due to impairments Transfers, gait   Clinical Presentation (PT Evaluation Complexity) Evolving/Changing   Clinical Presentation Rationale Presents as medically diagnosed.   Clinical Decision Making (Complexity) moderate complexity   Planned Therapy Interventions (PT) gait training;home exercise program;patient/family education;ROM (range of motion);strengthening;transfer training   Anticipated Equipment Needs at Discharge (PT) walker, rolling   Risk & Benefits of therapy have been explained care plan/treatment goals reviewed;patient   PT Total Evaluation Time   PT Eval, Moderate  Complexity Minutes (46300) 10   Plan of Care Review   Plan of Care Reviewed With patient;daughter   Physical Therapy Goals   PT Frequency Daily   PT Predicted Duration/Target Date for Goal Attainment 03/17/23   PT Goals Transfers;Gait;PT Goal 1   PT: Transfers Sit to/from stand;Minimal assist   PT: Gait Supervision/stand-by assist;Rolling walker;10 feet   PT: Goal 1 I with TKA HEP   Interventions   Interventions Quick Adds Therapeutic Activity   Therapeutic Activity   Therapeutic Activities: dynamic activities to improve functional performance Minutes (36246) 20   Symptoms Noted During/After Treatment Fatigue;Increased pain   Treatment Detail/Skilled Intervention Supine to sit Min A to help LEs, HOB elevated, pt using bedrail and leglifter, break taken due to pain and fatigue, increased time for carry out. Sitting EOB SBA. Scooting fwd independently. Sit<>stand x4 Mod A of 2 initially down to Min A of 2. Standing 10-15 seconds each time. Sit to supine with SBA using leg , and repositioning in bed.   PT Discharge Planning   PT Plan Amb, transfers, TKA HEP   PT Discharge Recommendation (DC Rec) (S)  Transitional Care Facility   PT Rationale for DC Rec Ax2 for all mobility, not amb at this time, high pain levels, lives alone. TCU to work on strength and functional mobility prior to d/c home.   PT Brief overview of current status Min-Mod A of 2 for bed mobility and STS, not amb yet

## 2023-03-10 NOTE — PLAN OF CARE
Problem: Plan of Care - These are the overarching goals to be used throughout the patient stay.    Goal: Optimal Comfort and Wellbeing  Outcome: Progressing     Problem: Pain Acute  Goal: Optimal Pain Control and Function  Outcome: Progressing   Goal Outcome Evaluation:    Pt A&O. Pain managed with prn oxycodone every 4 hours, scheduled atarax and ice. RLE swollen, red, limited ROM. Aquacel bandage intact. Able to bear some weight on extremity with therapy but only stood at bedside. Continues with bedrest. Purewick in place. Report given to next shift.

## 2023-03-10 NOTE — PROGRESS NOTES
[facility] MEDICINE PROGRESS NOTE      Summary: 83 year old female admitted 3/9/2023 to [facility] who is post op day number   3 after a right TKA.  She was discharged home though due to her clinical needs.  It   Took 3 people to get her out of bed. On admission she has a small amount of redness   In the lower extremity consistent with a post op infection. She was on keflex though   Has been changed to rocephin.  Overnight she is afebrile. Pending is TCU admission.    Problem list:    1.  POD #3 right TKA: per [facility]; change oxycodone to 2.5-5 mg and add   atarax  2.  RLE cellulitis: continue rocephin; elevate leg if able  3.  History of atrial fibrillation; continue eliquis;   4.  Dyslipidemia: statins  5.  Hypothyroidism: synthroid  6. HTN:  Losartan  7.  dvt prevention  8.  Disposition:  TCU pending      Dee Reeves MD  [facility] Medicine  [facility]  Phone: #[phone]    Securely message with the Vocera Web Console (learn more here)  Text page via [employer] Paging/Directory     Interval History/Subjective: stable overnight; pain not well controlled      Physical Exam/Objective:  Temp:  [97.6  F (36.4  C)-98.5  F (36.9  C)] 98  F (36.7  C)  Pulse:  [57-73] 57  Resp:  [16-20] 20  BP: (120-169)/(59-79) 121/59  SpO2:  [93 %-99 %] 99 %  Body mass index is 41.27 kg/m .    GENERAL:  Alert, appears comfortable, in no acute distress, appears stated age   HEAD:  Normocephalic, without obvious abnormality, atraumatic   EYES:  PERRL, conjunctiva/corneas clear, no scleral icterus, EOM's intact   NOSE: Nares normal, septum midline, mucosa normal, no drainage   THROAT: Lips, mucosa, and tongue normal; teeth and gums normal, mouth moist   NECK: Supple, symmetrical, trachea midline   BACK:   Symmetric, no curvature, ROM normal   LUNGS:   Clear to auscultation bilaterally, no rales, rhonchi, or wheezing, symmetric chest rise on inhalation, respirations  unlabored   CHEST WALL:  No tenderness or deformity   HEART:  Regular rate and rhythm, S1 and S2 normal, no murmur, rub, or gallop    ABDOMEN:   Soft, non-tender, bowel sounds active all four quadrants, no masses, no organomegaly, no rebound or guarding   EXTREMITIES: RLE with some redness mid leg; mild edema; no peeling of skin or openings;   Foot is not red   SKIN Dry to touch, no exanthems in the visualized areas   NEURO: Alert, oriented x3, moves all four extremities freely   PSYCH: Cooperative, behavior is appropriate      Data reviewed today: I personally reviewed all new medications, labs, imaging/diagnostics reports over the past 24 hours. Pertinent findings include:    Imaging:   Recent Results (from the past 24 hour(s))   US Lower Extremity Venous Duplex Right    Narrative    EXAM: US LOWER EXTREMITY VENOUS DUPLEX RIGHT  LOCATION: [facility]  DATE/TIME: 3/9/2023 1:47 PM    INDICATION: right leg pain and swelling  COMPARISON: 04/05/2022  TECHNIQUE: Venous Duplex ultrasound of the right lower extremity with and without compression, augmentation and duplex. Color flow and spectral Doppler with waveform analysis performed.    FINDINGS: Exam includes the common femoral, femoral, popliteal, and contralateral common femoral veins as well as segmentally visualized deep calf veins and greater saphenous vein.     RIGHT: No deep vein thrombosis. No superficial thrombophlebitis. No popliteal cyst.      Impression    IMPRESSION:  1.  No deep venous thrombosis in the right lower extremity.       Labs:  Most Recent 3 BMP's:Recent Labs   Lab Test 03/10/23  0808 03/09/23  1207 03/07/23  0621 02/22/23  1551    138  --  143   POTASSIUM 4.2 4.4  --  4.2   CHLORIDE 105 104  --  104   CO2 26 25  --  25   BUN 15 15  --  16.7   CR 0.56* 0.64  --  0.69   ANIONGAP 8 9  --  14   DAMARIS 8.7 9.1  --  9.7   GLC 90 95 94 103*       Medications:   Personally Reviewed.  Medications     - MEDICATION  INSTRUCTIONS -         cefTRIAXone  2 g Intravenous Q24H     folic acid  800 mcg Oral Daily     gabapentin  900 mg Oral At Bedtime     levothyroxine  112 mcg Oral Daily     losartan  50 mg Oral BID     magnesium oxide  400 mg Oral Daily     rivaroxaban ANTICOAGULANT  20 mg Oral Daily with supper     rosuvastatin  20 mg Oral QPM     sodium chloride (PF)  3 mL Intracatheter Q8H

## 2023-03-10 NOTE — CONSULTS
Care Management Initial Consult    General Information  Assessment completed with: Patient,    Type of CM/SW Visit: Initial Assessment    Primary Care Provider verified and updated as needed: Yes   Readmission within the last 30 days: no previous admission in last 30 days      Reason for Consult: discharge planning, facility placement  Advance Care Planning: Advance Care Planning Reviewed: other (see comments) (Declined Honoring Choices)     General Information Comments: Lives alone in a Senior Apt    Communication Assessment  Patient's communication style: spoken language (English or Bilingual)    Hearing Difficulty or Deaf: no   Wear Glasses or Blind: yes    Cognitive  Cognitive/Neuro/Behavioral: WDL                      Living Environment:   People in home: alone     Current living Arrangements: apartment      Able to return to prior arrangements: no (Needs TCU)  Living Arrangement Comments: Lives alone    Family/Social Support:  Care provided by: self  Provides care for: no one  Marital Status:   Children          Description of Support System: Supportive, Involved    Support Assessment: Adequate family and caregiver support    Current Resources:   Patient receiving home care services: Yes  Skilled Home Care Services: Physical Therapy (Through [facility])  Community Resources: Home Care (PT through [facility])  Equipment currently used at home: walker, rolling, walker, standard, other (see comments) (CPAP for sleep)  Supplies currently used at home: Other (CPAP Supplies)    Employment/Financial:  Employment Status: retired        Financial Concerns: No concerns identified           Lifestyle & Psychosocial Needs:  Social Determinants of Health     Tobacco Use: Low Risk      Smoking Tobacco Use: Never     Smokeless Tobacco Use: Never     Passive Exposure: Not on file   Alcohol Use: Not on file   Financial Resource Strain: Not on file   Food Insecurity: Not on file   Transportation Needs: Not on  file   Physical Activity: Not on file   Stress: Not on file   Social Connections: Not on file   Intimate Partner Violence: Not on file   Depression: Not on file   Housing Stability: Not on file       Functional Status:  Prior to admission patient needed assistance:   Dependent ADLs:: Ambulation-walker, Bathing, Transfers  Dependent IADLs:: Cleaning, Cooking, Laundry, Shopping, Meal Preparation, Transportation  Assesssment of Functional Status: Not at  functional baseline, Needs placement in a SNF/TCF for rehabilitation    Mental Health Status:          Chemical Dependency Status:              Values/Beliefs:  Spiritual, Cultural Beliefs, Orthodoxy Practices, Values that affect care:                 Additional Information:   83-year-old female with a h/o a fib on Xarelto, bilateral edema on furosemide, htn, osteoarthritis POD #2 after right RKA with Dr. Corbin of [facility] presents to the Emergency Department for evaluation of right leg pain and swelling, concern for inability to care for self at home and seeking TCU placement .    Reports she lives alone in a Senior apartment without any services. Uses a roller-walker for ambulation; receives PT through [facility]; and was able to drive prior to surgery. Discussed TCU facilities and patient wants to go to [facility]. Writer spoke with Allyssa of [facility] ([phone]) who will accept after patient has completed her IV antibiotics. Discussed further facilities should patient need to continue course of IV antibiotics. Patient agreed and sent initial referrals to [facility]; [facility]; and [facility]. Patient s daughter will transport on discharge. Other needs pending clinical progress.      TRISHA DAVIS, JEAN CLAUDE/CM

## 2023-03-10 NOTE — PHARMACY-VANCOMYCIN DOSING SERVICE
Pharmacy Note - Admission Medication History    Pertinent Provider Information: Patient hasn't taken any of her supplements since 2/24 prior to her surgery. She requests to have her home magnesium ordered to help with constipation.     ______________________________________________________________________    Prior To Admission (PTA) med list completed and updated in EMR.       PTA Med List   Medication Sig Last Dose     acetaminophen (TYLENOL) 325 MG tablet Take 2 tablets (650 mg) by mouth every 4 hours as needed for other (mild pain) 3/9/2023 at am     acetylcysteine (N-ACETYL CYSTEINE) 600 MG CAPS capsule Take 600 mg by mouth daily Past Month     alpha-lipoic acid 600 MG capsule Take 600 mg by mouth daily Past Month     biotin 1000 MCG TABS tablet Take 1,000 mcg by mouth daily Past Month     cholecalciferol (VITAMIN D3) 5000 units TABS tablet Take 2 tablets by mouth daily 3/8/2023     Coenzyme Q10 400 MG CAPS Take 800 mg by mouth daily Past Month     denosumab (PROLIA) 60 MG/ML SOSY injection Inject 60 mg Subcutaneous every 6 months 10/10/2022     Digestive Aids Mixture (PROTEOLYTIC ENZYMES PO) Take 3 tablets by mouth daily Past Month     folic acid 800 MCG tablet Take 800 mcg by mouth daily Past Month     furosemide (LASIX) 20 MG tablet Take 20 mg by mouth daily 3/9/2023     gabapentin (NEURONTIN) 300 MG capsule Take 900 mg by mouth At Bedtime 3/8/2023     Krill Oil (OMEGA-3) 500 MG CAPS Take 1 capsule by mouth daily Past Month     levothyroxine (SYNTHROID/LEVOTHROID) 112 MCG tablet Take 112 mcg by mouth daily 3/8/2023     losartan (COZAAR) 50 MG tablet Take 50 mg by mouth 2 times daily 3/9/2023 at am     MAGNESIUM PO Take 2 capsules by mouth 2 times daily 3/9/2023 at am     Menaquinone-7 (VITAMIN K2 PO) Take 100 mg by mouth daily Past Month     Multiple Vitamins-Minerals (GNP IMMUNE SUPPORT PO) Take 1 tablet by mouth daily Past Month     Omega-3 Fatty Acids (FISH OIL EXTRA STRENGTH PO) Take 1,600 mg by mouth 2  times daily Past Month     oxyCODONE (ROXICODONE) 5 MG tablet Take 1-2 tablets (5-10 mg) by mouth every 4 hours as needed for moderate to severe pain 3/9/2023 at 900     RESVERATROL PO Take 1,200 mg by mouth daily Past Month     rivaroxaban ANTICOAGULANT (XARELTO) 20 MG TABS tablet Take 1 tablet (20 mg) by mouth daily (with dinner) 3/8/2023 at pm     rosuvastatin (CRESTOR) 20 MG tablet Take 1 tablet (20 mg) by mouth daily 3/8/2023 at pm     Selenium 100 MCG TABS Take 100 mcg by mouth daily Past Month     senna-docusate (SENOKOT-S/PERICOLACE) 8.6-50 MG tablet Take 1-2 tablets by mouth 2 times daily Take while on oral narcotics to prevent or treat constipation. 3/8/2023 at pm     Sodium Hyaluronate, oral, (HYALURONIC ACID PO) Take 200 mg by mouth daily Past Month     Thiamine 50 MG CAPS daily Past Month     TURMERIC PO Take 1,250 mg by mouth daily Past Month     UNABLE TO FIND MEDICATION NAME: Mabel nayak vitamin E and zinc Past Month     UNABLE TO FIND MEDICATION NAME: complete gut health 2 tablets daily Past Month     VITAMIN A PO Take 10,000 Units by mouth daily Past Month     vitamin B-12 (CYANOCOBALAMIN) 1000 MCG tablet Take 1,000 mcg by mouth daily Past Month     vitamin C (ASCORBIC ACID) 500 MG tablet Take 500 mg by mouth daily Past Month     vitamin E 400 units TABS Take 400 Units by mouth daily Past Month       Information source(s): Patient, Family member, Hospital records and [facility]  Method of interview communication: in-person    Summary of Changes to PTA Med List  New: none  Discontinued: none  Changed: none    Patient was asked about OTC/herbal products specifically.  PTA med list reflects this.    In the past week, patient estimated taking medication this percent of the time:  greater than 90%.    Medication Affordability:  Not including over the counter (OTC) medications, was there a time in the past 12 months when you did not take your medications as prescribed because of cost?:  No    Allergies were reviewed, assessed, and updated with the patient.      Medications available for use during hospital stay: magnesium.     The information provided in this note is only as accurate as the sources available at the time of the update(s).    Thank you for the opportunity to participate in the care of this patient.    Alison Ibarra [facility]  3/9/2023 6:45 PM

## 2023-03-10 NOTE — PLAN OF CARE
Neurologically intact. Calls appropriately.   Rating RLE pain 6/10. Tylenol, dilaudid and oxycodone given prn throughout the shift. RLE warm/red/swollen +2/+3 edema. CPAP overnight. Clear LS. Purewick d/t weakness, unable to bear weight or keep knee in-line. Using ice packs intermittently. Weak pulses R foot.   Assumed care @ 2300   Problem: Pain Acute  Goal: Optimal Pain Control and Function  Outcome: Progressing   Goal Outcome Evaluation:

## 2023-03-10 NOTE — PROGRESS NOTES
Orthopedic Progress Note      Assessment:    3 days post op from right knee TKA    Plan:   - Continue PT/OT  - Weightbearing status: WBAT  - Antibiotics per medicine for right leg cellulitis   - Anticoagulation: Xarelto in addition to SCDs, janay stockings and early ambulation.  - Discharge planning: TCU placement      Subjective:  Pain: mild  Chest pain, SOB: no  Nausea, Vomiting:  no  Lightheadedness, Dizziness:  no  Neuro:  Patient denies new onset numbness or paresthesias    Patient is doing well today, notes that her pain is much better controlled since arriving to the hospital.  She does note that the right shin appears more red and swollen today.  No increasing pain or other Ortho concerns.    Objective:  /59 (BP Location: Left arm)   Pulse 57   Temp 98  F (36.7  C) (Axillary)   Resp 20   Wt 112.5 kg (248 lb)   SpO2 99%   BMI 41.27 kg/m    The patient is A&Ox3. Appears comfortable, sitting up at bedside.  Sensation is intact to light touch & equal bilaterally in the L2 through S1 dermatomes.  Calves are soft and non-tender.  Dorsiflexion and plantar flexion is intact bilaterally.  Appropriate flexion and extension of the toes bilaterally.   Brisk capillary refill in the toes bilaterally.   Palpable right dorsalis pedis pulse.  right knee dressing C/D/I.   There is moderate erythema to the right anterior shin with some swelling.    Pertinent Labs   Lab Results: personally reviewed.   No results found for: INR, PROTIME  Lab Results   Component Value Date    WBC 7.9 03/10/2023    HGB 11.0 (L) 03/10/2023    HCT 33.2 (L) 03/10/2023    MCV 84 03/10/2023     03/10/2023     Lab Results   Component Value Date     03/10/2023    CO2 26 03/10/2023         Report completed by:  Selina Horton PA-C/[facility]    Date: 3/10/2023  Time: 9:23 AM

## 2023-03-10 NOTE — PROGRESS NOTES
03/10/23 1310   Appointment Info   Signing Clinician's Name / Credentials (OT) Kate Irizarry, MOTR/L   Living Environment   People in Home alone   Current Living Arrangements independent living facility   Home Accessibility no concerns   Self-Care   Usual Activity Tolerance good   Current Activity Tolerance poor   Equipment Currently Used at Home raised toilet seat  (4WW)   Activity/Exercise/Self-Care Comment see previous PT note, was using w/c since December   General Information   Onset of Illness/Injury or Date of Surgery 03/09/23   Referring Physician Dr. Dee Reeves   Patient/Family Therapy Goal Statement (OT) go to TCU   Additional Occupational Profile Info/Pertinent History of Current Problem high: cellulitis of right LE   Existing Precautions/Restrictions weight bearing;fall   Right Lower Extremity (Weight-bearing Status) weight-bearing as tolerated (WBAT)   Cognitive Status Examination   Orientation Status orientation to person, place and time   Affect/Mental Status (Cognitive) WFL   Visual Perception   Visual Impairment/Limitations WFL   Sensory   Sensory Quick Adds sensation intact   Pain Assessment   Patient Currently in Pain No   Posture   Posture not impaired   Range of Motion Comprehensive   General Range of Motion no range of motion deficits identified   Strength Comprehensive (MMT)   Comment, General Manual Muscle Testing (MMT) Assessment generalized weakness   Muscle Tone Assessment   Muscle Tone Quick Adds No deficits were identified   Coordination   Upper Extremity Coordination No deficits were identified   Bed Mobility   Comment (Bed Mobility) max A   Transfers   Transfer Comments max Ax2   Balance   Balance Comments decreased   Activities of Daily Living   BADL Assessment/Intervention upper body dressing;lower body dressing   Upper Body Dressing Assessment/Training   [place] Level (Upper Body Dressing) supervision   Lower Body Dressing Assessment/Training   [place] Level (Lower  Body Dressing) maximum assist (25% patient effort)   Clinical Impression   Criteria for Skilled Therapeutic Interventions Met (OT) Yes, treatment indicated   OT Diagnosis decr ADL indep/safety   Influenced by the following impairments recent TKA and current cellulitis of same R LE with weakness   OT Problem List-Impairments impacting ADL activity tolerance impaired;balance;flexibility;mobility;strength;pain;post-surgical precautions   Assessment of Occupational Performance 5 or more Performance Deficits   Identified Performance Deficits dressing, bathing, toileting, G/H and trsfs/func mob   Planned Therapy Interventions (OT) ADL retraining;bed mobility training;transfer training;progressive activity/exercise   Clinical Decision Making Complexity (OT) high complexity   Anticipated Equipment Needs Upon Discharge (OT)   (leg )   Risk & Benefits of therapy have been explained care plan/treatment goals reviewed;patient;daughter   OT Total Evaluation Time   OT Eval, High Complexity Minutes (66622) 10   OT Goals   Therapy Frequency (OT) Daily   OT Predicted Duration/Target Date for Goal Attainment 03/16/23   OT Goals Lower Body Dressing;Transfers   OT: Lower Body Dressing Modified independent;using adaptive equipment;within precautions   OT: Transfer Modified independent;with assistive device;within precautions  (for toilet)   Interventions   Interventions Quick Adds Self-Care/Home Management   Self-Care/Home Management   Self-Care/Home Mgmt/ADL, Compensatory, Meal Prep Minutes (33953) 15   Symptoms Noted During/After Treatment (Meal Preparation/Planning Training) increased pain   Treatment Detail/Skilled Intervention supine to sit with min A/rail/HOB elevated/leg ; Sat EOB for approx 10 mins. Stood with modAx2 down to minAx2 after 4 sit<>stands. Pt very painful and anxious about R LE. Sit to supine with SBA/rail and leg /cues. Cueing for all tasks. Educ in PLB tech for pain and to take breaks with tasks  before fatiguing. Dtr present.   OT Discharge Planning   OT Plan transfers with help!;LE dress;toileting as able;sit for g/h to start.   OT Discharge Recommendation (DC Rec) (S)  Transitional Care Facility   OT Rationale for DC Rec pt needs Ax2 for safety and is high pain and maxAx2 for ADL/func mob.   OT Brief overview of current status able to sit<>stand with mod Ax2 but no walking as of yet.   Total Session Time   Timed Code Treatment Minutes 15   Total Session Time (sum of timed and untimed services) 25

## 2023-03-11 ENCOUNTER — APPOINTMENT (OUTPATIENT)
Dept: PHYSICAL THERAPY | Facility: CLINIC | Age: 83
DRG: 857 | End: 2023-03-11
Payer: MEDICARE

## 2023-03-11 PROCEDURE — 250N000011 HC RX IP 250 OP 636: Performed by: STUDENT IN AN ORGANIZED HEALTH CARE EDUCATION/TRAINING PROGRAM

## 2023-03-11 PROCEDURE — 97530 THERAPEUTIC ACTIVITIES: CPT | Mod: GP

## 2023-03-11 PROCEDURE — 250N000013 HC RX MED GY IP 250 OP 250 PS 637: Performed by: EMERGENCY MEDICINE

## 2023-03-11 PROCEDURE — 99207 PR CDG-CUT & PASTE-POTENTIAL IMPACT ON LEVEL: CPT | Performed by: EMERGENCY MEDICINE

## 2023-03-11 PROCEDURE — 97110 THERAPEUTIC EXERCISES: CPT | Mod: GP

## 2023-03-11 PROCEDURE — 120N000001 HC R&B MED SURG/OB

## 2023-03-11 PROCEDURE — 250N000013 HC RX MED GY IP 250 OP 250 PS 637: Performed by: STUDENT IN AN ORGANIZED HEALTH CARE EDUCATION/TRAINING PROGRAM

## 2023-03-11 PROCEDURE — 99232 SBSQ HOSP IP/OBS MODERATE 35: CPT | Performed by: EMERGENCY MEDICINE

## 2023-03-11 RX ORDER — POLYETHYLENE GLYCOL 3350 17 G/17G
17 POWDER, FOR SOLUTION ORAL 2 TIMES DAILY PRN
Status: DISCONTINUED | OUTPATIENT
Start: 2023-03-11 | End: 2023-03-14 | Stop reason: HOSPADM

## 2023-03-11 RX ADMIN — ROSUVASTATIN CALCIUM 20 MG: 10 TABLET, FILM COATED ORAL at 20:22

## 2023-03-11 RX ADMIN — DOCUSATE SODIUM 100 MG: 100 CAPSULE, LIQUID FILLED ORAL at 20:25

## 2023-03-11 RX ADMIN — OXYCODONE HYDROCHLORIDE 5 MG: 5 TABLET ORAL at 05:28

## 2023-03-11 RX ADMIN — GABAPENTIN 900 MG: 300 CAPSULE ORAL at 21:00

## 2023-03-11 RX ADMIN — LEVOTHYROXINE SODIUM 112 MCG: 0.11 TABLET ORAL at 05:29

## 2023-03-11 RX ADMIN — OXYCODONE HYDROCHLORIDE 5 MG: 5 TABLET ORAL at 12:33

## 2023-03-11 RX ADMIN — ACETAMINOPHEN 650 MG: 325 TABLET ORAL at 00:43

## 2023-03-11 RX ADMIN — SENNOSIDES 8.6 MG: 8.6 TABLET, FILM COATED ORAL at 08:36

## 2023-03-11 RX ADMIN — OXYCODONE HYDROCHLORIDE 5 MG: 5 TABLET ORAL at 20:50

## 2023-03-11 RX ADMIN — SENNOSIDES 8.6 MG: 8.6 TABLET, FILM COATED ORAL at 20:22

## 2023-03-11 RX ADMIN — POLYETHYLENE GLYCOL 3350 17 G: 17 POWDER, FOR SOLUTION ORAL at 20:51

## 2023-03-11 RX ADMIN — ACETAMINOPHEN 650 MG: 325 TABLET ORAL at 08:35

## 2023-03-11 RX ADMIN — RIVAROXABAN 20 MG: 10 TABLET, FILM COATED ORAL at 16:45

## 2023-03-11 RX ADMIN — Medication 800 MCG: at 08:36

## 2023-03-11 RX ADMIN — MAGNESIUM OXIDE TAB 400 MG (241.3 MG ELEMENTAL MG) 400 MG: 400 (241.3 MG) TAB at 08:36

## 2023-03-11 RX ADMIN — LOSARTAN POTASSIUM 50 MG: 50 TABLET, FILM COATED ORAL at 08:36

## 2023-03-11 RX ADMIN — HYDROXYZINE HYDROCHLORIDE 10 MG: 10 TABLET ORAL at 08:36

## 2023-03-11 RX ADMIN — HYDROXYZINE HYDROCHLORIDE 10 MG: 10 TABLET ORAL at 14:51

## 2023-03-11 RX ADMIN — OXYCODONE HYDROCHLORIDE 5 MG: 5 TABLET ORAL at 16:45

## 2023-03-11 RX ADMIN — DOCUSATE SODIUM 100 MG: 100 CAPSULE, LIQUID FILLED ORAL at 08:36

## 2023-03-11 RX ADMIN — CEFTRIAXONE SODIUM 2 G: 2 INJECTION, POWDER, FOR SOLUTION INTRAMUSCULAR; INTRAVENOUS at 14:50

## 2023-03-11 RX ADMIN — Medication 1 MG: at 00:43

## 2023-03-11 RX ADMIN — ACETAMINOPHEN 650 MG: 325 TABLET ORAL at 20:50

## 2023-03-11 RX ADMIN — OXYCODONE HYDROCHLORIDE 5 MG: 5 TABLET ORAL at 01:28

## 2023-03-11 RX ADMIN — ACETAMINOPHEN 650 MG: 325 TABLET ORAL at 14:50

## 2023-03-11 RX ADMIN — HYDROXYZINE HYDROCHLORIDE 10 MG: 10 TABLET ORAL at 20:23

## 2023-03-11 RX ADMIN — POLYETHYLENE GLYCOL 3350 17 G: 17 POWDER, FOR SOLUTION ORAL at 08:36

## 2023-03-11 RX ADMIN — LOSARTAN POTASSIUM 50 MG: 50 TABLET, FILM COATED ORAL at 20:23

## 2023-03-11 ASSESSMENT — ACTIVITIES OF DAILY LIVING (ADL)
ADLS_ACUITY_SCORE: 38

## 2023-03-11 NOTE — PLAN OF CARE
Goal Outcome Evaluation:      Problem: Pain Acute  Goal: Optimal Pain Control and Function  Intervention: Prevent or Manage Pain  Recent Flowsheet Documentation  Taken 3/11/2023 0019 by Beulah Oreilly RN  Sensory Stimulation Regulation:    television on    care clustered  Medication Review/Management: medications reviewed   Patient is alert & oriented x4. Reports pain in R knee with movement. Pain managed with PRN Tylenol, Oxycodone, rest/ice. VSS. Using CPAP over night. Saline locked. Pure wick in place. Assist of 2.  RLE shin/calf is swollen and red. Discharge pending abx and TCU placement.

## 2023-03-11 NOTE — PLAN OF CARE
"  Problem: Plan of Care - These are the overarching goals to be used throughout the patient stay.    Goal: Absence of Hospital-Acquired Illness or Injury  Intervention: Prevent and Manage VTE (Venous Thromboembolism) Risk    Problem: Plan of Care - These are the overarching goals to be used throughout the patient stay.    Goal: Optimal Comfort and Wellbeing  Intervention: Monitor Pain and Promote Comfort       Pt A&Ox4. Arrived from the ED onto the floor around 1700. Pain was managed with rest, emotional support and repositioning. RLE swollen, red, hot and glossy in appearance.  limited ROM. Aquacel bandage intact. Pt states that she will be \"unable to stand until tomorrow, I already stood 3 times today\". Continues with bedrest, RLE Elevated onto pillows. Purewick in place.                     "

## 2023-03-11 NOTE — PLAN OF CARE
Problem: Fall Injury Risk  Goal: Absence of Fall and Fall-Related Injury  Outcome: Progressing   Goal Outcome Evaluation:       Vss on room air.  Pain intermittent when pt. Gets up, otherwise pain free when not moving.  Purewick while in bed.  Tolerating diet, good urine output.  RLE red and very warm to touch.  RLE elevated on pillows.  Bed alarm activated and use of call light reinforced for safety.

## 2023-03-11 NOTE — PROGRESS NOTES
Orthopedic Progress Note      Assessment:    4 days post op from right knee TKA    Plan:   - Continue PT/OT  - Weightbearing status: WBAT  - Antibiotics per medicine for right leg cellulitis   - Anticoagulation: Xarelto in addition to SCDs, janay stockings and early ambulation.  - Discharge planning: TCU placement      Subjective:  Pain: mild  Chest pain, SOB: no  Nausea, Vomiting:  no  Lightheadedness, Dizziness:  no  Neuro:  Patient denies new onset numbness or paresthesias    Patient is doing well today, notes that her pain is controlled. No increasing pain or other Ortho concerns. Saw patient with medicine doctor who notes somewhat improved cellulitis at [facility].    Objective:  /59   Pulse 50   Temp 97.7  F (36.5  C) (Oral)   Resp 16   Wt 112.5 kg (248 lb)   SpO2 95%   BMI 41.27 kg/m    The patient is A&Ox3. Appears comfortable, sitting up at bedside.  Sensation is intact to light touch. Hyposensitive at area of cellulitis.  Calves are soft and non-tender.  Dorsiflexion and plantar flexion is intact bilaterally.  Appropriate flexion and extension of the toes bilaterally.   Brisk capillary refill in the toes bilaterally.   Palpable right dorsalis pedis pulse.  right knee dressing C/D/I.   There is moderate erythema to the right anterior shin with some swelling.    Pertinent Labs   Lab Results: personally reviewed.   No results found for: INR, PROTIME  Lab Results   Component Value Date    WBC 7.9 03/10/2023    HGB 11.0 (L) 03/10/2023    HCT 33.2 (L) 03/10/2023    MCV 84 03/10/2023     03/10/2023     Lab Results   Component Value Date     03/10/2023    CO2 26 03/10/2023         Report completed by:  Sandra Mason PA-C  [facility]

## 2023-03-11 NOTE — PROGRESS NOTES
Care Management Follow Up    Length of Stay (days): 2    Expected Discharge Date: 03/13/2023     Concerns to be Addressed: discharge planning, home safety  Lives alone  Patient plan of care discussed at interdisciplinary rounds: Yes    Anticipated Discharge Disposition: Skilled Nursing Facility, Transitional Care     Anticipated Discharge Services: None  Anticipated Discharge DME: Other (see comment) (Pending clinical progress)   Patient/Family in Agreement with the Plan: yes      Additional Information:  CM met with Pt and family as requested. Dtr Blanca had questions about discharge and Placements. Dtr stated that the Pt is not to discharge with IVs. CM stated that that would be up to the providers and stated that it is possible to discharge to TCU on IVs and was able to explain what diffident levels of care are at a TCU. Family understanding. The TCU that is requested who is considering is unable to take IVs, however needs are still pending at this time for discharge. CM to continue to work on placement.       LIT Humphries

## 2023-03-11 NOTE — PLAN OF CARE
Goal Outcome Evaluation:  Pt's redness and edema are slightly improved according to two staff who also evaluated the site yesterday. Pt continues to have significant pain, needing Oxy Q 4 hours. Pt is alert X 4 and able to make needs known. Pt is a heavy assist X 2 or 3 staff. Pt attempted to have a bowel movement but does not feel as though she was able to empty her bowels (taking Oxy). Pt requested a suppository, no order for one at this time. Sticky note left for the MD.   Also in the sticky note, pt has a long Hx of lymphedema. She uses lymphedema stockings as well as a compression device at home. Pt would like her edema evaluated & treated (at least the left side, opposite of the infection). Pt has been elevating her RLE and using ice as well.

## 2023-03-11 NOTE — PROGRESS NOTES
[facility] MEDICINE PROGRESS NOTE      Summary: 83 year old female admitted 3/9/2023 to [facility] who is post op day number   4 after a right TKA.  She was discharged home though she developed a moderate   Amount of pain, swelling and redness of the lower leg.   It took 3 people to get her out of bed.    On admission she has a small amount of redness   In the lower extremity consistent with a post op infection. She was on keflex though   Has been changed to rocephin.  Overnight she is afebrile. Pending is TCU admission.    Problem list:    1.  POD #4 right TKA: per [facility]; change oxycodone to 2.5-5 mg and add   atarax  2.  RLE cellulitis: day number 2 rocephin; continue again today; slightly improved;   3.  History of atrial fibrillation; continue eliquis;   4.  Dyslipidemia: statins  5.  Hypothyroidism: synthroid  6. HTN:  Losartan  7.  dvt prevention  8.  Disposition:  TCU pending      Dee Reeves MD  [facility] Medicine  [facility]  Phone: #[phone]    Securely message with the Vocera Web Console (learn more here)  Text page via App[employer]ero Paging/Directory     Interval History/Subjective: stable overnight; pain not well controlled      Physical Exam/Objective:  Temp:  [97.7  F (36.5  C)-98.7  F (37.1  C)] 97.7  F (36.5  C)  Pulse:  [50-81] 50  Resp:  [16] 16  BP: (125-133)/(59-67) 125/59  SpO2:  [95 %-99 %] 95 %  Body mass index is 41.27 kg/m .    GENERAL:  Alert, appears comfortable, in no acute distress, appears stated age   HEAD:  Normocephalic, without obvious abnormality, atraumatic   EYES:  PERRL, conjunctiva/corneas clear, no scleral icterus, EOM's intact   NOSE: Nares normal, septum midline, mucosa normal, no drainage   THROAT: Lips, mucosa, and tongue normal; teeth and gums normal, mouth moist   NECK: Supple, symmetrical, trachea midline   BACK:   Symmetric, no curvature, ROM normal   LUNGS:   Clear to auscultation bilaterally,  no rales, rhonchi, or wheezing, symmetric chest rise on inhalation, respirations unlabored   CHEST WALL:  No tenderness or deformity   HEART:  Regular rate and rhythm, S1 and S2 normal, no murmur, rub, or gallop    ABDOMEN:   Soft, non-tender, bowel sounds active all four quadrants, no masses, no organomegaly, no rebound or guarding   EXTREMITIES: RLE with some redness mid leg; mild edema; no peeling of skin or openings;   Foot is not red   SKIN Dry to touch, no exanthems in the visualized areas   NEURO: Alert, oriented x3, moves all four extremities freely   PSYCH: Cooperative, behavior is appropriate      Data reviewed today: I personally reviewed all new medications, labs, imaging/diagnostics reports over the past 24 hours. Pertinent findings include:    Imaging:   No results found for this or any previous visit (from the past 24 hour(s)).    Labs:  Most Recent 3 BMP's:  Recent Labs   Lab Test 03/10/23  0808 03/09/23  1207 03/07/23  0621 02/22/23  1551    138  --  143   POTASSIUM 4.2 4.4  --  4.2   CHLORIDE 105 104  --  104   CO2 26 25  --  25   BUN 15 15  --  16.7   CR 0.56* 0.64  --  0.69   ANIONGAP 8 9  --  14   DAMARIS 8.7 9.1  --  9.7   GLC 90 95 94 103*       Medications:   Personally Reviewed.  Medications     - MEDICATION INSTRUCTIONS -         cefTRIAXone  2 g Intravenous Q24H     docusate sodium  100 mg Oral BID     folic acid  800 mcg Oral Daily     gabapentin  900 mg Oral At Bedtime     hydrOXYzine  10 mg Oral TID     levothyroxine  112 mcg Oral Daily     losartan  50 mg Oral BID     magnesium oxide  400 mg Oral Daily     rivaroxaban ANTICOAGULANT  20 mg Oral Daily with supper     rosuvastatin  20 mg Oral QPM     sennosides  8.6 mg Oral BID     sodium chloride (PF)  3 mL Intracatheter Q8H

## 2023-03-12 ENCOUNTER — APPOINTMENT (OUTPATIENT)
Dept: PHYSICAL THERAPY | Facility: CLINIC | Age: 83
DRG: 857 | End: 2023-03-12
Payer: MEDICARE

## 2023-03-12 PROBLEM — D64.9 NORMOCYTIC ANEMIA: Status: ACTIVE | Noted: 2023-03-12

## 2023-03-12 LAB — HOLD SPECIMEN: NORMAL

## 2023-03-12 PROCEDURE — 99232 SBSQ HOSP IP/OBS MODERATE 35: CPT | Performed by: HOSPITALIST

## 2023-03-12 PROCEDURE — 250N000013 HC RX MED GY IP 250 OP 250 PS 637: Performed by: STUDENT IN AN ORGANIZED HEALTH CARE EDUCATION/TRAINING PROGRAM

## 2023-03-12 PROCEDURE — 250N000013 HC RX MED GY IP 250 OP 250 PS 637: Performed by: EMERGENCY MEDICINE

## 2023-03-12 PROCEDURE — 120N000001 HC R&B MED SURG/OB

## 2023-03-12 PROCEDURE — 94660 CPAP INITIATION&MGMT: CPT

## 2023-03-12 PROCEDURE — 97110 THERAPEUTIC EXERCISES: CPT | Mod: GP

## 2023-03-12 PROCEDURE — 250N000013 HC RX MED GY IP 250 OP 250 PS 637: Performed by: HOSPITALIST

## 2023-03-12 PROCEDURE — 97530 THERAPEUTIC ACTIVITIES: CPT | Mod: GP

## 2023-03-12 PROCEDURE — 999N000157 HC STATISTIC RCP TIME EA 10 MIN

## 2023-03-12 RX ORDER — CEFDINIR 300 MG/1
300 CAPSULE ORAL EVERY 12 HOURS SCHEDULED
Status: DISCONTINUED | OUTPATIENT
Start: 2023-03-12 | End: 2023-03-14

## 2023-03-12 RX ADMIN — LOSARTAN POTASSIUM 50 MG: 50 TABLET, FILM COATED ORAL at 20:49

## 2023-03-12 RX ADMIN — CEFDINIR 300 MG: 300 CAPSULE ORAL at 20:51

## 2023-03-12 RX ADMIN — ACETAMINOPHEN 650 MG: 325 TABLET ORAL at 08:34

## 2023-03-12 RX ADMIN — DOCUSATE SODIUM 100 MG: 100 CAPSULE, LIQUID FILLED ORAL at 20:49

## 2023-03-12 RX ADMIN — RIVAROXABAN 20 MG: 10 TABLET, FILM COATED ORAL at 18:27

## 2023-03-12 RX ADMIN — OXYCODONE HYDROCHLORIDE 5 MG: 5 TABLET ORAL at 05:48

## 2023-03-12 RX ADMIN — DOCUSATE SODIUM 100 MG: 100 CAPSULE, LIQUID FILLED ORAL at 08:35

## 2023-03-12 RX ADMIN — HYDROXYZINE HYDROCHLORIDE 10 MG: 10 TABLET ORAL at 20:52

## 2023-03-12 RX ADMIN — ROSUVASTATIN CALCIUM 20 MG: 10 TABLET, FILM COATED ORAL at 20:49

## 2023-03-12 RX ADMIN — GLYCERIN 1 SUPPOSITORY: 2 SUPPOSITORY RECTAL at 10:43

## 2023-03-12 RX ADMIN — OXYCODONE HYDROCHLORIDE 5 MG: 5 TABLET ORAL at 00:49

## 2023-03-12 RX ADMIN — HYDROXYZINE HYDROCHLORIDE 10 MG: 10 TABLET ORAL at 08:35

## 2023-03-12 RX ADMIN — SENNOSIDES 8.6 MG: 8.6 TABLET, FILM COATED ORAL at 08:36

## 2023-03-12 RX ADMIN — POLYETHYLENE GLYCOL 3350 17 G: 17 POWDER, FOR SOLUTION ORAL at 05:55

## 2023-03-12 RX ADMIN — OXYCODONE HYDROCHLORIDE 5 MG: 5 TABLET ORAL at 18:27

## 2023-03-12 RX ADMIN — OXYCODONE HYDROCHLORIDE 5 MG: 5 TABLET ORAL at 10:44

## 2023-03-12 RX ADMIN — POLYETHYLENE GLYCOL 3350 17 G: 17 POWDER, FOR SOLUTION ORAL at 14:22

## 2023-03-12 RX ADMIN — CEFDINIR 300 MG: 300 CAPSULE ORAL at 10:36

## 2023-03-12 RX ADMIN — LEVOTHYROXINE SODIUM 112 MCG: 0.11 TABLET ORAL at 05:48

## 2023-03-12 RX ADMIN — ACETAMINOPHEN 650 MG: 325 TABLET ORAL at 14:22

## 2023-03-12 RX ADMIN — LOSARTAN POTASSIUM 50 MG: 50 TABLET, FILM COATED ORAL at 08:36

## 2023-03-12 RX ADMIN — HYDROXYZINE HYDROCHLORIDE 10 MG: 10 TABLET ORAL at 14:11

## 2023-03-12 RX ADMIN — SENNOSIDES 8.6 MG: 8.6 TABLET, FILM COATED ORAL at 20:48

## 2023-03-12 RX ADMIN — MAGNESIUM OXIDE TAB 400 MG (241.3 MG ELEMENTAL MG) 400 MG: 400 (241.3 MG) TAB at 08:35

## 2023-03-12 RX ADMIN — Medication 800 MCG: at 08:36

## 2023-03-12 RX ADMIN — GABAPENTIN 900 MG: 300 CAPSULE ORAL at 20:50

## 2023-03-12 ASSESSMENT — ACTIVITIES OF DAILY LIVING (ADL)
ADLS_ACUITY_SCORE: 38
ADLS_ACUITY_SCORE: 42
ADLS_ACUITY_SCORE: 38
ADLS_ACUITY_SCORE: 42
ADLS_ACUITY_SCORE: 38
ADLS_ACUITY_SCORE: 42

## 2023-03-12 NOTE — PLAN OF CARE
Goal Outcome Evaluation:    Problem: Plan of Care - These are the overarching goals to be used throughout the patient stay.    Goal: Optimal Comfort and Wellbeing  Outcome: Progressing  Intervention: Monitor Pain and Promote Comfort  Recent Flowsheet Documentation  Taken 3/12/2023 0049 by Beulah Oreilly, RN  Pain Management Interventions:    medication (see MAR)    care clustered    cold applied    distraction    emotional support    pillow support provided    rest     Problem: Plan of Care - These are the overarching goals to be used throughout the patient stay.    Goal: Readiness for Transition of Care  Outcome: Progressing     Patient is A&Ox4. Pt reports pain is high with movement. PRN Oxycodone given Q4hr PRN. Voiding with purewick in place. Saline locked. Ax2-3. VSS. Pt reports constipation. There is a sticky note to providers for suppository, PRN Miralax & prune juice given to aid BM. Discharge pending IV abx and TCU placement.

## 2023-03-12 NOTE — PROGRESS NOTES
Ortho Progress Note      Post-operative Day:    S/P       Subjective:  No acute events overnight  Pain: Improved  Chest pain, SOB:  No  Nausea, vomiting:  No  Lightheadedness, dizziness:  No  Neuro:  Patient denies new onset numbness or paresthesias    Objective:  BP (!) 141/66 (BP Location: Right arm)   Pulse 63   Temp 98.4  F (36.9  C) (Oral)   Resp 16   Wt 112.5 kg (248 lb)   SpO2 91%   BMI 41.27 kg/m    Gen: A&O x 3. NAD.  Slightly improved erythema right leg, dressing clean and dry. Wiggles toes.    Pertinent Labs   Lab Results: personally reviewed.   No results found for: INR, PROTIME  Lab Results   Component Value Date    WBC 7.9 03/10/2023    HGB 11.0 (L) 03/10/2023    HCT 33.2 (L) 03/10/2023    MCV 84 03/10/2023     03/10/2023     Lab Results   Component Value Date     03/10/2023    CO2 26 03/10/2023       Plan:   - Continue PT/OT  - Weightbearing status: WBAT  - Antibiotics per medicine for right leg cellulitis and transition to oral antibiotics once back to TCU  - Anticoagulation: Xarelto in addition to SCDs, janay stockings and early ambulation.  - Discharge planning: awaiting TCU placement    Report completed by:  Carmelo Webb MD  Date: 3/12/2023  Time: 9:01 AM

## 2023-03-12 NOTE — PROGRESS NOTES
[facility] MEDICINE PROGRESS NOTE      Identification/Summary: Debra Manriquez is a 83 year old female with a past medical history of  has a past medical history of Antiplatelet or antithrombotic long-term use, Chronic acquired lymphedema, Edema, Falls, Fibrocystic breast disease, Fibrocystic breast disease, Foot pain, Gastroesophageal reflux disease, GERD (gastroesophageal reflux disease), Hyperlipidemia, Hypertension, Irregular heart beat, Lymphedema, Mitral valve disorder, Obese, JADYN (obstructive sleep apnea), Osteoarthritis of knee, Osteopenia, Skin cancer, Skin cancer, basal cell, Sleep apnea, Thyroid nodule, Thyroid nodule, and Vitamin B deficiency. who was admitted on 3/9/2023 for chief complaint of leg pain after surgery.    3/7 - admitted through 3/8 for TKA and was discharged home. Her functional status declined at home  3/9 - comes to  ED, diagnosed cellulitis of right leg, treated with ceftriaxone. U/S right leg negative for DVT. orthopedics consulted  3/10 -leg pain poorly controlled, PT/OT recommended TCU, Hb 11 from baseline 12.3  3/11 -pain control improved, constipation treatment ordered, transition to cefdinir PO, bordered cellulitis    Problem List  Principal Problem:    Cellulitis of right lower extremity (3/9/2023)  Active Problems:    Leg swelling (11/2/2015)    Venous stasis (11/2/2015)    Essential hypertension (6/17/2020)    Hyperlipidemia (6/17/2020)    Atrial tachycardia, paroxysmal (H) (8/25/2021)    Recent surgical procedure on lower extremity (3/9/2023)    Paroxysmal atrial fibrillation (H) (3/9/2023)    Afebrile, hemodynamically stable.  Denies melena or blood loss. Voiding with purewick.  No chills overnight last night. Denies melena    NAD, ice pack on right knee, no weeping noted from incision. RRR faint systolic murmur.  Minimally tender noncircumferential erythema distal to surgical site on anterior right leg, warmth is minimal    Order stockings (per  "home), diet with caffeine ordered, glycerin suppository ordered, switch IV ceftriaxone to p.o. cefdinir given similar antimicrobial profile.  Asked RN to draw border around erythema, will clinically monitor for response antibiotics tomorrow    Discharge comments: TCU when available  Code Status: Full Code    Body mass index is 41.27 kg/m .  Wt Readings from Last 5 Encounters:   03/09/23 112.5 kg (248 lb)   03/07/23 118.5 kg (261 lb 3.9 oz)   02/13/23 112.9 kg (249 lb)   01/25/23 113.5 kg (250 lb 3.2 oz)   12/07/22 113.4 kg (250 lb)       Diet: Combination Diet Low Saturated Fat Na <2400mg Diet, No Caffeine Diet  DVT Prophylaxis:  DOAC  Anticoagulant Medications     Direct Factor Xa Inhibitors Refills Start End     rivaroxaban ANTICOAGULANT (XARELTO) 20 MG TABS tablet    0 3/8/2023     Sig - Route: Take 1 tablet (20 mg) by mouth daily (with dinner) - Oral    Class: E-Prescribe    Renewals     Renewal requests to authorizing provider (Selina Horton PA-C) <b>prohibited</b>                 Micah Loya MD, MPH  Hospitalist,[facility]: [facility]  Phone: #[phone]    Medications:   Personally Reviewed.  Medications     - MEDICATION INSTRUCTIONS -         cefTRIAXone  2 g Intravenous Q24H     docusate sodium  100 mg Oral BID     folic acid  800 mcg Oral Daily     gabapentin  900 mg Oral At Bedtime     hydrOXYzine  10 mg Oral TID     levothyroxine  112 mcg Oral Daily     losartan  50 mg Oral BID     magnesium oxide  400 mg Oral Daily     rivaroxaban ANTICOAGULANT  20 mg Oral Daily with supper     rosuvastatin  20 mg Oral QPM     sennosides  8.6 mg Oral BID     sodium chloride (PF)  3 mL Intracatheter Q8H       Clinically Significant Risk Factors                        # Severe Obesity: Estimated body mass index is 41.27 kg/m  as calculated from the following:    Height as of 3/7/23: 1.651 m (5' 5\").    Weight as of this encounter: 112.5 kg (248 lb)., PRESENT ON ADMISSION     "

## 2023-03-12 NOTE — PLAN OF CARE
"Goal Outcome Evaluation:  Pt is alert & pleasant & able to make needs known. Her cellulitis in the right lower leg appears to be decreasing. I outlined the reddened area as per MD instructions & put a date & time by the line tin order o validate if in fact it is decreasing. Pt c/o constipation. She's been taking Oxycodone for Right knee & lower leg pain (R TKA 3/07). Pt did have a \"medium/soft\" bowel movement as noted by the nursing assistant who helped clean her. Pt states she still feels constipated, yet not as bad as before. Pt has been changed from IV to PO antibiotics for the RLE cellulitis.     Pt prefers that cares be clustered as much as possible, especially overnight                          "

## 2023-03-12 NOTE — PROGRESS NOTES
Care Management Follow Up    Length of Stay (days): 3    Expected Discharge Date: 03/13/2023     Concerns to be Addressed: discharge planning, home safety  Lives alone  Patient plan of care discussed at interdisciplinary rounds: Yes    Anticipated Discharge Disposition: Skilled Nursing Facility, Transitional Care     Anticipated Discharge Services: None  Anticipated Discharge DME: Other (see comment) (Pending clinical progress)    Patient/family educated on Medicare website which has current facility and service quality ratings:  yes  Education Provided on the Discharge Plan:    Patient/Family in Agreement with the Plan: yes    Referrals Placed by CM/SW: TCU    Private pay costs discussed: Not applicable    Additional Information:  It is recommended that patient go to TCU on discharge from the hospital. She and her family are agreeable to this. Referrals have been sent and are pending with:    [facility]      Parvin Whaley, REJISW

## 2023-03-12 NOTE — PLAN OF CARE
Problem: Plan of Care - These are the overarching goals to be used throughout the patient stay.    Goal: Optimal Comfort and Wellbeing  Intervention: Monitor Pain and Promote Comfort  Recent Flowsheet Documentation  Taken 3/11/2023 2210 by Marianela Jorge, RN  Pain Management Interventions:   medication (see MAR)   cold applied   rest  Taken 3/11/2023 1640 by Marianela Jorge, RN  Pain Management Interventions: medication (see MAR)   Goal Outcome Evaluation:    A/Ox4. PRN oxycodone and scheduled pain meds effective for pain relief; pt states pain increases significantly with any activity. VSS. Discharge pending medical improvement and TCU placement.

## 2023-03-13 ENCOUNTER — APPOINTMENT (OUTPATIENT)
Dept: PHYSICAL THERAPY | Facility: CLINIC | Age: 83
DRG: 857 | End: 2023-03-13
Payer: MEDICARE

## 2023-03-13 ENCOUNTER — APPOINTMENT (OUTPATIENT)
Dept: OCCUPATIONAL THERAPY | Facility: CLINIC | Age: 83
DRG: 857 | End: 2023-03-13
Payer: MEDICARE

## 2023-03-13 PROCEDURE — 97530 THERAPEUTIC ACTIVITIES: CPT | Mod: GP

## 2023-03-13 PROCEDURE — 94660 CPAP INITIATION&MGMT: CPT

## 2023-03-13 PROCEDURE — 99232 SBSQ HOSP IP/OBS MODERATE 35: CPT | Performed by: HOSPITALIST

## 2023-03-13 PROCEDURE — 97110 THERAPEUTIC EXERCISES: CPT | Mod: GP

## 2023-03-13 PROCEDURE — 97535 SELF CARE MNGMENT TRAINING: CPT | Mod: GP

## 2023-03-13 PROCEDURE — 120N000001 HC R&B MED SURG/OB

## 2023-03-13 PROCEDURE — 250N000013 HC RX MED GY IP 250 OP 250 PS 637: Performed by: STUDENT IN AN ORGANIZED HEALTH CARE EDUCATION/TRAINING PROGRAM

## 2023-03-13 PROCEDURE — 97535 SELF CARE MNGMENT TRAINING: CPT | Mod: GO

## 2023-03-13 PROCEDURE — 250N000013 HC RX MED GY IP 250 OP 250 PS 637: Performed by: EMERGENCY MEDICINE

## 2023-03-13 PROCEDURE — 250N000013 HC RX MED GY IP 250 OP 250 PS 637: Performed by: HOSPITALIST

## 2023-03-13 PROCEDURE — 999N000157 HC STATISTIC RCP TIME EA 10 MIN

## 2023-03-13 RX ADMIN — ACETAMINOPHEN 650 MG: 325 TABLET ORAL at 05:58

## 2023-03-13 RX ADMIN — LEVOTHYROXINE SODIUM 112 MCG: 0.11 TABLET ORAL at 05:44

## 2023-03-13 RX ADMIN — Medication 800 MCG: at 10:14

## 2023-03-13 RX ADMIN — CEFDINIR 300 MG: 300 CAPSULE ORAL at 10:17

## 2023-03-13 RX ADMIN — ACETAMINOPHEN 650 MG: 325 TABLET ORAL at 21:13

## 2023-03-13 RX ADMIN — LOSARTAN POTASSIUM 50 MG: 50 TABLET, FILM COATED ORAL at 10:15

## 2023-03-13 RX ADMIN — RIVAROXABAN 20 MG: 10 TABLET, FILM COATED ORAL at 18:01

## 2023-03-13 RX ADMIN — CEFDINIR 300 MG: 300 CAPSULE ORAL at 21:29

## 2023-03-13 RX ADMIN — ACETAMINOPHEN 650 MG: 325 TABLET ORAL at 13:16

## 2023-03-13 RX ADMIN — MAGNESIUM OXIDE TAB 400 MG (241.3 MG ELEMENTAL MG) 400 MG: 400 (241.3 MG) TAB at 10:16

## 2023-03-13 RX ADMIN — HYDROXYZINE HYDROCHLORIDE 10 MG: 10 TABLET ORAL at 21:14

## 2023-03-13 RX ADMIN — ROSUVASTATIN CALCIUM 20 MG: 10 TABLET, FILM COATED ORAL at 21:13

## 2023-03-13 RX ADMIN — HYDROXYZINE HYDROCHLORIDE 10 MG: 10 TABLET ORAL at 13:08

## 2023-03-13 RX ADMIN — DOCUSATE SODIUM 100 MG: 100 CAPSULE, LIQUID FILLED ORAL at 10:16

## 2023-03-13 RX ADMIN — GABAPENTIN 900 MG: 300 CAPSULE ORAL at 21:13

## 2023-03-13 RX ADMIN — SENNOSIDES 8.6 MG: 8.6 TABLET, FILM COATED ORAL at 10:15

## 2023-03-13 RX ADMIN — LOSARTAN POTASSIUM 50 MG: 50 TABLET, FILM COATED ORAL at 21:13

## 2023-03-13 RX ADMIN — HYDROXYZINE HYDROCHLORIDE 10 MG: 10 TABLET ORAL at 06:02

## 2023-03-13 ASSESSMENT — ACTIVITIES OF DAILY LIVING (ADL)
ADLS_ACUITY_SCORE: 42
ADLS_ACUITY_SCORE: 38
ADLS_ACUITY_SCORE: 44
ADLS_ACUITY_SCORE: 38
ADLS_ACUITY_SCORE: 42
ADLS_ACUITY_SCORE: 38

## 2023-03-13 NOTE — PLAN OF CARE
Problem: Plan of Care - These are the overarching goals to be used throughout the patient stay.    Goal: Optimal Comfort and Wellbeing  Outcome: Progressing  Intervention: Monitor Pain and Promote Comfort  Flowsheets  Taken 3/12/2023 2202  Pain Management Interventions:   medication (see MAR)   repositioned   rest   cold applied  Taken 3/12/2023 1827  Pain Management Interventions: medication (see MAR)   Goal Outcome Evaluation:    A/ox4. Oxycodone administered x 1 for 9/10 pain. VSS. Pt had small BM this shift. Pt educated on importance of weight shifting and repositioning while in bed to prevent skin injury; was agreeable. Discharge pending TCU placement.

## 2023-03-13 NOTE — PROGRESS NOTES
"Goal Outcome Evaluation:  Pt's redness (RLE) has receded from the line I outlined yesterday((cellulitis). Pt taking PO antibiotics. Pt had been complaining of constipation since approximately last Friday. Pt had several large stools this morning; some of which were very loose. Pt reportedly drank 6' cups of prune juice prior to 0700 hours. Pt's abdominal pain & discomfort are nearly resolved at this time. Pt has been up out of bed with therapy today and used the commode. After spending approximately 30 minutes in the chair, pt requested to be asisted back to bed. Linda Steady device was used to help pt get upright for transfers. Per pt & family request pt placed in Trendelenburg position to help facilitate blood & fluid to drain from the lower legs. Pt states that it \"feels good\". Denies further needs at this time  "

## 2023-03-13 NOTE — PLAN OF CARE
Goal Outcome Evaluation:      Problem: Plan of Care - These are the overarching goals to be used throughout the patient stay.    Goal: Optimal Comfort and Wellbeing  Outcome: Progressing  Intervention: Monitor Pain and Promote Comfort  Recent Flowsheet Documentation  Taken 3/12/2023 2333 by Beulah Oreilly, RN  Pain Management Interventions:    care clustered    emotional support    medication offered but refused     Problem: Skin or Soft Tissue Infection  Goal: Absence of Infection Signs and Symptoms  Outcome: Progressing   Pt is A&Ox4. Patient rates pain 6/10 in RLE knee. Voiding with purwick. Saline locked. VSS. TCU placement pending.

## 2023-03-13 NOTE — PROGRESS NOTES
Orthopedic Progress Note      Assessment:    5 days post op right TKA    Plan:   - Continue PT/OT  - Weightbearing status: WBAT  - Antibiotics per medicine for right leg cellulitis   - Anticoagulation: Xarelto in addition to SCDs, janay stockings and early ambulation.  - Discharge planning: TCU placement  - history of lymphedema, place lymph consult      Subjective:  Pain: mild   Chest pain, SOB: no  Nausea, Vomiting:  no  Lightheadedness, Dizziness:  no  Neuro:  Patient denies new onset numbness or paresthesias    Patient is doing well today, notes that her pain is controlled. No increasing pain or other Ortho concerns. Saw patient with medicine doctor who notes somewhat improved cellulitis at [facility].    Objective:  BP (!) 146/65 (BP Location: Right arm, Patient Position: Chair, Cuff Size: Adult Regular)   Pulse 74   Temp 97.3  F (36.3  C) (Oral)   Resp 16   Wt 112.5 kg (248 lb)   SpO2 94%   BMI 41.27 kg/m    The patient is A&Ox3. Appears comfortable, sitting up at bedside.  Sensation is intact to light touch. Hyposensitive at area of cellulitis.  Calves are soft and non-tender.  Dorsiflexion and plantar flexion is intact bilaterally.  Appropriate flexion and extension of the toes bilaterally.   Brisk capillary refill in the toes bilaterally.   Palpable right dorsalis pedis pulse.  right knee dressing C/D/I.   There is moderate erythema to the right anterior shin with some swelling.    Pertinent Labs   Lab Results: personally reviewed.   No results found for: INR, PROTIME  Lab Results   Component Value Date    WBC 7.9 03/10/2023    HGB 11.0 (L) 03/10/2023    HCT 33.2 (L) 03/10/2023    MCV 84 03/10/2023     03/10/2023     Lab Results   Component Value Date     03/10/2023    CO2 26 03/10/2023         Report completed by:  Selina Horton PA-C/[facility]    Date: 3/13/2023  Time: 9:05 AM

## 2023-03-13 NOTE — PLAN OF CARE
Goal Outcome Evaluation:  Pt's redness (RLE) has receded from the line I drew yesterday((cellulitis). Pt taking PO antibiotics. Pt had been complaining of constipation since approximately last Friday. Pt had several large stools this morning; some of which were very loose. Pt reportedly drank 6' cups of prune juice prior to 0700 hours. Pt's abdominal pain & discomfort are nearly resolved at this time. Pt has been up out of bed with therapy today and used the commode. After spending approximately 30 minutes if the chair pt wanted to return to the bed. Pt continues to be a heavy assist X 2 at this time. She is hopeful that tomorrow she might be upgraded to a 1 assist which would open up more options for TCU placement.

## 2023-03-13 NOTE — PROGRESS NOTES
[facility] MEDICINE PROGRESS NOTE      Identification/Summary: Debra Manriquez is a 83 year old female with a past medical history of  has a past medical history of Antiplatelet or antithrombotic long-term use, Chronic acquired lymphedema, Edema, Falls, Fibrocystic breast disease, Fibrocystic breast disease, Foot pain, Gastroesophageal reflux disease, GERD (gastroesophageal reflux disease), Hyperlipidemia, Hypertension, Irregular heart beat, Lymphedema, Mitral valve disorder, Obese, JADYN (obstructive sleep apnea), Osteoarthritis of knee, Osteopenia, Skin cancer, Skin cancer, basal cell, Sleep apnea, Thyroid nodule, Thyroid nodule, and Vitamin B deficiency. who was admitted on 3/9/2023 for chief complaint of leg pain after surgery.    3/7 - admitted through 3/8 for TKA and was discharged home. Her functional status declined at home  3/9 - comes to  ED, diagnosed cellulitis of right leg, treated with ceftriaxone. U/S right leg negative for DVT. orthopedics consulted  3/10 -leg pain poorly controlled, PT/OT recommended TCU, Hb 11 from baseline 12.3  3/11 - ceftriaxone continued  3/12  - pain control improved, transition to cefdinir PO, bordered cellulitis. Given caffeine and glycerine suppository for constipation  3/13 - cellulitis shrinking, pt having loose BMs. Ready for discharge medically, awaiting placement    Problem List  Principal Problem:    Cellulitis of right lower extremity  Active Problems:    Leg swelling    Venous stasis    Essential hypertension    Hyperlipidemia    Atrial tachycardia, paroxysmal (H)    Paroxysmal atrial fibrillation (H)    Normocytic anemia    Hypertensive 140's - 150's, afebrile, AVEL. Reports leg is feeling and looking better. Having loose BM's now, relieved constipation is resolved. Denies CP, SOB, chills, or melena.  Last oxycodone yesterday evening with relief.  Daily weights have not been recorded.    NAD in bed, right leg shows significant  "improvement with erythema receding from the border drawn yesterday.  Breathing comfortably on room air.    Discussed with care management and RN, ready to discharge when placement is found.  Discontinue as needed IV Dilaudid.  Consider low-dose amlodipine if hypertension persists despite clearing cellulitis    Discharge comments: TCU when available  Code Status: Full Code    Body mass index is 41.27 kg/m .  Wt Readings from Last 5 Encounters:   03/09/23 112.5 kg (248 lb)   03/07/23 118.5 kg (261 lb 3.9 oz)   02/13/23 112.9 kg (249 lb)   01/25/23 113.5 kg (250 lb 3.2 oz)   12/07/22 113.4 kg (250 lb)       Diet: Combination Diet Low Saturated Fat Na <2400mg Diet  DVT Prophylaxis:  DOAC  Anticoagulant Medications     Direct Factor Xa Inhibitors Refills Start End     rivaroxaban ANTICOAGULANT (XARELTO) 20 MG TABS tablet    0 3/8/2023     Sig - Route: Take 1 tablet (20 mg) by mouth daily (with dinner) - Oral    Class: E-Prescribe    Renewals     Renewal requests to authorizing provider (Selina Horton PA-C) <b>prohibited</b>                 Micah Loya MD, MPH  Hospitalist,[facility]  Phone: #[phone]    Medications:   Personally Reviewed.  Medications     - MEDICATION INSTRUCTIONS -         cefdinir  300 mg Oral Q12H Count includes the [facility] (08/20)     docusate sodium  100 mg Oral BID     folic acid  800 mcg Oral Daily     gabapentin  900 mg Oral At Bedtime     hydrOXYzine  10 mg Oral TID     levothyroxine  112 mcg Oral Daily     losartan  50 mg Oral BID     magnesium oxide  400 mg Oral Daily     rivaroxaban ANTICOAGULANT  20 mg Oral Daily with supper     rosuvastatin  20 mg Oral QPM     sennosides  8.6 mg Oral BID     sodium chloride (PF)  3 mL Intracatheter Q8H       Clinically Significant Risk Factors                        # Severe Obesity: Estimated body mass index is 41.27 kg/m  as calculated from the following:    Height as of 3/7/23: 1.651 m (5' 5\").    Weight as of this " encounter: 112.5 kg (248 lb)., PRESENT ON ADMISSION

## 2023-03-13 NOTE — PROGRESS NOTES
03/13/23 1400   Appointment Info   Signing Clinician's Name / Credentials (PT) Patt Dixon PT   Rehab Comments (PT) rewrap next visit.   Quick Adds   Quick Adds Edema Eval   General Information   Onset of Illness/Injury or Date of Surgery 03/09/23   Referring Physician ADDISON Purcell   Patient/Family Therapy Goals Statement (PT) Decrease RLE swelling   Pertinent History of Current Problem (include personal factors and/or comorbidities that impact the POC) Pt had R TKA 3/7/23. Discharged home 3/8/2023, developed RLE cellulitis 3/9/2023 and returned to hospital. PMH includes lymphedema per pt.   Edema General Information   Onset of Edema 03/09/23   Affected Body Part(s) Right LE   Edema Etiology TKA   Edema Examination/Assessment   Skin Condition Pitting   Skin Condition Comments R lower leg erythema, shiny   Scar No   Ulcerations No   Pitting Assessment 1+ pitting edema R lower leg   Clinical Impression   Criteria for Skilled Therapeutic Intervention Yes, treatment indicated   Edema: Patient Presentation Edema   Influenced by the following impairments TKA, cellulitis   Functional limitations due to impairments LE edema   Clinical Presentation (PT Evaluation Complexity) Evolving/Changing   Clinical Presentation Rationale Presents as medically diagnosed.   Clinical Decision Making (Complexity) moderate complexity   Edema: Planned Interventions Gradient compression bandaging;Precautions to prevent infection/exacerbation;Education   Risk & Benefits of therapy have been explained evaluation/treatment results reviewed;care plan/treatment goals reviewed;risks/benefits reviewed;participants voiced agreement with care plan;participants included;patient   Plan of Care Review   Plan of Care Reviewed With patient;daughter

## 2023-03-14 ENCOUNTER — APPOINTMENT (OUTPATIENT)
Dept: PHYSICAL THERAPY | Facility: CLINIC | Age: 83
DRG: 857 | End: 2023-03-14
Payer: MEDICARE

## 2023-03-14 ENCOUNTER — APPOINTMENT (OUTPATIENT)
Dept: OCCUPATIONAL THERAPY | Facility: CLINIC | Age: 83
DRG: 857 | End: 2023-03-14
Payer: MEDICARE

## 2023-03-14 VITALS
BODY MASS INDEX: 44.1 KG/M2 | DIASTOLIC BLOOD PRESSURE: 55 MMHG | HEART RATE: 74 BPM | WEIGHT: 264.99 LBS | SYSTOLIC BLOOD PRESSURE: 117 MMHG | OXYGEN SATURATION: 94 % | RESPIRATION RATE: 16 BRPM | TEMPERATURE: 98.3 F

## 2023-03-14 LAB
BACTERIA BLD CULT: NO GROWTH
BACTERIA BLD CULT: NO GROWTH

## 2023-03-14 PROCEDURE — 97535 SELF CARE MNGMENT TRAINING: CPT | Mod: GP

## 2023-03-14 PROCEDURE — 250N000013 HC RX MED GY IP 250 OP 250 PS 637: Performed by: STUDENT IN AN ORGANIZED HEALTH CARE EDUCATION/TRAINING PROGRAM

## 2023-03-14 PROCEDURE — 250N000013 HC RX MED GY IP 250 OP 250 PS 637: Performed by: EMERGENCY MEDICINE

## 2023-03-14 PROCEDURE — 97535 SELF CARE MNGMENT TRAINING: CPT | Mod: GO

## 2023-03-14 PROCEDURE — 250N000013 HC RX MED GY IP 250 OP 250 PS 637: Performed by: HOSPITALIST

## 2023-03-14 PROCEDURE — 97116 GAIT TRAINING THERAPY: CPT | Mod: GP

## 2023-03-14 PROCEDURE — 99239 HOSP IP/OBS DSCHRG MGMT >30: CPT | Performed by: STUDENT IN AN ORGANIZED HEALTH CARE EDUCATION/TRAINING PROGRAM

## 2023-03-14 PROCEDURE — 97110 THERAPEUTIC EXERCISES: CPT | Mod: GP

## 2023-03-14 PROCEDURE — 97530 THERAPEUTIC ACTIVITIES: CPT | Mod: GP

## 2023-03-14 RX ORDER — CEFDINIR 300 MG/1
300 CAPSULE ORAL EVERY 12 HOURS SCHEDULED
Status: DISCONTINUED | OUTPATIENT
Start: 2023-03-14 | End: 2023-03-14 | Stop reason: HOSPADM

## 2023-03-14 RX ORDER — OXYCODONE HYDROCHLORIDE 5 MG/1
2.5-5 TABLET ORAL EVERY 4 HOURS PRN
Qty: 20 TABLET | Refills: 0 | Status: SHIPPED | OUTPATIENT
Start: 2023-03-14 | End: 2023-03-16

## 2023-03-14 RX ORDER — CEFDINIR 300 MG/1
300 CAPSULE ORAL EVERY 12 HOURS
Qty: 3 CAPSULE | Refills: 0 | Status: SHIPPED | OUTPATIENT
Start: 2023-03-14 | End: 2023-03-16

## 2023-03-14 RX ORDER — HYDROXYZINE HYDROCHLORIDE 10 MG/1
10 TABLET, FILM COATED ORAL 3 TIMES DAILY
Qty: 30 TABLET | Refills: 0 | Status: SHIPPED | OUTPATIENT
Start: 2023-03-14 | End: 2023-03-16

## 2023-03-14 RX ADMIN — MAGNESIUM OXIDE TAB 400 MG (241.3 MG ELEMENTAL MG) 400 MG: 400 (241.3 MG) TAB at 08:10

## 2023-03-14 RX ADMIN — ACETAMINOPHEN 650 MG: 325 TABLET ORAL at 08:09

## 2023-03-14 RX ADMIN — ACETAMINOPHEN 650 MG: 325 TABLET ORAL at 16:51

## 2023-03-14 RX ADMIN — ACETAMINOPHEN 650 MG: 325 TABLET ORAL at 13:48

## 2023-03-14 RX ADMIN — HYDROXYZINE HYDROCHLORIDE 10 MG: 10 TABLET ORAL at 16:50

## 2023-03-14 RX ADMIN — LEVOTHYROXINE SODIUM 112 MCG: 0.11 TABLET ORAL at 06:19

## 2023-03-14 RX ADMIN — Medication 800 MCG: at 08:14

## 2023-03-14 RX ADMIN — LOSARTAN POTASSIUM 50 MG: 50 TABLET, FILM COATED ORAL at 08:09

## 2023-03-14 RX ADMIN — HYDROXYZINE HYDROCHLORIDE 10 MG: 10 TABLET ORAL at 08:09

## 2023-03-14 RX ADMIN — RIVAROXABAN 20 MG: 10 TABLET, FILM COATED ORAL at 16:51

## 2023-03-14 RX ADMIN — HYDROXYZINE HYDROCHLORIDE 10 MG: 10 TABLET ORAL at 13:48

## 2023-03-14 RX ADMIN — CEFDINIR 300 MG: 300 CAPSULE ORAL at 08:15

## 2023-03-14 ASSESSMENT — ACTIVITIES OF DAILY LIVING (ADL)
ADLS_ACUITY_SCORE: 44
ADLS_ACUITY_SCORE: 52
ADLS_ACUITY_SCORE: 44
ADLS_ACUITY_SCORE: 52
ADLS_ACUITY_SCORE: 48
ADLS_ACUITY_SCORE: 44
ADLS_ACUITY_SCORE: 52

## 2023-03-14 NOTE — PROGRESS NOTES
Orthopedic Progress Note      Assessment:    6 days s/p right TKA    Plan:   - Continue PT/OT  - Weightbearing status: WBAT  - Antibiotics per medicine for right leg cellulitis   - Anticoagulation: Xarelto in addition to SCDs, janay stockings and early ambulation.  - Discharge planning: TCU placement, okay to discharge form ortho standpoint. Ortho discharge orders placed    Subjective:  Pain: mild   Chest pain, SOB: no  Nausea, Vomiting:  no  Lightheadedness, Dizziness:  no  Neuro:  Patient denies new onset numbness or paresthesias     Patient is doing well today, notes that her pain is controlled. No increasing pain or other Ortho concerns. Saw patient with medicine doctor who notes somewhat improved cellulitis at [facility].    Objective:  /64 (BP Location: Right arm)   Pulse 64   Temp 98.1  F (36.7  C) (Oral)   Resp 16   Wt 120.2 kg (264 lb 15.9 oz)   SpO2 94%   BMI 44.10 kg/m    The patient is A&Ox3. Appears comfortable, sitting up at bedside.  Sensation is intact to light touch. Hyposensitive at area of cellulitis.  Calves are soft and non-tender.  Dorsiflexion and plantar flexion is intact bilaterally.  Appropriate flexion and extension of the toes bilaterally.   Brisk capillary refill in the toes bilaterally.   Palpable right dorsalis pedis pulse.  right knee dressing C/D/I.   Lymphedema wraps on to the bilaterally lower extremities    Pertinent Labs   Lab Results: personally reviewed.   No results found for: INR, PROTIME  Lab Results   Component Value Date    WBC 7.9 03/10/2023    HGB 11.0 (L) 03/10/2023    HCT 33.2 (L) 03/10/2023    MCV 84 03/10/2023     03/10/2023     Lab Results   Component Value Date     03/10/2023    CO2 26 03/10/2023         Report completed by:  Selina Horton PA-C/[facility]    Date: 3/14/2023  Time: 11:38 AM

## 2023-03-14 NOTE — PROGRESS NOTES
[facility] MEDICINE PROGRESS NOTE      Identification/Summary: Debra Manriquez is a 83 year old female with a past medical history of  has a past medical history of Antiplatelet or antithrombotic long-term use, Chronic acquired lymphedema, Edema, Falls, Fibrocystic breast disease, Fibrocystic breast disease, Foot pain, Gastroesophageal reflux disease, GERD (gastroesophageal reflux disease), Hyperlipidemia, Hypertension, Irregular heart beat, Lymphedema, Mitral valve disorder, Obese, JADYN (obstructive sleep apnea), Osteoarthritis of knee, Osteopenia, Skin cancer, Skin cancer, basal cell, Sleep apnea, Thyroid nodule, Thyroid nodule, and Vitamin B deficiency. who was admitted on 3/9/2023 for chief complaint of leg pain after surgery.    3/7 - admitted through 3/8 for TKA and was discharged home. Her functional status declined at home  3/9 - comes to  ED, diagnosed cellulitis of right leg, treated with ceftriaxone. U/S right leg negative for DVT. orthopedics consulted  3/10 -leg pain poorly controlled, PT/OT recommended TCU, Hb 11 from baseline 12.3  3/11 - ceftriaxone continued  3/12  - pain control improved, transition to cefdinir PO, bordered cellulitis. Given caffeine and glycerine suppository for constipation  3/13 - cellulitis shrinking, pt having loose BMs. Ready for discharge medically, awaiting placement    - will need abx through 3/15  - pending placement    Problem List  Principal Problem:    Cellulitis of right lower extremity  Active Problems:    Leg swelling    Venous stasis    Essential hypertension    Hyperlipidemia    Atrial tachycardia, paroxysmal (H)    Paroxysmal atrial fibrillation (H)    Normocytic anemia    Hypertensive 140's - 150's, afebrile, AVEL. Reports leg is feeling and looking better. Having loose BM's now, relieved constipation is resolved. Denies CP, SOB, chills, or melena.  Last oxycodone yesterday evening with relief.  Daily weights have not been  recorded.    NAD in bed, right leg shows significant improvement with erythema receding from the border drawn yesterday.  Breathing comfortably on room air.    Discussed with care management and RN, ready to discharge when placement is found.  Discontinue as-needed IV Dilaudid.  Consider low-dose amlodipine if hypertension persists despite clearing cellulitis    Discharge comments: TCU when available  Code Status: Full Code    Body mass index is 44.1 kg/m .  Wt Readings from Last 5 Encounters:   03/14/23 120.2 kg (264 lb 15.9 oz)   03/07/23 118.5 kg (261 lb 3.9 oz)   02/13/23 112.9 kg (249 lb)   01/25/23 113.5 kg (250 lb 3.2 oz)   12/07/22 113.4 kg (250 lb)       Diet: Combination Diet Low Saturated Fat Na <2400mg Diet  DVT Prophylaxis:  DOAC  Anticoagulant Medications     Direct Factor Xa Inhibitors Refills Start End     rivaroxaban ANTICOAGULANT (XARELTO) 20 MG TABS tablet    0 3/8/2023     Sig - Route: Take 1 tablet (20 mg) by mouth daily (with dinner) - Oral    Class: E-Prescribe    Renewals     Renewal requests to authorizing provider (Selina Horton PA-C) <b>prohibited</b>               [place] [facility]   Securely message with the Vocera Web Console (learn more here)  Text page via [facility] Paging/Directory       Medications:   Personally Reviewed.  Medications     - MEDICATION INSTRUCTIONS -         cefdinir  300 mg Oral Q12H [facility] (08/20)     docusate sodium  100 mg Oral BID     folic acid  800 mcg Oral Daily     gabapentin  900 mg Oral At Bedtime     hydrOXYzine  10 mg Oral TID     levothyroxine  112 mcg Oral Daily     losartan  50 mg Oral BID     magnesium oxide  400 mg Oral Daily     rivaroxaban ANTICOAGULANT  20 mg Oral Daily with supper     rosuvastatin  20 mg Oral QPM     sennosides  8.6 mg Oral BID     sodium chloride (PF)  3 mL Intracatheter Q8H       Clinically Significant Risk Factors                        # Severe Obesity: Estimated body  "mass index is 44.1 kg/m  as calculated from the following:    Height as of 3/7/23: 1.651 m (5' 5\").    Weight as of this encounter: 120.2 kg (264 lb 15.9 oz).        Subjective  - no new issues  - we discussed her diet  - no new pain  - discuss abx    Objective-   Exam:   General: alert, oriented, and in no acute distress  Pulmonary: clear to auscultation bilaterally, normal respiratory effort, on room air, no rales or wheezes or evidence of accessory muscle use  CVS: regular rate and rhythm, no murmurs, rubs, or gallops; no blatant jugular venous distention;  extremities are warm to the touch  GI: soft, nontender, BS+, no rebound or guarding, no conspicuous organomegaly   Neuro: nonfocal, moves all extremities of own volition  Psych: appropriate  msk- lower right leg w/ surgical dressing c/d/i; erythema at lower leg cellulitis receding from marked line    Labs:  - reviewed in EMR        Billing- level 1; 25 minutes spent on reviewing case and treatment for 3/14/2023      "

## 2023-03-14 NOTE — PLAN OF CARE
Patient alert and oriented. VSS. Tolerating diet. Verbalized adequate pain control with tylenol and cold application to right knee. Up to [facility] with heavy assist of 2, walker and gait belt. Patient reports feeling slightly stronger compared to this morning. Patient very unsteady and easily fatigued after prolong standing. Fall precaution maintained. Had 1 BM this shift. Purewick utilized for urine incontinence.  Plan of care reviewed with patient. Provided with much emotional support.   Problem: Pain Acute  Goal: Optimal Pain Control and Function  Outcome: Progressing  Intervention: Prevent or Manage Pain  Recent Flowsheet Documentation  Taken 3/13/2023 1550 by Laurie Espinosa, RN  Sensory Stimulation Regulation: lighting decreased        Problem: Fall Injury Risk  Goal: Absence of Fall and Fall-Related Injury  Outcome: Progressing     Problem: Skin or Soft Tissue Infection  Goal: Absence of Infection Signs and Symptoms  Outcome: Progressing

## 2023-03-14 NOTE — DISCHARGE SUMMARY
[facility]  Hospitalist Discharge Summary      Date of Admission:  3/9/2023  Date of Discharge:  3/14/2023  Discharging Provider: Micah Le MD  Discharge Service: Hospitalist Service    Discharge Diagnoses   Cellulitis of right lower extremity  Venous stasis  Relevant history of recently status post right total knee arthroplasty  On Xarelto anticoagulation for DVT prophylaxis    Follow-ups Needed After Discharge   Follow-up Appointments     Follow Up Care      Please follow-up with Dr. Corbin's team as scheduled at [facility]. Call our scheduling line at [phone] to make an   appointment, if you do not already have one scheduled.         Follow Up and recommended labs and tests      Follow up with Nursing home physician.               Unresulted Labs Ordered in the Past 30 Days of this Admission     Date and Time Order Name Status Description    3/9/2023 12:06 PM Blood Culture Line, arterial Preliminary     3/9/2023 12:06 PM Blood Culture Peripheral Blood Preliminary       These results will be followed up by [facility]    Discharge Disposition   Discharged to rehabilitation facility  Condition at discharge: [facility]     Debra Manriquez is a 83 year old female with a past medical history of chronic acquired lymphedema, Edema, Falls, Fibrocystic breast disease, Gastroesophageal reflux disease, GERD (gastroesophageal reflux disease), Hyperlipidemia, Hypertension, Irregular heart beat, Lymphedema, Mitral valve disorder, JADYN (obstructive sleep apnea), Osteoarthritis of knee, and Vitamin B deficiency who was admitted on 3/9/2023 for chief complaint of leg pain after surgery.     3/7 - admitted through 3/8 for TKA and was discharged home. Her functional status declined at home  3/9 - comes to  ED, diagnosed cellulitis of right leg, treated with ceftriaxone. U/S right leg negative for DVT. orthopedics consulted  3/10 -leg pain poorly controlled, PT/OT recommended TCU,  Hb 11 from baseline 12.3  3/11 - ceftriaxone continued  3/12  - pain control improved, transitioned to cefdinir PO, bordered cellulitis. Given caffeine and glycerine suppository for constipation  3/13 - cellulitis shrinking, pt having loose BMs.   3/14-clinically improved ready for discharge medically    On 3/14, she was clinically close and back to baseline and remained vitally and hemodynamically stable, and a TCU accepted her for rehabilitation and she will be discharged in stable medical condition.  She should complete 3 more doses of cefdinir for total course of 7 days for her cellulitis.  She was also given hydroxyzine for anxiety.  Her denosumab/Prolia should be held/paused while she is at the TCU.  She should follow-up with the facility provider within a week for a post hospitalization visit.  She should additionally follow-up with orthopedics as well.     Consultations This Hospital Stay   ORTHOPEDIC SURGERY IP CONSULT  PHYSICAL THERAPY ADULT IP CONSULT  OCCUPATIONAL THERAPY ADULT IP CONSULT  CARE MANAGEMENT / SOCIAL WORK IP CONSULT  PHYSICAL THERAPY ADULT IP CONSULT  OCCUPATIONAL THERAPY ADULT IP CONSULT  LYMPHEDEMA THERAPY IP CONSULT  PHYSICAL THERAPY ADULT IP CONSULT  OCCUPATIONAL THERAPY ADULT IP CONSULT    Code Status   Full Code    Time Spent on this Encounter   I, Micah Le MD, personally saw the patient today and spent greater than 30 minutes discharging this patient.       Micah Le MD  [address] [address]  Phone: [phone]  Fax: [phone]  ______________________________________________________________________    Physical Exam   Vital Signs: Temp: 98.1  F (36.7  C) Temp src: Oral BP: 114/70 Pulse: 82   Resp: 16 SpO2: 95 % O2 Device: None (Room air)    Weight: 264 lbs 15.89 oz    General: alert, oriented, and in no acute distress  Pulmonary: clear to auscultation bilaterally, normal respiratory effort, on room  "air, no rales or wheezes or evidence of accessory muscle use  CVS: regular rate and rhythm, no murmurs, rubs, or gallops; no blatant jugular venous distention;  extremities are warm to the touch  GI: soft, nontender, BS+, no rebound or guarding, no conspicuous organomegaly   Neuro: nonfocal, moves all extremities of own volition  Psych: appropriate  msk- lower right leg w/ surgical dressing c/d/i; erythema at lower leg cellulitis receding from marked line           Primary Care Physician   Yoanna Mcpherson    Discharge Orders      Reason for your hospital stay    Post op pain     When to call - Contact Surgeon Team    You may experience symptoms that require follow-up before your scheduled appointment. Refer to the \"Stoplight Tool\" for instructions on when to contact your Surgeon Team if you are concerned about pain control, blood clots, constipation, or if you are unable to urinate.     When to call - Reach out to Urgent Care    If you are not able to reach your Surgeon Team and you need immediate care, go to the Orthopedic Walk-in Clinic or Urgent Care at your Surgeon's office.  Do NOT go to the Emergency Room unless you have shortness of breath, chest pain, or other signs of a medical emergency.     When to call - Reasons to Call 911    Call 911 immediately if you experience sudden-onset chest pain, arm weakness/numbness, slurred speech, or shortness of breath     Discharge Instruction - Breathing exercises    Perform breathing exercises using your Incentive Spirometer 10 times per hour while awake for 2 weeks.     Symptoms - Fever Management    A low grade fever can be expected after surgery.  Use acetaminophen (TYLENOL) as needed for fever management.  Contact your Surgeon Team if you have a fever greater than 101.5 F, chills, and/or night sweats.     Symptoms - Constipation management    Constipation (hard, dry bowel movements) is expected after surgery due to the combination of being less active, the " anesthetic, and the opioid pain medication.  You can do the following to help reduce constipation:  ~  FLUIDS:  Drink clear liquids (water or Gatorade), or juice (apple/prune).  ~  DIET:  Eat a fiber rich diet.    ~  ACTIVITY:  Get up and move around several times a day.  Increase your activity as you are able.  MEDICATIONS:  Reduce the risk of constipation by starting medications before you are constipated.  You can take Miralax   (1 packet as directed) and/or a stool softener (Senokot 1-2 tablets 1-2 times a day).  If you already have constipation and these medications are not working, you can get magnesium citrate and use as directed.  If you continue to have constipation you can try an over the counter suppository or enema.  Call your Surgeon Team if it has been greater than 3 days since your last bowel movement.     Symptoms - Reduced Urine Output    Changes in the amount of fluids you drank before and after surgery may result in problems urinating.  It is important to stay well-hydrated after surgery and drink plenty of water. If it has been greater than 8 hours since you have urinated despite drinking plenty of water, call your Surgeon Team.     Activity - Exercises to prevent blood clots    Unless otherwise directed by your Surgeon team, perform the following exercises at least three times per day for the first four weeks after surgery to prevent blood clots in your legs: 1) Point and flex your feet (Ankle Pumps), 2) Move your ankle around in big circles, 3) Wiggle your toes, 4) Walk, even for short distances, several times a day, will help decrease the risk of blood clots.     Comfort and Pain Management - Pain after Surgery    Pain after surgery is normal and expected.  You will have some amount of pain for several weeks after surgery.  Your pain will improve with time.  There are several things you can do to help reduce your pain including: rest, ice, elevation, and using pain medications as needed.  Contact your Surgeon Team if you have pain that persists or worsens after surgery despite rest, ice, elevation, and taking your medication(s) as prescribed. Contact your Surgeon Team if you have new numbness, tingling, or weakness in your operative extremity.     Comfort and Pain Management - Swelling after Surgery    Swelling and/or bruising of the surgical extremity is common and may persist for several months after surgery. In addition to frequent icing and elevation, gentle compressive support with an ACE wrap or tubigrip may help with swelling. Apply compression regularly, removing at least twice daily to perform skin checks. Contact your Surgeon Team if your swelling increases and is NOT associated with an increase in your activity level, or if your swelling increases and is associated with redness and pain.     Comfort and Pain Management - Cold therapy    Ice can be used to control swelling and discomfort after surgery. Place a thin towel over your operative site and apply the ice pack overtop. Leave ice pack in place for 20 minutes, then remove for 20 minutes. Repeat this 20 minutes on/20 minutes off routine as often as tolerated.     Medication Instructions - Acetaminophen (TYLENOL) Instructions    You were discharged with acetaminophen (TYLENOL) for pain management after surgery. Acetaminophen most effectively manages pain symptoms when it is taken on a schedule without missing doses (every four, six, or eight hours). Your Provider will prescribe a safe daily dose between 3000 - 4000 mg.  Do NOT exceed this daily dose. Most patients use acetaminophen for pain control for the first four weeks after surgery.  You can wean from this medication as your pain decreases.     Medication Instructions - NSAID Instructions    You were discharged with an anti-inflammatory medication for pain management to use in combination with acetaminophen (TYLENOL) and the narcotic pain medication.  Take this medication exactly  "as directed.  You should only take one anti-inflammatory at a time.  Some common anti-inflammatories include: ibuprofen (ADVIL, MOTRIN), naproxen (ALEVE, NAPROSYN), celecoxib (CELEBREX), meloxicam (MOBIC), ketorolac (TORADOL).  Take this medication with food and water.     Medication Instructions - Opioids - Tapering Instructions    In the first three days following surgery, your symptoms may warrant use of the narcotic pain medication every four to six hours as prescribed. This is normal. As your pain symptoms improve, focus your efforts on decreasing (tapering) use of narcotic medications. The most successful tapering strategy is to first, decrease the number of tablets you take every 4-6 hours to the minimum prescribed. Then, increase the amount of time between doses.  For example:  First, taper to   or 1 tablet every 4-6 hours.  Then, taper to   or 1 tablet every 6-8 hours.  Then, taper to   or 1 tablet every 8-10 hours.  Then, taper to   or 1 tablet every 10-12 hours.  Then, taper to   or 1 tablet at bedtime.  The bedtime dose can help with comfort during sleep and is typically the last dose to be discontinued after surgery.     Follow Up Care    Please follow-up with Dr. Corbin's team as scheduled at [facility]. Call our scheduling line at [phone] to make an appointment, if you do not already have one scheduled.     Medication instructions - Anticoagulation - other    Take the Xarelto as prescribed  after surgery.  This is given to help minimize your risk of blood clot     Comfort and Pain Management - LOWER Extremity Elevation    Swelling is expected for several months after surgery. This type of swelling is usually associated with gravity and activity, and can be improved with elevation.   The best way to do this is to get your \"toes above your nose\" by laying down and placing several pillows lengthwise under your calf and heel. When elevating your leg keep your knee completely straight. Perform " this elevation as often as possible especially for the first two weeks after surgery.     Medication Instructions - Opioid Instructions (1 - 2 tablets Q 4-6 hours, MAX 6 tablets)    You were discharged with an opioid medication (hydromorphone, oxycodone, hydrocodone, or tramadol). This medication should only be taken for breakthrough pain that is not controlled with acetaminophen (TYLENOL). If you rate your pain less than 3 you do not need this medication.  Pain rating 0-3:  You do not need this medication.  Pain rating 4-6:  Take 1 tablet every 4-6 hours as needed  Pain rating 7-10:  Take 2 tablets every 4-6 hours as needed.  Do not exceed 6 tablets per day     Activity - Total Knee Arthroplasty     Return to Driving    Return to driving - Driving is NOT permitted until directed by your provider. Under no circumstance are you permitted to drive while using narcotic pain medications.     Dressing / Wound Care - Wound    You have a clean dressing on your surgical wound. Dressing change instructions as follows: dressing will be removed at your follow-up appointment. Contact your Surgeon Team if you have increased redness, warmth around the surgical wound, and/or drainage from the surgical wound.     Dressing / Wound Care - NO Tub Bathing    Tub bathing, swimming, or any other activities that will cause your incision to be submerged in water should be avoided until allowed by your Surgeon.     NO Precautions    No precautions directed by your Provider.  You may perform range of motion activities as tolerated.     Weight bearing as tolerated    Weight bearing as tolerated on your operative extremity.     Dressing / Wound care - Shower with wound/dressing covered    You must COVER your dressing or incision with saran wrap (or any other non-permeable covering) to allow the incision to remain dry while showering.  You may shower 3 days after surgery as long as the surgical wound stays dry. Continue to cover your dressing  or incision for showering until your first office visit.  You are strictly prohibited from soaking   or submerging the surgical wound underwater.     General info for SNF    Length of Stay Estimate: Short Term Care: Estimated # of Days <30  Condition at Discharge: Stable  Level of care:skilled   Rehabilitation Potential: Fair  Admission H&P remains valid and up-to-date: Yes  Recent Chemotherapy: N/A  Use Nursing Home Standing Orders: Yes     Mantoux instructions    Give two-step Mantoux (PPD) Per Facility Policy Yes     Follow Up and recommended labs and tests    Follow up with Nursing home physician.     Reason for your hospital stay    Infection of the skin, cellulitis     Activity - Up with nursing assistance     Additional Discharge Instructions    As of 3/14/2023, please hold and pause Prolia medication while at the transitional care unit     Physical Therapy Adult Consult    Evaluate and treat as clinically indicated.    Reason:  recent surgery     Occupational Therapy Adult Consult    Evaluate and treat as clinically indicated.    Reason:  recent surgery     Fall precautions     Discharge Instruction - Regular Diet Adult    Return to your pre-surgery diet unless instructed otherwise     Diet    Follow this diet upon discharge: Orders Placed This Encounter      Regular Diet Adult      Discharge Instruction - Regular Diet Adult       Significant Results and Procedures   Results for orders placed or performed during the hospital encounter of 03/09/23   US Lower Extremity Venous Duplex Right    Narrative    EXAM: US LOWER EXTREMITY VENOUS DUPLEX RIGHT  LOCATION: [facility]  DATE/TIME: 3/9/2023 1:47 PM    INDICATION: right leg pain and swelling  COMPARISON: 04/05/2022  TECHNIQUE: Venous Duplex ultrasound of the right lower extremity with and without compression, augmentation and duplex. Color flow and spectral Doppler with waveform analysis performed.    FINDINGS: Exam includes the common  femoral, femoral, popliteal, and contralateral common femoral veins as well as segmentally visualized deep calf veins and greater saphenous vein.     RIGHT: No deep vein thrombosis. No superficial thrombophlebitis. No popliteal cyst.      Impression    IMPRESSION:  1.  No deep venous thrombosis in the right lower extremity.       Discharge Medications   Current Discharge Medication List      START taking these medications    Details   cefdinir (OMNICEF) 300 MG capsule Take 1 capsule (300 mg) by mouth every 12 hours for 3 doses  Qty: 3 capsule, Refills: 0    Comments: Completing a course of 7 days of antibiotics with 3 more doses  Associated Diagnoses: Cellulitis of right lower extremity      hydrOXYzine (ATARAX) 10 MG tablet Take 1 tablet (10 mg) by mouth 3 times daily  Qty: 30 tablet, Refills: 0    Associated Diagnoses: Recent surgical procedure on lower extremity         CONTINUE these medications which have CHANGED    Details   denosumab (PROLIA) 60 MG/ML SOSY injection As of 3/14/2023, please hold and pause this medication while at the transitional care unit      oxyCODONE (ROXICODONE) 5 MG tablet Take 0.5-1 tablets (2.5-5 mg) by mouth every 4 hours as needed  Qty: 20 tablet, Refills: 0    Associated Diagnoses: Recent surgical procedure on lower extremity         CONTINUE these medications which have NOT CHANGED    Details   acetaminophen (TYLENOL) 325 MG tablet Take 2 tablets (650 mg) by mouth every 4 hours as needed for other (mild pain)  Qty: 100 tablet, Refills: 0    Associated Diagnoses: Primary osteoarthritis of right knee      alpha-lipoic acid 600 MG capsule Take 600 mg by mouth daily      biotin 1000 MCG TABS tablet Take 1,000 mcg by mouth daily      cholecalciferol (VITAMIN D3) 5000 units TABS tablet Take 2 tablets by mouth daily      Coenzyme Q10 400 MG CAPS Take 800 mg by mouth daily      Digestive Aids Mixture (PROTEOLYTIC ENZYMES PO) Take 3 tablets by mouth daily      folic acid 800 MCG tablet Take  800 mcg by mouth daily      furosemide (LASIX) 20 MG tablet Take 20 mg by mouth daily      gabapentin (NEURONTIN) 300 MG capsule Take 900 mg by mouth At Bedtime      Krill Oil (OMEGA-3) 500 MG CAPS Take 1 capsule by mouth daily      levothyroxine (SYNTHROID/LEVOTHROID) 112 MCG tablet Take 112 mcg by mouth daily      losartan (COZAAR) 50 MG tablet Take 50 mg by mouth 2 times daily      MAGNESIUM PO Take 2 capsules by mouth 2 times daily      Menaquinone-7 (VITAMIN K2 PO) Take 100 mg by mouth daily      Multiple Vitamins-Minerals (GNP IMMUNE SUPPORT PO) Take 1 tablet by mouth daily      Omega-3 Fatty Acids (FISH OIL EXTRA STRENGTH PO) Take 1,600 mg by mouth 2 times daily      RESVERATROL PO Take 1,200 mg by mouth daily      rivaroxaban ANTICOAGULANT (XARELTO) 20 MG TABS tablet Take 1 tablet (20 mg) by mouth daily (with dinner)  Qty: 30 tablet, Refills: 0    Associated Diagnoses: Primary osteoarthritis of right knee      rosuvastatin (CRESTOR) 20 MG tablet Take 1 tablet (20 mg) by mouth daily  Qty: 30 tablet, Refills: 12    Associated Diagnoses: Hyperlipidemia LDL goal <70      Selenium 100 MCG TABS Take 100 mcg by mouth daily      senna-docusate (SENOKOT-S/PERICOLACE) 8.6-50 MG tablet Take 1-2 tablets by mouth 2 times daily Take while on oral narcotics to prevent or treat constipation.  Qty: 30 tablet, Refills: 0    Comments: While taking narcotics  Associated Diagnoses: Primary osteoarthritis of right knee      Sodium Hyaluronate, oral, (HYALURONIC ACID PO) Take 200 mg by mouth daily      Thiamine 50 MG CAPS Take 50 mg by mouth daily      TURMERIC PO Take 1,250 mg by mouth daily      !! UNABLE TO FIND MEDICATION NAME: Mabel nayak vitamin E and zinc      !! UNABLE TO FIND MEDICATION NAME: complete gut health 2 tablets daily      VITAMIN A PO Take 10,000 Units by mouth daily      vitamin B-12 (CYANOCOBALAMIN) 1000 MCG tablet Take 1,000 mcg by mouth daily      vitamin C (ASCORBIC ACID) 500 MG tablet Take 500 mg by mouth  daily      vitamin E 400 units TABS Take 400 Units by mouth daily      vitamin B complex with vitamin C (STRESS TAB) tablet Take 1 tablet by mouth daily       !! - Potential duplicate medications found. Please discuss with provider.      STOP taking these medications       acetylcysteine (N-ACETYL CYSTEINE) 600 MG CAPS capsule Comments:   Reason for Stopping:             Allergies   Allergies   Allergen Reactions     Lisinopril Cough     Reglan [Metoclopramide] Other (See Comments)     depression

## 2023-03-14 NOTE — PROGRESS NOTES
Care Management Follow Up    Length of Stay (days): 5    Expected Discharge Date: 03/15/2023     Concerns to be Addressed: discharge planning, home safety  Lives alone  Patient plan of care discussed at interdisciplinary rounds: Yes    Anticipated Discharge Disposition: Skilled Nursing Facility, Transitional Care     Anticipated Discharge Services: None  Anticipated Discharge DME: Other (see comment) (Pending clinical progress)    Patient/family educated on Medicare website which has current facility and service quality ratings:    Education Provided on the Discharge Plan:    Patient/Family in Agreement with the Plan: yes    Referrals Placed by CM/SW:    Private pay costs discussed: Not applicable    Additional Information:  8:51 AM  SW left VM for [facility] 3[facility] TCU requesting return call around referral.  Pt is on oral antibiotics.  Wondering if TCU bed is available.    11:04 AM  SW met with patient and family per request.  Pt is interested in TCU and choices are as follow: 1)[facility] 2)[facility]note 3[facility] 3)[facility] 4) [facility].  SW resent referral to [facility] and [facility] due to change in pt status (pt doing better).  SW also sent referral to [facility] per request.    OPHELIA Bowers     Care Management Discharge Note    Discharge Date: 03/14/2023       Discharge Disposition: Skilled Nursing Facility, Transitional Care - [facility]    Discharge Services: None    Discharge DME: none    Discharge Transportation: [facility] at 5:45pm    Private pay costs discussed: Not applicable    PAS Confirmation Code:  [id number])  Patient/family educated on Medicare website which has current facility and service quality ratings:  yes    Education Provided on the Discharge Plan:  yes  Persons Notified of Discharge Plans: pt, daughter, TCU  Patient/Family in Agreement with the Plan: yes    Handoff Referral Completed: Yes    Additional Information:  1:32 PM  Pt  medically ready for discharge to [facility]CU.  PAS done.  [facility] to fax discharge orders.  Pt/family requested [facility] transport and is agreeable to any potential cost.  RAHEL scheduled ride at 5:45pm (soonest available).  [facility] to fax discharge orders.        OPHELIA Bowers

## 2023-03-14 NOTE — PLAN OF CARE
Problem: Plan of Care - These are the overarching goals to be used throughout the patient stay.    Goal: Optimal Comfort and Wellbeing  Intervention: Monitor Pain and Promote Comfort  Recent Flowsheet Documentation  Taken 3/14/2023 1536 by Ariela Hauser RN  Pain Management Interventions:   declines   emotional support   cold applied       Patient is A/Ox4, VSS on RA. 2 assist with walker and gait belt when ambulating. Patient is incontinent of both bowel and bladder, brief in place and deandra-care provided throughout the shift. Dressing is CDI, full sensation per Pt. Lymphedema wraps in place. No new skin issues noted. Patient rating pain 4-5/10 during shift, cold therapy, pain medications, rest, and repositioning used to manage pain. Pending discharge to TCU at 5:45pm today.

## 2023-03-14 NOTE — PLAN OF CARE
Goal Outcome Evaluation:      Problem: Skin or Soft Tissue Infection  Goal: Absence of Infection Signs and Symptoms  Outcome: Progressing   Patient is A&Ox4. Denies pain. VSS. Lymph wraps in place. Voiding with purewick. Saline locked. Ax2 with ambulation. Discharge pending placement.

## 2023-03-15 ENCOUNTER — LAB REQUISITION (OUTPATIENT)
Dept: LAB | Facility: CLINIC | Age: 83
End: 2023-03-15
Payer: MEDICARE

## 2023-03-15 ENCOUNTER — TRANSITIONAL CARE UNIT VISIT (OUTPATIENT)
Dept: GERIATRICS | Facility: CLINIC | Age: 83
End: 2023-03-15
Payer: MEDICARE

## 2023-03-15 VITALS
DIASTOLIC BLOOD PRESSURE: 70 MMHG | HEIGHT: 65 IN | HEART RATE: 74 BPM | SYSTOLIC BLOOD PRESSURE: 139 MMHG | TEMPERATURE: 98.3 F | RESPIRATION RATE: 18 BRPM | BODY MASS INDEX: 43.82 KG/M2 | OXYGEN SATURATION: 94 % | WEIGHT: 263 LBS

## 2023-03-15 DIAGNOSIS — K92.0 COFFEE GROUND EMESIS: Primary | ICD-10-CM

## 2023-03-15 DIAGNOSIS — E78.5 HYPERLIPIDEMIA, UNSPECIFIED HYPERLIPIDEMIA TYPE: ICD-10-CM

## 2023-03-15 DIAGNOSIS — L03.115 CELLULITIS OF RIGHT LOWER EXTREMITY: ICD-10-CM

## 2023-03-15 DIAGNOSIS — R19.5 LOOSE STOOLS: ICD-10-CM

## 2023-03-15 DIAGNOSIS — I48.0 PAROXYSMAL ATRIAL FIBRILLATION (H): ICD-10-CM

## 2023-03-15 DIAGNOSIS — I89.0 LYMPHEDEMA: ICD-10-CM

## 2023-03-15 DIAGNOSIS — I10 ESSENTIAL HYPERTENSION: ICD-10-CM

## 2023-03-15 DIAGNOSIS — Z96.651 STATUS POST TOTAL RIGHT KNEE REPLACEMENT: ICD-10-CM

## 2023-03-15 DIAGNOSIS — A04.72 ENTEROCOLITIS DUE TO CLOSTRIDIUM DIFFICILE, NOT SPECIFIED AS RECURRENT: ICD-10-CM

## 2023-03-15 DIAGNOSIS — G47.33 OBSTRUCTIVE SLEEP APNEA: ICD-10-CM

## 2023-03-15 LAB — C DIFF TOX B STL QL: NEGATIVE

## 2023-03-15 PROCEDURE — 99310 SBSQ NF CARE HIGH MDM 45: CPT | Performed by: NURSE PRACTITIONER

## 2023-03-15 PROCEDURE — 87493 C DIFF AMPLIFIED PROBE: CPT

## 2023-03-15 RX ORDER — L. ACIDOPHILUS/L.BULGARICUS 1MM CELL
2 TABLET ORAL DAILY
COMMUNITY

## 2023-03-15 NOTE — PROGRESS NOTES
"Code Status:  FULL CODE  Visit Type: Hospital F/U     Facility:   [facility] ([facility]) [[id number]]        History of Present Illness:   Hospital Admission Date: 3/9/2023    Hospital Discharge Date: 3/14/2023      Debra Manriquez is a 83 year old female with past medical history for lymphedema, Falls, GERD, HLD, HTN, p. Afib, JADYN, OA.  She was recently hospitalized for right leg cellulitis following an elective TKA on 3/7.  US of right leg was negative for DVT.  She was treated with ceftriaxone and transitioned to oral cefdinir at discharge. She had constipation and was given suppositories in which she developed loose stools. She was started on hydroxyzine for anxiety.  Her prolia was put on hold while she is in the TCU.     Today, nursing reports coffee ground emesis of moderate size.  Patient reports she was having an \"upset stomach all night\".  She endorses cramping loose stools.  She reports feeling better this AM after emesis and BM.  She has not been able to eat her breakfast and denies any history of peptic ulcer disease.  She is requesting the lymphedema wrap of her RLE to be removed due to feeling uncomfortable.  She is wondering if she can wear her compression hose she has from home.      Past Medical History:   Diagnosis Date     Antiplatelet or antithrombotic long-term use      Chronic acquired lymphedema      Edema      Falls      Fibrocystic breast disease      Fibrocystic breast disease      Foot pain      Gastroesophageal reflux disease      GERD (gastroesophageal reflux disease)      Hyperlipidemia      Hypertension      Irregular heart beat      Lymphedema      Mitral valve disorder      Obese      JADYN (obstructive sleep apnea)      Osteoarthritis of knee     hx of knee replacement and pending second     Osteopenia      Skin cancer      Skin cancer, basal cell      Sleep apnea      Thyroid nodule      Thyroid nodule      Vitamin B deficiency      Past Surgical History:   Procedure Laterality Date "     ARTHROPLASTY KNEE Right 3/7/2023    Procedure: RIGHT TOTAL KNEE ARTHROPLASTY;  Surgeon: Juan Corbin MD;  Location: [facility] Main OR     CATARACT EXTRACTION  2011, 2012     COLONOSCOPY       EXCISE TOENAIL(S) Bilateral 8/16/2019    Procedure: MATRIXECTOMY BILATERAL FEET; TOES 1,2,3,4 ON LEFT FOOT,  1,2 ON RIGHT FOOT;  BOTH NAIL BORDERS;  Surgeon: Opal Kam DPM;  Location:  OR     EYE SURGERY      cataract     GI SURGERY       GYN SURGERY      hysterectomy     INJECT STEROID (LOCATION) Right 8/16/2019    Procedure: CORTIZONE INJECTION RIGHT HEEL;  Surgeon: Opal Kam DPM;  Location:  OR     ORTHOPEDIC SURGERY Left     knee replacement     PARTIAL HYSTERECTOMY       STRIP VEIN Bilateral 1975     TOTAL KNEE ARTHROPLASTY Left 2011     Family History   Problem Relation Age of Onset     No Known Problems Mother      No Known Problems Father      Alzheimer Disease Maternal Uncle      Heart Disease Sister      Heart Disease Brother      No Known Problems Daughter      No Known Problems Son      No Known Problems Brother      No Known Problems Son      No Known Problems Son      Social History     Socioeconomic History     Marital status:      Spouse name: Not on file     Number of children: Not on file     Years of education: Not on file     Highest education level: Not on file   Occupational History     Not on file   Tobacco Use     Smoking status: Never     Smokeless tobacco: Never   Vaping Use     Vaping Use: Never used   Substance and Sexual Activity     Alcohol use: Yes     Alcohol/week: 0.8 standard drinks     Comment: Alcoholic Drinks/day: 1-2 glasses per month     Drug use: No     Sexual activity: Yes   Other Topics Concern     Parent/sibling w/ CABG, MI or angioplasty before 65F 55M? Not Asked   Social History Narrative    . 4 kids.  Teaches college English, reading.      Social Determinants of Health     Financial Resource Strain: Not on file   Food  Insecurity: Not on file   Transportation Needs: Not on file   Physical Activity: Not on file   Stress: Not on file   Social Connections: Not on file   Intimate Partner Violence: Not on file   Housing Stability: Not on file       Current Outpatient Medications   Medication Sig Dispense Refill     acetaminophen (TYLENOL) 325 MG tablet Take 2 tablets (650 mg) by mouth every 4 hours as needed for other (mild pain) 100 tablet 0     cefdinir (OMNICEF) 300 MG capsule Take 1 capsule (300 mg) by mouth every 12 hours for 3 doses 3 capsule 0     cholecalciferol (VITAMIN D3) 5000 units TABS tablet Take 2 tablets by mouth daily       Coenzyme Q10 400 MG CAPS Take 800 mg by mouth daily       furosemide (LASIX) 20 MG tablet Take 20 mg by mouth daily       gabapentin (NEURONTIN) 300 MG capsule Take 900 mg by mouth At Bedtime       hydrOXYzine (ATARAX) 10 MG tablet Take 1 tablet (10 mg) by mouth 3 times daily 30 tablet 0     lactobacillus TABS Take 2 capsules by mouth daily       levothyroxine (SYNTHROID/LEVOTHROID) 112 MCG tablet Take 112 mcg by mouth daily       losartan (COZAAR) 50 MG tablet Take 50 mg by mouth 2 times daily       MAGNESIUM PO Take 2 capsules by mouth 2 times daily       oxyCODONE (ROXICODONE) 5 MG tablet Take 0.5-1 tablets (2.5-5 mg) by mouth every 4 hours as needed 20 tablet 0     rivaroxaban ANTICOAGULANT (XARELTO) 20 MG TABS tablet Take 1 tablet (20 mg) by mouth daily (with dinner) 30 tablet 0     rosuvastatin (CRESTOR) 20 MG tablet Take 1 tablet (20 mg) by mouth daily 30 tablet 12     senna-docusate (SENOKOT-S/PERICOLACE) 8.6-50 MG tablet Take 1-2 tablets by mouth 2 times daily Take while on oral narcotics to prevent or treat constipation. 30 tablet 0     denosumab (PROLIA) 60 MG/ML SOSY injection As of 3/14/2023, please hold and pause this medication while at the transitional care unit       Allergies   Allergen Reactions     Lisinopril Cough     Reglan [Metoclopramide] Other (See Comments)     depression  "    Immunization History   Administered Date(s) Administered     COVID-19 Vaccine 12+ (Pfizer) 11/03/2021     COVID-19 Vaccine 18+ (Moderna) 02/05/2021, 03/05/2021, 05/04/2022     COVID-19 Vaccine Bivalent Booster 12+ (Pfizer) 10/05/2022     FLUAD(HD)65+ QUAD 09/11/2020, 09/17/2021, 11/01/2022     Influenza Vaccine >6 months (Alfuria,Fluzone) 10/12/2016     Influenza Vaccine, 6+MO IM (QUADRIVALENT W/PRESERVATIVES) 09/17/2015, 09/15/2017, 10/19/2018, 10/09/2019     Pneumo Conj 13-V (2010&after) 04/21/2017     Pneumococcal 23 valent 05/26/2021     Poliovirus, inactivated (IPV) 08/18/2022     TD (ADULT, 7+) 01/01/2013     Tdap (Adacel,Boostrix) 08/31/2022     Zoster vaccine recombinant adjuvanted (SHINGRIX) 06/15/2019, 11/16/2019         Post Discharge Medication Reconciliation Status: discharge medications reconciled and changed, per note/orders.    Medications list and allergies in the facility chart have been reviewed.  Please see facility EMR for most up to date list.         Review of Systems   Patient denies fever, chills, headache, lightheadedness, dizziness, rhinorrhea, cough, congestion, shortness of breath, chest pain, palpitations, constipation, change in appetite, change in sleep pattern, dysuria, frequency, burning or pain with urination.  Other than stated in HPI all other review of systems is negative.         Physical Exam  Vital signs:/70   Pulse 74   Temp 98.3  F (36.8  C)   Resp 18   Ht 1.651 m (5' 5\")   Wt 119.3 kg (263 lb)   SpO2 94%   BMI 43.77 kg/m     GENERAL APPEARANCE: Well developed, elderly female, in no acute distress.  HEENT: normocephalic, atraumatic   sclerae anicteric, conjunctivae clear and moist, EOM intact  LUNGS: Lung sounds CTA, no adventitious sounds, respiratory effort normal.  CARD: RRR, S1, S2, without murmurs, gallops, rubs  ABD: Soft, nondistended and nontender with hyperactive bowels  MSK: Muscle strength and tone were equal bilaterally. Moves all extremities " easily and intentionally.   EXTREMITIES: right leg wrapped, no edema of left leg  NEURO: Alert and oriented x 3. Face is symmetric.  SKIN: Inspection of the skin reveals no rashes, ulcerations or petechiae.  PSYCH: euthymic          Labs:   Last Comprehensive Metabolic Panel:  Lab Results   Component Value Date     03/10/2023    POTASSIUM 4.2 03/10/2023    CHLORIDE 105 03/10/2023    CO2 26 03/10/2023    ANIONGAP 8 03/10/2023    GLC 90 03/10/2023    BUN 15 03/10/2023    CR 0.56 (L) 03/10/2023    GFRESTIMATED 89 03/10/2023    DAMARIS 8.7 03/10/2023       Lab Results   Component Value Date    WBC 7.9 03/10/2023     Lab Results   Component Value Date    RBC 3.94 03/10/2023     Lab Results   Component Value Date    HGB 11.0 03/10/2023     Lab Results   Component Value Date    HCT 33.2 03/10/2023     Lab Results   Component Value Date    MCV 84 03/10/2023     Lab Results   Component Value Date    MCH 27.9 03/10/2023     Lab Results   Component Value Date    MCHC 33.1 03/10/2023     Lab Results   Component Value Date    RDW 13.9 03/10/2023     Lab Results   Component Value Date     03/10/2023           Assessment/plan:   Coffee ground emesis  Hold xarelto, advised nursing that if she were to have another coffee ground emesis to send to the ER for evaluation.  Obtain CBC tomorrow.  May need PPI started.  Discussed supplements with patient and at this time we will hold all extra supplements.  Advised to drink water and eat something bland for lunch to see if nausea re-occurs.  May need zofran. Hold Xarelto for now.     Loose stools  Obtain CDIFF culture, counseled patient on risk for CDIFF.  Bowel meds are prn.  Start lactobacillus. Obtain flatplate abd xray to rule out obstruction with loose stools and emesis.     Status post total right knee replacement  Pain controlled, continue with prn apap and oxycodone.  PT/OT eval and treat.  On Xarelto for prophylaxis but will hold for now due to coffee ground emesis.   Could consider restarting if symptoms resolved and hgb stable.  Advised nursing to place legs in compression for DVT prophylaxis as she is not ambulatory yet.     Cellulitis of right lower extremity  Continue with Omnicef through today.      Paroxysmal atrial fibrillation (H)  Rate controlled without rate controlling medications.  Not typically on AC, Xarelto on hold.     Essential hypertension  Diet controlled only.      Lymphedema  Lymphedema specialist to see today to determine if lymphedema wraps are still needed or if she could be put in her home compression garments. Continue on home lasix    Hyperlipidemia, unspecified hyperlipidemia type  On home Crestor    Obstructive sleep apnea  Okay to use home cpap on home settings.           Electronically signed by: Ashly Elizabeth NP

## 2023-03-15 NOTE — LETTER
"    3/15/2023        RE: Debra Manriquez  [address]  [address]  [place] [address]        Code Status:  FULL CODE  Visit Type: Hospital F/U     Facility:   [facility] ([facility]) [[id number]]        History of Present Illness:   Hospital Admission Date: 3/9/2023    Hospital Discharge Date: 3/14/2023      Debra Manriquez is a 83 year old female with past medical history for lymphedema, Falls, GERD, HLD, HTN, p. Afib, JADYN, OA.  She was recently hospitalized for right leg cellulitis following an elective TKA on 3/7.  US of right leg was negative for DVT.  She was treated with ceftriaxone and transitioned to oral cefdinir at discharge. She had constipation and was given suppositories in which she developed loose stools. She was started on hydroxyzine for anxiety.  Her prolia was put on hold while she is in the TCU.     Today, nursing reports coffee ground emesis of moderate size.  Patient reports she was having an \"upset stomach all night\".  She endorses cramping loose stools.  She reports feeling better this AM after emesis and BM.  She has not been able to eat her breakfast and denies any history of peptic ulcer disease.  She is requesting the lymphedema wrap of her RLE to be removed due to feeling uncomfortable.  She is wondering if she can wear her compression hose she has from home.      Past Medical History:   Diagnosis Date     Antiplatelet or antithrombotic long-term use      Chronic acquired lymphedema      Edema      Falls      Fibrocystic breast disease      Fibrocystic breast disease      Foot pain      Gastroesophageal reflux disease      GERD (gastroesophageal reflux disease)      Hyperlipidemia      Hypertension      Irregular heart beat      Lymphedema      Mitral valve disorder      Obese      JADYN (obstructive sleep apnea)      Osteoarthritis of knee     hx of knee replacement and pending second     Osteopenia      Skin cancer      Skin cancer, basal cell      Sleep apnea      Thyroid " nodule      Thyroid nodule      Vitamin B deficiency      Past Surgical History:   Procedure Laterality Date     ARTHROPLASTY KNEE Right 3/7/2023    Procedure: RIGHT TOTAL KNEE ARTHROPLASTY;  Surgeon: Juan Corbin MD;  Location: [facility] Main OR     CATARACT EXTRACTION  2011, 2012     COLONOSCOPY       EXCISE TOENAIL(S) Bilateral 8/16/2019    Procedure: MATRIXECTOMY BILATERAL FEET; TOES 1,2,3,4 ON LEFT FOOT,  1,2 ON RIGHT FOOT;  BOTH NAIL BORDERS;  Surgeon: Opal Kam DPM;  Location:  OR     EYE SURGERY      cataract     GI SURGERY       GYN SURGERY      hysterectomy     INJECT STEROID (LOCATION) Right 8/16/2019    Procedure: CORTIZONE INJECTION RIGHT HEEL;  Surgeon: Opal Kam DPM;  Location:  OR     ORTHOPEDIC SURGERY Left     knee replacement     PARTIAL HYSTERECTOMY       STRIP VEIN Bilateral 1975     TOTAL KNEE ARTHROPLASTY Left 2011     Family History   Problem Relation Age of Onset     No Known Problems Mother      No Known Problems Father      Alzheimer Disease Maternal Uncle      Heart Disease Sister      Heart Disease Brother      No Known Problems Daughter      No Known Problems Son      No Known Problems Brother      No Known Problems Son      No Known Problems Son      Social History     Socioeconomic History     Marital status:      Spouse name: Not on file     Number of children: Not on file     Years of education: Not on file     Highest education level: Not on file   Occupational History     Not on file   Tobacco Use     Smoking status: Never     Smokeless tobacco: Never   Vaping Use     Vaping Use: Never used   Substance and Sexual Activity     Alcohol use: Yes     Alcohol/week: 0.8 standard drinks     Comment: Alcoholic Drinks/day: 1-2 glasses per month     Drug use: No     Sexual activity: Yes   Other Topics Concern     Parent/sibling w/ CABG, MI or angioplasty before 65F 55M? Not Asked   Social History Narrative    . 4 kids.  Teaches  college English, reading.      Social Determinants of Health     Financial Resource Strain: Not on file   Food Insecurity: Not on file   Transportation Needs: Not on file   Physical Activity: Not on file   Stress: Not on file   Social Connections: Not on file   Intimate Partner Violence: Not on file   Housing Stability: Not on file       Current Outpatient Medications   Medication Sig Dispense Refill     acetaminophen (TYLENOL) 325 MG tablet Take 2 tablets (650 mg) by mouth every 4 hours as needed for other (mild pain) 100 tablet 0     cefdinir (OMNICEF) 300 MG capsule Take 1 capsule (300 mg) by mouth every 12 hours for 3 doses 3 capsule 0     cholecalciferol (VITAMIN D3) 5000 units TABS tablet Take 2 tablets by mouth daily       Coenzyme Q10 400 MG CAPS Take 800 mg by mouth daily       furosemide (LASIX) 20 MG tablet Take 20 mg by mouth daily       gabapentin (NEURONTIN) 300 MG capsule Take 900 mg by mouth At Bedtime       hydrOXYzine (ATARAX) 10 MG tablet Take 1 tablet (10 mg) by mouth 3 times daily 30 tablet 0     lactobacillus TABS Take 2 capsules by mouth daily       levothyroxine (SYNTHROID/LEVOTHROID) 112 MCG tablet Take 112 mcg by mouth daily       losartan (COZAAR) 50 MG tablet Take 50 mg by mouth 2 times daily       MAGNESIUM PO Take 2 capsules by mouth 2 times daily       oxyCODONE (ROXICODONE) 5 MG tablet Take 0.5-1 tablets (2.5-5 mg) by mouth every 4 hours as needed 20 tablet 0     rivaroxaban ANTICOAGULANT (XARELTO) 20 MG TABS tablet Take 1 tablet (20 mg) by mouth daily (with dinner) 30 tablet 0     rosuvastatin (CRESTOR) 20 MG tablet Take 1 tablet (20 mg) by mouth daily 30 tablet 12     senna-docusate (SENOKOT-S/PERICOLACE) 8.6-50 MG tablet Take 1-2 tablets by mouth 2 times daily Take while on oral narcotics to prevent or treat constipation. 30 tablet 0     denosumab (PROLIA) 60 MG/ML SOSY injection As of 3/14/2023, please hold and pause this medication while at the transitional care unit    "    Allergies   Allergen Reactions     Lisinopril Cough     Reglan [Metoclopramide] Other (See Comments)     depression     Immunization History   Administered Date(s) Administered     COVID-19 Vaccine 12+ (Pfizer) 11/03/2021     COVID-19 Vaccine 18+ (Moderna) 02/05/2021, 03/05/2021, 05/04/2022     COVID-19 Vaccine Bivalent Booster 12+ (Pfizer) 10/05/2022     FLUAD(HD)65+ QUAD 09/11/2020, 09/17/2021, 11/01/2022     Influenza Vaccine >6 months (Alfuria,Fluzone) 10/12/2016     Influenza Vaccine, 6+MO IM (QUADRIVALENT W/PRESERVATIVES) 09/17/2015, 09/15/2017, 10/19/2018, 10/09/2019     Pneumo Conj 13-V (2010&after) 04/21/2017     Pneumococcal 23 valent 05/26/2021     Poliovirus, inactivated (IPV) 08/18/2022     TD (ADULT, 7+) 01/01/2013     Tdap (Adacel,Boostrix) 08/31/2022     Zoster vaccine recombinant adjuvanted (SHINGRIX) 06/15/2019, 11/16/2019         Post Discharge Medication Reconciliation Status: discharge medications reconciled and changed, per note/orders.    Medications list and allergies in the facility chart have been reviewed.  Please see facility EMR for most up to date list.         Review of Systems   Patient denies fever, chills, headache, lightheadedness, dizziness, rhinorrhea, cough, congestion, shortness of breath, chest pain, palpitations, constipation, change in appetite, change in sleep pattern, dysuria, frequency, burning or pain with urination.  Other than stated in HPI all other review of systems is negative.         Physical Exam  Vital signs:/70   Pulse 74   Temp 98.3  F (36.8  C)   Resp 18   Ht 1.651 m (5' 5\")   Wt 119.3 kg (263 lb)   SpO2 94%   BMI 43.77 kg/m     GENERAL APPEARANCE: Well developed, elderly female, in no acute distress.  HEENT: normocephalic, atraumatic   sclerae anicteric, conjunctivae clear and moist, EOM intact  LUNGS: Lung sounds CTA, no adventitious sounds, respiratory effort normal.  CARD: RRR, S1, S2, without murmurs, gallops, rubs  ABD: Soft, " nondistended and nontender with hyperactive bowels  MSK: Muscle strength and tone were equal bilaterally. Moves all extremities easily and intentionally.   EXTREMITIES: right leg wrapped, no edema of left leg  NEURO: Alert and oriented x 3. Face is symmetric.  SKIN: Inspection of the skin reveals no rashes, ulcerations or petechiae.  PSYCH: euthymic          Labs:   Last Comprehensive Metabolic Panel:  Lab Results   Component Value Date     03/10/2023    POTASSIUM 4.2 03/10/2023    CHLORIDE 105 03/10/2023    CO2 26 03/10/2023    ANIONGAP 8 03/10/2023    GLC 90 03/10/2023    BUN 15 03/10/2023    CR 0.56 (L) 03/10/2023    GFRESTIMATED 89 03/10/2023    DAMARIS 8.7 03/10/2023       Lab Results   Component Value Date    WBC 7.9 03/10/2023     Lab Results   Component Value Date    RBC 3.94 03/10/2023     Lab Results   Component Value Date    HGB 11.0 03/10/2023     Lab Results   Component Value Date    HCT 33.2 03/10/2023     Lab Results   Component Value Date    MCV 84 03/10/2023     Lab Results   Component Value Date    MCH 27.9 03/10/2023     Lab Results   Component Value Date    MCHC 33.1 03/10/2023     Lab Results   Component Value Date    RDW 13.9 03/10/2023     Lab Results   Component Value Date     03/10/2023           Assessment/plan:   Coffee ground emesis  Hold xarelto, advised nursing that if she were to have another coffee ground emesis to send to the ER for evaluation.  Obtain CBC tomorrow.  May need PPI started.  Discussed supplements with patient and at this time we will hold all extra supplements.  Advised to drink water and eat something bland for lunch to see if nausea re-occurs.  May need zofran. Hold Xarelto for now.     Loose stools  Obtain CDIFF culture, counseled patient on risk for CDIFF.  Bowel meds are prn.  Start lactobacillus. Obtain flatplate abd xray to rule out obstruction with loose stools and emesis.     Status post total right knee replacement  Pain controlled, continue with prn  apap and oxycodone.  PT/OT eval and treat.  On Xarelto for prophylaxis but will hold for now due to coffee ground emesis.  Could consider restarting if symptoms resolved and hgb stable.  Advised nursing to place legs in compression for DVT prophylaxis as she is not ambulatory yet.     Cellulitis of right lower extremity  Continue with Omnicef through today.      Paroxysmal atrial fibrillation (H)  Rate controlled without rate controlling medications.  Not typically on AC, Xarelto on hold.     Essential hypertension  Diet controlled only.      Lymphedema  Lymphedema specialist to see today to determine if lymphedema wraps are still needed or if she could be put in her home compression garments. Continue on home lasix    Hyperlipidemia, unspecified hyperlipidemia type  On home Crestor    Obstructive sleep apnea  Okay to use home cpap on home settings.           Electronically signed by: Ashly Elizabeth NP          Sincerely,        Ashly Elizabeth NP

## 2023-03-15 NOTE — PROGRESS NOTES
Occupational Therapy Discharge Summary    Reason for therapy discharge:    Discharged to transitional care facility.    Progress towards therapy goal(s). See goals on Care Plan in [facility] electronic health record for goal details.  Goals not met.  Barriers to achieving goals:   discharge from facility.    Therapy recommendation(s):    Continued therapy is recommended.  Rationale/Recommendations:  Pt weak, not at baseline for ADL.

## 2023-03-16 ENCOUNTER — TRANSITIONAL CARE UNIT VISIT (OUTPATIENT)
Dept: GERIATRICS | Facility: CLINIC | Age: 83
End: 2023-03-16
Payer: MEDICARE

## 2023-03-16 ENCOUNTER — LAB REQUISITION (OUTPATIENT)
Dept: LAB | Facility: CLINIC | Age: 83
End: 2023-03-16
Payer: MEDICARE

## 2023-03-16 VITALS
SYSTOLIC BLOOD PRESSURE: 122 MMHG | WEIGHT: 263.8 LBS | TEMPERATURE: 98.3 F | HEART RATE: 76 BPM | OXYGEN SATURATION: 97 % | RESPIRATION RATE: 18 BRPM | BODY MASS INDEX: 43.95 KG/M2 | DIASTOLIC BLOOD PRESSURE: 64 MMHG | HEIGHT: 65 IN

## 2023-03-16 DIAGNOSIS — Z96.651 S/P TOTAL KNEE ARTHROPLASTY, RIGHT: ICD-10-CM

## 2023-03-16 DIAGNOSIS — M81.8 OTHER OSTEOPOROSIS WITHOUT CURRENT PATHOLOGICAL FRACTURE: ICD-10-CM

## 2023-03-16 DIAGNOSIS — I89.0 LYMPHEDEMA OF BOTH LOWER EXTREMITIES: ICD-10-CM

## 2023-03-16 DIAGNOSIS — I48.0 PAROXYSMAL ATRIAL FIBRILLATION (H): ICD-10-CM

## 2023-03-16 DIAGNOSIS — E89.0 H/O TOTAL THYROIDECTOMY: ICD-10-CM

## 2023-03-16 DIAGNOSIS — Z78.9 IMPAIRED MOBILITY AND ADLS: ICD-10-CM

## 2023-03-16 DIAGNOSIS — L03.115 CELLULITIS OF RIGHT LOWER EXTREMITY: Primary | ICD-10-CM

## 2023-03-16 DIAGNOSIS — C73 PAPILLARY MICROCARCINOMA OF THYROID (H): ICD-10-CM

## 2023-03-16 DIAGNOSIS — I87.2 VENOUS INSUFFICIENCY OF BOTH LOWER EXTREMITIES: ICD-10-CM

## 2023-03-16 DIAGNOSIS — I50.32 CHRONIC HEART FAILURE WITH PRESERVED EJECTION FRACTION (HFPEF) (H): ICD-10-CM

## 2023-03-16 DIAGNOSIS — R11.10 VOMITING, UNSPECIFIED: ICD-10-CM

## 2023-03-16 DIAGNOSIS — M17.11 PRIMARY OSTEOARTHRITIS OF RIGHT KNEE: ICD-10-CM

## 2023-03-16 DIAGNOSIS — Z74.09 IMPAIRED MOBILITY AND ADLS: ICD-10-CM

## 2023-03-16 DIAGNOSIS — R53.81 PHYSICAL DECONDITIONING: ICD-10-CM

## 2023-03-16 DIAGNOSIS — R11.2 NAUSEA AND VOMITING, UNSPECIFIED VOMITING TYPE: ICD-10-CM

## 2023-03-16 DIAGNOSIS — G47.33 OBSTRUCTIVE SLEEP APNEA: ICD-10-CM

## 2023-03-16 DIAGNOSIS — Z91.89 AT RISK FOR MALNUTRITION: ICD-10-CM

## 2023-03-16 PROBLEM — Z90.89 H/O TOTAL THYROIDECTOMY: Status: ACTIVE | Noted: 2021-03-14

## 2023-03-16 PROBLEM — Z98.890 H/O TOTAL THYROIDECTOMY: Status: ACTIVE | Noted: 2021-03-14

## 2023-03-16 PROCEDURE — 99306 1ST NF CARE HIGH MDM 50: CPT | Performed by: FAMILY MEDICINE

## 2023-03-16 RX ORDER — ACETAMINOPHEN 325 MG/1
650 TABLET ORAL EVERY 8 HOURS PRN
Qty: 100 TABLET | Refills: 0 | COMMUNITY
Start: 2023-03-16 | End: 2023-04-11 | Stop reason: DRUGHIGH

## 2023-03-16 NOTE — PROGRESS NOTES
"[facility] GERIATRICS    PRIMARY CARE PROVIDER AND CLINIC:  Yoanna Mcpherson MD, [address] [facility] / [place] [address]  Chief Complaint   Patient presents with     Hospital F/U      [place] Medical Record Number:  [id number]  Place of Service where encounter took place:  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. Hospital stay 3/9/23 through 3/14/23.     HPI:      Hospital course  Prior to his most recent admission, she had undergone a planned R total knee arthroplasty 3/7/2023 without complications.  She was able to discharge 3/8/2023.  There are no postoperative complications.  She was continued on her prior Xarelto for DVT prophylaxis.  Plan was for her to continue with therapies at home.  However she was requiring significant more assistance.  On review, she was unable to complete her ADLs and reduced requiring 4 people to transfer.  That she return to the hospital for further evaluation.  She was noted to have significantly more swelling and erythema of her right lower extremity. DVT ultrasound was obtained which was negative for DVT.  BMP overall normal with a relatively low creatinine.  She had mild anemia but not significant decrease from surgery.  No leukocytosis.  She was admitted for treatment of cellulitis.  Was started on ceftriaxone with improvement of pain.  She was able to transition to oral cefdinir with ongoing improvement.  With her readmission, and deconditioning related to pain, she was evaluated with therapies and recommendation was discharged to TCU.    Seen by colleague Ashly Elizabeth NP earlier this week.  At that time, Leslie was having increased left lower quadrant abdominal pain with associated diarrhea.  Additionally had described possible coffee-ground " emesis.  CBC was ordered, all of her supplements were placed on hold.  C. difficile was obtained which has returned negative.  She had recently completed her cefdinir as well.    Today  Today she reports having significant improvement.  Says that when she first came here she was quite constipated.  She drank a bunch of coffee and prune juice and said it switched into diarrhea but that has now resolved as well.  She did have significant nausea yesterday morning and did have an episode of vomiting.  She described this as producing purple water.  Thought that was may be related to the prune juice.  She did not see individual dark flecks.  She did feel much improved afterwards.  She has not had any ongoing nausea or vomiting.  No hypotension or dizziness.  No abdominal pain at this time. She noted that she was doing okay in the hospital but had been feeling off the last few days and then again felt much better after vomiting.    Her leg is also feeling significantly improved.  Says the cellulitis has cleared up.  She still is having difficulty with placing weight on her legs related to postoperative pain and her chronic swelling.  Currently requiring a Linda steady for transfers.  She is weightbearing as tolerated on her lower extremities but has been able to place only limited weight on her right leg.  She does have mild pain at rest but is tolerable.  She wishes to have oxycodone removed from her medication list that she has had issues with this in the past.    Her appetite has significantly improved.  She is able to eat breakfast this morning without issue.  Says she finally got a green banana which she enjoys.   No chest pain or pressure, no shortness of breath.    She has been sleeping okay.  She takes gabapentin at night to assist her with sleep.  She originally started this related to postherpetic neuralgia but stayed on it as it seemed to improve her sleep.  She additionally wears a CPAP at night regularly, wears  it for 8 hours at a time.    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  She lives at home on her own.  Her and her partner  many years ago.  He now has cognitive impairment as well and her daughter assists with this.  She does have a person come by to help with her compression stockings and other odd jobs around the house otherwise she manages on her own.  She does have grab bars and a shower bench she uses.  Denies any difficulty with transfers from the toilet. She has no incontinence of bowel or bladder.  She greatly enjoys visits with her grandchildren.    CODE STATUS/ADVANCE DIRECTIVES DISCUSSION:  Prior  CPR/Full code      PAST SURGICAL HISTORY:   has a past surgical history that includes orthopedic surgery (Left); Eye surgery; GYN surgery; GI surgery; Excise toenail(s) (Bilateral, 8/16/2019); Inject steroid (location) (Right, 8/16/2019); Total Knee Arthroplasty (Left, 2011); Cataract Extraction (2011, 2012); Colonoscopy; Strip vein (Bilateral, 1975); Partial Hysterectomy; and Arthroplasty knee (Right, 3/7/2023).  FAMILY HISTORY: family history includes Alzheimer Disease in her maternal uncle; Heart Disease in her brother and sister; No Known Problems in her brother, daughter, father, mother, son, son, and son.  SOCIAL HISTORY:   reports that she has never smoked. She has never used smokeless tobacco. She reports current alcohol use of about 0.8 standard drinks per week. She reports that she does not use drugs.  Patient's living condition: lives alone    Post Discharge Medication Reconciliation Status:   MED REC REQUIRED  Post Medication Reconciliation Status: medication reconcilation previously completed during another office visit       Current Outpatient Medications   Medication Sig     acetaminophen (TYLENOL) 325 MG tablet Take 2 tablets (650 mg) by mouth 3 times daily     cholecalciferol (VITAMIN D3) 5000 units TABS tablet Take 2 tablets by mouth daily      "Coenzyme Q10 400 MG CAPS Take 800 mg by mouth daily     denosumab (PROLIA) 60 MG/ML SOSY injection As of 3/14/2023, please hold and pause this medication while at the transitional care unit     furosemide (LASIX) 20 MG tablet Take 20 mg by mouth daily     gabapentin (NEURONTIN) 300 MG capsule Take 900 mg by mouth At Bedtime     lactobacillus TABS Take 2 capsules by mouth daily     levothyroxine (SYNTHROID/LEVOTHROID) 112 MCG tablet Take 112 mcg by mouth daily     losartan (COZAAR) 50 MG tablet Take 50 mg by mouth 2 times daily     MAGNESIUM PO Take 2 capsules by mouth 2 times daily     rivaroxaban ANTICOAGULANT (XARELTO) 20 MG TABS tablet Take 1 tablet (20 mg) by mouth daily (with dinner)     rosuvastatin (CRESTOR) 20 MG tablet Take 1 tablet (20 mg) by mouth daily     senna-docusate (SENOKOT-S/PERICOLACE) 8.6-50 MG tablet Take 1-2 tablets by mouth 2 times daily Take while on oral narcotics to prevent or treat constipation.     No current facility-administered medications for this visit.       Vitals:  /64   Pulse 76   Temp 98.3  F (36.8  C)   Resp 18   Ht 1.651 m (5' 5\")   Wt 119.7 kg (263 lb 12.8 oz)   SpO2 97%   BMI 43.90 kg/m    Exam:    GENERAL APPEARANCE: Laying in chair comfortably, NAD  HENT:  NCAT, not [place], good dentition  EYES:  Conjunctiva clear, anicteric, EOMI, PERRL  PULM  Normal WOB on RA, lungs CTAB with limited exam, good air movement  CV:  Irreguarl, S1/S2 normal, no murmurs; severe LE edema bilaterally   ABDOMEN: Abdomen soft, not tender, not distended, BS normal and active throughout   SKIN:  Right knee surgical wound c/d/i without discharge or erythema; moderate bilateral erythema of lower extremities with venous stasis changes  NEURO: Alert, answering questions appropriately, normal thought processes; CN II-XII grossly intact  PSYCH:  Mood normal with congruent affect, normal rate and volume of speech, insight/judgment intact    Lab/Diagnostic data:  Recent labs and imaging in [facility] " reviewed by me today.     ASSESSMENT/PLAN:    (L03.115) Cellulitis of right lower extremity  (primary encounter diagnosis)  Comment: Primary reason for her admission.  Complication following recent knee surgery also complicated by chronic lower extremity swelling.  Had significant improvement with ceftriaxone while in the hospital and completed full antibiotic course with cefdinir.  No ongoing antibiotics indicated.    (R11.2) Nausea and vomiting, unspecified vomiting type  Comment: Possibly related to cefdinir as seem to primarily be affecting her the last few days after transition from ceftriaxone to cefdinir.  Significant improvement after episode of vomiting.  Her description today seems less consistent with coffee-ground emesis.  Additionally with no ongoing symptoms, no other evidence of a rapid upper GI bleed, overall reassuring against gastric ulcer.  She had significant concerns with being off Xarelto and reasonable to resume given these findings.  Plan:   -Resume Xarelto 20 mg daily    (I87.2) Venous insufficiency of both lower extremities  (I89.0) Lymphedema of both lower extremities  Comment: Likely related to obesity.  Contributing to decreased function and vulnerability to cellulitis.  Plan:   -Therapies for edema treatment    (R53.81) Physical deconditioning  (Z74.09,  Z78.9) Impaired mobility and ADLs  Comment: Related to obesity, lower extremity edema, recent surgery, and hospitalization.  Plan:   -PT/OT    (M17.11) Primary osteoarthritis of right knee  (Z96.651) S/P total knee arthroplasty, right  Comment: Uncomplicated surgery, overall recovering well from this standpoint.  Will discontinue oxycodone per her request.   Plan:   -Tylenol 1000mg TID          (Z91.89) At risk for malnutrition  Comment: Significantly low creatinine suggesting poor muscle bulk, particular in context of her age.  She is on multiple supplements related to a history of possible mineral deficiencies.  Unclear if she has  underlying malabsorptive process.  No history of gastric surgery  Plan:   -RD eval and treat    (G47.33) Obstructive sleep apnea  Comment: Contributing to deconditioning.  Using CPAP regularly and throughout the night.  No changes at this time.    (C73) Papillary microcarcinoma of thyroid (H)  (E89.0) H/O total thyroidectomy  Comment: Follows with endocrinology related to this.  Continues on levothyroxine 112 mcg daily.  Further recommendation, plan to maintain TSH between 1-2.  Plan:   -Continue PTA levothyroxine    (M81.8) Other osteoporosis without current pathological fracture  Comment: Related to the above.  Again following with endocrinology for this.  Has been on Prolia due next in April.    (I50.32) Chronic heart failure with preserved ejection fraction (HFpEF) (H)  Comment: Does have significant lower extremity edema, else no findings of decompensation.  Plan:   -Continue furosemide 20 mg daily    (I48.0) Paroxysmal atrial fibrillation (H)  Comment: Rate controlled.  Plan:   -Resume anticoagulation with Xarelto 20 mg daily    Orders:  [x] discontinue hydroxyzine  [x] discontinue oxycodone  [x] schedule tylenol 1000mg TID    Electronically signed by:    Benjamin Rosenstein, MD, MA  [facility]    This note was completed with the assistance of dictation software. Typos and word substitution-errors are expected and unintended.       63 MINUTES SPENT BY ME on the date of service doing chart review, history, exam, documentation & further activities per the note.

## 2023-03-16 NOTE — LETTER
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 Hospital stay 3/9/23 through 3/14/23.     HPI:      Hospital course  Prior to his most recent admission, she had undergone a planned R total knee arthroplasty 3/7/2023 without complications.  She was able to discharge 3/8/2023.  There are no postoperative complications.  She was continued on her prior Xarelto for DVT prophylaxis.  Plan was for her to continue with therapies at home.  However she was requiring significant more assistance.  On review, she was unable to complete her ADLs and reduced requiring 4 people to transfer.  That she return to the hospital for further evaluation.  She was noted to have significantly more swelling and erythema of her right lower extremity. DVT ultrasound was obtained which was negative for DVT.  BMP overall normal with a relatively low creatinine.  She had mild anemia but not significant decrease from surgery.  No leukocytosis.  She was admitted for treatment of cellulitis.  Was started on ceftriaxone with improvement of pain.  She was able to transition to oral cefdinir with ongoing improvement.  With her readmission, and deconditioning related to pain, she was evaluated with therapies and recommendation was discharged to TCU.    Seen by colleague Ashly Elizabeth NP earlier this week.  At that time, Leslie was having increased left " lower quadrant abdominal pain with associated diarrhea.  Additionally had described possible coffee-ground emesis.  CBC was ordered, all of her supplements were placed on hold.  C. difficile was obtained which has returned negative.  She had recently completed her cefdinir as well.    Today  Today she reports having significant improvement.  Says that when she first came here she was quite constipated.  She drank a bunch of coffee and prune juice and said it switched into diarrhea but that has now resolved as well.  She did have significant nausea yesterday morning and did have an episode of vomiting.  She described this as producing purple water.  Thought that was may be related to the prune juice.  She did not see individual dark flecks.  She did feel much improved afterwards.  She has not had any ongoing nausea or vomiting.  No hypotension or dizziness.  No abdominal pain at this time. She noted that she was doing okay in the hospital but had been feeling off the last few days and then again felt much better after vomiting.    Her leg is also feeling significantly improved.  Says the cellulitis has cleared up.  She still is having difficulty with placing weight on her legs related to postoperative pain and her chronic swelling.  Currently requiring a Linda steady for transfers.  She is weightbearing as tolerated on her lower extremities but has been able to place only limited weight on her right leg.  She does have mild pain at rest but is tolerable.  She wishes to have oxycodone removed from her medication list that she has had issues with this in the past.    Her appetite has significantly improved.  She is able to eat breakfast this morning without issue.  Says she finally got a green banana which she enjoys.   No chest pain or pressure, no shortness of breath.    She has been sleeping okay.  She takes gabapentin at night to assist her with sleep.  She originally started this related to postherpetic neuralgia  but stayed on it as it seemed to improve her sleep.  She additionally wears a CPAP at night regularly, wears it for 8 hours at a time.    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.  She lives at home on her own.  Her and her partner  many years ago.  He now has cognitive impairment as well and her daughter assists with this.  She does have a person come by to help with her compression stockings and other odd jobs around the house otherwise she manages on her own.  She does have grab bars and a shower bench she uses.  Denies any difficulty with transfers from the toilet. She has no incontinence of bowel or bladder.  She greatly enjoys visits with her grandchildren.    CODE STATUS/ADVANCE DIRECTIVES DISCUSSION:  Prior  CPR/Full code      PAST SURGICAL HISTORY:   has a past surgical history that includes orthopedic surgery (Left); Eye surgery; GYN surgery; GI surgery; Excise toenail(s) (Bilateral, 8/16/2019); Inject steroid (location) (Right, 8/16/2019); Total Knee Arthroplasty (Left, 2011); Cataract Extraction (2011, 2012); Colonoscopy; Strip vein (Bilateral, 1975); Partial Hysterectomy; and Arthroplasty knee (Right, 3/7/2023).  FAMILY HISTORY: family history includes Alzheimer Disease in her maternal uncle; Heart Disease in her brother and sister; No Known Problems in her brother, daughter, father, mother, son, son, and son.  SOCIAL HISTORY:   reports that she has never smoked. She has never used smokeless tobacco. She reports current alcohol use of about 0.8 standard drinks per week. She reports that she does not use drugs.  Patient's living condition: lives alone    Post Discharge Medication Reconciliation Status:   MED REC REQUIRED  Post Medication Reconciliation Status: medication reconcilation previously completed during another office visit       Current Outpatient Medications   Medication Sig     acetaminophen (TYLENOL) 325 MG tablet Take 2 tablets (650 mg) by  "mouth 3 times daily     cholecalciferol (VITAMIN D3) 5000 units TABS tablet Take 2 tablets by mouth daily     Coenzyme Q10 400 MG CAPS Take 800 mg by mouth daily     denosumab (PROLIA) 60 MG/ML SOSY injection As of 3/14/2023, please hold and pause this medication while at the transitional care unit     furosemide (LASIX) 20 MG tablet Take 20 mg by mouth daily     gabapentin (NEURONTIN) 300 MG capsule Take 900 mg by mouth At Bedtime     lactobacillus TABS Take 2 capsules by mouth daily     levothyroxine (SYNTHROID/LEVOTHROID) 112 MCG tablet Take 112 mcg by mouth daily     losartan (COZAAR) 50 MG tablet Take 50 mg by mouth 2 times daily     MAGNESIUM PO Take 2 capsules by mouth 2 times daily     rivaroxaban ANTICOAGULANT (XARELTO) 20 MG TABS tablet Take 1 tablet (20 mg) by mouth daily (with dinner)     rosuvastatin (CRESTOR) 20 MG tablet Take 1 tablet (20 mg) by mouth daily     senna-docusate (SENOKOT-S/PERICOLACE) 8.6-50 MG tablet Take 1-2 tablets by mouth 2 times daily Take while on oral narcotics to prevent or treat constipation.     No current facility-administered medications for this visit.       Vitals:  /64   Pulse 76   Temp 98.3  F (36.8  C)   Resp 18   Ht 1.651 m (5' 5\")   Wt 119.7 kg (263 lb 12.8 oz)   SpO2 97%   BMI 43.90 kg/m    Exam:    GENERAL APPEARANCE: Laying in chair comfortably, NAD  HENT:  NCAT, not [place], good dentition  EYES:  Conjunctiva clear, anicteric, EOMI, PERRL  PULM  Normal WOB on RA, lungs CTAB with limited exam, good air movement  CV:  Irreguarl, S1/S2 normal, no murmurs; severe LE edema bilaterally   ABDOMEN: Abdomen soft, not tender, not distended, BS normal and active throughout   SKIN:  Right knee surgical wound c/d/i without discharge or erythema; moderate bilateral erythema of lower extremities with venous stasis changes  NEURO: Alert, answering questions appropriately, normal thought processes; CN II-XII grossly intact  PSYCH:  Mood normal with congruent affect, " normal rate and volume of speech, insight/judgment intact    Lab/Diagnostic data:  Recent labs and imaging in [facility] reviewed by me today.     ASSESSMENT/PLAN:    (L03.115) Cellulitis of right lower extremity  (primary encounter diagnosis)  Comment: Primary reason for her admission.  Complication following recent knee surgery also complicated by chronic lower extremity swelling.  Had significant improvement with ceftriaxone while in the hospital and completed full antibiotic course with cefdinir.  No ongoing antibiotics indicated.    (R11.2) Nausea and vomiting, unspecified vomiting type  Comment: Possibly related to cefdinir as seem to primarily be affecting her the last few days after transition from ceftriaxone to cefdinir.  Significant improvement after episode of vomiting.  Her description today seems less consistent with coffee-ground emesis.  Additionally with no ongoing symptoms, no other evidence of a rapid upper GI bleed, overall reassuring against gastric ulcer.  She had significant concerns with being off Xarelto and reasonable to resume given these findings.  Plan:   -Resume Xarelto 20 mg daily    (I87.2) Venous insufficiency of both lower extremities  (I89.0) Lymphedema of both lower extremities  Comment: Likely related to obesity.  Contributing to decreased function and vulnerability to cellulitis.  Plan:   -Therapies for edema treatment    (R53.81) Physical deconditioning  (Z74.09,  Z78.9) Impaired mobility and ADLs  Comment: Related to obesity, lower extremity edema, recent surgery, and hospitalization.  Plan:   -PT/OT    (M17.11) Primary osteoarthritis of right knee  (Z96.651) S/P total knee arthroplasty, right  Comment: Uncomplicated surgery, overall recovering well from this standpoint.  Will discontinue oxycodone per her request.   Plan:   -Tylenol 1000mg TID          (Z91.89) At risk for malnutrition  Comment: Significantly low creatinine suggesting poor muscle bulk, particular in context of her  age.  She is on multiple supplements related to a history of possible mineral deficiencies.  Unclear if she has underlying malabsorptive process.  No history of gastric surgery  Plan:   -RD eval and treat    (G47.33) Obstructive sleep apnea  Comment: Contributing to deconditioning.  Using CPAP regularly and throughout the night.  No changes at this time.    (C73) Papillary microcarcinoma of thyroid (H)  (E89.0) H/O total thyroidectomy  Comment: Follows with endocrinology related to this.  Continues on levothyroxine 112 mcg daily.  Further recommendation, plan to maintain TSH between 1-2.  Plan:   -Continue PTA levothyroxine    (M81.8) Other osteoporosis without current pathological fracture  Comment: Related to the above.  Again following with endocrinology for this.  Has been on Prolia due next in April.    (I50.32) Chronic heart failure with preserved ejection fraction (HFpEF) (H)  Comment: Does have significant lower extremity edema, else no findings of decompensation.  Plan:   -Continue furosemide 20 mg daily    (I48.0) Paroxysmal atrial fibrillation (H)  Comment: Rate controlled.  Plan:   -Resume anticoagulation with Xarelto 20 mg daily    Orders:  [x] discontinue hydroxyzine  [x] discontinue oxycodone  [x] schedule tylenol 1000mg TID    Electronically signed by:    Benjamin Rosenstein, MD, MA  [facility]    This note was completed with the assistance of dictation software. Typos and word substitution-errors are expected and unintended.       63 MINUTES SPENT BY ME on the date of service doing chart review, history, exam, documentation & further activities per the note.             Sincerely,        Benjamin Rosenstein, MD

## 2023-03-17 ENCOUNTER — TELEPHONE (OUTPATIENT)
Dept: GERIATRICS | Facility: CLINIC | Age: 83
End: 2023-03-17
Payer: MEDICARE

## 2023-03-17 ENCOUNTER — LAB REQUISITION (OUTPATIENT)
Dept: LAB | Facility: CLINIC | Age: 83
End: 2023-03-17
Payer: MEDICARE

## 2023-03-17 DIAGNOSIS — D64.9 ANEMIA, UNSPECIFIED: ICD-10-CM

## 2023-03-17 LAB
ANION GAP SERPL CALCULATED.3IONS-SCNC: 12 MMOL/L (ref 7–15)
BUN SERPL-MCNC: 15.8 MG/DL (ref 8–23)
CALCIUM SERPL-MCNC: 8 MG/DL (ref 8.8–10.2)
CHLORIDE SERPL-SCNC: 105 MMOL/L (ref 98–107)
CREAT SERPL-MCNC: 0.7 MG/DL (ref 0.51–0.95)
DEPRECATED HCO3 PLAS-SCNC: 22 MMOL/L (ref 22–29)
ERYTHROCYTE [DISTWIDTH] IN BLOOD BY AUTOMATED COUNT: 14.2 % (ref 10–15)
GFR SERPL CREATININE-BSD FRML MDRD: 84 ML/MIN/1.73M2
GLUCOSE SERPL-MCNC: 92 MG/DL (ref 70–99)
HCT VFR BLD AUTO: 35.2 % (ref 35–47)
HGB BLD-MCNC: 10.8 G/DL (ref 11.7–15.7)
MAGNESIUM SERPL-MCNC: 2.3 MG/DL (ref 1.7–2.3)
MCH RBC QN AUTO: 27.6 PG (ref 26.5–33)
MCHC RBC AUTO-ENTMCNC: 30.7 G/DL (ref 31.5–36.5)
MCV RBC AUTO: 90 FL (ref 78–100)
PLATELET # BLD AUTO: 268 10E3/UL (ref 150–450)
POTASSIUM SERPL-SCNC: 3.7 MMOL/L (ref 3.4–5.3)
RBC # BLD AUTO: 3.91 10E6/UL (ref 3.8–5.2)
SODIUM SERPL-SCNC: 139 MMOL/L (ref 136–145)
WBC # BLD AUTO: 5.7 10E3/UL (ref 4–11)

## 2023-03-17 PROCEDURE — 82310 ASSAY OF CALCIUM: CPT | Performed by: FAMILY MEDICINE

## 2023-03-17 PROCEDURE — 83735 ASSAY OF MAGNESIUM: CPT | Performed by: FAMILY MEDICINE

## 2023-03-17 PROCEDURE — 85014 HEMATOCRIT: CPT | Performed by: FAMILY MEDICINE

## 2023-03-17 PROCEDURE — 36415 COLL VENOUS BLD VENIPUNCTURE: CPT | Performed by: FAMILY MEDICINE

## 2023-03-17 PROCEDURE — P9604 ONE-WAY ALLOW PRORATED TRIP: HCPCS | Performed by: FAMILY MEDICINE

## 2023-03-18 NOTE — PROGRESS NOTES
Staff paged calling in today's labs, notable for hgb 10.8. Mild drop noted.    Orders  Recheck Hgb Monday    Dr Sharyn NATH DNP

## 2023-03-20 LAB — HGB BLD-MCNC: 11.4 G/DL (ref 11.7–15.7)

## 2023-03-20 PROCEDURE — 85018 HEMOGLOBIN: CPT | Performed by: FAMILY MEDICINE

## 2023-03-20 PROCEDURE — 36415 COLL VENOUS BLD VENIPUNCTURE: CPT | Performed by: FAMILY MEDICINE

## 2023-03-20 PROCEDURE — P9604 ONE-WAY ALLOW PRORATED TRIP: HCPCS | Performed by: FAMILY MEDICINE

## 2023-03-23 ENCOUNTER — TRANSITIONAL CARE UNIT VISIT (OUTPATIENT)
Dept: GERIATRICS | Facility: CLINIC | Age: 83
End: 2023-03-23
Payer: MEDICARE

## 2023-03-23 DIAGNOSIS — L97.211 ULCER OF RIGHT CALF, LIMITED TO BREAKDOWN OF SKIN (H): ICD-10-CM

## 2023-03-23 DIAGNOSIS — Z96.651 HISTORY OF ARTHROPLASTY OF RIGHT KNEE: ICD-10-CM

## 2023-03-23 DIAGNOSIS — L03.115 CELLULITIS OF RIGHT LOWER EXTREMITY: Primary | ICD-10-CM

## 2023-03-23 DIAGNOSIS — I87.2 VENOUS INSUFFICIENCY OF BOTH LOWER EXTREMITIES: ICD-10-CM

## 2023-03-23 DIAGNOSIS — E66.01 OBESITY, CLASS III, BMI 40-49.9 (MORBID OBESITY) (H): ICD-10-CM

## 2023-03-23 DIAGNOSIS — I50.32 CHRONIC HEART FAILURE WITH PRESERVED EJECTION FRACTION (HFPEF) (H): ICD-10-CM

## 2023-03-23 PROCEDURE — 99309 SBSQ NF CARE MODERATE MDM 30: CPT | Performed by: NURSE PRACTITIONER

## 2023-03-23 NOTE — LETTER
3/23/2023        RE: Debra Manriquez  [address]  [address]  [place] [address]        [facility]    Name:   Debra Manriquez  :   1940  MRN:    [id number]     Facility:   [facility] (SNF) [[id number]]   Room: [place] [address]  Code Status: FULL CODE and POLST AVAILABLE -     DOS:  2023    PCP:  Yoanna Mcpherson MD     CHIEF COMPLAINT / REASON FOR VISIT:  Chief Complaint   Patient presents with     Clinic Care Coordination - Follow-up     Cellulitis of right lower extremity      [facility] from 2023 until 2023 (RLE cellulitis, venous stasis)      HPI: Debra is an 83 year old female with a past medical history significant for fibrocystic breast disease, GERD, hyperlipidemia, essential hypertension, acquired lymphedema, obstructive sleep apnea, osteoarthritis of the knee, and a mitral valve disorder, who was admitted to the hospital on  due to left leg post surgical pain.    Brief review of hospital course, excerpted from the hospital discharge summary:    3/07 -- Admitted through 3/8 for TKA and discharged home where functional status declined.  3/09 -- Went to  ED, Dx cellulitis of right LE, treated with ceftriaxone. U/S right leg negative for DVT.  Ortho consulted.  3/10 -- Leg pain poorly controlled, PT/OT recommended TCU, Hgb 11 from baseline 12.3  3/11 -- Ceftriaxone continued  3/12 -- Pain improved, transitioned to p.o. cefdinir, bordered cellulitis.  Caffeine and glycerine suppository for constipation.  3/13 -- Cellulitis shrinking, pt having loose BMs.   3/14 -- Clinically improved and ready medically for discharge.      On 3/14, clinically close to a return to baseline, remaining vitally and hemodynamically stable. TCU acceptance for rehabilitation, and ,discharged in stable medical condition with plan to complete 3 more doses of cefdinir for total course of 7 days.    Also initially given hydroxyzine for  "anxiety.  Her denosumab/Prolia to be held/paused while at the TCU.  F/U with the facility provider within a week.    Additionally F/U with orthopedics as well.       CURRENT/RECENT TCU ISSUES    Disposition  -- She apparently went to bed at 9 PM last night but was in severe pain by 11 PM.  -- Pain management is a bit confusing.  Pain, according to the patient, tends to go \"up and down.\"  She says she was doing okay but experiencing more pain, hence the order for tramadol.  I was told by the nurse manager that they had gotten an order for tramadol from ortho (patient called) while already on Norco.  She was apparently getting relief with tramadol, yet she mentions receiving Vicodin as well.  We discussed round-the-clock pain management and will schedule for getting every 8 hours, at 0600, 1400, and 2200.  -- We will also adjust her gabapentin to meet PTA dosing by increasing her 900 mg dose at bedtime to 1200 mg.  -- She is interested in taking supplements (K2 and omega-3), but I suggested there would be no harm in waiting until discharge to resume those supplements.    Ulcer of right calf  -- Completion of oral antibiotics.    HFpEF  -- Currently asymptomatic.  LE edema is minimal.    Obesity  -- BMI >40    DVT prophylaxis  - On DOAC.  Recent venous Doppler ultrasound was negative for DVT.      ROS:  -- 10 point ROS of systems including Constitutional, Eyes, Respiratory, Cardiovascular, Gastroenterology, Genitourinary, Integumentary, Muscularskeletal, Psychiatric were all negative except for pertinent positives noted in my HPI.      Past Medical History:   Diagnosis Date     Antiplatelet or antithrombotic long-term use      Chronic acquired lymphedema      Edema      Falls      Fibrocystic breast disease      Fibrocystic breast disease      Foot pain      Gastroesophageal reflux disease      GERD (gastroesophageal reflux disease)      Hyperlipidemia      Hypertension      Irregular heart beat      Lymphedema      " Mitral valve disorder      Obese      JADYN (obstructive sleep apnea)      Osteoarthritis of knee     hx of knee replacement and pending second     Osteopenia      Skin cancer      Skin cancer, basal cell      Sleep apnea      Thyroid nodule      Thyroid nodule      Vitamin B deficiency               Family History   Problem Relation Age of Onset     No Known Problems Mother      No Known Problems Father      Alzheimer Disease Maternal Uncle      Heart Disease Sister      Heart Disease Brother      No Known Problems Daughter      No Known Problems Son      No Known Problems Brother      No Known Problems Son      No Known Problems Son      Social History     Socioeconomic History     Marital status:      Spouse name: None     Number of children: None     Years of education: None     Highest education level: None   Tobacco Use     Smoking status: Never     Smokeless tobacco: Never   Vaping Use     Vaping Use: Never used   Substance and Sexual Activity     Alcohol use: Yes     Alcohol/week: 0.8 standard drinks     Comment: Alcoholic Drinks/day: 1-2 glasses per month     Drug use: No     Sexual activity: Yes   Social History Narrative    . 4 kids.  Teaches college English, reading.        MEDICATIONS: Reviewed from the MAR, physician orders, and/or earlier progress notes.  MED REC REQUIRED  Post Medication Reconciliation Status: medication reconcilation previously completed during another office visit    Current Outpatient Medications   Medication Sig     acetaminophen (TYLENOL) 325 MG tablet Take 2 tablets (650 mg) by mouth 3 times daily     cholecalciferol (VITAMIN D3) 5000 units TABS tablet Take 2 tablets by mouth daily     Coenzyme Q10 400 MG CAPS Take 800 mg by mouth daily     denosumab (PROLIA) 60 MG/ML SOSY injection As of 3/14/2023, please hold and pause this medication while at the transitional care unit     furosemide (LASIX) 20 MG tablet Take 20 mg by mouth daily     gabapentin (NEURONTIN) 300  "MG capsule Take 900 mg by mouth At Bedtime     lactobacillus TABS Take 2 capsules by mouth daily     levothyroxine (SYNTHROID/LEVOTHROID) 112 MCG tablet Take 112 mcg by mouth daily     losartan (COZAAR) 50 MG tablet Take 50 mg by mouth 2 times daily     MAGNESIUM PO Take 2 capsules by mouth 2 times daily     rivaroxaban ANTICOAGULANT (XARELTO) 20 MG TABS tablet Take 1 tablet (20 mg) by mouth daily (with dinner)     rosuvastatin (CRESTOR) 20 MG tablet Take 1 tablet (20 mg) by mouth daily     senna-docusate (SENOKOT-S/PERICOLACE) 8.6-50 MG tablet Take 1-2 tablets by mouth 2 times daily Take while on oral narcotics to prevent or treat constipation.     No current facility-administered medications for this visit.     ALLERGIES:   Allergies   Allergen Reactions     Lisinopril Cough     Reglan [Metoclopramide] Other (See Comments)     depression     DIET: Regular, regular texture, thin liquids.    Vitals:    03/24/23 1009   BP: (!) 155/87   Pulse: 77   Resp: 19   Temp: 97.5  F (36.4  C)   SpO2: 96%   Weight: 114.3 kg (252 lb)   Height: 1.651 m (5' 5\")     Wt Readings from Last 4 Encounters:   03/24/23 114.3 kg (252 lb)   03/16/23 119.7 kg (263 lb 12.8 oz)   03/15/23 119.3 kg (263 lb)   03/14/23 120.2 kg (264 lb 15.9 oz)     Body mass index is 41.93 kg/m .  Body surface area is 2.29 meters squared.    EXAMINATION:   General: Pleasant, alert, and conversant elderly female, in no apparent distress.  Head: Normocephalic and atraumatic.   Eyes: PERRLA, sclerae clear.   ENT: Moist oral mucosa.  No nasal discharge.  Hearing seems unimpaired.  Cardiovascular: Regular rate and rhythm with a 3/6 JACOB at the LSB.   Respiratory: Lungs clear to auscultation bilaterally.   Abdomen: Nondistended.   Musculoskeletal/Extremities: Age-related osteoarthritic changes.  Trace lower extremity edema.  She does wear compression stockings.  Integument: Right knee surgical wound looks good and without drainage.  Otherwise, no rashes, clinically " significant lesions, or skin breakdown.   Cognitive/Psychiatric: Alert and oriented with euthymic affect.    DIAGNOSTICS:   Recent Results (from the past 240 hour(s))   Basic metabolic panel    Collection Time: 03/17/23 11:38 AM   Result Value Ref Range    Sodium 139 136 - 145 mmol/L    Potassium 3.7 3.4 - 5.3 mmol/L    Chloride 105 98 - 107 mmol/L    Carbon Dioxide (CO2) 22 22 - 29 mmol/L    Anion Gap 12 7 - 15 mmol/L    Urea Nitrogen 15.8 8.0 - 23.0 mg/dL    Creatinine 0.70 0.51 - 0.95 mg/dL    Calcium 8.0 (L) 8.8 - 10.2 mg/dL    Glucose 92 70 - 99 mg/dL    GFR Estimate 84 >60 mL/min/1.73m2   Magnesium    Collection Time: 03/17/23 11:38 AM   Result Value Ref Range    Magnesium 2.3 1.7 - 2.3 mg/dL   CBC with platelets    Collection Time: 03/17/23 11:38 AM   Result Value Ref Range    WBC Count 5.7 4.0 - 11.0 10e3/uL    RBC Count 3.91 3.80 - 5.20 10e6/uL    Hemoglobin 10.8 (L) 11.7 - 15.7 g/dL    Hematocrit 35.2 35.0 - 47.0 %    MCV 90 78 - 100 fL    MCH 27.6 26.5 - 33.0 pg    MCHC 30.7 (L) 31.5 - 36.5 g/dL    RDW 14.2 10.0 - 15.0 %    Platelet Count 268 150 - 450 10e3/uL   Hemoglobin    Collection Time: 03/20/23  8:36 AM   Result Value Ref Range    Hemoglobin 11.4 (L) 11.7 - 15.7 g/dL       ASSESSMENT/Plan:      ICD-10-CM    1. Cellulitis of right lower extremity  L03.115       2. Ulcer of right calf, limited to breakdown of skin (H)  L97.211       3. Venous insufficiency of both lower extremities  I87.2       4. History of arthroplasty of right knee  Z96.651       5. Chronic heart failure with preserved ejection fraction (HFpEF) (H)  I50.32       6. Obesity, Class III, BMI 40-49.9 (morbid obesity) (H)  E66.01       7. DVT prophylaxis  Z29.9           CHANGES:    1. Adjust her gabapentin to meet PTA dosing by increasing her bedtime dose of 900 mg => 1200 mg.  2. Schedule Vicodin every 8 hours round-the-clock (0600, 1400, 2200).    CARE PLAN:    The care plan, medications, vital signs, orders, and nursing notes have  been reviewed, and all orders signed. Changes to care plan, if any, as noted. Otherwise, continue current plan of care.  Total time spent in this encounter was 38 minutes, mostly addressing her pain issues.    The above has been created using voice recognition software. Please be aware that this may unintentionally  produce inaccuracies and/or nonsensical sentences.      Electronically signed by: PHAM Rocha CNP        Sincerely,        PHAM Rocha CNP

## 2023-03-24 VITALS
HEIGHT: 65 IN | WEIGHT: 252 LBS | SYSTOLIC BLOOD PRESSURE: 155 MMHG | TEMPERATURE: 97.5 F | DIASTOLIC BLOOD PRESSURE: 87 MMHG | RESPIRATION RATE: 19 BRPM | HEART RATE: 77 BPM | BODY MASS INDEX: 41.99 KG/M2 | OXYGEN SATURATION: 96 %

## 2023-03-24 PROBLEM — Z96.651 HISTORY OF ARTHROPLASTY OF RIGHT KNEE: Status: ACTIVE | Noted: 2023-03-24

## 2023-03-26 PROBLEM — I50.32 CHRONIC HEART FAILURE WITH PRESERVED EJECTION FRACTION (HFPEF) (H): Status: ACTIVE | Noted: 2023-03-26

## 2023-03-26 NOTE — PROGRESS NOTES
[facility]    Name:   Debra Manriquez  :   1940  MRN:    [id number]     Facility:   [facility] ([facility]) [[id number]]   Room: [facility] / [place] [address]  Code Status: FULL CODE and POLST AVAILABLE -     DOS:  2023    PCP:  Yoanna Mcpherson MD     CHIEF COMPLAINT / REASON FOR VISIT:  Chief Complaint   Patient presents with     Clinic Care Coordination - Follow-up     Cellulitis of right lower extremity      [facility] from 2023 until 2023 (RLE cellulitis, venous stasis)      HPI: Debra is an 83 year old female with a past medical history significant for fibrocystic breast disease, GERD, hyperlipidemia, essential hypertension, acquired lymphedema, obstructive sleep apnea, osteoarthritis of the knee, and a mitral valve disorder, who was admitted to the hospital on  due to left leg post surgical pain.    Brief review of hospital course, excerpted from the hospital discharge summary:    3/07 -- Admitted through 3/8 for TKA and discharged home where functional status declined.  3/09 -- Went to  ED, Dx cellulitis of right LE, treated with ceftriaxone. U/S right leg negative for DVT.  Ortho consulted.  3/10 -- Leg pain poorly controlled, PT/OT recommended TCU, Hgb 11 from baseline 12.3  3/11 -- Ceftriaxone continued  3/12 -- Pain improved, transitioned to p.o. cefdinir, bordered cellulitis.  Caffeine and glycerine suppository for constipation.  3/13 -- Cellulitis shrinking, pt having loose BMs.   3/14 -- Clinically improved and ready medically for discharge.      On 3/14, clinically close to a return to baseline, remaining vitally and hemodynamically stable. TCU acceptance for rehabilitation, and ,discharged in stable medical condition with plan to complete 3 more doses of cefdinir for total course of 7 days.    Also initially given hydroxyzine for anxiety.  Her denosumab/Prolia to be held/paused while at the TCU.  F/U with the facility provider within a  "week.    Additionally F/U with orthopedics as well.       CURRENT/RECENT TCU ISSUES    Disposition  -- She apparently went to bed at 9 PM last night but was in severe pain by 11 PM.  -- Pain management is a bit confusing.  Pain, according to the patient, tends to go \"up and down.\"  She says she was doing okay but experiencing more pain, hence the order for tramadol.  I was told by the nurse manager that they had gotten an order for tramadol from ortho (patient called) while already on Norco.  She was apparently getting relief with tramadol, yet she mentions receiving Vicodin as well.  We discussed round-the-clock pain management and will schedule for getting every 8 hours, at 0600, 1400, and 2200.  -- We will also adjust her gabapentin to meet PTA dosing by increasing her 900 mg dose at bedtime to 1200 mg.  -- She is interested in taking supplements (K2 and omega-3), but I suggested there would be no harm in waiting until discharge to resume those supplements.    Ulcer of right calf  -- Completion of oral antibiotics.    HFpEF  -- Currently asymptomatic.  LE edema is minimal.    Obesity  -- BMI >40    DVT prophylaxis  - On DOAC.  Recent venous Doppler ultrasound was negative for DVT.      ROS:  -- 10 point ROS of systems including Constitutional, Eyes, Respiratory, Cardiovascular, Gastroenterology, Genitourinary, Integumentary, Muscularskeletal, Psychiatric were all negative except for pertinent positives noted in my HPI.      Past Medical History:   Diagnosis Date     Antiplatelet or antithrombotic long-term use      Chronic acquired lymphedema      Edema      Falls      Fibrocystic breast disease      Fibrocystic breast disease      Foot pain      Gastroesophageal reflux disease      GERD (gastroesophageal reflux disease)      Hyperlipidemia      Hypertension      Irregular heart beat      Lymphedema      Mitral valve disorder      Obese      JADYN (obstructive sleep apnea)      Osteoarthritis of knee     hx of knee " replacement and pending second     Osteopenia      Skin cancer      Skin cancer, basal cell      Sleep apnea      Thyroid nodule      Thyroid nodule      Vitamin B deficiency               Family History   Problem Relation Age of Onset     No Known Problems Mother      No Known Problems Father      Alzheimer Disease Maternal Uncle      Heart Disease Sister      Heart Disease Brother      No Known Problems Daughter      No Known Problems Son      No Known Problems Brother      No Known Problems Son      No Known Problems Son      Social History     Socioeconomic History     Marital status:      Spouse name: None     Number of children: None     Years of education: None     Highest education level: None   Tobacco Use     Smoking status: Never     Smokeless tobacco: Never   Vaping Use     Vaping Use: Never used   Substance and Sexual Activity     Alcohol use: Yes     Alcohol/week: 0.8 standard drinks     Comment: Alcoholic Drinks/day: 1-2 glasses per month     Drug use: No     Sexual activity: Yes   Social History Narrative    . 4 kids.  Teaches college English, reading.        MEDICATIONS: Reviewed from the MAR, physician orders, and/or earlier progress notes.  MED REC REQUIRED  Post Medication Reconciliation Status: medication reconcilation previously completed during another office visit    Current Outpatient Medications   Medication Sig     acetaminophen (TYLENOL) 325 MG tablet Take 2 tablets (650 mg) by mouth 3 times daily     cholecalciferol (VITAMIN D3) 5000 units TABS tablet Take 2 tablets by mouth daily     Coenzyme Q10 400 MG CAPS Take 800 mg by mouth daily     denosumab (PROLIA) 60 MG/ML SOSY injection As of 3/14/2023, please hold and pause this medication while at the transitional care unit     furosemide (LASIX) 20 MG tablet Take 20 mg by mouth daily     gabapentin (NEURONTIN) 300 MG capsule Take 900 mg by mouth At Bedtime     lactobacillus TABS Take 2 capsules by mouth daily      "levothyroxine (SYNTHROID/LEVOTHROID) 112 MCG tablet Take 112 mcg by mouth daily     losartan (COZAAR) 50 MG tablet Take 50 mg by mouth 2 times daily     MAGNESIUM PO Take 2 capsules by mouth 2 times daily     rivaroxaban ANTICOAGULANT (XARELTO) 20 MG TABS tablet Take 1 tablet (20 mg) by mouth daily (with dinner)     rosuvastatin (CRESTOR) 20 MG tablet Take 1 tablet (20 mg) by mouth daily     senna-docusate (SENOKOT-S/PERICOLACE) 8.6-50 MG tablet Take 1-2 tablets by mouth 2 times daily Take while on oral narcotics to prevent or treat constipation.     No current facility-administered medications for this visit.     ALLERGIES:   Allergies   Allergen Reactions     Lisinopril Cough     Reglan [Metoclopramide] Other (See Comments)     depression     DIET: Regular, regular texture, thin liquids.    Vitals:    03/24/23 1009   BP: (!) 155/87   Pulse: 77   Resp: 19   Temp: 97.5  F (36.4  C)   SpO2: 96%   Weight: 114.3 kg (252 lb)   Height: 1.651 m (5' 5\")     Wt Readings from Last 4 Encounters:   03/24/23 114.3 kg (252 lb)   03/16/23 119.7 kg (263 lb 12.8 oz)   03/15/23 119.3 kg (263 lb)   03/14/23 120.2 kg (264 lb 15.9 oz)     Body mass index is 41.93 kg/m .  Body surface area is 2.29 meters squared.    EXAMINATION:   General: Pleasant, alert, and conversant elderly female, in no apparent distress.  Head: Normocephalic and atraumatic.   Eyes: PERRLA, sclerae clear.   ENT: Moist oral mucosa.  No nasal discharge.  Hearing seems unimpaired.  Cardiovascular: Regular rate and rhythm with a 3/6 JACOB at the LSB.   Respiratory: Lungs clear to auscultation bilaterally.   Abdomen: Nondistended.   Musculoskeletal/Extremities: Age-related osteoarthritic changes.  Trace lower extremity edema.  She does wear compression stockings.  Integument: Right knee surgical wound looks good and without drainage.  Otherwise, no rashes, clinically significant lesions, or skin breakdown.   Cognitive/Psychiatric: Alert and oriented with euthymic " affect.    DIAGNOSTICS:   Recent Results (from the past 240 hour(s))   Basic metabolic panel    Collection Time: 03/17/23 11:38 AM   Result Value Ref Range    Sodium 139 136 - 145 mmol/L    Potassium 3.7 3.4 - 5.3 mmol/L    Chloride 105 98 - 107 mmol/L    Carbon Dioxide (CO2) 22 22 - 29 mmol/L    Anion Gap 12 7 - 15 mmol/L    Urea Nitrogen 15.8 8.0 - 23.0 mg/dL    Creatinine 0.70 0.51 - 0.95 mg/dL    Calcium 8.0 (L) 8.8 - 10.2 mg/dL    Glucose 92 70 - 99 mg/dL    GFR Estimate 84 >60 mL/min/1.73m2   Magnesium    Collection Time: 03/17/23 11:38 AM   Result Value Ref Range    Magnesium 2.3 1.7 - 2.3 mg/dL   CBC with platelets    Collection Time: 03/17/23 11:38 AM   Result Value Ref Range    WBC Count 5.7 4.0 - 11.0 10e3/uL    RBC Count 3.91 3.80 - 5.20 10e6/uL    Hemoglobin 10.8 (L) 11.7 - 15.7 g/dL    Hematocrit 35.2 35.0 - 47.0 %    MCV 90 78 - 100 fL    MCH 27.6 26.5 - 33.0 pg    MCHC 30.7 (L) 31.5 - 36.5 g/dL    RDW 14.2 10.0 - 15.0 %    Platelet Count 268 150 - 450 10e3/uL   Hemoglobin    Collection Time: 03/20/23  8:36 AM   Result Value Ref Range    Hemoglobin 11.4 (L) 11.7 - 15.7 g/dL       ASSESSMENT/Plan:      ICD-10-CM    1. Cellulitis of right lower extremity  L03.115       2. Ulcer of right calf, limited to breakdown of skin (H)  L97.211       3. Venous insufficiency of both lower extremities  I87.2       4. History of arthroplasty of right knee  Z96.651       5. Chronic heart failure with preserved ejection fraction (HFpEF) (H)  I50.32       6. Obesity, Class III, BMI 40-49.9 (morbid obesity) (H)  E66.01       7. DVT prophylaxis  Z29.9           CHANGES:    1. Adjust her gabapentin to meet PTA dosing by increasing her bedtime dose of 900 mg => 1200 mg.  2. Schedule Vicodin every 8 hours round-the-clock (0600, 1400, 2200).    CARE PLAN:    The care plan, medications, vital signs, orders, and nursing notes have been reviewed, and all orders signed. Changes to care plan, if any, as noted. Otherwise, continue  current plan of care.  Total time spent in this encounter was 38 minutes, mostly addressing her pain issues.    The above has been created using voice recognition software. Please be aware that this may unintentionally  produce inaccuracies and/or nonsensical sentences.      Electronically signed by: PHAM Rocha CNP

## 2023-03-27 ENCOUNTER — TRANSITIONAL CARE UNIT VISIT (OUTPATIENT)
Dept: GERIATRICS | Facility: CLINIC | Age: 83
End: 2023-03-27
Payer: MEDICARE

## 2023-03-27 VITALS
DIASTOLIC BLOOD PRESSURE: 69 MMHG | WEIGHT: 256.2 LBS | BODY MASS INDEX: 42.69 KG/M2 | HEART RATE: 69 BPM | TEMPERATURE: 97.2 F | HEIGHT: 65 IN | OXYGEN SATURATION: 97 % | RESPIRATION RATE: 18 BRPM | SYSTOLIC BLOOD PRESSURE: 129 MMHG

## 2023-03-27 DIAGNOSIS — Z96.651 HISTORY OF ARTHROPLASTY OF RIGHT KNEE: ICD-10-CM

## 2023-03-27 DIAGNOSIS — L03.115 CELLULITIS OF RIGHT LOWER EXTREMITY: Primary | ICD-10-CM

## 2023-03-27 DIAGNOSIS — I87.2 VENOUS INSUFFICIENCY OF BOTH LOWER EXTREMITIES: ICD-10-CM

## 2023-03-27 DIAGNOSIS — E66.01 OBESITY, CLASS III, BMI 40-49.9 (MORBID OBESITY) (H): ICD-10-CM

## 2023-03-27 DIAGNOSIS — I50.32 CHRONIC HEART FAILURE WITH PRESERVED EJECTION FRACTION (HFPEF) (H): ICD-10-CM

## 2023-03-27 DIAGNOSIS — L97.211 ULCER OF RIGHT CALF, LIMITED TO BREAKDOWN OF SKIN (H): ICD-10-CM

## 2023-03-27 PROCEDURE — 99310 SBSQ NF CARE HIGH MDM 45: CPT | Performed by: NURSE PRACTITIONER

## 2023-03-27 NOTE — LETTER
3/27/2023        RE: Debra Manriquez  [address]  [address]  [place] [address]        [facility]    Name:   Debra Manriquez  :   1940  MRN:    [id number]     Facility:   [facility] (SNF) [[id number]]   Room: [place] [address]  Code Status: FULL CODE and POLST AVAILABLE -     DOS:  2023    PCP:  Yoanna Mcpherson MD     CHIEF COMPLAINT / REASON FOR VISIT:  Chief Complaint   Patient presents with     Clinic Care Coordination - Follow-up     Cellulitis of right lower extremity      [facility] from 2023 until 2023 (RLE cellulitis, venous stasis)      HPI: Debra is an 83 year old female   with a past medical history significant for fibrocystic breast disease, GERD, hyperlipidemia, essential hypertension, acquired lymphedema, obstructive sleep apnea, osteoarthritis of the knee, and a mitral valve disorder, who was admitted to the hospital on  due to left leg post surgical pain.    Brief review of hospital course, excerpted from the hospital discharge summary:    3/07 -- Admitted through 3/8 for TKA and discharged home where functional status declined.  3/09 -- Went to  ED, Dx cellulitis of right LE, treated with ceftriaxone. U/S right leg negative for DVT.  Ortho consulted.  3/10 -- Leg pain poorly controlled, PT/OT recommended TCU, Hgb 11 from baseline 12.3  3/11 -- Ceftriaxone continued  3/12 -- Pain improved, transitioned to p.o. cefdinir, bordered cellulitis.  Caffeine and glycerine suppository for constipation.  3/13 -- Cellulitis shrinking, pt having loose BMs.   3/14 -- Clinically improved and ready medically for discharge.      On 3/14, clinically close to a return to baseline, remaining vitally and hemodynamically stable. TCU acceptance for rehabilitation, and ,discharged in stable medical condition with plan to complete 3 more doses of cefdinir for total course of 7 days.    Also initially given hydroxyzine  "for anxiety.  Her denosumab/Prolia to be held/paused while at the TCU.  F/U with the facility provider within a week.    Additionally F/U with orthopedics as well.       CURRENT/RECENT TCU ISSUES    Disposition  -- Pain management has been a bit confusing.  Pain, according to the patient, has tended to go \"up and down.\"  Previously, she said she was doing okay but experiencing more pain that appeared to be neuropathic, hence an order for tramadol.  I was told by the nurse manager that they had gotten an order for tramadol from ortho (patient called) while already on Norco.  She was apparently getting relief with tramadol, yet she stated she claimed to have gotten Vicodin (or possibly Norco) as well.  We discussed round-the-clock pain management and we scheduled that every 8 hours, at 0600, 1400, and 2200.  TODAY, she is refusing all narcotics (because they make her nauseous), and we will discontinue them.  Per her request, we will also change her acetaminophen to as needed.  -- Because she has been refusing narcotics, she now has loose stools.  We will change her senna as to as needed.  -- She is also complaining of the being awakened too early to receive her levothyroxine, and we will move that to evening.  -- We will also adjust her gabapentin to meet PTA dosing by increasing her 900 mg dose at bedtime to 1200 mg.  -- She is interested in taking supplements (K2 and omega-3), but I suggested there would be no harm in waiting until discharge to resume those supplements.  -- She states she could walk before, but her right knee now is buckling when she stands.  She says she is not afraid to walk \"when all is well.\"    Ulcer of right calf  -- Completion of oral antibiotics.    HFpEF  -- Currently asymptomatic.  No current LE edema.    Obesity  -- BMI >40    DVT prophylaxis  - On DOAC.  Recent venous Doppler ultrasound was negative for DVT.      ROS:  -- 10 point ROS of systems including Constitutional, Eyes, " Respiratory, Cardiovascular, Gastroenterology, Genitourinary, Integumentary, Muscularskeletal, Psychiatric were all negative except for pertinent positives noted in my HPI.      Past Medical History:   Diagnosis Date     Antiplatelet or antithrombotic long-term use      Chronic acquired lymphedema      Edema      Falls      Fibrocystic breast disease      Fibrocystic breast disease      Foot pain      Gastroesophageal reflux disease      GERD (gastroesophageal reflux disease)      Hyperlipidemia      Hypertension      Irregular heart beat      Lymphedema      Mitral valve disorder      Obese      JADYN (obstructive sleep apnea)      Osteoarthritis of knee     hx of knee replacement and pending second     Osteopenia      Skin cancer      Skin cancer, basal cell      Sleep apnea      Thyroid nodule      Thyroid nodule      Vitamin B deficiency               Family History   Problem Relation Age of Onset     No Known Problems Mother      No Known Problems Father      Alzheimer Disease Maternal Uncle      Heart Disease Sister      Heart Disease Brother      No Known Problems Daughter      No Known Problems Son      No Known Problems Brother      No Known Problems Son      No Known Problems Son      Social History     Socioeconomic History     Marital status:      Spouse name: None     Number of children: None     Years of education: None     Highest education level: None   Tobacco Use     Smoking status: Never     Smokeless tobacco: Never   Vaping Use     Vaping Use: Never used   Substance and Sexual Activity     Alcohol use: Yes     Alcohol/week: 0.8 standard drinks     Comment: Alcoholic Drinks/day: 1-2 glasses per month     Drug use: No     Sexual activity: Yes   Social History Narrative    . 4 kids.  Teaches college English, reading.        MEDICATIONS: Reviewed from the MAR, physician orders, and/or earlier progress notes.  MED REC REQUIRED  Post Medication Reconciliation Status: medication  "reconcilation previously completed during another office visit    Current Outpatient Medications   Medication Sig     acetaminophen (TYLENOL) 325 MG tablet Take 2 tablets (650 mg) by mouth 3 times daily     cholecalciferol (VITAMIN D3) 5000 units TABS tablet Take 2 tablets by mouth daily     Coenzyme Q10 400 MG CAPS Take 800 mg by mouth daily     denosumab (PROLIA) 60 MG/ML SOSY injection As of 3/14/2023, please hold and pause this medication while at the transitional care unit     furosemide (LASIX) 20 MG tablet Take 20 mg by mouth daily     gabapentin (NEURONTIN) 300 MG capsule Take 1,200 mg by mouth At Bedtime     lactobacillus TABS Take 2 capsules by mouth daily     levothyroxine (SYNTHROID/LEVOTHROID) 112 MCG tablet Take 112 mcg by mouth daily     losartan (COZAAR) 50 MG tablet Take 50 mg by mouth 2 times daily     MAGNESIUM PO Take 2 capsules by mouth 2 times daily     rivaroxaban ANTICOAGULANT (XARELTO) 20 MG TABS tablet Take 1 tablet (20 mg) by mouth daily (with dinner)     rosuvastatin (CRESTOR) 20 MG tablet Take 1 tablet (20 mg) by mouth daily     senna-docusate (SENOKOT-S/PERICOLACE) 8.6-50 MG tablet Take 1-2 tablets by mouth 2 times daily Take while on oral narcotics to prevent or treat constipation.     No current facility-administered medications for this visit.     ALLERGIES:   Allergies   Allergen Reactions     Lisinopril Cough     Reglan [Metoclopramide] Other (See Comments)     depression     DIET: Regular, regular texture, thin liquids.    Vitals:    03/27/23 1434   BP: 129/69   Pulse: 69   Resp: 18   Temp: 97.2  F (36.2  C)   SpO2: 97%   Weight: 116.2 kg (256 lb 3.2 oz)   Height: 1.651 m (5' 5\")     Wt Readings from Last 4 Encounters:   03/27/23 116.2 kg (256 lb 3.2 oz)   03/24/23 114.3 kg (252 lb)   03/16/23 119.7 kg (263 lb 12.8 oz)   03/15/23 119.3 kg (263 lb)     Body mass index is 42.63 kg/m .  Body surface area is 2.31 meters squared.    EXAMINATION:   General: Pleasant, alert, and " conversant elderly female, in no apparent distress.  Head: Normocephalic and atraumatic.   Eyes: PERRLA, sclerae clear.   ENT: Moist oral mucosa.  No nasal discharge.  Hearing unimpaired.  Cardiovascular: RRR with a 3/6 AJCOB at the LSB.   Respiratory: Lungs CTAB.   Abdomen: Nondistended.   Musculoskeletal/Extremities: Age-related osteoarthritic changes.  No significant lower extremity edema.  Wears compression stockings.  Integument: Right knee surgical wound looks good and without drainage.  Otherwise, no rashes, clinically significant lesions, or skin breakdown.   Cognitive/Psychiatric: Alert and oriented with euthymic affect.    DIAGNOSTICS:   Recent Results (from the past 240 hour(s))   Hemoglobin    Collection Time: 03/20/23  8:36 AM   Result Value Ref Range    Hemoglobin 11.4 (L) 11.7 - 15.7 g/dL     Last Comprehensive Metabolic Panel:  Sodium   Date Value Ref Range Status   03/17/2023 139 136 - 145 mmol/L Final     Potassium   Date Value Ref Range Status   03/17/2023 3.7 3.4 - 5.3 mmol/L Final   03/10/2023 4.2 3.5 - 5.0 mmol/L Final     Chloride   Date Value Ref Range Status   03/17/2023 105 98 - 107 mmol/L Final   03/10/2023 105 98 - 107 mmol/L Final     Carbon Dioxide (CO2)   Date Value Ref Range Status   03/17/2023 22 22 - 29 mmol/L Final   03/10/2023 26 22 - 31 mmol/L Final     Anion Gap   Date Value Ref Range Status   03/17/2023 12 7 - 15 mmol/L Final   03/10/2023 8 5 - 18 mmol/L Final     Glucose   Date Value Ref Range Status   03/17/2023 92 70 - 99 mg/dL Final   03/10/2023 90 70 - 125 mg/dL Final     Urea Nitrogen   Date Value Ref Range Status   03/17/2023 15.8 8.0 - 23.0 mg/dL Final   03/10/2023 15 8 - 28 mg/dL Final     Creatinine   Date Value Ref Range Status   03/17/2023 0.70 0.51 - 0.95 mg/dL Final     GFR Estimate   Date Value Ref Range Status   03/17/2023 84 >60 mL/min/1.73m2 Final     Comment:     eGFR calculated using 2021 CKD-EPI equation.   05/26/2021 >60 >60 mL/min/1.73m2 Final     Calcium    Date Value Ref Range Status   03/17/2023 8.0 (L) 8.8 - 10.2 mg/dL Final     Bilirubin Total   Date Value Ref Range Status   03/16/2022 0.7 0.0 - 1.0 mg/dL Final     Alkaline Phosphatase   Date Value Ref Range Status   03/16/2022 60 45 - 120 U/L Final     ALT   Date Value Ref Range Status   03/16/2022 32 0 - 45 U/L Final     AST   Date Value Ref Range Status   03/16/2022 25 0 - 40 U/L Final     Lab Results   Component Value Date    WBC 5.7 03/17/2023     Lab Results   Component Value Date    RBC 3.91 03/17/2023     Lab Results   Component Value Date    HGB 11.4 03/20/2023     Lab Results   Component Value Date    HCT 35.2 03/17/2023     Lab Results   Component Value Date    MCV 90 03/17/2023     Lab Results   Component Value Date    MCH 27.6 03/17/2023     Lab Results   Component Value Date    MCHC 30.7 03/17/2023     Lab Results   Component Value Date    RDW 14.2 03/17/2023     Lab Results   Component Value Date     03/17/2023       ASSESSMENT/Plan:      ICD-10-CM    1. Cellulitis of right lower extremity  L03.115       2. Ulcer of right calf, limited to breakdown of skin (H)  L97.211       3. Venous insufficiency of both lower extremities  I87.2       4. History of arthroplasty of right knee  Z96.651       5. Chronic heart failure with preserved ejection fraction (HFpEF) (H)  I50.32       6. Obesity, Class III, BMI 40-49.9 (morbid obesity) (H)  E66.01       7. DVT prophylaxis  Z29.9           CHANGES:    1. Discontinue all narcotics.  2. Change acetaminophen to as needed.  3. Change senna S to bedtime as needed.  4. Give levothyroxine in the evening so she doesn't need to have her sleep disturbed in the morning.    CARE PLAN:    The care plan, medications, vital signs, orders, and nursing notes have been reviewed, and all orders signed. Changes to care plan, if any, as noted. Otherwise, continue current plan of care.  Total time spent in this encounter was 44 minutes, mostly addressing issues as described  above with several orders being written.    The above has been created using voice recognition software. Please be aware that this may unintentionally  produce inaccuracies and/or nonsensical sentences.      Electronically signed by: PHAM Rocha CNP        Sincerely,        PHAM Rocha CNP

## 2023-03-29 NOTE — PROGRESS NOTES
[facility]    Name:   Debra Manriquez  :   1940  MRN:    [id number]     Facility:   [facility] ([facility]) [[id number]]   Room: [facility] / [place] [address]  Code Status: FULL CODE and POLST AVAILABLE -     DOS:  2023    PCP:  Yoanna Mcpherson MD     CHIEF COMPLAINT / REASON FOR VISIT:  Chief Complaint   Patient presents with     Clinic Care Coordination - Follow-up     Cellulitis of right lower extremity      [facility] from 2023 until 2023 (RLE cellulitis, venous stasis)      HPI: Debra is an 83 year old female   with a past medical history significant for fibrocystic breast disease, GERD, hyperlipidemia, essential hypertension, acquired lymphedema, obstructive sleep apnea, osteoarthritis of the knee, and a mitral valve disorder, who was admitted to the hospital on  due to left leg post surgical pain.    Brief review of hospital course, excerpted from the hospital discharge summary:    3/07 -- Admitted through 3/8 for TKA and discharged home where functional status declined.  3/09 -- Went to  ED, Dx cellulitis of right LE, treated with ceftriaxone. U/S right leg negative for DVT.  Ortho consulted.  3/10 -- Leg pain poorly controlled, PT/OT recommended TCU, Hgb 11 from baseline 12.3  3/11 -- Ceftriaxone continued  3/12 -- Pain improved, transitioned to p.o. cefdinir, bordered cellulitis.  Caffeine and glycerine suppository for constipation.  3/13 -- Cellulitis shrinking, pt having loose BMs.   3/14 -- Clinically improved and ready medically for discharge.      On 3/14, clinically close to a return to baseline, remaining vitally and hemodynamically stable. TCU acceptance for rehabilitation, and ,discharged in stable medical condition with plan to complete 3 more doses of cefdinir for total course of 7 days.    Also initially given hydroxyzine for anxiety.  Her denosumab/Prolia to be held/paused while at the TCU.  F/U with the facility provider within a  "week.    Additionally F/U with orthopedics as well.       CURRENT/RECENT TCU ISSUES    Disposition  -- Pain management has been a bit confusing.  Pain, according to the patient, has tended to go \"up and down.\"  Previously, she said she was doing okay but experiencing more pain that appeared to be neuropathic, hence an order for tramadol.  I was told by the nurse manager that they had gotten an order for tramadol from ortho (patient called) while already on Norco.  She was apparently getting relief with tramadol, yet she stated she claimed to have gotten Vicodin (or possibly Norco) as well.  We discussed round-the-clock pain management and we scheduled that every 8 hours, at 0600, 1400, and 2200.  TODAY, she is refusing all narcotics (because they make her nauseous), and we will discontinue them.  Per her request, we will also change her acetaminophen to as needed.  -- Because she has been refusing narcotics, she now has loose stools.  We will change her senna as to as needed.  -- She is also complaining of the being awakened too early to receive her levothyroxine, and we will move that to evening.  -- We will also adjust her gabapentin to meet PTA dosing by increasing her 900 mg dose at bedtime to 1200 mg.  -- She is interested in taking supplements (K2 and omega-3), but I suggested there would be no harm in waiting until discharge to resume those supplements.  -- She states she could walk before, but her right knee now is buckling when she stands.  She says she is not afraid to walk \"when all is well.\"    Ulcer of right calf  -- Completion of oral antibiotics.    HFpEF  -- Currently asymptomatic.  No current LE edema.    Obesity  -- BMI >40    DVT prophylaxis  - On DOAC.  Recent venous Doppler ultrasound was negative for DVT.      ROS:  -- 10 point ROS of systems including Constitutional, Eyes, Respiratory, Cardiovascular, Gastroenterology, Genitourinary, Integumentary, Muscularskeletal, Psychiatric were all " negative except for pertinent positives noted in my HPI.      Past Medical History:   Diagnosis Date     Antiplatelet or antithrombotic long-term use      Chronic acquired lymphedema      Edema      Falls      Fibrocystic breast disease      Fibrocystic breast disease      Foot pain      Gastroesophageal reflux disease      GERD (gastroesophageal reflux disease)      Hyperlipidemia      Hypertension      Irregular heart beat      Lymphedema      Mitral valve disorder      Obese      JADYN (obstructive sleep apnea)      Osteoarthritis of knee     hx of knee replacement and pending second     Osteopenia      Skin cancer      Skin cancer, basal cell      Sleep apnea      Thyroid nodule      Thyroid nodule      Vitamin B deficiency               Family History   Problem Relation Age of Onset     No Known Problems Mother      No Known Problems Father      Alzheimer Disease Maternal Uncle      Heart Disease Sister      Heart Disease Brother      No Known Problems Daughter      No Known Problems Son      No Known Problems Brother      No Known Problems Son      No Known Problems Son      Social History     Socioeconomic History     Marital status:      Spouse name: None     Number of children: None     Years of education: None     Highest education level: None   Tobacco Use     Smoking status: Never     Smokeless tobacco: Never   Vaping Use     Vaping Use: Never used   Substance and Sexual Activity     Alcohol use: Yes     Alcohol/week: 0.8 standard drinks     Comment: Alcoholic Drinks/day: 1-2 glasses per month     Drug use: No     Sexual activity: Yes   Social History Narrative    . 4 kids.  Teaches college English, reading.        MEDICATIONS: Reviewed from the MAR, physician orders, and/or earlier progress notes.  MED REC REQUIRED  Post Medication Reconciliation Status: medication reconcilation previously completed during another office visit    Current Outpatient Medications   Medication Sig      "acetaminophen (TYLENOL) 325 MG tablet Take 2 tablets (650 mg) by mouth 3 times daily     cholecalciferol (VITAMIN D3) 5000 units TABS tablet Take 2 tablets by mouth daily     Coenzyme Q10 400 MG CAPS Take 800 mg by mouth daily     denosumab (PROLIA) 60 MG/ML SOSY injection As of 3/14/2023, please hold and pause this medication while at the transitional care unit     furosemide (LASIX) 20 MG tablet Take 20 mg by mouth daily     gabapentin (NEURONTIN) 300 MG capsule Take 1,200 mg by mouth At Bedtime     lactobacillus TABS Take 2 capsules by mouth daily     levothyroxine (SYNTHROID/LEVOTHROID) 112 MCG tablet Take 112 mcg by mouth daily     losartan (COZAAR) 50 MG tablet Take 50 mg by mouth 2 times daily     MAGNESIUM PO Take 2 capsules by mouth 2 times daily     rivaroxaban ANTICOAGULANT (XARELTO) 20 MG TABS tablet Take 1 tablet (20 mg) by mouth daily (with dinner)     rosuvastatin (CRESTOR) 20 MG tablet Take 1 tablet (20 mg) by mouth daily     senna-docusate (SENOKOT-S/PERICOLACE) 8.6-50 MG tablet Take 1-2 tablets by mouth 2 times daily Take while on oral narcotics to prevent or treat constipation.     No current facility-administered medications for this visit.     ALLERGIES:   Allergies   Allergen Reactions     Lisinopril Cough     Reglan [Metoclopramide] Other (See Comments)     depression     DIET: Regular, regular texture, thin liquids.    Vitals:    03/27/23 1434   BP: 129/69   Pulse: 69   Resp: 18   Temp: 97.2  F (36.2  C)   SpO2: 97%   Weight: 116.2 kg (256 lb 3.2 oz)   Height: 1.651 m (5' 5\")     Wt Readings from Last 4 Encounters:   03/27/23 116.2 kg (256 lb 3.2 oz)   03/24/23 114.3 kg (252 lb)   03/16/23 119.7 kg (263 lb 12.8 oz)   03/15/23 119.3 kg (263 lb)     Body mass index is 42.63 kg/m .  Body surface area is 2.31 meters squared.    EXAMINATION:   General: Pleasant, alert, and conversant elderly female, in no apparent distress.  Head: Normocephalic and atraumatic.   Eyes: PERRLA, sclerae clear.   ENT: " Moist oral mucosa.  No nasal discharge.  Hearing unimpaired.  Cardiovascular: RRR with a 3/6 JACOB at the LSB.   Respiratory: Lungs CTAB.   Abdomen: Nondistended.   Musculoskeletal/Extremities: Age-related osteoarthritic changes.  No significant lower extremity edema.  Wears compression stockings.  Integument: Right knee surgical wound looks good and without drainage.  Otherwise, no rashes, clinically significant lesions, or skin breakdown.   Cognitive/Psychiatric: Alert and oriented with euthymic affect.    DIAGNOSTICS:   Recent Results (from the past 240 hour(s))   Hemoglobin    Collection Time: 03/20/23  8:36 AM   Result Value Ref Range    Hemoglobin 11.4 (L) 11.7 - 15.7 g/dL     Last Comprehensive Metabolic Panel:  Sodium   Date Value Ref Range Status   03/17/2023 139 136 - 145 mmol/L Final     Potassium   Date Value Ref Range Status   03/17/2023 3.7 3.4 - 5.3 mmol/L Final   03/10/2023 4.2 3.5 - 5.0 mmol/L Final     Chloride   Date Value Ref Range Status   03/17/2023 105 98 - 107 mmol/L Final   03/10/2023 105 98 - 107 mmol/L Final     Carbon Dioxide (CO2)   Date Value Ref Range Status   03/17/2023 22 22 - 29 mmol/L Final   03/10/2023 26 22 - 31 mmol/L Final     Anion Gap   Date Value Ref Range Status   03/17/2023 12 7 - 15 mmol/L Final   03/10/2023 8 5 - 18 mmol/L Final     Glucose   Date Value Ref Range Status   03/17/2023 92 70 - 99 mg/dL Final   03/10/2023 90 70 - 125 mg/dL Final     Urea Nitrogen   Date Value Ref Range Status   03/17/2023 15.8 8.0 - 23.0 mg/dL Final   03/10/2023 15 8 - 28 mg/dL Final     Creatinine   Date Value Ref Range Status   03/17/2023 0.70 0.51 - 0.95 mg/dL Final     GFR Estimate   Date Value Ref Range Status   03/17/2023 84 >60 mL/min/1.73m2 Final     Comment:     eGFR calculated using 2021 CKD-EPI equation.   05/26/2021 >60 >60 mL/min/1.73m2 Final     Calcium   Date Value Ref Range Status   03/17/2023 8.0 (L) 8.8 - 10.2 mg/dL Final     Bilirubin Total   Date Value Ref Range Status    03/16/2022 0.7 0.0 - 1.0 mg/dL Final     Alkaline Phosphatase   Date Value Ref Range Status   03/16/2022 60 45 - 120 U/L Final     ALT   Date Value Ref Range Status   03/16/2022 32 0 - 45 U/L Final     AST   Date Value Ref Range Status   03/16/2022 25 0 - 40 U/L Final     Lab Results   Component Value Date    WBC 5.7 03/17/2023     Lab Results   Component Value Date    RBC 3.91 03/17/2023     Lab Results   Component Value Date    HGB 11.4 03/20/2023     Lab Results   Component Value Date    HCT 35.2 03/17/2023     Lab Results   Component Value Date    MCV 90 03/17/2023     Lab Results   Component Value Date    MCH 27.6 03/17/2023     Lab Results   Component Value Date    MCHC 30.7 03/17/2023     Lab Results   Component Value Date    RDW 14.2 03/17/2023     Lab Results   Component Value Date     03/17/2023       ASSESSMENT/Plan:      ICD-10-CM    1. Cellulitis of right lower extremity  L03.115       2. Ulcer of right calf, limited to breakdown of skin (H)  L97.211       3. Venous insufficiency of both lower extremities  I87.2       4. History of arthroplasty of right knee  Z96.651       5. Chronic heart failure with preserved ejection fraction (HFpEF) (H)  I50.32       6. Obesity, Class III, BMI 40-49.9 (morbid obesity) (H)  E66.01       7. DVT prophylaxis  Z29.9           CHANGES:    1. Discontinue all narcotics.  2. Change acetaminophen to as needed.  3. Change senna S to bedtime as needed.  4. Give levothyroxine in the evening so she doesn't need to have her sleep disturbed in the morning.    CARE PLAN:    The care plan, medications, vital signs, orders, and nursing notes have been reviewed, and all orders signed. Changes to care plan, if any, as noted. Otherwise, continue current plan of care.  Total time spent in this encounter was 44 minutes, mostly addressing issues as described above with several orders being written.    The above has been created using voice recognition software. Please be aware  that this may unintentionally  produce inaccuracies and/or nonsensical sentences.      Electronically signed by: PHAM Rocha CNP

## 2023-03-30 ENCOUNTER — TRANSITIONAL CARE UNIT VISIT (OUTPATIENT)
Dept: GERIATRICS | Facility: CLINIC | Age: 83
End: 2023-03-30
Payer: MEDICARE

## 2023-03-30 DIAGNOSIS — L03.115 CELLULITIS OF RIGHT LOWER EXTREMITY: Primary | ICD-10-CM

## 2023-03-30 DIAGNOSIS — Z96.651 HISTORY OF ARTHROPLASTY OF RIGHT KNEE: ICD-10-CM

## 2023-03-30 DIAGNOSIS — I87.2 VENOUS INSUFFICIENCY OF BOTH LOWER EXTREMITIES: ICD-10-CM

## 2023-03-30 DIAGNOSIS — L97.211 ULCER OF RIGHT CALF, LIMITED TO BREAKDOWN OF SKIN (H): ICD-10-CM

## 2023-03-30 DIAGNOSIS — E66.01 OBESITY, CLASS III, BMI 40-49.9 (MORBID OBESITY) (H): ICD-10-CM

## 2023-03-30 DIAGNOSIS — I50.32 CHRONIC HEART FAILURE WITH PRESERVED EJECTION FRACTION (HFPEF) (H): ICD-10-CM

## 2023-03-30 PROCEDURE — 99309 SBSQ NF CARE MODERATE MDM 30: CPT | Performed by: NURSE PRACTITIONER

## 2023-03-30 NOTE — LETTER
3/30/2023        RE: Debra Manriquez  [address]  [address]  [place] [address]        [facility]    Name:   Debra Manriquez  :   1940  MRN:    [id number]     Facility:   [facility] (SNF) [[id number]]   Room: [place] [address]  Code Status: FULL CODE and POLST AVAILABLE -     DOS:  2023    PCP:  Yoanna Mcpherson MD     CHIEF COMPLAINT / REASON FOR VISIT:  Chief Complaint   Patient presents with     Clinic Care Coordination - Follow-up     Cellulitis of right lower extremity      [facility] from 2023 until 2023 (RLE cellulitis, venous stasis)      HPI: Debra is an 83 year old female   with a past medical history significant for fibrocystic breast disease, GERD, hyperlipidemia, essential hypertension, acquired lymphedema, obstructive sleep apnea, osteoarthritis of the knee, and a mitral valve disorder, who was admitted to the hospital on  due to left leg post surgical pain.    Brief review of hospital course, excerpted from the hospital discharge summary:    3/07 -- Admitted through 3/8 for TKA and discharged home where functional status declined.  3/09 -- Went to  ED, Dx cellulitis of right LE, treated with ceftriaxone. U/S right leg negative for DVT.  Ortho consulted.  3/10 -- Leg pain poorly controlled, PT/OT recommended TCU, Hgb 11 from baseline 12.3  3/11 -- Ceftriaxone continued  3/12 -- Pain improved, transitioned to p.o. cefdinir, bordered cellulitis.  Caffeine and glycerine suppository for constipation.  3/13 -- Cellulitis shrinking, pt having loose BMs.   3/14 -- Clinically improved and ready medically for discharge.      On 3/14, clinically close to a return to baseline, remaining vitally and hemodynamically stable. TCU acceptance for rehabilitation, and ,discharged in stable medical condition with plan to complete 3 more doses of cefdinir for total course of 7 days.    Also initially given hydroxyzine  "for anxiety.  Her denosumab/Prolia to be held/paused while at the TCU.  F/U with the facility provider within a week.    Additionally F/U with orthopedics as well.       CURRENT/RECENT TCU ISSUES    Disposition  -- Pain management has been a bit confusing.  Pain, according to the patient, has tended to go \"up and down.\"  Previously, she said she was doing okay but experiencing more pain that appeared to be neuropathic, hence an order for tramadol.  I was told by the nurse manager that they had gotten an order for tramadol from ortho (patient called) while already on Norco.  She was apparently getting relief with tramadol, yet she claimed to have gotten a script for Vicodin (or possibly Norco) as well.  We discussed round-the-clock pain management and we scheduled that every 8 hours.  By the following visit, she was refusing all narcotics (because they made her nauseous).  Per her request, we will also changed her acetaminophen to as needed.  -- Because of her narcotic refusal, she developed loose stools, and we changed her senna as to as needed.  -- She is also complaining of the being awakened too early to receive her levothyroxine, and we moved that to evening.  Since then, however, she has changed her mind and wants it, once again, in the morning.  -- We will also adjust her gabapentin to meet PTA dosing by increasing her 900 mg dose at bedtime to 1200 mg.  -- She is interested in taking supplements (K2 and omega-3), but I suggested there would be no harm in waiting until discharge to resume those supplements.  -- She states she could walk before, but her right knee now is buckling when she stands.  She says she is not afraid to walk \"when all is well.\"    Ulcer of right calf  -- Completion of oral antibiotics.    HFpEF  -- Currently asymptomatic.  No current LE edema.    Obesity  -- BMI >40    DVT prophylaxis  - On DOAC.  Recent venous Doppler ultrasound was negative for DVT.      ROS:  -- 10 point ROS of " systems including Constitutional, Eyes, Respiratory, Cardiovascular, Gastroenterology, Genitourinary, Integumentary, Muscularskeletal, Psychiatric were all negative except for pertinent positives noted in my HPI.      Past Medical History:   Diagnosis Date     Antiplatelet or antithrombotic long-term use      Chronic acquired lymphedema      Edema      Falls      Fibrocystic breast disease      Fibrocystic breast disease      Foot pain      Gastroesophageal reflux disease      GERD (gastroesophageal reflux disease)      Hyperlipidemia      Hypertension      Irregular heart beat      Lymphedema      Mitral valve disorder      Obese      JADYN (obstructive sleep apnea)      Osteoarthritis of knee     hx of knee replacement and pending second     Osteopenia      Skin cancer      Skin cancer, basal cell      Sleep apnea      Thyroid nodule      Thyroid nodule      Vitamin B deficiency               Family History   Problem Relation Age of Onset     No Known Problems Mother      No Known Problems Father      Alzheimer Disease Maternal Uncle      Heart Disease Sister      Heart Disease Brother      No Known Problems Daughter      No Known Problems Son      No Known Problems Brother      No Known Problems Son      No Known Problems Son      Social History     Socioeconomic History     Marital status:      Spouse name: None     Number of children: None     Years of education: None     Highest education level: None   Tobacco Use     Smoking status: Never     Smokeless tobacco: Never   Vaping Use     Vaping Use: Never used   Substance and Sexual Activity     Alcohol use: Yes     Alcohol/week: 0.8 standard drinks     Comment: Alcoholic Drinks/day: 1-2 glasses per month     Drug use: No     Sexual activity: Yes   Social History Narrative    . 4 kids.  Teaches college English, reading.        MEDICATIONS: Reviewed from the MAR, physician orders, and/or earlier progress notes.  MED REC REQUIRED  Post Medication  "Reconciliation Status: medication reconcilation previously completed during another office visit    Current Outpatient Medications   Medication Sig     acetaminophen (TYLENOL) 325 MG tablet Take 2 tablets (650 mg) by mouth 3 times daily     cholecalciferol (VITAMIN D3) 5000 units TABS tablet Take 2 tablets by mouth daily     Coenzyme Q10 400 MG CAPS Take 800 mg by mouth daily     denosumab (PROLIA) 60 MG/ML SOSY injection As of 3/14/2023, please hold and pause this medication while at the transitional care unit     furosemide (LASIX) 20 MG tablet Take 20 mg by mouth daily     gabapentin (NEURONTIN) 300 MG capsule Take 1,200 mg by mouth At Bedtime     lactobacillus TABS Take 2 capsules by mouth daily     levothyroxine (SYNTHROID/LEVOTHROID) 112 MCG tablet Take 112 mcg by mouth daily     losartan (COZAAR) 50 MG tablet Take 50 mg by mouth 2 times daily     MAGNESIUM PO Take 2 capsules by mouth 2 times daily     rivaroxaban ANTICOAGULANT (XARELTO) 20 MG TABS tablet Take 1 tablet (20 mg) by mouth daily (with dinner)     rosuvastatin (CRESTOR) 20 MG tablet Take 1 tablet (20 mg) by mouth daily     senna-docusate (SENOKOT-S/PERICOLACE) 8.6-50 MG tablet Take 1-2 tablets by mouth 2 times daily Take while on oral narcotics to prevent or treat constipation.     No current facility-administered medications for this visit.     ALLERGIES:   Allergies   Allergen Reactions     Lisinopril Cough     Reglan [Metoclopramide] Other (See Comments)     depression     DIET: Regular, regular texture, thin liquids.    Vitals:    03/31/23 0854   BP: 118/68   Pulse: 67   Resp: 18   Temp: 97.9  F (36.6  C)   SpO2: 93%   Weight: 116.2 kg (256 lb 3.2 oz)   Height: 1.651 m (5' 5\")     Wt Readings from Last 4 Encounters:   03/31/23 116.2 kg (256 lb 3.2 oz)   03/27/23 116.2 kg (256 lb 3.2 oz)   03/24/23 114.3 kg (252 lb)   03/16/23 119.7 kg (263 lb 12.8 oz)     Body mass index is 42.63 kg/m .  Body surface area is 2.31 meters squared.    EXAMINATION: "   General: Pleasant, alert, and conversant elderly female, in no apparent distress.  Head: Normocephalic and atraumatic.   Eyes: PERRLA, sclerae clear.   ENT: Moist oral mucosa.  No nasal discharge.  Hearing unimpaired.  Cardiovascular: RRR with a 3/6 JACOB at the LSB.   Respiratory: Lungs CTAB.   Abdomen: Nondistended.   Musculoskeletal/Extremities: Age-related osteoarthritic changes.  No significant lower extremity edema.  Wears compression stockings.  Integument: Right knee surgical wound looks good and without drainage.  Otherwise, no rashes, clinically significant lesions, or skin breakdown.   Cognitive/Psychiatric: Alert and oriented with euthymic affect.    DIAGNOSTICS:   No results found for this or any previous visit (from the past 240 hour(s)).  Last Comprehensive Metabolic Panel:  Sodium   Date Value Ref Range Status   03/17/2023 139 136 - 145 mmol/L Final     Potassium   Date Value Ref Range Status   03/17/2023 3.7 3.4 - 5.3 mmol/L Final   03/10/2023 4.2 3.5 - 5.0 mmol/L Final     Chloride   Date Value Ref Range Status   03/17/2023 105 98 - 107 mmol/L Final   03/10/2023 105 98 - 107 mmol/L Final     Carbon Dioxide (CO2)   Date Value Ref Range Status   03/17/2023 22 22 - 29 mmol/L Final   03/10/2023 26 22 - 31 mmol/L Final     Anion Gap   Date Value Ref Range Status   03/17/2023 12 7 - 15 mmol/L Final   03/10/2023 8 5 - 18 mmol/L Final     Glucose   Date Value Ref Range Status   03/17/2023 92 70 - 99 mg/dL Final   03/10/2023 90 70 - 125 mg/dL Final     Urea Nitrogen   Date Value Ref Range Status   03/17/2023 15.8 8.0 - 23.0 mg/dL Final   03/10/2023 15 8 - 28 mg/dL Final     Creatinine   Date Value Ref Range Status   03/17/2023 0.70 0.51 - 0.95 mg/dL Final     GFR Estimate   Date Value Ref Range Status   03/17/2023 84 >60 mL/min/1.73m2 Final     Comment:     eGFR calculated using 2021 CKD-EPI equation.   05/26/2021 >60 >60 mL/min/1.73m2 Final     Calcium   Date Value Ref Range Status   03/17/2023 8.0 (L) 8.8  - 10.2 mg/dL Final     Bilirubin Total   Date Value Ref Range Status   03/16/2022 0.7 0.0 - 1.0 mg/dL Final     Alkaline Phosphatase   Date Value Ref Range Status   03/16/2022 60 45 - 120 U/L Final     ALT   Date Value Ref Range Status   03/16/2022 32 0 - 45 U/L Final     AST   Date Value Ref Range Status   03/16/2022 25 0 - 40 U/L Final     Lab Results   Component Value Date    WBC 5.7 03/17/2023     Lab Results   Component Value Date    RBC 3.91 03/17/2023     Lab Results   Component Value Date    HGB 11.4 03/20/2023     Lab Results   Component Value Date    HCT 35.2 03/17/2023     Lab Results   Component Value Date    MCV 90 03/17/2023     Lab Results   Component Value Date    MCH 27.6 03/17/2023     Lab Results   Component Value Date    MCHC 30.7 03/17/2023     Lab Results   Component Value Date    RDW 14.2 03/17/2023     Lab Results   Component Value Date     03/17/2023       ASSESSMENT/Plan:      ICD-10-CM    1. Cellulitis of right lower extremity  L03.115       2. Ulcer of right calf, limited to breakdown of skin (H)  L97.211       3. Venous insufficiency of both lower extremities  I87.2       4. History of arthroplasty of right knee  Z96.651       5. Chronic heart failure with preserved ejection fraction (HFpEF) (H)  I50.32       6. Obesity, Class III, BMI 40-49.9 (morbid obesity) (H)  E66.01       7. DVT prophylaxis  Z29.9           CHANGES:    1. None.    CARE PLAN:    The care plan, medications, vital signs, orders, and nursing notes have been reviewed, and all orders signed. Changes to care plan, if any, as noted. Otherwise, continue current plan of care.    The above has been created using voice recognition software. Please be aware that this may unintentionally  produce inaccuracies and/or nonsensical sentences.      Electronically signed by: PHAM Rocha CNP        Sincerely,        PHAM Rocha CNP

## 2023-03-31 VITALS
BODY MASS INDEX: 42.69 KG/M2 | SYSTOLIC BLOOD PRESSURE: 118 MMHG | WEIGHT: 256.2 LBS | HEIGHT: 65 IN | OXYGEN SATURATION: 93 % | TEMPERATURE: 97.9 F | RESPIRATION RATE: 18 BRPM | DIASTOLIC BLOOD PRESSURE: 68 MMHG | HEART RATE: 67 BPM

## 2023-03-31 NOTE — PROGRESS NOTES
[facility]    Name:   Debra Manriquez  :   1940  MRN:    [id number]     Facility:   [facility] ([facility]) [[id number]]   Room: [facility] / [place] [address]  Code Status: FULL CODE and POLST AVAILABLE -     DOS:  2023    PCP:  Yoanna Mcpherson MD     CHIEF COMPLAINT / REASON FOR VISIT:  Chief Complaint   Patient presents with     Clinic Care Coordination - Follow-up     Cellulitis of right lower extremity      [facility] from 2023 until 2023 (RLE cellulitis, venous stasis)      HPI: Debra is an 83 year old female   with a past medical history significant for fibrocystic breast disease, GERD, hyperlipidemia, essential hypertension, acquired lymphedema, obstructive sleep apnea, osteoarthritis of the knee, and a mitral valve disorder, who was admitted to the hospital on  due to left leg post surgical pain.    Brief review of hospital course, excerpted from the hospital discharge summary:    3/07 -- Admitted through 3/8 for TKA and discharged home where functional status declined.  3/09 -- Went to  ED, Dx cellulitis of right LE, treated with ceftriaxone. U/S right leg negative for DVT.  Ortho consulted.  3/10 -- Leg pain poorly controlled, PT/OT recommended TCU, Hgb 11 from baseline 12.3  3/11 -- Ceftriaxone continued  3/12 -- Pain improved, transitioned to p.o. cefdinir, bordered cellulitis.  Caffeine and glycerine suppository for constipation.  3/13 -- Cellulitis shrinking, pt having loose BMs.   3/14 -- Clinically improved and ready medically for discharge.      On 3/14, clinically close to a return to baseline, remaining vitally and hemodynamically stable. TCU acceptance for rehabilitation, and ,discharged in stable medical condition with plan to complete 3 more doses of cefdinir for total course of 7 days.    Also initially given hydroxyzine for anxiety.  Her denosumab/Prolia to be held/paused while at the TCU.  F/U with the facility provider within a  "week.    Additionally F/U with orthopedics as well.       CURRENT/RECENT TCU ISSUES    Disposition  -- Pain management has been a bit confusing.  Pain, according to the patient, has tended to go \"up and down.\"  Previously, she said she was doing okay but experiencing more pain that appeared to be neuropathic, hence an order for tramadol.  I was told by the nurse manager that they had gotten an order for tramadol from ortho (patient called) while already on Norco.  She was apparently getting relief with tramadol, yet she claimed to have gotten a script for Vicodin (or possibly Norco) as well.  We discussed round-the-clock pain management and we scheduled that every 8 hours.  By the following visit, she was refusing all narcotics (because they made her nauseous).  Per her request, we will also changed her acetaminophen to as needed.  -- Because of her narcotic refusal, she developed loose stools, and we changed her senna as to as needed.  -- She is also complaining of the being awakened too early to receive her levothyroxine, and we moved that to evening.  Since then, however, she has changed her mind and wants it, once again, in the morning.  -- We will also adjust her gabapentin to meet PTA dosing by increasing her 900 mg dose at bedtime to 1200 mg.  -- She is interested in taking supplements (K2 and omega-3), but I suggested there would be no harm in waiting until discharge to resume those supplements.  -- She states she could walk before, but her right knee now is buckling when she stands.  She says she is not afraid to walk \"when all is well.\"    Ulcer of right calf  -- Completion of oral antibiotics.    HFpEF  -- Currently asymptomatic.  No current LE edema.    Obesity  -- BMI >40    DVT prophylaxis  - On DOAC.  Recent venous Doppler ultrasound was negative for DVT.      ROS:  -- 10 point ROS of systems including Constitutional, Eyes, Respiratory, Cardiovascular, Gastroenterology, Genitourinary, Integumentary, " Muscularskeletal, Psychiatric were all negative except for pertinent positives noted in my HPI.      Past Medical History:   Diagnosis Date     Antiplatelet or antithrombotic long-term use      Chronic acquired lymphedema      Edema      Falls      Fibrocystic breast disease      Fibrocystic breast disease      Foot pain      Gastroesophageal reflux disease      GERD (gastroesophageal reflux disease)      Hyperlipidemia      Hypertension      Irregular heart beat      Lymphedema      Mitral valve disorder      Obese      JADYN (obstructive sleep apnea)      Osteoarthritis of knee     hx of knee replacement and pending second     Osteopenia      Skin cancer      Skin cancer, basal cell      Sleep apnea      Thyroid nodule      Thyroid nodule      Vitamin B deficiency               Family History   Problem Relation Age of Onset     No Known Problems Mother      No Known Problems Father      Alzheimer Disease Maternal Uncle      Heart Disease Sister      Heart Disease Brother      No Known Problems Daughter      No Known Problems Son      No Known Problems Brother      No Known Problems Son      No Known Problems Son      Social History     Socioeconomic History     Marital status:      Spouse name: None     Number of children: None     Years of education: None     Highest education level: None   Tobacco Use     Smoking status: Never     Smokeless tobacco: Never   Vaping Use     Vaping Use: Never used   Substance and Sexual Activity     Alcohol use: Yes     Alcohol/week: 0.8 standard drinks     Comment: Alcoholic Drinks/day: 1-2 glasses per month     Drug use: No     Sexual activity: Yes   Social History Narrative    . 4 kids.  Teaches college English, reading.        MEDICATIONS: Reviewed from the MAR, physician orders, and/or earlier progress notes.  MED REC REQUIRED  Post Medication Reconciliation Status: medication reconcilation previously completed during another office visit    Current Outpatient  "Medications   Medication Sig     acetaminophen (TYLENOL) 325 MG tablet Take 2 tablets (650 mg) by mouth 3 times daily     cholecalciferol (VITAMIN D3) 5000 units TABS tablet Take 2 tablets by mouth daily     Coenzyme Q10 400 MG CAPS Take 800 mg by mouth daily     denosumab (PROLIA) 60 MG/ML SOSY injection As of 3/14/2023, please hold and pause this medication while at the transitional care unit     furosemide (LASIX) 20 MG tablet Take 20 mg by mouth daily     gabapentin (NEURONTIN) 300 MG capsule Take 1,200 mg by mouth At Bedtime     lactobacillus TABS Take 2 capsules by mouth daily     levothyroxine (SYNTHROID/LEVOTHROID) 112 MCG tablet Take 112 mcg by mouth daily     losartan (COZAAR) 50 MG tablet Take 50 mg by mouth 2 times daily     MAGNESIUM PO Take 2 capsules by mouth 2 times daily     rivaroxaban ANTICOAGULANT (XARELTO) 20 MG TABS tablet Take 1 tablet (20 mg) by mouth daily (with dinner)     rosuvastatin (CRESTOR) 20 MG tablet Take 1 tablet (20 mg) by mouth daily     senna-docusate (SENOKOT-S/PERICOLACE) 8.6-50 MG tablet Take 1-2 tablets by mouth 2 times daily Take while on oral narcotics to prevent or treat constipation.     No current facility-administered medications for this visit.     ALLERGIES:   Allergies   Allergen Reactions     Lisinopril Cough     Reglan [Metoclopramide] Other (See Comments)     depression     DIET: Regular, regular texture, thin liquids.    Vitals:    03/31/23 0854   BP: 118/68   Pulse: 67   Resp: 18   Temp: 97.9  F (36.6  C)   SpO2: 93%   Weight: 116.2 kg (256 lb 3.2 oz)   Height: 1.651 m (5' 5\")     Wt Readings from Last 4 Encounters:   03/31/23 116.2 kg (256 lb 3.2 oz)   03/27/23 116.2 kg (256 lb 3.2 oz)   03/24/23 114.3 kg (252 lb)   03/16/23 119.7 kg (263 lb 12.8 oz)     Body mass index is 42.63 kg/m .  Body surface area is 2.31 meters squared.    EXAMINATION:   General: Pleasant, alert, and conversant elderly female, in no apparent distress.  Head: Normocephalic and " atraumatic.   Eyes: PERRLA, sclerae clear.   ENT: Moist oral mucosa.  No nasal discharge.  Hearing unimpaired.  Cardiovascular: RRR with a 3/6 JACOB at the LSB.   Respiratory: Lungs CTAB.   Abdomen: Nondistended.   Musculoskeletal/Extremities: Age-related osteoarthritic changes.  No significant lower extremity edema.  Wears compression stockings.  Integument: Right knee surgical wound looks good and without drainage.  Otherwise, no rashes, clinically significant lesions, or skin breakdown.   Cognitive/Psychiatric: Alert and oriented with euthymic affect.    DIAGNOSTICS:   No results found for this or any previous visit (from the past 240 hour(s)).  Last Comprehensive Metabolic Panel:  Sodium   Date Value Ref Range Status   03/17/2023 139 136 - 145 mmol/L Final     Potassium   Date Value Ref Range Status   03/17/2023 3.7 3.4 - 5.3 mmol/L Final   03/10/2023 4.2 3.5 - 5.0 mmol/L Final     Chloride   Date Value Ref Range Status   03/17/2023 105 98 - 107 mmol/L Final   03/10/2023 105 98 - 107 mmol/L Final     Carbon Dioxide (CO2)   Date Value Ref Range Status   03/17/2023 22 22 - 29 mmol/L Final   03/10/2023 26 22 - 31 mmol/L Final     Anion Gap   Date Value Ref Range Status   03/17/2023 12 7 - 15 mmol/L Final   03/10/2023 8 5 - 18 mmol/L Final     Glucose   Date Value Ref Range Status   03/17/2023 92 70 - 99 mg/dL Final   03/10/2023 90 70 - 125 mg/dL Final     Urea Nitrogen   Date Value Ref Range Status   03/17/2023 15.8 8.0 - 23.0 mg/dL Final   03/10/2023 15 8 - 28 mg/dL Final     Creatinine   Date Value Ref Range Status   03/17/2023 0.70 0.51 - 0.95 mg/dL Final     GFR Estimate   Date Value Ref Range Status   03/17/2023 84 >60 mL/min/1.73m2 Final     Comment:     eGFR calculated using 2021 CKD-EPI equation.   05/26/2021 >60 >60 mL/min/1.73m2 Final     Calcium   Date Value Ref Range Status   03/17/2023 8.0 (L) 8.8 - 10.2 mg/dL Final     Bilirubin Total   Date Value Ref Range Status   03/16/2022 0.7 0.0 - 1.0 mg/dL Final      Alkaline Phosphatase   Date Value Ref Range Status   03/16/2022 60 45 - 120 U/L Final     ALT   Date Value Ref Range Status   03/16/2022 32 0 - 45 U/L Final     AST   Date Value Ref Range Status   03/16/2022 25 0 - 40 U/L Final     Lab Results   Component Value Date    WBC 5.7 03/17/2023     Lab Results   Component Value Date    RBC 3.91 03/17/2023     Lab Results   Component Value Date    HGB 11.4 03/20/2023     Lab Results   Component Value Date    HCT 35.2 03/17/2023     Lab Results   Component Value Date    MCV 90 03/17/2023     Lab Results   Component Value Date    MCH 27.6 03/17/2023     Lab Results   Component Value Date    MCHC 30.7 03/17/2023     Lab Results   Component Value Date    RDW 14.2 03/17/2023     Lab Results   Component Value Date     03/17/2023       ASSESSMENT/Plan:      ICD-10-CM    1. Cellulitis of right lower extremity  L03.115       2. Ulcer of right calf, limited to breakdown of skin (H)  L97.211       3. Venous insufficiency of both lower extremities  I87.2       4. History of arthroplasty of right knee  Z96.651       5. Chronic heart failure with preserved ejection fraction (HFpEF) (H)  I50.32       6. Obesity, Class III, BMI 40-49.9 (morbid obesity) (H)  E66.01       7. DVT prophylaxis  Z29.9           CHANGES:    1. None.    CARE PLAN:    The care plan, medications, vital signs, orders, and nursing notes have been reviewed, and all orders signed. Changes to care plan, if any, as noted. Otherwise, continue current plan of care.    The above has been created using voice recognition software. Please be aware that this may unintentionally  produce inaccuracies and/or nonsensical sentences.      Electronically signed by: PHAM Rocha CNP

## 2023-04-03 ENCOUNTER — TRANSITIONAL CARE UNIT VISIT (OUTPATIENT)
Dept: GERIATRICS | Facility: CLINIC | Age: 83
End: 2023-04-03
Payer: MEDICARE

## 2023-04-03 VITALS
SYSTOLIC BLOOD PRESSURE: 102 MMHG | DIASTOLIC BLOOD PRESSURE: 58 MMHG | BODY MASS INDEX: 42.69 KG/M2 | OXYGEN SATURATION: 95 % | HEIGHT: 65 IN | TEMPERATURE: 98.6 F | HEART RATE: 66 BPM | WEIGHT: 256.2 LBS | RESPIRATION RATE: 18 BRPM

## 2023-04-03 DIAGNOSIS — Z53.09 CONTRAINDICATION TO DEEP VEIN THROMBOSIS (DVT) PROPHYLAXIS: ICD-10-CM

## 2023-04-03 DIAGNOSIS — G47.00 INSOMNIA, UNSPECIFIED TYPE: ICD-10-CM

## 2023-04-03 DIAGNOSIS — E66.01 OBESITY, CLASS III, BMI 40-49.9 (MORBID OBESITY) (H): ICD-10-CM

## 2023-04-03 DIAGNOSIS — I50.32 CHRONIC HEART FAILURE WITH PRESERVED EJECTION FRACTION (HFPEF) (H): ICD-10-CM

## 2023-04-03 DIAGNOSIS — Z96.651 HISTORY OF ARTHROPLASTY OF RIGHT KNEE: ICD-10-CM

## 2023-04-03 DIAGNOSIS — L03.115 CELLULITIS OF RIGHT LOWER EXTREMITY: Primary | ICD-10-CM

## 2023-04-03 DIAGNOSIS — I87.2 VENOUS INSUFFICIENCY OF BOTH LOWER EXTREMITIES: ICD-10-CM

## 2023-04-03 DIAGNOSIS — L97.211 ULCER OF RIGHT CALF, LIMITED TO BREAKDOWN OF SKIN (H): ICD-10-CM

## 2023-04-03 PROBLEM — Z79.899 ON DEEP VEIN THROMBOSIS (DVT) PROPHYLAXIS: Status: ACTIVE | Noted: 2023-03-24

## 2023-04-03 PROCEDURE — 99309 SBSQ NF CARE MODERATE MDM 30: CPT | Performed by: NURSE PRACTITIONER

## 2023-04-03 NOTE — LETTER
4/3/2023        RE: Debra Manriquez  [address]  [address]  [place] [address]        [facility]    Name:   Debra Manriquez  :   1940  MRN:    [id number]     Facility:   [facility] (SNF) [[id number]]   Room: [place] [address]  Code Status: FULL CODE and POLST AVAILABLE -     DOS:  2023    PCP:  Yoanna Mcpherson MD     CHIEF COMPLAINT / REASON FOR VISIT:  Chief Complaint   Patient presents with     Clinic Care Coordination - Follow-up     Cellulitis of right lower extremity      [facility] from 2023 until 2023 (RLE cellulitis, venous stasis)      HPI: Debra is an 83 year old female   with a past medical history significant for fibrocystic breast disease, GERD, hyperlipidemia, essential hypertension, acquired lymphedema, obstructive sleep apnea, osteoarthritis of the knee, and a mitral valve disorder, who was admitted to the hospital on  due to left leg post surgical pain.    Brief review of hospital course, excerpted from the hospital discharge summary:    3/07 -- Admitted through 3/8 for TKA and discharged home where functional status declined.  3/09 -- Went to  ED, Dx cellulitis of right LE, treated with ceftriaxone. U/S right leg negative for DVT.  Ortho consulted.  3/10 -- Leg pain poorly controlled, PT/OT recommended TCU, Hgb 11 from baseline 12.3  3/11 -- Ceftriaxone continued  3/12 -- Pain improved, transitioned to p.o. cefdinir, bordered cellulitis.  Caffeine and glycerine suppository for constipation.  3/13 -- Cellulitis shrinking, pt having loose BMs.   3/14 -- Clinically improved and ready medically for discharge.      On 3/14, clinically close to a return to baseline, remaining vitally and hemodynamically stable. TCU acceptance for rehabilitation, and ,discharged in stable medical condition with plan to complete 3 more doses of cefdinir for total course of 7 days.    Also initially given hydroxyzine for  "anxiety.  Her denosumab/Prolia to be held/paused while at the TCU.  F/U with the facility provider within a week.    Additionally F/U with orthopedics as well.       CURRENT/RECENT TCU ISSUES    Disposition  -- Because of her narcotic refusal, she developed loose stools, and we changed her senna as to as needed.  -- Per patient request, we also adjusted her gabapentin to meet PTA dosing by increasing her 900 mg dose at bedtime to 1200 mg.  -- Expressed interest in taking supplements: K2 (she reported in her own) and omega-3, but I suggested there would be no harm in waiting until discharge to resume those supplements.  -- Patient reports being able to walk before, but her right knee now is buckling when she stands.  She says she is not afraid to walk \"when all is well.\"  Continue to monitor.  -- Complaining of being unable to sleep.  We did discuss trazodone, and we will order trazodone 25 mg at bedtime with an additional as needed dose of 25 mg available if not asleep by midnight.    Pain management  -- Pain management has been a bit confusing.  Pain, according to the patient, has tended to go \"up and down.\"  Previously, she said she was doing okay but experiencing more pain that appeared to be neuropathic, hence an order for tramadol.  I was told by the nurse manager that they had gotten an order for tramadol from ortho (patient called) while already on Norco.  She was apparently getting relief with tramadol, yet she claimed to have gotten a script for Vicodin (or possibly Norco) as well.  We discussed round-the-clock pain management and we scheduled that every 8 hours.  By the following visit, she was refusing all narcotics (because they made her nauseous).  Per her request, we will also changed her acetaminophen to as needed.  -- No pain currently, but she does feel it when walking.    Ulcer of right calf  -- Completion of oral antibiotics.    HFpEF  -- Currently asymptomatic.  No current LE " edema.    Obesity  -- BMI >40    DVT prophylaxis  -- On DOAC.  Recent venous Doppler ultrasound was negative for DVT.    Hypothyroidism  -- She was complaining of the being awakened too early to receive her levothyroxine, and we moved that to evening.  Since then, however, she has changed her mind and wants it, once again, in the morning.      ROS:  -- 10 point ROS of systems including Constitutional, Eyes, Respiratory, Cardiovascular, Gastroenterology, Genitourinary, Integumentary, Muscularskeletal, Psychiatric were all negative except for pertinent positives noted in my HPI.      Past Medical History:   Diagnosis Date     Antiplatelet or antithrombotic long-term use      Chronic acquired lymphedema      Edema      Falls      Fibrocystic breast disease      Fibrocystic breast disease      Foot pain      Gastroesophageal reflux disease      GERD (gastroesophageal reflux disease)      Hyperlipidemia      Hypertension      Irregular heart beat      Lymphedema      Mitral valve disorder      Obese      JADYN (obstructive sleep apnea)      Osteoarthritis of knee     hx of knee replacement and pending second     Osteopenia      Skin cancer      Skin cancer, basal cell      Sleep apnea      Thyroid nodule      Thyroid nodule      Vitamin B deficiency               Family History   Problem Relation Age of Onset     No Known Problems Mother      No Known Problems Father      Alzheimer Disease Maternal Uncle      Heart Disease Sister      Heart Disease Brother      No Known Problems Daughter      No Known Problems Son      No Known Problems Brother      No Known Problems Son      No Known Problems Son      Social History     Socioeconomic History     Marital status:      Spouse name: None     Number of children: None     Years of education: None     Highest education level: None   Tobacco Use     Smoking status: Never     Smokeless tobacco: Never   Vaping Use     Vaping Use: Never used   Substance and Sexual Activity      Alcohol use: Yes     Alcohol/week: 0.8 standard drinks     Comment: Alcoholic Drinks/day: 1-2 glasses per month     Drug use: No     Sexual activity: Yes   Social History Narrative    . 4 kids.  Teaches college English, reading.        MEDICATIONS: Reviewed from the MAR, physician orders, and/or earlier progress notes.  MED REC REQUIRED  Post Medication Reconciliation Status: medication reconcilation previously completed during another office visit    Current Outpatient Medications   Medication Sig     traZODone (DESYREL) 50 MG tablet Take 25 mg by mouth At Bedtime May give an additional 25 mg PRN dose if not asleep by midnight.     acetaminophen (TYLENOL) 325 MG tablet Take 2 tablets (650 mg) by mouth 3 times daily     cholecalciferol (VITAMIN D3) 5000 units TABS tablet Take 2 tablets by mouth daily     Coenzyme Q10 400 MG CAPS Take 800 mg by mouth daily     denosumab (PROLIA) 60 MG/ML SOSY injection As of 3/14/2023, please hold and pause this medication while at the transitional care unit     furosemide (LASIX) 20 MG tablet Take 20 mg by mouth daily     gabapentin (NEURONTIN) 300 MG capsule Take 1,200 mg by mouth At Bedtime     lactobacillus TABS Take 2 capsules by mouth daily     levothyroxine (SYNTHROID/LEVOTHROID) 112 MCG tablet Take 112 mcg by mouth daily     losartan (COZAAR) 50 MG tablet Take 50 mg by mouth 2 times daily     MAGNESIUM PO Take 2 capsules by mouth 2 times daily     rivaroxaban ANTICOAGULANT (XARELTO) 20 MG TABS tablet Take 1 tablet (20 mg) by mouth daily (with dinner)     rosuvastatin (CRESTOR) 20 MG tablet Take 1 tablet (20 mg) by mouth daily     senna-docusate (SENOKOT-S/PERICOLACE) 8.6-50 MG tablet Take 1-2 tablets by mouth 2 times daily Take while on oral narcotics to prevent or treat constipation.     No current facility-administered medications for this visit.     ALLERGIES:   Allergies   Allergen Reactions     Lisinopril Cough     Reglan [Metoclopramide] Other (See Comments)  "    depression     DIET: Regular, regular texture, thin liquids.    Vitals:    04/03/23 1313   BP: 102/58   Pulse: 66   Resp: 18   Temp: 98.6  F (37  C)   SpO2: 95%   Weight: 116.2 kg (256 lb 3.2 oz)   Height: 1.651 m (5' 5\")     Wt Readings from Last 4 Encounters:   04/03/23 116.2 kg (256 lb 3.2 oz)   03/31/23 116.2 kg (256 lb 3.2 oz)   03/27/23 116.2 kg (256 lb 3.2 oz)   03/24/23 114.3 kg (252 lb)     Body mass index is 42.63 kg/m .  Body surface area is 2.31 meters squared.    EXAMINATION:   General: Pleasant, alert, and conversant elderly female, in NAD.  Head: Normocephalic and atraumatic.   Eyes: PERRLA, sclerae clear.   ENT: Moist oral mucosa.  No nasal discharge.  Hearing unimpaired.  Cardiovascular: RRR with a 3/6 JACOB at the LSB.   Respiratory: Lungs CTAB.   Abdomen: Nondistended.   Musculoskeletal/Extremities: Age-related osteoarthritic changes.  No significant LE edema, though it does worsen as the day progresses per patient report.  She does wear compression stockings, although they are not very tight.  Integument: Right knee surgical wound healed.  Otherwise, no rashes, clinically significant lesions, or skin breakdown.   Cognitive/Psychiatric: Alert and oriented with euthymic affect.    DIAGNOSTICS:   No results found for this or any previous visit (from the past 240 hour(s)).  Last Comprehensive Metabolic Panel:  Sodium   Date Value Ref Range Status   03/17/2023 139 136 - 145 mmol/L Final     Potassium   Date Value Ref Range Status   03/17/2023 3.7 3.4 - 5.3 mmol/L Final   03/10/2023 4.2 3.5 - 5.0 mmol/L Final     Chloride   Date Value Ref Range Status   03/17/2023 105 98 - 107 mmol/L Final   03/10/2023 105 98 - 107 mmol/L Final     Carbon Dioxide (CO2)   Date Value Ref Range Status   03/17/2023 22 22 - 29 mmol/L Final   03/10/2023 26 22 - 31 mmol/L Final     Anion Gap   Date Value Ref Range Status   03/17/2023 12 7 - 15 mmol/L Final   03/10/2023 8 5 - 18 mmol/L Final     Glucose   Date Value Ref " Range Status   03/17/2023 92 70 - 99 mg/dL Final   03/10/2023 90 70 - 125 mg/dL Final     Urea Nitrogen   Date Value Ref Range Status   03/17/2023 15.8 8.0 - 23.0 mg/dL Final   03/10/2023 15 8 - 28 mg/dL Final     Creatinine   Date Value Ref Range Status   03/17/2023 0.70 0.51 - 0.95 mg/dL Final     GFR Estimate   Date Value Ref Range Status   03/17/2023 84 >60 mL/min/1.73m2 Final     Comment:     eGFR calculated using 2021 CKD-EPI equation.   05/26/2021 >60 >60 mL/min/1.73m2 Final     Calcium   Date Value Ref Range Status   03/17/2023 8.0 (L) 8.8 - 10.2 mg/dL Final     Bilirubin Total   Date Value Ref Range Status   03/16/2022 0.7 0.0 - 1.0 mg/dL Final     Alkaline Phosphatase   Date Value Ref Range Status   03/16/2022 60 45 - 120 U/L Final     ALT   Date Value Ref Range Status   03/16/2022 32 0 - 45 U/L Final     AST   Date Value Ref Range Status   03/16/2022 25 0 - 40 U/L Final     Lab Results   Component Value Date    WBC 5.7 03/17/2023     Lab Results   Component Value Date    RBC 3.91 03/17/2023     Lab Results   Component Value Date    HGB 11.4 03/20/2023     Lab Results   Component Value Date    HCT 35.2 03/17/2023     Lab Results   Component Value Date    MCV 90 03/17/2023     Lab Results   Component Value Date    MCH 27.6 03/17/2023     Lab Results   Component Value Date    MCHC 30.7 03/17/2023     Lab Results   Component Value Date    RDW 14.2 03/17/2023     Lab Results   Component Value Date     03/17/2023       ASSESSMENT/Plan:      ICD-10-CM    1. Cellulitis of right lower extremity  L03.115       2. Ulcer of right calf, limited to breakdown of skin (H)  L97.211       3. Venous insufficiency of both lower extremities  I87.2       4. History of arthroplasty of right knee  Z96.651       5. Chronic heart failure with preserved ejection fraction (HFpEF) (H)  I50.32       6. Obesity, Class III, BMI 40-49.9 (morbid obesity) (H)  E66.01       7. DVT prophylaxis  Z29.9           CHANGES:    1. Trazodone  25 mg at bedtime.  May have an additional 25 mg as needed dose if not asleep by midnight.    CARE PLAN:    The care plan, medications, vital signs, orders, and nursing notes have been reviewed, and all orders signed. Changes to care plan, if any, as noted. Otherwise, continue current plan of care.    The above has been created using voice recognition software. Please be aware that this may unintentionally  produce inaccuracies and/or nonsensical sentences.      Electronically signed by: PHAM Rocha CNP        Sincerely,        PHAM Rocha CNP

## 2023-04-06 RX ORDER — TRAZODONE HYDROCHLORIDE 50 MG/1
100 TABLET, FILM COATED ORAL AT BEDTIME
COMMUNITY
Start: 2023-04-03 | End: 2023-08-15

## 2023-04-06 NOTE — PROGRESS NOTES
[facility]    Name:   Debra Manriquez  :   1940  MRN:    [id number]     Facility:   [facility] ([facility]) [[id number]]   Room: [facility] / [place] [address]  Code Status: FULL CODE and POLST AVAILABLE -     DOS:  2023    PCP:  Yoanna Mcpherson MD     CHIEF COMPLAINT / REASON FOR VISIT:  Chief Complaint   Patient presents with     Clinic Care Coordination - Follow-up     Cellulitis of right lower extremity      [facility] from 2023 until 2023 (RLE cellulitis, venous stasis)      HPI: Debra is an 83 year old female   with a past medical history significant for fibrocystic breast disease, GERD, hyperlipidemia, essential hypertension, acquired lymphedema, obstructive sleep apnea, osteoarthritis of the knee, and a mitral valve disorder, who was admitted to the hospital on  due to left leg post surgical pain.    Brief review of hospital course, excerpted from the hospital discharge summary:    3/07 -- Admitted through 3/8 for TKA and discharged home where functional status declined.  3/09 -- Went to  ED, Dx cellulitis of right LE, treated with ceftriaxone. U/S right leg negative for DVT.  Ortho consulted.  3/10 -- Leg pain poorly controlled, PT/OT recommended TCU, Hgb 11 from baseline 12.3  3/11 -- Ceftriaxone continued  3/12 -- Pain improved, transitioned to p.o. cefdinir, bordered cellulitis.  Caffeine and glycerine suppository for constipation.  3/13 -- Cellulitis shrinking, pt having loose BMs.   3/14 -- Clinically improved and ready medically for discharge.      On 3/14, clinically close to a return to baseline, remaining vitally and hemodynamically stable. TCU acceptance for rehabilitation, and ,discharged in stable medical condition with plan to complete 3 more doses of cefdinir for total course of 7 days.    Also initially given hydroxyzine for anxiety.  Her denosumab/Prolia to be held/paused while at the TCU.  F/U with the facility provider within a  "week.    Additionally F/U with orthopedics as well.       CURRENT/RECENT TCU ISSUES    Disposition  -- Because of her narcotic refusal, she developed loose stools, and we changed her senna as to as needed.  -- Per patient request, we also adjusted her gabapentin to meet PTA dosing by increasing her 900 mg dose at bedtime to 1200 mg.  -- Expressed interest in taking supplements: K2 (she reported in her own) and omega-3, but I suggested there would be no harm in waiting until discharge to resume those supplements.  -- Patient reports being able to walk before, but her right knee now is buckling when she stands.  She says she is not afraid to walk \"when all is well.\"  Continue to monitor.  -- Complaining of being unable to sleep.  We did discuss trazodone, and we will order trazodone 25 mg at bedtime with an additional as needed dose of 25 mg available if not asleep by midnight.    Pain management  -- Pain management has been a bit confusing.  Pain, according to the patient, has tended to go \"up and down.\"  Previously, she said she was doing okay but experiencing more pain that appeared to be neuropathic, hence an order for tramadol.  I was told by the nurse manager that they had gotten an order for tramadol from ortho (patient called) while already on Norco.  She was apparently getting relief with tramadol, yet she claimed to have gotten a script for Vicodin (or possibly Norco) as well.  We discussed round-the-clock pain management and we scheduled that every 8 hours.  By the following visit, she was refusing all narcotics (because they made her nauseous).  Per her request, we will also changed her acetaminophen to as needed.  -- No pain currently, but she does feel it when walking.    Ulcer of right calf  -- Completion of oral antibiotics.    HFpEF  -- Currently asymptomatic.  No current LE edema.    Obesity  -- BMI >40    DVT prophylaxis  -- On DOAC.  Recent venous Doppler ultrasound was negative for " DVT.    Hypothyroidism  -- She was complaining of the being awakened too early to receive her levothyroxine, and we moved that to evening.  Since then, however, she has changed her mind and wants it, once again, in the morning.      ROS:  -- 10 point ROS of systems including Constitutional, Eyes, Respiratory, Cardiovascular, Gastroenterology, Genitourinary, Integumentary, Muscularskeletal, Psychiatric were all negative except for pertinent positives noted in my HPI.      Past Medical History:   Diagnosis Date     Antiplatelet or antithrombotic long-term use      Chronic acquired lymphedema      Edema      Falls      Fibrocystic breast disease      Fibrocystic breast disease      Foot pain      Gastroesophageal reflux disease      GERD (gastroesophageal reflux disease)      Hyperlipidemia      Hypertension      Irregular heart beat      Lymphedema      Mitral valve disorder      Obese      JADYN (obstructive sleep apnea)      Osteoarthritis of knee     hx of knee replacement and pending second     Osteopenia      Skin cancer      Skin cancer, basal cell      Sleep apnea      Thyroid nodule      Thyroid nodule      Vitamin B deficiency               Family History   Problem Relation Age of Onset     No Known Problems Mother      No Known Problems Father      Alzheimer Disease Maternal Uncle      Heart Disease Sister      Heart Disease Brother      No Known Problems Daughter      No Known Problems Son      No Known Problems Brother      No Known Problems Son      No Known Problems Son      Social History     Socioeconomic History     Marital status:      Spouse name: None     Number of children: None     Years of education: None     Highest education level: None   Tobacco Use     Smoking status: Never     Smokeless tobacco: Never   Vaping Use     Vaping Use: Never used   Substance and Sexual Activity     Alcohol use: Yes     Alcohol/week: 0.8 standard drinks     Comment: Alcoholic Drinks/day: 1-2 glasses per  month     Drug use: No     Sexual activity: Yes   Social History Narrative    . 4 kids.  Teaches college English, reading.        MEDICATIONS: Reviewed from the MAR, physician orders, and/or earlier progress notes.  MED REC REQUIRED  Post Medication Reconciliation Status: medication reconcilation previously completed during another office visit    Current Outpatient Medications   Medication Sig     traZODone (DESYREL) 50 MG tablet Take 25 mg by mouth At Bedtime May give an additional 25 mg PRN dose if not asleep by midnight.     acetaminophen (TYLENOL) 325 MG tablet Take 2 tablets (650 mg) by mouth 3 times daily     cholecalciferol (VITAMIN D3) 5000 units TABS tablet Take 2 tablets by mouth daily     Coenzyme Q10 400 MG CAPS Take 800 mg by mouth daily     denosumab (PROLIA) 60 MG/ML SOSY injection As of 3/14/2023, please hold and pause this medication while at the transitional care unit     furosemide (LASIX) 20 MG tablet Take 20 mg by mouth daily     gabapentin (NEURONTIN) 300 MG capsule Take 1,200 mg by mouth At Bedtime     lactobacillus TABS Take 2 capsules by mouth daily     levothyroxine (SYNTHROID/LEVOTHROID) 112 MCG tablet Take 112 mcg by mouth daily     losartan (COZAAR) 50 MG tablet Take 50 mg by mouth 2 times daily     MAGNESIUM PO Take 2 capsules by mouth 2 times daily     rivaroxaban ANTICOAGULANT (XARELTO) 20 MG TABS tablet Take 1 tablet (20 mg) by mouth daily (with dinner)     rosuvastatin (CRESTOR) 20 MG tablet Take 1 tablet (20 mg) by mouth daily     senna-docusate (SENOKOT-S/PERICOLACE) 8.6-50 MG tablet Take 1-2 tablets by mouth 2 times daily Take while on oral narcotics to prevent or treat constipation.     No current facility-administered medications for this visit.     ALLERGIES:   Allergies   Allergen Reactions     Lisinopril Cough     Reglan [Metoclopramide] Other (See Comments)     depression     DIET: Regular, regular texture, thin liquids.    Vitals:    04/03/23 1313   BP: 102/58  "  Pulse: 66   Resp: 18   Temp: 98.6  F (37  C)   SpO2: 95%   Weight: 116.2 kg (256 lb 3.2 oz)   Height: 1.651 m (5' 5\")     Wt Readings from Last 4 Encounters:   04/03/23 116.2 kg (256 lb 3.2 oz)   03/31/23 116.2 kg (256 lb 3.2 oz)   03/27/23 116.2 kg (256 lb 3.2 oz)   03/24/23 114.3 kg (252 lb)     Body mass index is 42.63 kg/m .  Body surface area is 2.31 meters squared.    EXAMINATION:   General: Pleasant, alert, and conversant elderly female, in NAD.  Head: Normocephalic and atraumatic.   Eyes: PERRLA, sclerae clear.   ENT: Moist oral mucosa.  No nasal discharge.  Hearing unimpaired.  Cardiovascular: RRR with a 3/6 JACOB at the LSB.   Respiratory: Lungs CTAB.   Abdomen: Nondistended.   Musculoskeletal/Extremities: Age-related osteoarthritic changes.  No significant LE edema, though it does worsen as the day progresses per patient report.  She does wear compression stockings, although they are not very tight.  Integument: Right knee surgical wound healed.  Otherwise, no rashes, clinically significant lesions, or skin breakdown.   Cognitive/Psychiatric: Alert and oriented with euthymic affect.    DIAGNOSTICS:   No results found for this or any previous visit (from the past 240 hour(s)).  Last Comprehensive Metabolic Panel:  Sodium   Date Value Ref Range Status   03/17/2023 139 136 - 145 mmol/L Final     Potassium   Date Value Ref Range Status   03/17/2023 3.7 3.4 - 5.3 mmol/L Final   03/10/2023 4.2 3.5 - 5.0 mmol/L Final     Chloride   Date Value Ref Range Status   03/17/2023 105 98 - 107 mmol/L Final   03/10/2023 105 98 - 107 mmol/L Final     Carbon Dioxide (CO2)   Date Value Ref Range Status   03/17/2023 22 22 - 29 mmol/L Final   03/10/2023 26 22 - 31 mmol/L Final     Anion Gap   Date Value Ref Range Status   03/17/2023 12 7 - 15 mmol/L Final   03/10/2023 8 5 - 18 mmol/L Final     Glucose   Date Value Ref Range Status   03/17/2023 92 70 - 99 mg/dL Final   03/10/2023 90 70 - 125 mg/dL Final     Urea Nitrogen   Date " Value Ref Range Status   03/17/2023 15.8 8.0 - 23.0 mg/dL Final   03/10/2023 15 8 - 28 mg/dL Final     Creatinine   Date Value Ref Range Status   03/17/2023 0.70 0.51 - 0.95 mg/dL Final     GFR Estimate   Date Value Ref Range Status   03/17/2023 84 >60 mL/min/1.73m2 Final     Comment:     eGFR calculated using 2021 CKD-EPI equation.   05/26/2021 >60 >60 mL/min/1.73m2 Final     Calcium   Date Value Ref Range Status   03/17/2023 8.0 (L) 8.8 - 10.2 mg/dL Final     Bilirubin Total   Date Value Ref Range Status   03/16/2022 0.7 0.0 - 1.0 mg/dL Final     Alkaline Phosphatase   Date Value Ref Range Status   03/16/2022 60 45 - 120 U/L Final     ALT   Date Value Ref Range Status   03/16/2022 32 0 - 45 U/L Final     AST   Date Value Ref Range Status   03/16/2022 25 0 - 40 U/L Final     Lab Results   Component Value Date    WBC 5.7 03/17/2023     Lab Results   Component Value Date    RBC 3.91 03/17/2023     Lab Results   Component Value Date    HGB 11.4 03/20/2023     Lab Results   Component Value Date    HCT 35.2 03/17/2023     Lab Results   Component Value Date    MCV 90 03/17/2023     Lab Results   Component Value Date    MCH 27.6 03/17/2023     Lab Results   Component Value Date    MCHC 30.7 03/17/2023     Lab Results   Component Value Date    RDW 14.2 03/17/2023     Lab Results   Component Value Date     03/17/2023       ASSESSMENT/Plan:      ICD-10-CM    1. Cellulitis of right lower extremity  L03.115       2. Ulcer of right calf, limited to breakdown of skin (H)  L97.211       3. Venous insufficiency of both lower extremities  I87.2       4. History of arthroplasty of right knee  Z96.651       5. Chronic heart failure with preserved ejection fraction (HFpEF) (H)  I50.32       6. Obesity, Class III, BMI 40-49.9 (morbid obesity) (H)  E66.01       7. DVT prophylaxis  Z29.9           CHANGES:    1. Trazodone 25 mg at bedtime.  May have an additional 25 mg as needed dose if not asleep by midnight.    CARE PLAN:    The  care plan, medications, vital signs, orders, and nursing notes have been reviewed, and all orders signed. Changes to care plan, if any, as noted. Otherwise, continue current plan of care.    The above has been created using voice recognition software. Please be aware that this may unintentionally  produce inaccuracies and/or nonsensical sentences.      Electronically signed by: PHAM Rocha CNP

## 2023-04-07 ENCOUNTER — TRANSITIONAL CARE UNIT VISIT (OUTPATIENT)
Dept: GERIATRICS | Facility: CLINIC | Age: 83
End: 2023-04-07
Payer: MEDICARE

## 2023-04-07 VITALS
OXYGEN SATURATION: 95 % | RESPIRATION RATE: 16 BRPM | HEIGHT: 65 IN | SYSTOLIC BLOOD PRESSURE: 133 MMHG | HEART RATE: 65 BPM | BODY MASS INDEX: 42.69 KG/M2 | WEIGHT: 256.2 LBS | DIASTOLIC BLOOD PRESSURE: 74 MMHG | TEMPERATURE: 98.1 F

## 2023-04-07 DIAGNOSIS — Z96.651 HISTORY OF ARTHROPLASTY OF RIGHT KNEE: ICD-10-CM

## 2023-04-07 DIAGNOSIS — E66.01 OBESITY, CLASS III, BMI 40-49.9 (MORBID OBESITY) (H): ICD-10-CM

## 2023-04-07 DIAGNOSIS — I87.2 VENOUS INSUFFICIENCY OF BOTH LOWER EXTREMITIES: ICD-10-CM

## 2023-04-07 DIAGNOSIS — G47.00 INSOMNIA, UNSPECIFIED TYPE: ICD-10-CM

## 2023-04-07 DIAGNOSIS — I50.32 CHRONIC HEART FAILURE WITH PRESERVED EJECTION FRACTION (HFPEF) (H): ICD-10-CM

## 2023-04-07 DIAGNOSIS — L03.115 CELLULITIS OF RIGHT LOWER EXTREMITY: Primary | ICD-10-CM

## 2023-04-07 DIAGNOSIS — L97.211 ULCER OF RIGHT CALF, LIMITED TO BREAKDOWN OF SKIN (H): ICD-10-CM

## 2023-04-07 DIAGNOSIS — Z79.899 ON DEEP VEIN THROMBOSIS (DVT) PROPHYLAXIS: ICD-10-CM

## 2023-04-07 PROCEDURE — 99309 SBSQ NF CARE MODERATE MDM 30: CPT | Performed by: NURSE PRACTITIONER

## 2023-04-07 NOTE — LETTER
2023        RE: Debra Manriquez  [address]  [address]  [place] [address]        [facility]    Name:   Debra Manriquez  :   1940  MRN:    [id number]     Facility:   [facility] (SNF) [[id number]]   Room: [place] [address]  Code Status: FULL CODE and POLST AVAILABLE -     DOS:  2023    PCP:  Yoanna Mcpherson MD     CHIEF COMPLAINT / REASON FOR VISIT:  Chief Complaint   Patient presents with     Clinic Care Coordination - Follow-up     Cellulitis of right lower extremity      [facility] from 2023 until 2023 (RLE cellulitis, venous stasis)      HPI: Debra is an 83 year old female with a PMH significant for fibrocystic breast disease, GERD, HLD, essential HTN, acquired lymphedema, JADYN, OA of the knee, and a mitral valve disorder, who was admitted to the hospital on  due to severe left leg post surgical pain.    Brief review of hospital course, excerpted from the hospital discharge summary:    3/07 -- Admitted through 3/8 for TKA and discharged home where functional status declined.  3/09 -- Went to  ED, Dx cellulitis of right LE, treated with ceftriaxone. U/S right leg negative for DVT.  Ortho consulted.  3/10 -- Leg pain poorly controlled, PT/OT recommended TCU, Hgb 11 from baseline 12.3  3/11 -- Ceftriaxone continued  3/12 -- Pain improved, transitioned to p.o. cefdinir, bordered cellulitis.  Caffeine and glycerine suppository for constipation.  3/13 -- Cellulitis shrinking, pt having loose BMs.   3/14 -- Clinically improved and ready medically for discharge.      On 3/14, clinically close to a return to baseline, remaining vitally and hemodynamically stable. TCU acceptance for rehabilitation, and ,discharged in stable medical condition with plan to complete 3 more doses of cefdinir for total course of 7 days.    Also initially given hydroxyzine for anxiety.  Her denosumab/Prolia to be held/paused while at the  "TCU.  F/U with the facility provider within a week.    Additionally F/U with orthopedics as well.       CURRENT/RECENT TCU ISSUES    Disposition  -- Excited that she was able to walk 112 feet today.  She did have some burning in 1 foot this morning.  -- Due to narcotic refusal, she developed loose stools, and we changed senna-S to PRN.  -- Per patient request, we also adjusted her gabapentin to meet PTA dosing by increasing her 900 mg dose at bedtime to 1200 mg.  -- Expressed interest in taking supplements: K2 (she has her own supply) and omega-3.  I suggested there would be no harm in waiting until discharge to resume those supplements.  However, she already brought in her K2, and we will write orders to permit the omega-3 as well.  -- Patient reports being able to walk before, but her right knee now is buckling when she stands.  She says she is not afraid to walk \"when all is well.\"  Continue to monitor.  -- Complaining of being unable to sleep.  We did discuss trazodone, and we ordered a 25 mg bedtime dose with an additional 25 mg available if not asleep by midnight.  This has worked so well (\"it works sadiq\"), that she has not required the additional dose.     Pain management  -- Pain management has been a bit confusing.  Pain, according to the patient, has tended to go \"up and down.\"  Previously, she said she was doing okay but experiencing more pain that appeared to be neuropathic, hence an order for tramadol.  I was told by the nurse manager that they had gotten an order for tramadol from ortho (patient called) while already on Norco.  She was apparently getting relief with tramadol, yet she claimed to have gotten a script for Vicodin (or possibly Norco) as well.  We discussed round-the-clock pain management and we scheduled that every 8 hours.  By the following visit, she was refusing all narcotics (because they made her nauseous).  Per her request, we will also changed her acetaminophen to as needed.  -- " Multimodal treatment of pain now involves ice packs to the right knee.  -- No pain currently, but she does feel it when walking.    Ulcer of right calf  -- Completion of oral antibiotics.    HFpEF  -- Currently asymptomatic.  No current LE edema.    Obesity  -- BMI >40    DVT prophylaxis  -- On DOAC.  Recent venous Doppler ultrasound was negative for DVT.    Hypothyroidism  -- She was complaining of the being awakened too early to receive her levothyroxine, and we moved that to evening.  Since then, however, she has changed her mind and wants it, once again, in the morning.      ROS:  -- 10 point ROS of systems including Constitutional, Eyes, Respiratory, Cardiovascular, Gastroenterology, Genitourinary, Integumentary, Muscularskeletal, Psychiatric were all negative except for pertinent positives noted in my HPI.      Past Medical History:   Diagnosis Date     Antiplatelet or antithrombotic long-term use      Chronic acquired lymphedema      Edema      Falls      Fibrocystic breast disease      Fibrocystic breast disease      Foot pain      Gastroesophageal reflux disease      GERD (gastroesophageal reflux disease)      Hyperlipidemia      Hypertension      Irregular heart beat      Lymphedema      Mitral valve disorder      Obese      JADYN (obstructive sleep apnea)      Osteoarthritis of knee     hx of knee replacement and pending second     Osteopenia      Skin cancer      Skin cancer, basal cell      Sleep apnea      Thyroid nodule      Thyroid nodule      Vitamin B deficiency               Family History   Problem Relation Age of Onset     No Known Problems Mother      No Known Problems Father      Alzheimer Disease Maternal Uncle      Heart Disease Sister      Heart Disease Brother      No Known Problems Daughter      No Known Problems Son      No Known Problems Brother      No Known Problems Son      No Known Problems Son      Social History     Socioeconomic History     Marital status:      Spouse name:  None     Number of children: None     Years of education: None     Highest education level: None   Tobacco Use     Smoking status: Never     Smokeless tobacco: Never   Vaping Use     Vaping Use: Never used   Substance and Sexual Activity     Alcohol use: Yes     Alcohol/week: 0.8 standard drinks     Comment: Alcoholic Drinks/day: 1-2 glasses per month     Drug use: No     Sexual activity: Yes   Social History Narrative    . 4 kids.  Teaches college English, reading.        MEDICATIONS: Reviewed from the MAR, physician orders, and/or earlier progress notes.  MED REC REQUIRED  Post Medication Reconciliation Status: medication reconcilation previously completed during another office visit    Current Outpatient Medications   Medication Sig     fish oil-omega-3 fatty acids 1000 MG capsule Take 2 g by mouth daily     acetaminophen (TYLENOL) 325 MG tablet Take 650 mg by mouth every 8 hours as needed     cholecalciferol (VITAMIN D3) 5000 units TABS tablet Take 2 tablets by mouth daily     Coenzyme Q10 400 MG CAPS Take 800 mg by mouth daily     denosumab (PROLIA) 60 MG/ML SOSY injection As of 3/14/2023, please hold and pause this medication while at the transitional care unit     furosemide (LASIX) 20 MG tablet Take 20 mg by mouth daily     gabapentin (NEURONTIN) 300 MG capsule Take 1,200 mg by mouth At Bedtime     lactobacillus TABS Take 2 capsules by mouth daily     levothyroxine (SYNTHROID/LEVOTHROID) 112 MCG tablet Take 112 mcg by mouth daily     losartan (COZAAR) 50 MG tablet Take 50 mg by mouth 2 times daily     MAGNESIUM PO Take 2 capsules by mouth 2 times daily     rivaroxaban ANTICOAGULANT (XARELTO) 20 MG TABS tablet Take 1 tablet (20 mg) by mouth daily (with dinner)     rosuvastatin (CRESTOR) 20 MG tablet Take 1 tablet (20 mg) by mouth daily     senna-docusate (SENOKOT-S/PERICOLACE) 8.6-50 MG tablet Take 1-2 tablets by mouth 2 times daily Take while on oral narcotics to prevent or treat constipation.      "traZODone (DESYREL) 50 MG tablet Take 25 mg by mouth At Bedtime May give an additional 25 mg PRN dose if not asleep by midnight.     No current facility-administered medications for this visit.     ALLERGIES:   Allergies   Allergen Reactions     Lisinopril Cough     Reglan [Metoclopramide] Other (See Comments)     depression     DIET: Regular, regular texture, thin liquids.    Vitals:    04/07/23 1156   BP: 133/74   Pulse: 65   Resp: 16   Temp: 98.1  F (36.7  C)   SpO2: 95%   Weight: 116.2 kg (256 lb 3.2 oz)   Height: 1.651 m (5' 5\")     Wt Readings from Last 4 Encounters:   04/07/23 116.2 kg (256 lb 3.2 oz)   04/03/23 116.2 kg (256 lb 3.2 oz)   03/31/23 116.2 kg (256 lb 3.2 oz)   03/27/23 116.2 kg (256 lb 3.2 oz)     Body mass index is 42.63 kg/m .  Body surface area is 2.31 meters squared.    EXAMINATION:   General: Pleasant, alert, and conversant elderly female, sitting in a recliner, in NAD.  Head: Normocephalic and atraumatic.   Eyes: PERRLA, sclerae clear.   ENT: Moist oral mucosa.  No nasal discharge.  Hearing unimpaired.  Cardiovascular: RRR with a 3/6 JACOB at the LSB.   Respiratory: Lungs CTAB.   Abdomen: Nondistended.   Musculoskeletal/Extremities: Age-related osteoarthritic changes.  No significant LE edema, though she reports that it does worsen as the day progresses.  She does wear compression stockings, although they appears to offer very little in the way of compression.  Integument: Right knee surgical wound healed.  Otherwise, no rashes, clinically significant lesions, or skin breakdown.   Cognitive/Psychiatric: Alert and oriented with euthymic affect.    DIAGNOSTICS:   No results found for this or any previous visit (from the past 240 hour(s)).  Last Comprehensive Metabolic Panel:  Sodium   Date Value Ref Range Status   03/17/2023 139 136 - 145 mmol/L Final     Potassium   Date Value Ref Range Status   03/17/2023 3.7 3.4 - 5.3 mmol/L Final   03/10/2023 4.2 3.5 - 5.0 mmol/L Final     Chloride   Date " Value Ref Range Status   03/17/2023 105 98 - 107 mmol/L Final   03/10/2023 105 98 - 107 mmol/L Final     Carbon Dioxide (CO2)   Date Value Ref Range Status   03/17/2023 22 22 - 29 mmol/L Final   03/10/2023 26 22 - 31 mmol/L Final     Anion Gap   Date Value Ref Range Status   03/17/2023 12 7 - 15 mmol/L Final   03/10/2023 8 5 - 18 mmol/L Final     Glucose   Date Value Ref Range Status   03/17/2023 92 70 - 99 mg/dL Final   03/10/2023 90 70 - 125 mg/dL Final     Urea Nitrogen   Date Value Ref Range Status   03/17/2023 15.8 8.0 - 23.0 mg/dL Final   03/10/2023 15 8 - 28 mg/dL Final     Creatinine   Date Value Ref Range Status   03/17/2023 0.70 0.51 - 0.95 mg/dL Final     GFR Estimate   Date Value Ref Range Status   03/17/2023 84 >60 mL/min/1.73m2 Final     Comment:     eGFR calculated using 2021 CKD-EPI equation.   05/26/2021 >60 >60 mL/min/1.73m2 Final     Calcium   Date Value Ref Range Status   03/17/2023 8.0 (L) 8.8 - 10.2 mg/dL Final     Bilirubin Total   Date Value Ref Range Status   03/16/2022 0.7 0.0 - 1.0 mg/dL Final     Alkaline Phosphatase   Date Value Ref Range Status   03/16/2022 60 45 - 120 U/L Final     ALT   Date Value Ref Range Status   03/16/2022 32 0 - 45 U/L Final     AST   Date Value Ref Range Status   03/16/2022 25 0 - 40 U/L Final     Lab Results   Component Value Date    WBC 5.7 03/17/2023     Lab Results   Component Value Date    RBC 3.91 03/17/2023     Lab Results   Component Value Date    HGB 11.4 03/20/2023     Lab Results   Component Value Date    HCT 35.2 03/17/2023     Lab Results   Component Value Date    MCV 90 03/17/2023     Lab Results   Component Value Date    MCH 27.6 03/17/2023     Lab Results   Component Value Date    MCHC 30.7 03/17/2023     Lab Results   Component Value Date    RDW 14.2 03/17/2023     Lab Results   Component Value Date     03/17/2023       ASSESSMENT/Plan:      ICD-10-CM    1. Cellulitis of right lower extremity  L03.115       2. Ulcer of right calf, limited  to breakdown of skin (H)  L97.211       3. Venous insufficiency of both lower extremities  I87.2       4. Chronic heart failure with preserved ejection fraction (HFpEF) (H)  I50.32       5. History of arthroplasty of right knee  Z96.651       6. Obesity, Class III, BMI 40-49.9 (morbid obesity) (H)  E66.01       7. Insomnia, unspecified type  G47.00       8. DVT prophylaxis  Z79.899           CHANGES:    1. Change acetaminophen to as needed dosing.  2. Omega-3 1000 mg daily (patient will supply her own).    CARE PLAN:    The care plan, medications, vital signs, orders, and nursing notes have been reviewed, and all orders signed. Changes to care plan, if any, as noted. Otherwise, continue current plan of care.    The above has been created using voice recognition software. Please be aware that this may unintentionally  produce inaccuracies and/or nonsensical sentences.      Electronically signed by: PHAM Rocha CNP        Sincerely,        PHAM Rocha CNP

## 2023-04-08 RX ORDER — CHLORAL HYDRATE 500 MG
2 CAPSULE ORAL DAILY
COMMUNITY
Start: 2023-04-07 | End: 2023-08-15 | Stop reason: SINTOL

## 2023-04-08 NOTE — PROGRESS NOTES
[facility]    Name:   Debra Manriquez  :   1940  MRN:    [id number]     Facility:   [facility] ([facility]) [[id number]]   Room: [facility] / [place] [id number]  Code Status: FULL CODE and POLST AVAILABLE -     DOS:  2023    PCP:  Yoanna Mcpherson MD     CHIEF COMPLAINT / REASON FOR VISIT:  Chief Complaint   Patient presents with     Clinic Care Coordination - Follow-up     Cellulitis of right lower extremity      [facility] from 2023 until 2023 (RLE cellulitis, venous stasis)      HPI: Debra is an 83 year old female with a PMH significant for fibrocystic breast disease, GERD, HLD, essential HTN, acquired lymphedema, JADYN, OA of the knee, and a mitral valve disorder, who was admitted to the hospital on  due to severe left leg post surgical pain.    Brief review of hospital course, excerpted from the hospital discharge summary:    3/07 -- Admitted through 3/8 for TKA and discharged home where functional status declined.  3/09 -- Went to  ED, Dx cellulitis of right LE, treated with ceftriaxone. U/S right leg negative for DVT.  Ortho consulted.  3/10 -- Leg pain poorly controlled, PT/OT recommended TCU, Hgb 11 from baseline 12.3  3/11 -- Ceftriaxone continued  3/12 -- Pain improved, transitioned to p.o. cefdinir, bordered cellulitis.  Caffeine and glycerine suppository for constipation.  3/13 -- Cellulitis shrinking, pt having loose BMs.   3/14 -- Clinically improved and ready medically for discharge.      On 3/14, clinically close to a return to baseline, remaining vitally and hemodynamically stable. TCU acceptance for rehabilitation, and ,discharged in stable medical condition with plan to complete 3 more doses of cefdinir for total course of 7 days.    Also initially given hydroxyzine for anxiety.  Her denosumab/Prolia to be held/paused while at the TCU.  F/U with the facility provider within a week.    Additionally F/U with orthopedics as well.  "      CURRENT/RECENT TCU ISSUES    Disposition  -- Excited that she was able to walk 112 feet today.  She did have some burning in 1 foot this morning.  -- Due to narcotic refusal, she developed loose stools, and we changed senna-S to PRN.  -- Per patient request, we also adjusted her gabapentin to meet PTA dosing by increasing her 900 mg dose at bedtime to 1200 mg.  -- Expressed interest in taking supplements: K2 (she has her own supply) and omega-3.  I suggested there would be no harm in waiting until discharge to resume those supplements.  However, she already brought in her K2, and we will write orders to permit the omega-3 as well.  -- Patient reports being able to walk before, but her right knee now is buckling when she stands.  She says she is not afraid to walk \"when all is well.\"  Continue to monitor.  -- Complaining of being unable to sleep.  We did discuss trazodone, and we ordered a 25 mg bedtime dose with an additional 25 mg available if not asleep by midnight.  This has worked so well (\"it works sadiq\"), that she has not required the additional dose.     Pain management  -- Pain management has been a bit confusing.  Pain, according to the patient, has tended to go \"up and down.\"  Previously, she said she was doing okay but experiencing more pain that appeared to be neuropathic, hence an order for tramadol.  I was told by the nurse manager that they had gotten an order for tramadol from ortho (patient called) while already on Norco.  She was apparently getting relief with tramadol, yet she claimed to have gotten a script for Vicodin (or possibly Norco) as well.  We discussed round-the-clock pain management and we scheduled that every 8 hours.  By the following visit, she was refusing all narcotics (because they made her nauseous).  Per her request, we will also changed her acetaminophen to as needed.  -- Multimodal treatment of pain now involves ice packs to the right knee.  -- No pain currently, but " she does feel it when walking.    Ulcer of right calf  -- Completion of oral antibiotics.    HFpEF  -- Currently asymptomatic.  No current LE edema.    Obesity  -- BMI >40    DVT prophylaxis  -- On DOAC.  Recent venous Doppler ultrasound was negative for DVT.    Hypothyroidism  -- She was complaining of the being awakened too early to receive her levothyroxine, and we moved that to evening.  Since then, however, she has changed her mind and wants it, once again, in the morning.      ROS:  -- 10 point ROS of systems including Constitutional, Eyes, Respiratory, Cardiovascular, Gastroenterology, Genitourinary, Integumentary, Muscularskeletal, Psychiatric were all negative except for pertinent positives noted in my HPI.      Past Medical History:   Diagnosis Date     Antiplatelet or antithrombotic long-term use      Chronic acquired lymphedema      Edema      Falls      Fibrocystic breast disease      Fibrocystic breast disease      Foot pain      Gastroesophageal reflux disease      GERD (gastroesophageal reflux disease)      Hyperlipidemia      Hypertension      Irregular heart beat      Lymphedema      Mitral valve disorder      Obese      JADYN (obstructive sleep apnea)      Osteoarthritis of knee     hx of knee replacement and pending second     Osteopenia      Skin cancer      Skin cancer, basal cell      Sleep apnea      Thyroid nodule      Thyroid nodule      Vitamin B deficiency               Family History   Problem Relation Age of Onset     No Known Problems Mother      No Known Problems Father      Alzheimer Disease Maternal Uncle      Heart Disease Sister      Heart Disease Brother      No Known Problems Daughter      No Known Problems Son      No Known Problems Brother      No Known Problems Son      No Known Problems Son      Social History     Socioeconomic History     Marital status:      Spouse name: None     Number of children: None     Years of education: None     Highest education level: None    Tobacco Use     Smoking status: Never     Smokeless tobacco: Never   Vaping Use     Vaping Use: Never used   Substance and Sexual Activity     Alcohol use: Yes     Alcohol/week: 0.8 standard drinks     Comment: Alcoholic Drinks/day: 1-2 glasses per month     Drug use: No     Sexual activity: Yes   Social History Narrative    . 4 kids.  Teaches college English, reading.        MEDICATIONS: Reviewed from the MAR, physician orders, and/or earlier progress notes.  MED REC REQUIRED  Post Medication Reconciliation Status: medication reconcilation previously completed during another office visit    Current Outpatient Medications   Medication Sig     fish oil-omega-3 fatty acids 1000 MG capsule Take 2 g by mouth daily     acetaminophen (TYLENOL) 325 MG tablet Take 650 mg by mouth every 8 hours as needed     cholecalciferol (VITAMIN D3) 5000 units TABS tablet Take 2 tablets by mouth daily     Coenzyme Q10 400 MG CAPS Take 800 mg by mouth daily     denosumab (PROLIA) 60 MG/ML SOSY injection As of 3/14/2023, please hold and pause this medication while at the transitional care unit     furosemide (LASIX) 20 MG tablet Take 20 mg by mouth daily     gabapentin (NEURONTIN) 300 MG capsule Take 1,200 mg by mouth At Bedtime     lactobacillus TABS Take 2 capsules by mouth daily     levothyroxine (SYNTHROID/LEVOTHROID) 112 MCG tablet Take 112 mcg by mouth daily     losartan (COZAAR) 50 MG tablet Take 50 mg by mouth 2 times daily     MAGNESIUM PO Take 2 capsules by mouth 2 times daily     rivaroxaban ANTICOAGULANT (XARELTO) 20 MG TABS tablet Take 1 tablet (20 mg) by mouth daily (with dinner)     rosuvastatin (CRESTOR) 20 MG tablet Take 1 tablet (20 mg) by mouth daily     senna-docusate (SENOKOT-S/PERICOLACE) 8.6-50 MG tablet Take 1-2 tablets by mouth 2 times daily Take while on oral narcotics to prevent or treat constipation.     traZODone (DESYREL) 50 MG tablet Take 25 mg by mouth At Bedtime May give an additional 25 mg PRN  "dose if not asleep by midnight.     No current facility-administered medications for this visit.     ALLERGIES:   Allergies   Allergen Reactions     Lisinopril Cough     Reglan [Metoclopramide] Other (See Comments)     depression     DIET: Regular, regular texture, thin liquids.    Vitals:    04/07/23 1156   BP: 133/74   Pulse: 65   Resp: 16   Temp: 98.1  F (36.7  C)   SpO2: 95%   Weight: 116.2 kg (256 lb 3.2 oz)   Height: 1.651 m (5' 5\")     Wt Readings from Last 4 Encounters:   04/07/23 116.2 kg (256 lb 3.2 oz)   04/03/23 116.2 kg (256 lb 3.2 oz)   03/31/23 116.2 kg (256 lb 3.2 oz)   03/27/23 116.2 kg (256 lb 3.2 oz)     Body mass index is 42.63 kg/m .  Body surface area is 2.31 meters squared.    EXAMINATION:   General: Pleasant, alert, and conversant elderly female, sitting in a recliner, in NAD.  Head: Normocephalic and atraumatic.   Eyes: PERRLA, sclerae clear.   ENT: Moist oral mucosa.  No nasal discharge.  Hearing unimpaired.  Cardiovascular: RRR with a 3/6 JACOB at the LSB.   Respiratory: Lungs CTAB.   Abdomen: Nondistended.   Musculoskeletal/Extremities: Age-related osteoarthritic changes.  No significant LE edema, though she reports that it does worsen as the day progresses.  She does wear compression stockings, although they appears to offer very little in the way of compression.  Integument: Right knee surgical wound healed.  Otherwise, no rashes, clinically significant lesions, or skin breakdown.   Cognitive/Psychiatric: Alert and oriented with euthymic affect.    DIAGNOSTICS:   No results found for this or any previous visit (from the past 240 hour(s)).  Last Comprehensive Metabolic Panel:  Sodium   Date Value Ref Range Status   03/17/2023 139 136 - 145 mmol/L Final     Potassium   Date Value Ref Range Status   03/17/2023 3.7 3.4 - 5.3 mmol/L Final   03/10/2023 4.2 3.5 - 5.0 mmol/L Final     Chloride   Date Value Ref Range Status   03/17/2023 105 98 - 107 mmol/L Final   03/10/2023 105 98 - 107 mmol/L " Final     Carbon Dioxide (CO2)   Date Value Ref Range Status   03/17/2023 22 22 - 29 mmol/L Final   03/10/2023 26 22 - 31 mmol/L Final     Anion Gap   Date Value Ref Range Status   03/17/2023 12 7 - 15 mmol/L Final   03/10/2023 8 5 - 18 mmol/L Final     Glucose   Date Value Ref Range Status   03/17/2023 92 70 - 99 mg/dL Final   03/10/2023 90 70 - 125 mg/dL Final     Urea Nitrogen   Date Value Ref Range Status   03/17/2023 15.8 8.0 - 23.0 mg/dL Final   03/10/2023 15 8 - 28 mg/dL Final     Creatinine   Date Value Ref Range Status   03/17/2023 0.70 0.51 - 0.95 mg/dL Final     GFR Estimate   Date Value Ref Range Status   03/17/2023 84 >60 mL/min/1.73m2 Final     Comment:     eGFR calculated using 2021 CKD-EPI equation.   05/26/2021 >60 >60 mL/min/1.73m2 Final     Calcium   Date Value Ref Range Status   03/17/2023 8.0 (L) 8.8 - 10.2 mg/dL Final     Bilirubin Total   Date Value Ref Range Status   03/16/2022 0.7 0.0 - 1.0 mg/dL Final     Alkaline Phosphatase   Date Value Ref Range Status   03/16/2022 60 45 - 120 U/L Final     ALT   Date Value Ref Range Status   03/16/2022 32 0 - 45 U/L Final     AST   Date Value Ref Range Status   03/16/2022 25 0 - 40 U/L Final     Lab Results   Component Value Date    WBC 5.7 03/17/2023     Lab Results   Component Value Date    RBC 3.91 03/17/2023     Lab Results   Component Value Date    HGB 11.4 03/20/2023     Lab Results   Component Value Date    HCT 35.2 03/17/2023     Lab Results   Component Value Date    MCV 90 03/17/2023     Lab Results   Component Value Date    MCH 27.6 03/17/2023     Lab Results   Component Value Date    MCHC 30.7 03/17/2023     Lab Results   Component Value Date    RDW 14.2 03/17/2023     Lab Results   Component Value Date     03/17/2023       ASSESSMENT/Plan:      ICD-10-CM    1. Cellulitis of right lower extremity  L03.115       2. Ulcer of right calf, limited to breakdown of skin (H)  L97.211       3. Venous insufficiency of both lower extremities   I87.2       4. Chronic heart failure with preserved ejection fraction (HFpEF) (H)  I50.32       5. History of arthroplasty of right knee  Z96.651       6. Obesity, Class III, BMI 40-49.9 (morbid obesity) (H)  E66.01       7. Insomnia, unspecified type  G47.00       8. DVT prophylaxis  Z79.899           CHANGES:    1. Change acetaminophen to as needed dosing.  2. Omega-3 1000 mg daily (patient will supply her own).    CARE PLAN:    The care plan, medications, vital signs, orders, and nursing notes have been reviewed, and all orders signed. Changes to care plan, if any, as noted. Otherwise, continue current plan of care.    The above has been created using voice recognition software. Please be aware that this may unintentionally  produce inaccuracies and/or nonsensical sentences.      Electronically signed by: PHAM Rocha CNP

## 2023-04-10 ENCOUNTER — TRANSITIONAL CARE UNIT VISIT (OUTPATIENT)
Dept: GERIATRICS | Facility: CLINIC | Age: 83
End: 2023-04-10
Payer: MEDICARE

## 2023-04-10 VITALS
BODY MASS INDEX: 41.77 KG/M2 | OXYGEN SATURATION: 95 % | DIASTOLIC BLOOD PRESSURE: 62 MMHG | WEIGHT: 251 LBS | RESPIRATION RATE: 18 BRPM | SYSTOLIC BLOOD PRESSURE: 127 MMHG | HEART RATE: 59 BPM | TEMPERATURE: 97.7 F

## 2023-04-10 DIAGNOSIS — I50.32 CHRONIC HEART FAILURE WITH PRESERVED EJECTION FRACTION (HFPEF) (H): ICD-10-CM

## 2023-04-10 DIAGNOSIS — Z79.899 ON DEEP VEIN THROMBOSIS (DVT) PROPHYLAXIS: ICD-10-CM

## 2023-04-10 DIAGNOSIS — L03.115 CELLULITIS OF RIGHT LOWER EXTREMITY: Primary | ICD-10-CM

## 2023-04-10 DIAGNOSIS — G47.00 INSOMNIA, UNSPECIFIED TYPE: ICD-10-CM

## 2023-04-10 DIAGNOSIS — I87.2 VENOUS INSUFFICIENCY OF BOTH LOWER EXTREMITIES: ICD-10-CM

## 2023-04-10 DIAGNOSIS — Z96.651 HISTORY OF ARTHROPLASTY OF RIGHT KNEE: ICD-10-CM

## 2023-04-10 DIAGNOSIS — E66.01 OBESITY, CLASS III, BMI 40-49.9 (MORBID OBESITY) (H): ICD-10-CM

## 2023-04-10 DIAGNOSIS — L97.211 ULCER OF RIGHT CALF, LIMITED TO BREAKDOWN OF SKIN (H): ICD-10-CM

## 2023-04-10 PROCEDURE — 99309 SBSQ NF CARE MODERATE MDM 30: CPT | Performed by: NURSE PRACTITIONER

## 2023-04-10 NOTE — LETTER
4/10/2023        RE: Debra Manriquez  [address]  [address]  [place] [address]        [facility]    Name:   Debra Manriquez  :   1940  MRN:    [id number]     Facility:   [facility] ([facility]) [[id number]]   Room: [place] [address]  Code Status: FULL CODE and POLST AVAILABLE -     DOS:  04/10/2023    PCP:  Yoanna Mcpherson MD     CHIEF COMPLAINT / REASON FOR VISIT:  Chief Complaint   Patient presents with     Clinic Care Coordination - Follow-up     Cellulitis of right lower extremity      [facility] from 2023 until 2023 (RLE cellulitis, venous stasis)      HPI: Debra is an 83 year old female with a PMH significant for fibrocystic breast disease, GERD, HLD, essential HTN, acquired lymphedema, JADYN, OA of the knee, and a mitral valve disorder, who was admitted to the hospital on  due to severe left leg post surgical pain.    Brief review of hospital course, excerpted from the hospital discharge summary:    3/07 -- Admitted through 3/8 for TKA and discharged home where functional status declined.  3/09 -- Went to  ED, Dx cellulitis of right LE, treated with ceftriaxone. U/S right leg negative for DVT.  Ortho consulted.  3/10 -- Leg pain poorly controlled, PT/OT recommended TCU, Hgb 11 from baseline 12.3  3/11 -- Ceftriaxone continued  3/12 -- Pain improved, transitioned to p.o. cefdinir, bordered cellulitis.  Caffeine and glycerine suppository for constipation.  3/13 -- Cellulitis shrinking, pt having loose BMs.   3/14 -- Clinically improved and ready medically for discharge.      On 3/14, clinically close to a return to baseline, remaining vitally and hemodynamically stable. TCU acceptance for rehabilitation, and ,discharged in stable medical condition with plan to complete 3 more doses of cefdinir for total course of 7 days.    Also initially given hydroxyzine for anxiety.  Her denosumab/Prolia to be  "held/paused while at the TCU.  F/U with the facility provider within a week.    Additionally F/U with orthopedics as well.       CURRENT/RECENT TCU ISSUES    Disposition  -- Excited that she was able to walk 112 feet today.  She did have some burning in 1 foot this morning.  -- Due to narcotic refusal, she developed loose stools, and we changed senna-S to PRN.  -- Per patient request, we also adjusted her gabapentin to meet PTA dosing by increasing her 900 mg dose at bedtime to 1200 mg.  -- Expressed interest in taking supplements: K2 (she has her own supply) and omega-3.  I suggested there would be no harm in waiting until discharge to resume those supplements.  However, she already brought in her K2, and we will write orders to permit the omega-3 as well.  -- Patient reports being able to walk before, but her right knee now is buckling when she stands.  She says she is not afraid to walk \"when all is well.\"  Continue to monitor.  -- Complaining of being unable to sleep.  We did discuss trazodone, and we ordered a 25 mg bedtime dose with an additional 25 mg available if not asleep by midnight.  This has worked so well (\"it works sadiq\"), that she has not required the additional dose.     Pain management  -- Pain management has been a bit confusing.  Pain, according to the patient, has tended to go \"up and down.\"  Previously, she said she was doing okay but experiencing more pain that appeared to be neuropathic, hence an order for tramadol.  I was told by the nurse manager that they had gotten an order for tramadol from ortho (patient called) while already on Norco.  She was apparently getting relief with tramadol, yet she claimed to have gotten a script for Vicodin (or possibly Norco) as well.  We discussed round-the-clock pain management and we scheduled that every 8 hours.  By the following visit, she was refusing all narcotics (because they made her nauseous).  Per her request, we will also changed her " acetaminophen to as needed.   -- With increased pain today, we discuss addressing this. She does not want narcotics, mostly due to concern for constipation. We settle on increasing acetaminophen to 1000 mg and scheduling it TID. She did not want the usual 0600, 1400, 2200 round-the-clock dosing, eventually agreeing to 0700, 1400, 2100). She is aware of the nighttime gap.  -- Multimodal treatment of pain now involves ice packs to the right knee.  .  Ulcer of right calf  -- Oral antibiotics completed.    HFpEF  -- Currently asymptomatic.  No current LE edema.    Obesity  -- BMI >40    DVT prophylaxis  -- On DOAC.  Recent venous Doppler ultrasound was negative for DVT.    Hypothyroidism  -- She was complaining of the being awakened too early to receive her levothyroxine, and we moved that to evening.  Since then, however, she has changed her mind and wants it, once again, in the morning.    Discharge planning  -- Expecting to discharge later this week or next.      ROS:  10 point ROS of systems including Constitutional, Eyes, Respiratory, Cardiovascular, Gastroenterology, Genitourinary, Integumentary, Muscularskeletal, Psychiatric were all negative except for pertinent positives noted in my HPI.      Past Medical History:   Diagnosis Date     Antiplatelet or antithrombotic long-term use      Chronic acquired lymphedema      Edema      Falls      Fibrocystic breast disease      Fibrocystic breast disease      Foot pain      Gastroesophageal reflux disease      GERD (gastroesophageal reflux disease)      Hyperlipidemia      Hypertension      Irregular heart beat      Lymphedema      Mitral valve disorder      Obese      JADYN (obstructive sleep apnea)      Osteoarthritis of knee     hx of knee replacement and pending second     Osteopenia      Skin cancer      Skin cancer, basal cell      Sleep apnea      Thyroid nodule      Thyroid nodule      Vitamin B deficiency               Family History   Problem Relation Age of  Onset     No Known Problems Mother      No Known Problems Father      Alzheimer Disease Maternal Uncle      Heart Disease Sister      Heart Disease Brother      No Known Problems Daughter      No Known Problems Son      No Known Problems Brother      No Known Problems Son      No Known Problems Son      Social History     Socioeconomic History     Marital status:      Spouse name: None     Number of children: None     Years of education: None     Highest education level: None   Tobacco Use     Smoking status: Never     Smokeless tobacco: Never   Vaping Use     Vaping Use: Never used   Substance and Sexual Activity     Alcohol use: Yes     Alcohol/week: 0.8 standard drinks     Comment: Alcoholic Drinks/day: 1-2 glasses per month     Drug use: No     Sexual activity: Yes   Social History Narrative    . 4 kids.  [employer], reading.        MEDICATIONS: Reviewed from the MAR, physician orders, and/or earlier progress notes.  MED REC REQUIRED  Post Medication Reconciliation Status: medication reconcilation previously completed during another office visit    Current Outpatient Medications   Medication Sig     acetaminophen (TYLENOL) 500 MG tablet Take 1,000 mg by mouth 3 times daily 0700, 1400, 2100  No PRN     cholecalciferol (VITAMIN D3) 5000 units TABS tablet Take 2 tablets by mouth daily     Coenzyme Q10 400 MG CAPS Take 800 mg by mouth daily     denosumab (PROLIA) 60 MG/ML SOSY injection As of 3/14/2023, please hold and pause this medication while at the transitional care unit     fish oil-omega-3 fatty acids 1000 MG capsule Take 2 g by mouth daily     furosemide (LASIX) 20 MG tablet Take 20 mg by mouth daily     gabapentin (NEURONTIN) 300 MG capsule Take 1,200 mg by mouth At Bedtime     lactobacillus TABS Take 2 capsules by mouth daily     levothyroxine (SYNTHROID/LEVOTHROID) 112 MCG tablet Take 112 mcg by mouth daily     losartan (COZAAR) 50 MG tablet Take 50 mg by mouth 2 times daily      MAGNESIUM PO Take 2 capsules by mouth 2 times daily     rivaroxaban ANTICOAGULANT (XARELTO) 20 MG TABS tablet Take 1 tablet (20 mg) by mouth daily (with dinner)     rosuvastatin (CRESTOR) 20 MG tablet Take 1 tablet (20 mg) by mouth daily     senna-docusate (SENOKOT-S/PERICOLACE) 8.6-50 MG tablet Take 1-2 tablets by mouth 2 times daily Take while on oral narcotics to prevent or treat constipation.     traZODone (DESYREL) 50 MG tablet Take 25 mg by mouth At Bedtime May give an additional 25 mg PRN dose if not asleep by midnight.     No current facility-administered medications for this visit.     ALLERGIES:   Allergies   Allergen Reactions     Lisinopril Cough     Reglan [Metoclopramide] Other (See Comments)     depression     DIET: Regular, regular texture, thin liquids.    Vitals:    04/10/23 1300   BP: 127/62   Pulse: 59   Resp: 18   Temp: 97.7  F (36.5  C)   SpO2: 95%   Weight: 113.9 kg (251 lb)     Wt Readings from Last 4 Encounters:   04/10/23 113.9 kg (251 lb)   04/07/23 116.2 kg (256 lb 3.2 oz)   04/03/23 116.2 kg (256 lb 3.2 oz)   03/31/23 116.2 kg (256 lb 3.2 oz)     Body mass index is 41.77 kg/m .  Body surface area is 2.29 meters squared.    EXAMINATION:   General: Pleasant, alert, and conversant elderly female, sitting in a wheelchair, in NAD.  Head: Normocephalic and atraumatic.   Eyes: PERRLA, sclerae clear.   ENT: Moist oral mucosa.  No nasal discharge.  Hearing appears unimpaired.  Cardiovascular: RRR with a 3/6 JACOB at the LSB.   Respiratory: Lungs CTAB.   Abdomen: Nondistended.   Musculoskeletal/Extremities: Age-related osteoarthritic changes.  No significant LE edema, though she reports worsening of leg swelling as the day progresses.  She does wear compression stockings, although they appear to offer very little compression.  Integument: Right knee surgical wound healed.  Otherwise, no rashes, clinically significant lesions, or skin breakdown.   Cognitive/Psychiatric: Alert and oriented with  euthymic affect.    DIAGNOSTICS:   No results found for this or any previous visit (from the past 240 hour(s)).  Last Comprehensive Metabolic Panel:  Sodium   Date Value Ref Range Status   03/17/2023 139 136 - 145 mmol/L Final     Potassium   Date Value Ref Range Status   03/17/2023 3.7 3.4 - 5.3 mmol/L Final   03/10/2023 4.2 3.5 - 5.0 mmol/L Final     Chloride   Date Value Ref Range Status   03/17/2023 105 98 - 107 mmol/L Final   03/10/2023 105 98 - 107 mmol/L Final     Carbon Dioxide (CO2)   Date Value Ref Range Status   03/17/2023 22 22 - 29 mmol/L Final   03/10/2023 26 22 - 31 mmol/L Final     Anion Gap   Date Value Ref Range Status   03/17/2023 12 7 - 15 mmol/L Final   03/10/2023 8 5 - 18 mmol/L Final     Glucose   Date Value Ref Range Status   03/17/2023 92 70 - 99 mg/dL Final   03/10/2023 90 70 - 125 mg/dL Final     Urea Nitrogen   Date Value Ref Range Status   03/17/2023 15.8 8.0 - 23.0 mg/dL Final   03/10/2023 15 8 - 28 mg/dL Final     Creatinine   Date Value Ref Range Status   03/17/2023 0.70 0.51 - 0.95 mg/dL Final     GFR Estimate   Date Value Ref Range Status   03/17/2023 84 >60 mL/min/1.73m2 Final     Comment:     eGFR calculated using 2021 CKD-EPI equation.   05/26/2021 >60 >60 mL/min/1.73m2 Final     Calcium   Date Value Ref Range Status   03/17/2023 8.0 (L) 8.8 - 10.2 mg/dL Final     Bilirubin Total   Date Value Ref Range Status   03/16/2022 0.7 0.0 - 1.0 mg/dL Final     Alkaline Phosphatase   Date Value Ref Range Status   03/16/2022 60 45 - 120 U/L Final     ALT   Date Value Ref Range Status   03/16/2022 32 0 - 45 U/L Final     AST   Date Value Ref Range Status   03/16/2022 25 0 - 40 U/L Final     Lab Results   Component Value Date    WBC 5.7 03/17/2023     Lab Results   Component Value Date    RBC 3.91 03/17/2023     Lab Results   Component Value Date    HGB 11.4 03/20/2023     Lab Results   Component Value Date    HCT 35.2 03/17/2023     Lab Results   Component Value Date    MCV 90 03/17/2023      Lab Results   Component Value Date    MCH 27.6 03/17/2023     Lab Results   Component Value Date    MCHC 30.7 03/17/2023     Lab Results   Component Value Date    RDW 14.2 03/17/2023     Lab Results   Component Value Date     03/17/2023       ASSESSMENT/Plan:      ICD-10-CM    1. Cellulitis of right lower extremity  L03.115       2. Ulcer of right calf, limited to breakdown of skin (H)  L97.211       3. Venous insufficiency of both lower extremities  I87.2       4. Chronic heart failure with preserved ejection fraction (HFpEF) (H)  I50.32       5. History of arthroplasty of right knee  Z96.651       6. Obesity, Class III, BMI 40-49.9 (morbid obesity) (H)  E66.01       7. Insomnia, unspecified type  G47.00       8. DVT prophylaxis  Z79.899           CHANGES:    1. Discontinue acetaminophen 650 mg.  2. Start acetaminophen 500 mg, 2 tabs 3 times daily at 0700, 1400, and 2100.    CARE PLAN:    The care plan, medications, vital signs, orders, and nursing notes have been reviewed, and all orders signed. Changes to care plan, if any, as noted. Otherwise, continue current plan of care.    The above has been created using voice recognition software. Please be aware that this may unintentionally  produce inaccuracies and/or nonsensical sentences.      Electronically signed by: PHAM Rocha CNP        Sincerely,        PHAM Rocha CNP

## 2023-04-10 NOTE — PROGRESS NOTES
[facility]    Name:   Debra Manriquez  :   1940  MRN:    [id number]     Facility:   [facility] ([facility]) [[id number]]   Room: [facility] / [place] [address]  Code Status: FULL CODE and POLST AVAILABLE -     DOS:  04/10/2023    PCP:  Yoanna Mcpherson MD     CHIEF COMPLAINT / REASON FOR VISIT:  Chief Complaint   Patient presents with     Clinic Care Coordination - Follow-up     Cellulitis of right lower extremity      [facility] from 2023 until 2023 (RLE cellulitis, venous stasis)      HPI: Debra is an 83 year old female with a PMH significant for fibrocystic breast disease, GERD, HLD, essential HTN, acquired lymphedema, JADYN, OA of the knee, and a mitral valve disorder, who was admitted to the hospital on  due to severe left leg post surgical pain.    Brief review of hospital course, excerpted from the hospital discharge summary:    3/07 -- Admitted through 3/8 for TKA and discharged home where functional status declined.  3/09 -- Went to  ED, Dx cellulitis of right LE, treated with ceftriaxone. U/S right leg negative for DVT.  Ortho consulted.  3/10 -- Leg pain poorly controlled, PT/OT recommended TCU, Hgb 11 from baseline 12.3  3/11 -- Ceftriaxone continued  3/12 -- Pain improved, transitioned to p.o. cefdinir, bordered cellulitis.  Caffeine and glycerine suppository for constipation.  3/13 -- Cellulitis shrinking, pt having loose BMs.   3/14 -- Clinically improved and ready medically for discharge.      On 3/14, clinically close to a return to baseline, remaining vitally and hemodynamically stable. TCU acceptance for rehabilitation, and ,discharged in stable medical condition with plan to complete 3 more doses of cefdinir for total course of 7 days.    Also initially given hydroxyzine for anxiety.  Her denosumab/Prolia to be held/paused while at the TCU.  F/U with the facility provider within a week.    Additionally F/U with orthopedics as well.  "      CURRENT/RECENT TCU ISSUES    Disposition  -- Excited that she was able to walk 112 feet today.  She did have some burning in 1 foot this morning.  -- Due to narcotic refusal, she developed loose stools, and we changed senna-S to PRN.  -- Per patient request, we also adjusted her gabapentin to meet PTA dosing by increasing her 900 mg dose at bedtime to 1200 mg.  -- Expressed interest in taking supplements: K2 (she has her own supply) and omega-3.  I suggested there would be no harm in waiting until discharge to resume those supplements.  However, she already brought in her K2, and we will write orders to permit the omega-3 as well.  -- Patient reports being able to walk before, but her right knee now is buckling when she stands.  She says she is not afraid to walk \"when all is well.\"  Continue to monitor.  -- Complaining of being unable to sleep.  We did discuss trazodone, and we ordered a 25 mg bedtime dose with an additional 25 mg available if not asleep by midnight.  This has worked so well (\"it works sadiq\"), that she has not required the additional dose.     Pain management  -- Pain management has been a bit confusing.  Pain, according to the patient, has tended to go \"up and down.\"  Previously, she said she was doing okay but experiencing more pain that appeared to be neuropathic, hence an order for tramadol.  I was told by the nurse manager that they had gotten an order for tramadol from ortho (patient called) while already on Norco.  She was apparently getting relief with tramadol, yet she claimed to have gotten a script for Vicodin (or possibly Norco) as well.  We discussed round-the-clock pain management and we scheduled that every 8 hours.  By the following visit, she was refusing all narcotics (because they made her nauseous).  Per her request, we will also changed her acetaminophen to as needed.   -- With increased pain today, we discuss addressing this. She does not want narcotics, mostly due to " concern for constipation. We settle on increasing acetaminophen to 1000 mg and scheduling it TID. She did not want the usual 0600, 1400, 2200 round-the-clock dosing, eventually agreeing to 0700, 1400, 2100). She is aware of the nighttime gap.  -- Multimodal treatment of pain now involves ice packs to the right knee.  .  Ulcer of right calf  -- Oral antibiotics completed.    HFpEF  -- Currently asymptomatic.  No current LE edema.    Obesity  -- BMI >40    DVT prophylaxis  -- On DOAC.  Recent venous Doppler ultrasound was negative for DVT.    Hypothyroidism  -- She was complaining of the being awakened too early to receive her levothyroxine, and we moved that to evening.  Since then, however, she has changed her mind and wants it, once again, in the morning.    Discharge planning  -- Expecting to discharge later this week or next.      ROS:  10 point ROS of systems including Constitutional, Eyes, Respiratory, Cardiovascular, Gastroenterology, Genitourinary, Integumentary, Muscularskeletal, Psychiatric were all negative except for pertinent positives noted in my HPI.      Past Medical History:   Diagnosis Date     Antiplatelet or antithrombotic long-term use      Chronic acquired lymphedema      Edema      Falls      Fibrocystic breast disease      Fibrocystic breast disease      Foot pain      Gastroesophageal reflux disease      GERD (gastroesophageal reflux disease)      Hyperlipidemia      Hypertension      Irregular heart beat      Lymphedema      Mitral valve disorder      Obese      JADYN (obstructive sleep apnea)      Osteoarthritis of knee     hx of knee replacement and pending second     Osteopenia      Skin cancer      Skin cancer, basal cell      Sleep apnea      Thyroid nodule      Thyroid nodule      Vitamin B deficiency               Family History   Problem Relation Age of Onset     No Known Problems Mother      No Known Problems Father      Alzheimer Disease Maternal Uncle      Heart Disease Sister       Heart Disease Brother      No Known Problems Daughter      No Known Problems Son      No Known Problems Brother      No Known Problems Son      No Known Problems Son      Social History     Socioeconomic History     Marital status:      Spouse name: None     Number of children: None     Years of education: None     Highest education level: None   Tobacco Use     Smoking status: Never     Smokeless tobacco: Never   Vaping Use     Vaping Use: Never used   Substance and Sexual Activity     Alcohol use: Yes     Alcohol/week: 0.8 standard drinks     Comment: Alcoholic Drinks/day: 1-2 glasses per month     Drug use: No     Sexual activity: Yes   Social History Narrative    . 4 kids.  Teaches college English, reading.        MEDICATIONS: Reviewed from the MAR, physician orders, and/or earlier progress notes.  MED REC REQUIRED  Post Medication Reconciliation Status: medication reconcilation previously completed during another office visit    Current Outpatient Medications   Medication Sig     acetaminophen (TYLENOL) 500 MG tablet Take 1,000 mg by mouth 3 times daily 0700, 1400, 2100  No PRN     cholecalciferol (VITAMIN D3) 5000 units TABS tablet Take 2 tablets by mouth daily     Coenzyme Q10 400 MG CAPS Take 800 mg by mouth daily     denosumab (PROLIA) 60 MG/ML SOSY injection As of 3/14/2023, please hold and pause this medication while at the transitional care unit     fish oil-omega-3 fatty acids 1000 MG capsule Take 2 g by mouth daily     furosemide (LASIX) 20 MG tablet Take 20 mg by mouth daily     gabapentin (NEURONTIN) 300 MG capsule Take 1,200 mg by mouth At Bedtime     lactobacillus TABS Take 2 capsules by mouth daily     levothyroxine (SYNTHROID/LEVOTHROID) 112 MCG tablet Take 112 mcg by mouth daily     losartan (COZAAR) 50 MG tablet Take 50 mg by mouth 2 times daily     MAGNESIUM PO Take 2 capsules by mouth 2 times daily     rivaroxaban ANTICOAGULANT (XARELTO) 20 MG TABS tablet Take 1 tablet (20 mg)  by mouth daily (with dinner)     rosuvastatin (CRESTOR) 20 MG tablet Take 1 tablet (20 mg) by mouth daily     senna-docusate (SENOKOT-S/PERICOLACE) 8.6-50 MG tablet Take 1-2 tablets by mouth 2 times daily Take while on oral narcotics to prevent or treat constipation.     traZODone (DESYREL) 50 MG tablet Take 25 mg by mouth At Bedtime May give an additional 25 mg PRN dose if not asleep by midnight.     No current facility-administered medications for this visit.     ALLERGIES:   Allergies   Allergen Reactions     Lisinopril Cough     Reglan [Metoclopramide] Other (See Comments)     depression     DIET: Regular, regular texture, thin liquids.    Vitals:    04/10/23 1300   BP: 127/62   Pulse: 59   Resp: 18   Temp: 97.7  F (36.5  C)   SpO2: 95%   Weight: 113.9 kg (251 lb)     Wt Readings from Last 4 Encounters:   04/10/23 113.9 kg (251 lb)   04/07/23 116.2 kg (256 lb 3.2 oz)   04/03/23 116.2 kg (256 lb 3.2 oz)   03/31/23 116.2 kg (256 lb 3.2 oz)     Body mass index is 41.77 kg/m .  Body surface area is 2.29 meters squared.    EXAMINATION:   General: Pleasant, alert, and conversant elderly female, sitting in a wheelchair, in NAD.  Head: Normocephalic and atraumatic.   Eyes: PERRLA, sclerae clear.   ENT: Moist oral mucosa.  No nasal discharge.  Hearing appears unimpaired.  Cardiovascular: RRR with a 3/6 JACOB at the LSB.   Respiratory: Lungs CTAB.   Abdomen: Nondistended.   Musculoskeletal/Extremities: Age-related osteoarthritic changes.  No significant LE edema, though she reports worsening of leg swelling as the day progresses.  She does wear compression stockings, although they appear to offer very little compression.  Integument: Right knee surgical wound healed.  Otherwise, no rashes, clinically significant lesions, or skin breakdown.   Cognitive/Psychiatric: Alert and oriented with euthymic affect.    DIAGNOSTICS:   No results found for this or any previous visit (from the past 240 hour(s)).  Last Comprehensive  Metabolic Panel:  Sodium   Date Value Ref Range Status   03/17/2023 139 136 - 145 mmol/L Final     Potassium   Date Value Ref Range Status   03/17/2023 3.7 3.4 - 5.3 mmol/L Final   03/10/2023 4.2 3.5 - 5.0 mmol/L Final     Chloride   Date Value Ref Range Status   03/17/2023 105 98 - 107 mmol/L Final   03/10/2023 105 98 - 107 mmol/L Final     Carbon Dioxide (CO2)   Date Value Ref Range Status   03/17/2023 22 22 - 29 mmol/L Final   03/10/2023 26 22 - 31 mmol/L Final     Anion Gap   Date Value Ref Range Status   03/17/2023 12 7 - 15 mmol/L Final   03/10/2023 8 5 - 18 mmol/L Final     Glucose   Date Value Ref Range Status   03/17/2023 92 70 - 99 mg/dL Final   03/10/2023 90 70 - 125 mg/dL Final     Urea Nitrogen   Date Value Ref Range Status   03/17/2023 15.8 8.0 - 23.0 mg/dL Final   03/10/2023 15 8 - 28 mg/dL Final     Creatinine   Date Value Ref Range Status   03/17/2023 0.70 0.51 - 0.95 mg/dL Final     GFR Estimate   Date Value Ref Range Status   03/17/2023 84 >60 mL/min/1.73m2 Final     Comment:     eGFR calculated using 2021 CKD-EPI equation.   05/26/2021 >60 >60 mL/min/1.73m2 Final     Calcium   Date Value Ref Range Status   03/17/2023 8.0 (L) 8.8 - 10.2 mg/dL Final     Bilirubin Total   Date Value Ref Range Status   03/16/2022 0.7 0.0 - 1.0 mg/dL Final     Alkaline Phosphatase   Date Value Ref Range Status   03/16/2022 60 45 - 120 U/L Final     ALT   Date Value Ref Range Status   03/16/2022 32 0 - 45 U/L Final     AST   Date Value Ref Range Status   03/16/2022 25 0 - 40 U/L Final     Lab Results   Component Value Date    WBC 5.7 03/17/2023     Lab Results   Component Value Date    RBC 3.91 03/17/2023     Lab Results   Component Value Date    HGB 11.4 03/20/2023     Lab Results   Component Value Date    HCT 35.2 03/17/2023     Lab Results   Component Value Date    MCV 90 03/17/2023     Lab Results   Component Value Date    MCH 27.6 03/17/2023     Lab Results   Component Value Date    MCHC 30.7 03/17/2023     Lab  Results   Component Value Date    RDW 14.2 03/17/2023     Lab Results   Component Value Date     03/17/2023       ASSESSMENT/Plan:      ICD-10-CM    1. Cellulitis of right lower extremity  L03.115       2. Ulcer of right calf, limited to breakdown of skin (H)  L97.211       3. Venous insufficiency of both lower extremities  I87.2       4. Chronic heart failure with preserved ejection fraction (HFpEF) (H)  I50.32       5. History of arthroplasty of right knee  Z96.651       6. Obesity, Class III, BMI 40-49.9 (morbid obesity) (H)  E66.01       7. Insomnia, unspecified type  G47.00       8. DVT prophylaxis  Z79.899           CHANGES:    1. Discontinue acetaminophen 650 mg.  2. Start acetaminophen 500 mg, 2 tabs 3 times daily at 0700, 1400, and 2100.    CARE PLAN:    The care plan, medications, vital signs, orders, and nursing notes have been reviewed, and all orders signed. Changes to care plan, if any, as noted. Otherwise, continue current plan of care.    The above has been created using voice recognition software. Please be aware that this may unintentionally  produce inaccuracies and/or nonsensical sentences.      Electronically signed by: PHAM Rocha CNP

## 2023-04-11 RX ORDER — ACETAMINOPHEN 500 MG
1000 TABLET ORAL 3 TIMES DAILY
COMMUNITY
Start: 2023-04-10 | End: 2024-05-12

## 2023-04-13 ENCOUNTER — TRANSITIONAL CARE UNIT VISIT (OUTPATIENT)
Dept: GERIATRICS | Facility: CLINIC | Age: 83
End: 2023-04-13
Payer: MEDICARE

## 2023-04-13 VITALS
BODY MASS INDEX: 41.77 KG/M2 | RESPIRATION RATE: 18 BRPM | OXYGEN SATURATION: 94 % | WEIGHT: 251 LBS | TEMPERATURE: 98.2 F | SYSTOLIC BLOOD PRESSURE: 127 MMHG | DIASTOLIC BLOOD PRESSURE: 80 MMHG | HEART RATE: 59 BPM

## 2023-04-13 DIAGNOSIS — Z79.899 ON DEEP VEIN THROMBOSIS (DVT) PROPHYLAXIS: ICD-10-CM

## 2023-04-13 DIAGNOSIS — R25.1 TREMOR OF BOTH HANDS: ICD-10-CM

## 2023-04-13 DIAGNOSIS — R21 FACIAL RASH: ICD-10-CM

## 2023-04-13 DIAGNOSIS — I50.32 CHRONIC HEART FAILURE WITH PRESERVED EJECTION FRACTION (HFPEF) (H): ICD-10-CM

## 2023-04-13 DIAGNOSIS — I87.2 VENOUS INSUFFICIENCY OF BOTH LOWER EXTREMITIES: ICD-10-CM

## 2023-04-13 DIAGNOSIS — E66.01 OBESITY, CLASS III, BMI 40-49.9 (MORBID OBESITY) (H): ICD-10-CM

## 2023-04-13 DIAGNOSIS — L97.211 ULCER OF RIGHT CALF, LIMITED TO BREAKDOWN OF SKIN (H): ICD-10-CM

## 2023-04-13 DIAGNOSIS — L03.115 CELLULITIS OF RIGHT LOWER EXTREMITY: Primary | ICD-10-CM

## 2023-04-13 DIAGNOSIS — F51.04 PSYCHOPHYSIOLOGICAL INSOMNIA: ICD-10-CM

## 2023-04-13 DIAGNOSIS — Z96.651 HISTORY OF ARTHROPLASTY OF RIGHT KNEE: ICD-10-CM

## 2023-04-13 PROCEDURE — 99316 NF DSCHRG MGMT 30 MIN+: CPT | Performed by: NURSE PRACTITIONER

## 2023-04-13 NOTE — LETTER
2023        RE: Debra Manriquez  [address]  [address]  [place] [address]        [facility]    Name:   Debra Manriquez  :   1940  MRN:    [id number]     Facility:   [facility] (SNF) [[id number]]   Room: [facility] / [place] [address]  Code Status: FULL CODE and POLST AVAILABLE -     DOS:  2023    PCP:  Yoanna Mcpherson MD     CHIEF COMPLAINT / REASON FOR VISIT:  Chief Complaint   Patient presents with     Discharge Summary Nursing Home     Cellulitis of right lower extremity      [facility] from 2023 until 2023 (RLE cellulitis, venous stasis)  [facility] from 2023 until 2023 (anticipated discharge date)      HPI: Debra is an 83 year old female with a PMH significant for fibrocystic breast disease, GERD, HLD, essential HTN, acquired lymphedema, JADYN, OA of the knee, and a mitral valve disorder, who was admitted to the hospital on  due to severe left leg post surgical pain.    Brief review of hospital course, excerpted from the hospital discharge summary:    3/07 -- Admitted through 3/8 for TKA and discharged home where functional status declined.  3/09 -- Went to  ED, Dx cellulitis of right LE, treated with ceftriaxone. U/S right leg negative for DVT.  Ortho consulted.  3/10 -- Leg pain poorly controlled, PT/OT recommended TCU, Hgb 11 from baseline 12.3  3/11 -- Ceftriaxone continued  3/12 -- Pain improved, transitioned to p.o. cefdinir, bordered cellulitis.  Caffeine and glycerine suppository for constipation.  3/13 -- Cellulitis shrinking, pt having loose BMs.   3/14 -- Clinically improved and ready medically for discharge.      On 3/14, clinically close to a return to baseline, remaining vitally and hemodynamically stable. TCU acceptance for rehabilitation, and ,discharged in stable medical condition with plan to complete 3 more doses of cefdinir for total course of 7 days.    Also initially  "given hydroxyzine for anxiety.  Her denosumab/Prolia to be held/paused while at the TCU.  F/U with the facility provider within a week.    Additionally F/U with orthopedics as well.       CURRENT/RECENT TCU ISSUES    Disposition  -- Excited that she was able to walk 112 feet today.  She did have some burning in 1 foot this morning.  -- Due to narcotic refusal, she developed loose stools, and we changed senna-S to PRN.  -- Per patient request, we also adjusted her gabapentin to meet PTA dosing by increasing her 900 mg dose at bedtime to 1200 mg.  -- Expressed interest in taking supplements: K2 (she has her own supply) and omega-3.  I suggested there would be no harm in waiting until discharge to resume those supplements.  However, she already brought in her K2, and we will write orders to permit the omega-3 as well.  -- Patient reports being able to walk before, but her right knee now is buckling when she stands.  She says she is not afraid to walk \"when all is well.\"  Continue to monitor.    Upper extremity tremor  -- She has a bilateral upper extremity tremor, etiology unknown (psychosomatic?) which she is blaming on gabapentin despite being on the drug for some time. Tremors are not constant but occurs \"only when getting up and trying to walk.\"  She ultimately tells me that she has had tremors for a while, though not as bad.  She didn't take her gabapentin last night, and so far, today, she has not experienced any tremors.  \"I don't know what [the gabapentin] is doing for me\" she says.  She has been taking it for insomnia (see below).  -- I explained the need to taper gabapentin rather than abruptly discontinue.  She will take 900 mg tonight and tomorrow night, then 600 mg on 04/15 and 0/16, followed by 300 mg on 04/17 and 04/18, and then she can discontinue.  -- Therapy once her to use a wheelchair because of her unsteadiness.  -- She has a virtual visit with neuro coming up.    Psychophysiologic insomnia  -- Has " "been having difficulty sleeping.  For some time, she has been changed to gabapentin for this, currently up to 1200 mg.  As she finds no use for this (ineffective), we have agreed to taper her off.  -- She is also on trazodone which we initiated at 25 mg at bedtime plus an additional 25 mg if still awake by midnight.  We are increasing that to 50 mg, as she has been doing better taking the 2 half tablets.    Facial rash  -- Covering most of her face like a macular mask.  It is erythematous, and the texture is rough, feeling similar to seborrheic dermatitis.  We are ordering hydrocortisone 1% cream to be applied twice daily.  She is aware that she must not get the cream into her eyes.    Pain management  -- Pain management has been a bit confusing.  Pain, according to the patient, has tended to go \"up and down.\"  Previously, she said she was doing okay but experiencing more pain that appeared to be neuropathic, hence an order for tramadol.  I was told by the nurse manager that they had gotten an order for tramadol from ortho (patient called) while already on Norco.  She was apparently getting relief with tramadol, yet she claimed to have gotten a script for Vicodin (or possibly Norco) as well.  We discussed round-the-clock pain management and we scheduled that every 8 hours.  By the following visit, she was refusing all narcotics (because they made her nauseous).  Per her request, we will also changed her acetaminophen to as needed.   -- With increased pain reported at my last visit, we addressed this. She did not want narcotics, mostly due to concern for constipation. We agreed to increasing acetaminophen to 1000 mg and scheduling it TID. She did not want the usual 0600, 1400, 2200 round-the-clock dosing, eventually agreeing to 0700, 1400, 2100). She is aware of the nighttime gap.  -- Multimodal treatment of pain now involves ice packs to the right knee.  .  Ulcer of right calf  -- Oral antibiotic therapy " completed.    HFpEF  -- Currently asymptomatic.  No current LE edema.    Obesity  -- BMI >40    DVT prophylaxis  -- On DOAC.  Recent venous Doppler ultrasound was negative for DVT.    Hypothyroidism  -- She had complained of the being awakened too early to receive her levothyroxine, and we moved that to evening.  Since then, however, she has changed her mind and wants it, once again, in the morning.    Discharge planning  -- Initially expecting to discharge tomorrow, 04/14, she will discharge on 04/18 instead.      ROS:  10 point ROS of systems including Constitutional, Eyes, Respiratory, Cardiovascular, Gastroenterology, Genitourinary, Integumentary, Muscularskeletal, Psychiatric were all negative except for pertinent positives noted in my HPI.      Past Medical History:   Diagnosis Date     Antiplatelet or antithrombotic long-term use      Chronic acquired lymphedema      Edema      Falls      Fibrocystic breast disease      Fibrocystic breast disease      Foot pain      Gastroesophageal reflux disease      GERD (gastroesophageal reflux disease)      Hyperlipidemia      Hypertension      Irregular heart beat      Lymphedema      Mitral valve disorder      Obese      JADYN (obstructive sleep apnea)      Osteoarthritis of knee     hx of knee replacement and pending second     Osteopenia      Skin cancer      Skin cancer, basal cell      Sleep apnea      Thyroid nodule      Thyroid nodule      Vitamin B deficiency               Family History   Problem Relation Age of Onset     No Known Problems Mother      No Known Problems Father      Alzheimer Disease Maternal Uncle      Heart Disease Sister      Heart Disease Brother      No Known Problems Daughter      No Known Problems Son      No Known Problems Brother      No Known Problems Son      No Known Problems Son      Social History     Socioeconomic History     Marital status:      Spouse name: None     Number of children: None     Years of education: None      Highest education level: None   Tobacco Use     Smoking status: Never     Smokeless tobacco: Never   Vaping Use     Vaping Use: Never used   Substance and Sexual Activity     Alcohol use: Yes     Alcohol/week: 0.8 standard drinks     Comment: Alcoholic Drinks/day: 1-2 glasses per month     Drug use: No     Sexual activity: Yes   Social History Narrative    . 4 kids.  Teaches college English, reading.        MEDICATIONS: Reviewed from the MAR, physician orders, and/or earlier progress notes.  MED REC REQUIRED  Post Medication Reconciliation Status: medication reconcilation previously completed during another office visit    Current Outpatient Medications   Medication Sig     hydrocortisone (CORTAID) 1 % external cream Apply topically 2 times daily     acetaminophen (TYLENOL) 500 MG tablet Take 1,000 mg by mouth 3 times daily 0700, 1400, 2100  No PRN     cholecalciferol (VITAMIN D3) 5000 units TABS tablet Take 2 tablets by mouth daily     Coenzyme Q10 400 MG CAPS Take 800 mg by mouth daily     denosumab (PROLIA) 60 MG/ML SOSY injection As of 3/14/2023, please hold and pause this medication while at the transitional care unit     fish oil-omega-3 fatty acids 1000 MG capsule Take 2 g by mouth daily     furosemide (LASIX) 20 MG tablet Take 20 mg by mouth daily     lactobacillus TABS Take 2 capsules by mouth daily     levothyroxine (SYNTHROID/LEVOTHROID) 112 MCG tablet Take 112 mcg by mouth daily     losartan (COZAAR) 50 MG tablet Take 50 mg by mouth 2 times daily     MAGNESIUM PO Take 2 capsules by mouth 2 times daily     rivaroxaban ANTICOAGULANT (XARELTO) 20 MG TABS tablet Take 1 tablet (20 mg) by mouth daily (with dinner)     rosuvastatin (CRESTOR) 20 MG tablet Take 1 tablet (20 mg) by mouth daily     senna-docusate (SENOKOT-S/PERICOLACE) 8.6-50 MG tablet Take 1-2 tablets by mouth 2 times daily Take while on oral narcotics to prevent or treat constipation.     traZODone (DESYREL) 50 MG tablet Take 50 mg by  mouth At Bedtime May give an additional 50 mg PRN dose if not asleep by midnight.     No current facility-administered medications for this visit.     ALLERGIES:   Allergies   Allergen Reactions     Lisinopril Cough     Reglan [Metoclopramide] Other (See Comments)     depression     DIET: Regular, regular texture, thin liquids.    Vitals:    04/13/23 1440   BP: 127/80   Pulse: 59   Resp: 18   Temp: 98.2  F (36.8  C)   SpO2: 94%   Weight: 113.9 kg (251 lb)     Wt Readings from Last 4 Encounters:   04/13/23 113.9 kg (251 lb)   04/10/23 113.9 kg (251 lb)   04/07/23 116.2 kg (256 lb 3.2 oz)   04/03/23 116.2 kg (256 lb 3.2 oz)     Body mass index is 41.77 kg/m .  Body surface area is 2.29 meters squared.    EXAMINATION:   General: Pleasant, alert, and conversant elderly female, lying in bed, in NAD.  Head: Normocephalic and atraumatic.   Eyes: PERRLA, sclerae clear.   ENT: Moist oral mucosa.  No nasal discharge.  Hearing appears unimpaired.  Cardiovascular: RRR with a 3/6 JACOB at the LSB.   Respiratory: Lungs CTAB.   Abdomen: Nondistended.   Musculoskeletal/Extremities: Age-related osteoarthritic changes.  Trace right pedal edema.  Legs do tend to swell as the day progresses.  She does wear compression stockings, although they appear to offer very little compression.  Integument: Right knee surgical wound healed.  Otherwise, no rashes, clinically significant lesions, or skin breakdown.   Cognitive/Psychiatric: Alert and oriented with euthymic affect.    DIAGNOSTICS:   No results found for this or any previous visit (from the past 240 hour(s)).  Last Comprehensive Metabolic Panel:  Sodium   Date Value Ref Range Status   03/17/2023 139 136 - 145 mmol/L Final     Potassium   Date Value Ref Range Status   03/17/2023 3.7 3.4 - 5.3 mmol/L Final   03/10/2023 4.2 3.5 - 5.0 mmol/L Final     Chloride   Date Value Ref Range Status   03/17/2023 105 98 - 107 mmol/L Final   03/10/2023 105 98 - 107 mmol/L Final     Carbon Dioxide (CO2)    Date Value Ref Range Status   03/17/2023 22 22 - 29 mmol/L Final   03/10/2023 26 22 - 31 mmol/L Final     Anion Gap   Date Value Ref Range Status   03/17/2023 12 7 - 15 mmol/L Final   03/10/2023 8 5 - 18 mmol/L Final     Glucose   Date Value Ref Range Status   03/17/2023 92 70 - 99 mg/dL Final   03/10/2023 90 70 - 125 mg/dL Final     Urea Nitrogen   Date Value Ref Range Status   03/17/2023 15.8 8.0 - 23.0 mg/dL Final   03/10/2023 15 8 - 28 mg/dL Final     Creatinine   Date Value Ref Range Status   03/17/2023 0.70 0.51 - 0.95 mg/dL Final     GFR Estimate   Date Value Ref Range Status   03/17/2023 84 >60 mL/min/1.73m2 Final     Comment:     eGFR calculated using 2021 CKD-EPI equation.   05/26/2021 >60 >60 mL/min/1.73m2 Final     Calcium   Date Value Ref Range Status   03/17/2023 8.0 (L) 8.8 - 10.2 mg/dL Final     Bilirubin Total   Date Value Ref Range Status   03/16/2022 0.7 0.0 - 1.0 mg/dL Final     Alkaline Phosphatase   Date Value Ref Range Status   03/16/2022 60 45 - 120 U/L Final     ALT   Date Value Ref Range Status   03/16/2022 32 0 - 45 U/L Final     AST   Date Value Ref Range Status   03/16/2022 25 0 - 40 U/L Final     Lab Results   Component Value Date    WBC 5.7 03/17/2023     Lab Results   Component Value Date    RBC 3.91 03/17/2023     Lab Results   Component Value Date    HGB 11.4 03/20/2023     Lab Results   Component Value Date    HCT 35.2 03/17/2023     Lab Results   Component Value Date    MCV 90 03/17/2023     Lab Results   Component Value Date    MCH 27.6 03/17/2023     Lab Results   Component Value Date    MCHC 30.7 03/17/2023     Lab Results   Component Value Date    RDW 14.2 03/17/2023     Lab Results   Component Value Date     03/17/2023       ASSESSMENT/Plan:      ICD-10-CM    1. Cellulitis of right lower extremity  L03.115       2. Ulcer of right calf, limited to breakdown of skin (H)  L97.211       3. Facial rash  R21       4. Tremor of both hands  R25.1       5. Venous  insufficiency of both lower extremities  I87.2       6. Chronic heart failure with preserved ejection fraction (HFpEF) (H)  I50.32       7. History of arthroplasty of right knee  Z96.651       8. Obesity, Class III, BMI 40-49.9 (morbid obesity) (H)  E66.01       9. Psychophysiological insomnia  F51.04       10. DVT prophylaxis  Z79.899           CHANGES:  1.  Hydrocortisone 1% cream applied to facial rash BID. Patient is aware of the need to avoid getting cream into the eyes.  2.  Gabapentin 900 mg tonight and tomorrow night, then 600 mg on 04/15 and 0/16, followed by 300 mg on 04/17 and 04/18, and then she can discontinue.  3.  Increase trazodone from 25 mg to 50 mg (each dose).      DISCHARGE FACE TO FACE:  I certify that this patient is under my care and that I had a face-to-face encounter that meets the physician face-to-face encounter requirements with this patient.     Date of Face-to-Face Encounter:  04/13/2023     I certify that, based on my findings, the following services are medically necessary home health services:  Home health nurse, home health aide, home PT, and home OT    My clinical findings support the need for the above skilled services because: (Please write a brief narrative summary that describes what the RN, PT, SLP, or other services will be doing in the home. A list of diagnoses in this section does not meet the CMS requirements):  Home health nurse for wound care management/teaching; home health aide for bathing and ADL needs; home PT for strengthening, balance, endurance, and safety with mobility/ambulation; home OT for strengthening, ADL needs, adaptive equipment, and safety.    This patient is homebound because: (Please write a brief narrative summmary describing the functional limitations as to why this patient is homebound and specifically what makes this patient homebound.):  Above services necessarily need to be performed in the home to be of benefit.    The patient is, or has been,  under my care and I have initiated the establishment of the plan of care. This patient will be followed by a physician who will periodically review the plan of care.  Initial follow-up should be within 7-10 days.    Time spent in this encounter was greater than 30 minutes, involving routine check, documentation, and discharge planning.      The above has been created using voice recognition software. Please be aware that this may unintentionally  produce inaccuracies and/or nonsensical sentences.      Electronically signed by: PHAM Rocha CNP        Sincerely,        PHAM Rocha CNP

## 2023-04-13 NOTE — LETTER
4/13/2023        RE: Debra Manriquez  [address]  [address]  [place] [address]        No notes on file      Sincerely,        PHAM Rocha CNP

## 2023-04-15 RX ORDER — BENZOCAINE/MENTHOL 6 MG-10 MG
LOZENGE MUCOUS MEMBRANE 2 TIMES DAILY
COMMUNITY
Start: 2023-04-13 | End: 2024-05-12

## 2023-04-15 NOTE — PROGRESS NOTES
[facility]    Name:   Debra Manriquez  :   1940  MRN:    [id number]     Facility:   [facility] (SNF) [[id number]]   Room: TCU / [place] [address]  Code Status: FULL CODE and POLST AVAILABLE -     DOS:  2023    PCP:  Yoanna Mcpherson MD     CHIEF COMPLAINT / REASON FOR VISIT:  Chief Complaint   Patient presents with     Discharge Summary Nursing Home     Cellulitis of right lower extremity      [facility] from 2023 until 2023 (RLE cellulitis, venous stasis)  [facility] (anticipated discharge date)      HPI: Debra is an 83 year old female with a PMH significant for fibrocystic breast disease, GERD, HLD, essential HTN, acquired lymphedema, JADYN, OA of the knee, and a mitral valve disorder, who was admitted to the hospital on  due to severe left leg post surgical pain.    Brief review of hospital course, excerpted from the hospital discharge summary:    3/07 -- Admitted through 3/8 for TKA and discharged home where functional status declined.  3/09 -- Went to  ED, Dx cellulitis of right LE, treated with ceftriaxone. U/S right leg negative for DVT.  Ortho consulted.  3/10 -- Leg pain poorly controlled, PT/OT recommended TCU, Hgb 11 from baseline 12.3  3/11 -- Ceftriaxone continued  3/12 -- Pain improved, transitioned to p.o. cefdinir, bordered cellulitis.  Caffeine and glycerine suppository for constipation.  3/13 -- Cellulitis shrinking, pt having loose BMs.   3/14 -- Clinically improved and ready medically for discharge.      On 3/14, clinically close to a return to baseline, remaining vitally and hemodynamically stable. TCU acceptance for rehabilitation, and ,discharged in stable medical condition with plan to complete 3 more doses of cefdinir for total course of 7 days.    Also initially given hydroxyzine for anxiety.  Her denosumab/Prolia to be held/paused while at the TCU.  F/U with the  "facility provider within a week.    Additionally F/U with orthopedics as well.       CURRENT/RECENT TCU ISSUES    Disposition  -- Excited that she was able to walk 112 feet today.  She did have some burning in 1 foot this morning.  -- Due to narcotic refusal, she developed loose stools, and we changed senna-S to PRN.  -- Per patient request, we also adjusted her gabapentin to meet PTA dosing by increasing her 900 mg dose at bedtime to 1200 mg.  -- Expressed interest in taking supplements: K2 (she has her own supply) and omega-3.  I suggested there would be no harm in waiting until discharge to resume those supplements.  However, she already brought in her K2, and we will write orders to permit the omega-3 as well.  -- Patient reports being able to walk before, but her right knee now is buckling when she stands.  She says she is not afraid to walk \"when all is well.\"  Continue to monitor.    Upper extremity tremor  -- She has a bilateral upper extremity tremor, etiology unknown (psychosomatic?) which she is blaming on gabapentin despite being on the drug for some time. Tremors are not constant but occurs \"only when getting up and trying to walk.\"  She ultimately tells me that she has had tremors for a while, though not as bad.  She didn't take her gabapentin last night, and so far, today, she has not experienced any tremors.  \"I don't know what [the gabapentin] is doing for me\" she says.  She has been taking it for insomnia (see below).  -- I explained the need to taper gabapentin rather than abruptly discontinue.  She will take 900 mg tonight and tomorrow night, then 600 mg on 04/15 and 0/16, followed by 300 mg on 04/17 and 04/18, and then she can discontinue.  -- Therapy once her to use a wheelchair because of her unsteadiness.  -- She has a virtual visit with neuro coming up.    Psychophysiologic insomnia  -- Has been having difficulty sleeping.  For some time, she has been changed to gabapentin for this, " "currently up to 1200 mg.  As she finds no use for this (ineffective), we have agreed to taper her off.  -- She is also on trazodone which we initiated at 25 mg at bedtime plus an additional 25 mg if still awake by midnight.  We are increasing that to 50 mg, as she has been doing better taking the 2 half tablets.    Facial rash  -- Covering most of her face like a macular mask.  It is erythematous, and the texture is rough, feeling similar to seborrheic dermatitis.  We are ordering hydrocortisone 1% cream to be applied twice daily.  She is aware that she must not get the cream into her eyes.    Pain management  -- Pain management has been a bit confusing.  Pain, according to the patient, has tended to go \"up and down.\"  Previously, she said she was doing okay but experiencing more pain that appeared to be neuropathic, hence an order for tramadol.  I was told by the nurse manager that they had gotten an order for tramadol from ortho (patient called) while already on Norco.  She was apparently getting relief with tramadol, yet she claimed to have gotten a script for Vicodin (or possibly Norco) as well.  We discussed round-the-clock pain management and we scheduled that every 8 hours.  By the following visit, she was refusing all narcotics (because they made her nauseous).  Per her request, we will also changed her acetaminophen to as needed.   -- With increased pain reported at my last visit, we addressed this. She did not want narcotics, mostly due to concern for constipation. We agreed to increasing acetaminophen to 1000 mg and scheduling it TID. She did not want the usual 0600, 1400, 2200 round-the-clock dosing, eventually agreeing to 0700, 1400, 2100). She is aware of the nighttime gap.  -- Multimodal treatment of pain now involves ice packs to the right knee.  .  Ulcer of right calf  -- Oral antibiotic therapy completed.    HFpEF  -- Currently asymptomatic.  No current LE edema.    Obesity  -- BMI >40    DVT " prophylaxis  -- On DOAC.  Recent venous Doppler ultrasound was negative for DVT.    Hypothyroidism  -- She had complained of the being awakened too early to receive her levothyroxine, and we moved that to evening.  Since then, however, she has changed her mind and wants it, once again, in the morning.    Discharge planning  -- Initially expecting to discharge tomorrow, 04/14, she will discharge on 04/18 instead.      ROS:  10 point ROS of systems including Constitutional, Eyes, Respiratory, Cardiovascular, Gastroenterology, Genitourinary, Integumentary, Muscularskeletal, Psychiatric were all negative except for pertinent positives noted in my HPI.      Past Medical History:   Diagnosis Date     Antiplatelet or antithrombotic long-term use      Chronic acquired lymphedema      Edema      Falls      Fibrocystic breast disease      Fibrocystic breast disease      Foot pain      Gastroesophageal reflux disease      GERD (gastroesophageal reflux disease)      Hyperlipidemia      Hypertension      Irregular heart beat      Lymphedema      Mitral valve disorder      Obese      JADYN (obstructive sleep apnea)      Osteoarthritis of knee     hx of knee replacement and pending second     Osteopenia      Skin cancer      Skin cancer, basal cell      Sleep apnea      Thyroid nodule      Thyroid nodule      Vitamin B deficiency               Family History   Problem Relation Age of Onset     No Known Problems Mother      No Known Problems Father      Alzheimer Disease Maternal Uncle      Heart Disease Sister      Heart Disease Brother      No Known Problems Daughter      No Known Problems Son      No Known Problems Brother      No Known Problems Son      No Known Problems Son      Social History     Socioeconomic History     Marital status:      Spouse name: None     Number of children: None     Years of education: None     Highest education level: None   Tobacco Use     Smoking status: Never     Smokeless tobacco: Never    Vaping Use     Vaping Use: Never used   Substance and Sexual Activity     Alcohol use: Yes     Alcohol/week: 0.8 standard drinks     Comment: Alcoholic Drinks/day: 1-2 glasses per month     Drug use: No     Sexual activity: Yes   Social History Narrative    . 4 kids.  Teaches college English, reading.        MEDICATIONS: Reviewed from the MAR, physician orders, and/or earlier progress notes.  MED REC REQUIRED  Post Medication Reconciliation Status: medication reconcilation previously completed during another office visit    Current Outpatient Medications   Medication Sig     hydrocortisone (CORTAID) 1 % external cream Apply topically 2 times daily     acetaminophen (TYLENOL) 500 MG tablet Take 1,000 mg by mouth 3 times daily 0700, 1400, 2100  No PRN     cholecalciferol (VITAMIN D3) 5000 units TABS tablet Take 2 tablets by mouth daily     Coenzyme Q10 400 MG CAPS Take 800 mg by mouth daily     denosumab (PROLIA) 60 MG/ML SOSY injection As of 3/14/2023, please hold and pause this medication while at the transitional care unit     fish oil-omega-3 fatty acids 1000 MG capsule Take 2 g by mouth daily     furosemide (LASIX) 20 MG tablet Take 20 mg by mouth daily     lactobacillus TABS Take 2 capsules by mouth daily     levothyroxine (SYNTHROID/LEVOTHROID) 112 MCG tablet Take 112 mcg by mouth daily     losartan (COZAAR) 50 MG tablet Take 50 mg by mouth 2 times daily     MAGNESIUM PO Take 2 capsules by mouth 2 times daily     rivaroxaban ANTICOAGULANT (XARELTO) 20 MG TABS tablet Take 1 tablet (20 mg) by mouth daily (with dinner)     rosuvastatin (CRESTOR) 20 MG tablet Take 1 tablet (20 mg) by mouth daily     senna-docusate (SENOKOT-S/PERICOLACE) 8.6-50 MG tablet Take 1-2 tablets by mouth 2 times daily Take while on oral narcotics to prevent or treat constipation.     traZODone (DESYREL) 50 MG tablet Take 50 mg by mouth At Bedtime May give an additional 50 mg PRN dose if not asleep by midnight.     No current  facility-administered medications for this visit.     ALLERGIES:   Allergies   Allergen Reactions     Lisinopril Cough     Reglan [Metoclopramide] Other (See Comments)     depression     DIET: Regular, regular texture, thin liquids.    Vitals:    04/13/23 1440   BP: 127/80   Pulse: 59   Resp: 18   Temp: 98.2  F (36.8  C)   SpO2: 94%   Weight: 113.9 kg (251 lb)     Wt Readings from Last 4 Encounters:   04/13/23 113.9 kg (251 lb)   04/10/23 113.9 kg (251 lb)   04/07/23 116.2 kg (256 lb 3.2 oz)   04/03/23 116.2 kg (256 lb 3.2 oz)     Body mass index is 41.77 kg/m .  Body surface area is 2.29 meters squared.    EXAMINATION:   General: Pleasant, alert, and conversant elderly female, lying in bed, in NAD.  Head: Normocephalic and atraumatic.   Eyes: PERRLA, sclerae clear.   ENT: Moist oral mucosa.  No nasal discharge.  Hearing appears unimpaired.  Cardiovascular: RRR with a 3/6 JACOB at the LSB.   Respiratory: Lungs CTAB.   Abdomen: Nondistended.   Musculoskeletal/Extremities: Age-related osteoarthritic changes.  Trace right pedal edema.  Legs do tend to swell as the day progresses.  She does wear compression stockings, although they appear to offer very little compression.  Integument: Right knee surgical wound healed.  Otherwise, no rashes, clinically significant lesions, or skin breakdown.   Cognitive/Psychiatric: Alert and oriented with euthymic affect.    DIAGNOSTICS:   No results found for this or any previous visit (from the past 240 hour(s)).  Last Comprehensive Metabolic Panel:  Sodium   Date Value Ref Range Status   03/17/2023 139 136 - 145 mmol/L Final     Potassium   Date Value Ref Range Status   03/17/2023 3.7 3.4 - 5.3 mmol/L Final   03/10/2023 4.2 3.5 - 5.0 mmol/L Final     Chloride   Date Value Ref Range Status   03/17/2023 105 98 - 107 mmol/L Final   03/10/2023 105 98 - 107 mmol/L Final     Carbon Dioxide (CO2)   Date Value Ref Range Status   03/17/2023 22 22 - 29 mmol/L Final   03/10/2023 26 22 - 31 mmol/L  Final     Anion Gap   Date Value Ref Range Status   03/17/2023 12 7 - 15 mmol/L Final   03/10/2023 8 5 - 18 mmol/L Final     Glucose   Date Value Ref Range Status   03/17/2023 92 70 - 99 mg/dL Final   03/10/2023 90 70 - 125 mg/dL Final     Urea Nitrogen   Date Value Ref Range Status   03/17/2023 15.8 8.0 - 23.0 mg/dL Final   03/10/2023 15 8 - 28 mg/dL Final     Creatinine   Date Value Ref Range Status   03/17/2023 0.70 0.51 - 0.95 mg/dL Final     GFR Estimate   Date Value Ref Range Status   03/17/2023 84 >60 mL/min/1.73m2 Final     Comment:     eGFR calculated using 2021 CKD-EPI equation.   05/26/2021 >60 >60 mL/min/1.73m2 Final     Calcium   Date Value Ref Range Status   03/17/2023 8.0 (L) 8.8 - 10.2 mg/dL Final     Bilirubin Total   Date Value Ref Range Status   03/16/2022 0.7 0.0 - 1.0 mg/dL Final     Alkaline Phosphatase   Date Value Ref Range Status   03/16/2022 60 45 - 120 U/L Final     ALT   Date Value Ref Range Status   03/16/2022 32 0 - 45 U/L Final     AST   Date Value Ref Range Status   03/16/2022 25 0 - 40 U/L Final     Lab Results   Component Value Date    WBC 5.7 03/17/2023     Lab Results   Component Value Date    RBC 3.91 03/17/2023     Lab Results   Component Value Date    HGB 11.4 03/20/2023     Lab Results   Component Value Date    HCT 35.2 03/17/2023     Lab Results   Component Value Date    MCV 90 03/17/2023     Lab Results   Component Value Date    MCH 27.6 03/17/2023     Lab Results   Component Value Date    MCHC 30.7 03/17/2023     Lab Results   Component Value Date    RDW 14.2 03/17/2023     Lab Results   Component Value Date     03/17/2023       ASSESSMENT/Plan:      ICD-10-CM    1. Cellulitis of right lower extremity  L03.115       2. Ulcer of right calf, limited to breakdown of skin (H)  L97.211       3. Facial rash  R21       4. Tremor of both hands  R25.1       5. Venous insufficiency of both lower extremities  I87.2       6. Chronic heart failure with preserved ejection fraction  (HFpEF) (H)  I50.32       7. History of arthroplasty of right knee  Z96.651       8. Obesity, Class III, BMI 40-49.9 (morbid obesity) (H)  E66.01       9. Psychophysiological insomnia  F51.04       10. DVT prophylaxis  Z79.899           CHANGES:  1.  Hydrocortisone 1% cream applied to facial rash BID. Patient is aware of the need to avoid getting cream into the eyes.  2.  Gabapentin 900 mg tonight and tomorrow night, then 600 mg on 04/15 and 0/16, followed by 300 mg on 04/17 and 04/18, and then she can discontinue.  3.  Increase trazodone from 25 mg to 50 mg (each dose).      DISCHARGE FACE TO FACE:  I certify that this patient is under my care and that I had a face-to-face encounter that meets the physician face-to-face encounter requirements with this patient.     Date of Face-to-Face Encounter:  04/13/2023     I certify that, based on my findings, the following services are medically necessary home health services:  Home health nurse, home health aide, home PT, and home OT    My clinical findings support the need for the above skilled services because: (Please write a brief narrative summary that describes what the RN, PT, SLP, or other services will be doing in the home. A list of diagnoses in this section does not meet the CMS requirements):  Home health nurse for wound care management/teaching; home health aide for bathing and ADL needs; home PT for strengthening, balance, endurance, and safety with mobility/ambulation; home OT for strengthening, ADL needs, adaptive equipment, and safety.    This patient is homebound because: (Please write a brief narrative summmary describing the functional limitations as to why this patient is homebound and specifically what makes this patient homebound.):  Above services necessarily need to be performed in the home to be of benefit.    The patient is, or has been, under my care and I have initiated the establishment of the plan of care. This patient will be followed by a  physician who will periodically review the plan of care.  Initial follow-up should be within 7-10 days.    Time spent in this encounter was greater than 30 minutes, involving routine check, documentation, and discharge planning.      The above has been created using voice recognition software. Please be aware that this may unintentionally  produce inaccuracies and/or nonsensical sentences.      Electronically signed by: PHAM Rocha CNP

## 2023-04-17 ENCOUNTER — TRANSITIONAL CARE UNIT VISIT (OUTPATIENT)
Dept: GERIATRICS | Facility: CLINIC | Age: 83
End: 2023-04-17
Payer: MEDICARE

## 2023-04-17 VITALS
HEART RATE: 57 BPM | DIASTOLIC BLOOD PRESSURE: 80 MMHG | HEIGHT: 65 IN | WEIGHT: 251 LBS | OXYGEN SATURATION: 96 % | TEMPERATURE: 98.6 F | RESPIRATION RATE: 18 BRPM | BODY MASS INDEX: 41.82 KG/M2 | SYSTOLIC BLOOD PRESSURE: 160 MMHG

## 2023-04-17 DIAGNOSIS — R53.81 PHYSICAL DECONDITIONING: ICD-10-CM

## 2023-04-17 DIAGNOSIS — R25.1 TREMOR OF BOTH HANDS: ICD-10-CM

## 2023-04-17 DIAGNOSIS — Z79.899 POLYPHARMACY: ICD-10-CM

## 2023-04-17 DIAGNOSIS — F51.04 PSYCHOPHYSIOLOGICAL INSOMNIA: Primary | ICD-10-CM

## 2023-04-17 PROCEDURE — 99310 SBSQ NF CARE HIGH MDM 45: CPT | Performed by: NURSE PRACTITIONER

## 2023-04-17 RX ORDER — MULTIVIT WITH MINERALS/LUTEIN
500 TABLET ORAL DAILY
COMMUNITY
End: 2023-08-15

## 2023-04-17 RX ORDER — METHYLDOPA/HYDROCHLOROTHIAZIDE 250MG-25MG
200 TABLET ORAL ONCE
COMMUNITY
End: 2023-08-15

## 2023-04-17 RX ORDER — BIOTIN 1 MG
1000 TABLET ORAL DAILY
COMMUNITY

## 2023-04-17 RX ORDER — PERPHENAZINE 16 MG
1 TABLET ORAL DAILY
COMMUNITY
End: 2023-08-15

## 2023-04-17 RX ORDER — GABAPENTIN 300 MG/1
300 CAPSULE ORAL AT BEDTIME
COMMUNITY
End: 2023-08-15

## 2023-04-17 RX ORDER — KRILL/OM-3/DHA/EPA/PHOSPHO/AST 500-110 MG
1 CAPSULE ORAL ONCE
COMMUNITY
End: 2023-08-15

## 2023-04-17 RX ORDER — VITAMIN A 10000 UNIT
10000 TABLET ORAL DAILY
COMMUNITY
End: 2023-08-15

## 2023-04-17 RX ORDER — CYANOCOBALAMIN (VITAMIN B-12) 500 MCG
400 LOZENGE ORAL DAILY
COMMUNITY
End: 2023-08-15

## 2023-04-17 RX ORDER — FOLIC ACID 0.8 MG
800 TABLET ORAL DAILY
COMMUNITY
End: 2023-08-15

## 2023-04-17 RX ORDER — LANOLIN ALCOHOL/MO/W.PET/CERES
1000 CREAM (GRAM) TOPICAL DAILY
COMMUNITY

## 2023-04-17 RX ORDER — PYRIDOXINE HCL (VITAMIN B6) 100 MG
1 TABLET ORAL DAILY
COMMUNITY
End: 2023-08-15

## 2023-04-17 RX ORDER — BIOTIN 10 MG
1 TABLET ORAL ONCE
COMMUNITY
End: 2023-08-15

## 2023-04-17 NOTE — PROGRESS NOTES
[facility]  Episodic/Acute/Follow-Up  [place] MRN: [id number]. Place of Service where encounter took place:  [facility] ([facility]) [[id number]]   Chief Complaint   Patient presents with     RECHECK    HPI: Debra Manriquez  is a 83 year old (1940), who is being seen today for an episodic care visit. Today's concern is:    Debra is discharging tomorrow, and concerned about ongoing insomnia. Both she and nursing requested a visit from provider today.     Today, Debra states that she never needed trazodone at home, and this is a new rx for her. She is taking her 50mg dose and for the past 4 nights has been using her PRN 50mg dose to no avail. She reports falling asleep, but not staying asleep. She also reports diet habits such as green tea intake in the afternoon/evening. She is discharging home tomorrow and is requesting a double dose of Trazodone (200mg). She denies waking up in pain/having pain, SOB, orthopnea.     Past Medical and Surgical History reviewed in Epic today.  MEDICATIONS:  Current Outpatient Medications   Medication Sig Dispense Refill     acetaminophen (TYLENOL) 500 MG tablet Take 1,000 mg by mouth 3 times daily 0700, 1400, 2100  No PRN       alpha-lipoic acid 600 MG capsule Take 1 capsule by mouth daily       B Complex-C (VITAMIN B COMPLEX W/VITAMIN C) TABS tablet Take 1 tablet by mouth daily       biotin 1000 MCG TABS tablet Take 1,000 mcg by mouth daily       cholecalciferol (VITAMIN D3) 5000 units TABS tablet Take 2 tablets by mouth daily       Coenzyme Q10 400 MG CAPS Take 800 mg by mouth daily       cyanocobalamin (VITAMIN B-12) 1000 MCG tablet Take 1,000 mcg by mouth daily       fish oil-omega-3 fatty acids 1000 MG capsule Take 2 g by mouth daily       folic acid 800 MCG tablet Take 800 mcg by mouth daily       furosemide (LASIX) 20 MG tablet Take 20 mg by mouth daily       gabapentin (NEURONTIN) 300 MG capsule Take 300 mg by mouth At Bedtime At bedtime for  neuropathic pain until 4/18       hydrocortisone (CORTAID) 1 % external cream Apply topically 2 times daily       Krill Oil (OMEGA-3) 500 MG CAPS Take 1 capsule by mouth once       lactobacillus TABS Take 2 capsules by mouth daily       levothyroxine (SYNTHROID/LEVOTHROID) 112 MCG tablet Take 112 mcg by mouth daily       losartan (COZAAR) 50 MG tablet Take 50 mg by mouth 2 times daily       MAGNESIUM PO Take 2 capsules by mouth 2 times daily       Menatetrenone 100 MCG TABS Take 100 mcg by mouth daily       Multiple Vitamins-Minerals (IMMUNE SUPPORT) CHEW Take 1 tablet by mouth once       Nutritional Supplements (PROTEOLYTIC FORMULA) TABS Take 3 tablets by mouth once       Resveratrol 100 MG CAPS Take 1,200 mg by mouth daily       rivaroxaban ANTICOAGULANT (XARELTO) 20 MG TABS tablet Take 1 tablet (20 mg) by mouth daily (with dinner) 30 tablet 0     rosuvastatin (CRESTOR) 20 MG tablet Take 1 tablet (20 mg) by mouth daily 30 tablet 12     Selenium 100 MCG TABS Take 1 tablet by mouth daily       senna-docusate (SENOKOT-S/PERICOLACE) 8.6-50 MG tablet Take 1-2 tablets by mouth 2 times daily Take while on oral narcotics to prevent or treat constipation. 30 tablet 0     Sodium Hyaluronate, oral, (HYALURONIC ACID) 100 MG CAPS Take 200 mg by mouth once       Thiamine HCl 50 MG CAPS Take 1 capsule by mouth once       traZODone (DESYREL) 50 MG tablet Take 100 mg by mouth At Bedtime May give an additional 25 mg PRN dose if not asleep by midnight.       TURMERIC PO Take 1,250 capsules by mouth once       Vitamin A 3 MG (56360 UT) TABS Take 10,000 tablets by mouth daily       vitamin C (ASCORBIC ACID) 250 MG tablet Take 500 mg by mouth daily       vitamin E 400 units TABS Take 400 Units by mouth daily       denosumab (PROLIA) 60 MG/ML SOSY injection As of 3/14/2023, please hold and pause this medication while at the transitional care unit       Objective: BP (!) 160/80   Pulse 57   Temp 98.6  F (37  C)   Resp 18   Ht 1.651 m  "(5' 5\")   Wt 113.9 kg (251 lb)   SpO2 96%   BMI 41.77 kg/m    Exam:  GENERAL APPEARANCE: Alert, in no distress, cooperative.   RESP: Respiratory effort good, no respiratory distress, On RA.   PSYCH: Insight, judgement, and memory are baseline forgetful, affect and mood are happy/engaged.    Labs: Labs done in facility are in EPIC. Please refer to them using [email or web address] [email or web address].    ASSESSMENT/PLAN:  Psychophysiological insomnia  Tremor of both hands  Physical deconditioning  Polypharmacy  Acute on chronic. Tenuous.     Severe polypharmacy and the use of caffeine in afternoon/evening. Patient counseled on this.     Would also recommend PCP discontinue Alpha-Lipoiec Acid, CoQ10, Biotin, Hyaluronic acid, Probiotic, Fish oil, Resveratrol, Selenium, Tumeric, Vitamin A, Vitamin B/C complex, Vitamin C (which apparently is separate from B/C complex), and K2 and these lend certainly to adverse drug events, interactions, and place her at risk for further falls, cognitive impairment, or other ADEs. She is leaving/discharging tomorrow, and therefore it would be appropriate for PCP to start these reductions, instead of TCU team.     Ongoing insomnia, and PRN Trazodone has been utilized x 4 nights (totalling 100mg). Patient concerned as above. Though she wanted a doubled-dose, this is not safe, especially given her polypharmacy and fall risk. Provider recommended slow change, lifestyle changes, and close follow up w/ PCP. Orders changed as noted below and Debra is agreeable, though reluctant.     Provider counseled Debra on the potential side effects of changing her Trazodone dose, including a hangover effect or increasing her risk of falls. She accepts this risk.     If sleep issues continue, PCP could consider sleep study, but would highly recommend ongoing education and polypharmacy reduction w/ sleep hygiene first.   TCU team to follow up only as needed. PCP to follow up on the above.    Orders:  1. Increase Trazodone to " 100mg PO at bedtime. Dx: insomnia.   2. Change PRN Trazodone to 25mg PO at bedtime before 0300 PRN x 14 days. Dx: insomnia.     Electronically signed by:  Dr. Kendra Barillas, PHAM CNP DNP

## 2023-04-17 NOTE — LETTER
4/17/2023        RE: Debra Manriquez  [address]  [address]  [place] [address]        [facility]  Episodic/Acute/Follow-Up  [place] MRN: [id number]. Place of Service where encounter took place:  [facility] ([facility]) [[id number]]   Chief Complaint   Patient presents with     RECHECK    HPI: Debra Manriquez  is a 83 year old (1940), who is being seen today for an episodic care visit. Today's concern is:    Debra is discharging tomorrow, and concerned about ongoing insomnia. Both she and nursing requested a visit from provider today.     Today, Debra states that she never needed trazodone at home, and this is a new rx for her. She is taking her 50mg dose and for the past 4 nights has been using her PRN 50mg dose to no avail. She reports falling asleep, but not staying asleep. She also reports diet habits such as green tea intake in the afternoon/evening. She is discharging home tomorrow and is requesting a double dose of Trazodone (200mg). She denies waking up in pain/having pain, SOB, orthopnea.     Past Medical and Surgical History reviewed in Epic today.  MEDICATIONS:  Current Outpatient Medications   Medication Sig Dispense Refill     acetaminophen (TYLENOL) 500 MG tablet Take 1,000 mg by mouth 3 times daily 0700, 1400, 2100  No PRN       alpha-lipoic acid 600 MG capsule Take 1 capsule by mouth daily       B Complex-C (VITAMIN B COMPLEX W/VITAMIN C) TABS tablet Take 1 tablet by mouth daily       biotin 1000 MCG TABS tablet Take 1,000 mcg by mouth daily       cholecalciferol (VITAMIN D3) 5000 units TABS tablet Take 2 tablets by mouth daily       Coenzyme Q10 400 MG CAPS Take 800 mg by mouth daily       cyanocobalamin (VITAMIN B-12) 1000 MCG tablet Take 1,000 mcg by mouth daily       fish oil-omega-3 fatty acids 1000 MG capsule Take 2 g by mouth daily       folic acid 800 MCG tablet Take 800 mcg by mouth daily       furosemide (LASIX) 20 MG tablet Take 20 mg by  mouth daily       gabapentin (NEURONTIN) 300 MG capsule Take 300 mg by mouth At Bedtime At bedtime for neuropathic pain until 4/18       hydrocortisone (CORTAID) 1 % external cream Apply topically 2 times daily       Krill Oil (OMEGA-3) 500 MG CAPS Take 1 capsule by mouth once       lactobacillus TABS Take 2 capsules by mouth daily       levothyroxine (SYNTHROID/LEVOTHROID) 112 MCG tablet Take 112 mcg by mouth daily       losartan (COZAAR) 50 MG tablet Take 50 mg by mouth 2 times daily       MAGNESIUM PO Take 2 capsules by mouth 2 times daily       Menatetrenone 100 MCG TABS Take 100 mcg by mouth daily       Multiple Vitamins-Minerals (IMMUNE SUPPORT) CHEW Take 1 tablet by mouth once       Nutritional Supplements (PROTEOLYTIC FORMULA) TABS Take 3 tablets by mouth once       Resveratrol 100 MG CAPS Take 1,200 mg by mouth daily       rivaroxaban ANTICOAGULANT (XARELTO) 20 MG TABS tablet Take 1 tablet (20 mg) by mouth daily (with dinner) 30 tablet 0     rosuvastatin (CRESTOR) 20 MG tablet Take 1 tablet (20 mg) by mouth daily 30 tablet 12     Selenium 100 MCG TABS Take 1 tablet by mouth daily       senna-docusate (SENOKOT-S/PERICOLACE) 8.6-50 MG tablet Take 1-2 tablets by mouth 2 times daily Take while on oral narcotics to prevent or treat constipation. 30 tablet 0     Sodium Hyaluronate, oral, (HYALURONIC ACID) 100 MG CAPS Take 200 mg by mouth once       Thiamine HCl 50 MG CAPS Take 1 capsule by mouth once       traZODone (DESYREL) 50 MG tablet Take 100 mg by mouth At Bedtime May give an additional 25 mg PRN dose if not asleep by midnight.       TURMERIC PO Take 1,250 capsules by mouth once       Vitamin A 3 MG (77787 UT) TABS Take 10,000 tablets by mouth daily       vitamin C (ASCORBIC ACID) 250 MG tablet Take 500 mg by mouth daily       vitamin E 400 units TABS Take 400 Units by mouth daily       denosumab (PROLIA) 60 MG/ML SOSY injection As of 3/14/2023, please hold and pause this medication while at the  "transitional care unit       Objective: BP (!) 160/80   Pulse 57   Temp 98.6  F (37  C)   Resp 18   Ht 1.651 m (5' 5\")   Wt 113.9 kg (251 lb)   SpO2 96%   BMI 41.77 kg/m    Exam:  GENERAL APPEARANCE: Alert, in no distress, cooperative.   RESP: Respiratory effort good, no respiratory distress, On RA.   PSYCH: Insight, judgement, and memory are baseline forgetful, affect and mood are happy/engaged.    Labs: Labs done in facility are in EPIC. Please refer to them using [facility] Everywhere.    ASSESSMENT/PLAN:  Psychophysiological insomnia  Tremor of both hands  Physical deconditioning  Polypharmacy  Acute on chronic. Tenuous.     Severe polypharmacy and the use of caffeine in afternoon/evening. Patient counseled on this.     Would also recommend PCP discontinue Alpha-Lipoiec Acid, CoQ10, Biotin, Hyaluronic acid, Probiotic, Fish oil, Resveratrol, Selenium, Tumeric, Vitamin A, Vitamin B/C complex, Vitamin C (which apparently is separate from B/C complex), and K2 and these lend certainly to adverse drug events, interactions, and place her at risk for further falls, cognitive impairment, or other ADEs. She is leaving/discharging tomorrow, and therefore it would be appropriate for PCP to start these reductions, instead of TCU team.     Ongoing insomnia, and PRN Trazodone has been utilized x 4 nights (totalling 100mg). Patient concerned as above. Though she wanted a doubled-dose, this is not safe, especially given her polypharmacy and fall risk. Provider recommended slow change, lifestyle changes, and close follow up w/ PCP. Orders changed as noted below and Debra is agreeable, though reluctant.     Provider counseled Debra on the potential side effects of changing her Trazodone dose, including a hangover effect or increasing her risk of falls. She accepts this risk.     If sleep issues continue, PCP could consider sleep study, but would highly recommend ongoing education and polypharmacy reduction w/ sleep hygiene " first.   TCU team to follow up only as needed. PCP to follow up on the above.    Orders:  1. Increase Trazodone to 100mg PO at bedtime. Dx: insomnia.   2. Change PRN Trazodone to 25mg PO at bedtime before 0300 PRN x 14 days. Dx: insomnia.     Electronically signed by:  PHAM Doshi CNP DNP          Sincerely,        PHAM Petty CNP

## 2023-05-03 ENCOUNTER — TELEPHONE (OUTPATIENT)
Dept: CARDIOLOGY | Facility: CLINIC | Age: 83
End: 2023-05-03
Payer: MEDICARE

## 2023-05-03 ENCOUNTER — TRANSFERRED RECORDS (OUTPATIENT)
Dept: HEALTH INFORMATION MANAGEMENT | Facility: CLINIC | Age: 83
End: 2023-05-03
Payer: MEDICARE

## 2023-05-03 DIAGNOSIS — I48.0 PAROXYSMAL ATRIAL FIBRILLATION (H): Primary | ICD-10-CM

## 2023-05-03 NOTE — TELEPHONE ENCOUNTER
"Called back Blanca to address her concerns. She reports that Debra received notification of Afib this morning at 0830 and 1000 with rates as high as 130. She has felt completely fine and never notices when she is in Afib. She is on Xarelto but no rate control or lowering agents due to hx of bradycardia. She said now that her HR is 99. Writer reiterated that she may yet be in Afib still and Blanca verbalized understanding and is driving over there now.    Blanca reports that Debra has had a lot of other health concerns recently and is going in to see Dr. Mcpherson at 2 pm to address these and \"medication mismanagement\" by Debra herself. She was recommended to ask for EKG. Dr. Mcpherson can certainly address Afib concern due to appt in 2 hours. Blanca was told that a follow up order will be placed and to call back if they are recommended to see Afib MAURO earlier than July. She saw Claudia and Renard in January. Blanca thanked writer and verbalized understanding. -[facility]  "

## 2023-05-03 NOTE — TELEPHONE ENCOUNTER
Noted, reviewed Claudia's last OV, pt was recommended to keep log of AFIB episodes and bring with to next follow up.  Episodes had been infrequent, pt was to continue current medications.     Phone call to pt, denies increased episodes of AFIB, denies symptoms. She will continue current plan and monitor current AFIB episode. Stats this is the first episode since her OV. Denies missing a dose of her Xarelto since Knee replacement surgery in March.     She will call us back if for some reason this episode is prolonged and HR elevated over 100bpm, or she starts to have symptoms and we can discuss with Claudia at that time.    I let her know she does not need to be seen urgently as this is not a new diagnosis, and that I will have our  reach out to schedule the 6 month visit for the end of July.  Pt states she is seeing PMD today at 2:15.    Nor further questions or concerns.    Renuka

## 2023-05-03 NOTE — TELEPHONE ENCOUNTER
Health Call Center    Phone Message    May a detailed message be left on voicemail: yes     Reason for Call: Other:    Patient daughter Blanca is calling regarding patient symptoms. Patient apple watch is saying irregular heart rate and Afib at 8:43am and pulse at 130. Blanca would like a call back regarding what she should do for patient. Patient is has appt with PCP Yoanna Mcpherson at 2pm today.     Action Taken: Other: Cardiology     Travel Screening: Not Applicable     Thank you!  [facility]

## 2023-05-03 NOTE — TELEPHONE ENCOUNTER
----- Message from Kavita Robbie sent at 5/3/2023 10:46 AM CDT -----  General phone call:    I CALLED PT TO SCHEDULE Follow-up WITH STEFFEN END OF July, SHE TELLS ME HER APPLE WATCH WENT OFF THIS MORNING AND SAID SHE IS IN AFIB.  I OFFERED TO SCHEDULE HER TO BE SEEN TODAY, BUT SHE SAID SHE IS SEEING HER PRIMARY TODAY.  PLEASE CALL PATIENT    Caller: PATIENT  Primary cardiologist: STEFFEN  Detailed reason for call: SEE ABOVE  New or active symptoms? YES, AFIB  Best phone number: [phone]  Best time to contact: ANY TIME  Ok to leave a detailedmessage? YES  Device? NO    Additional Info:

## 2023-05-07 ENCOUNTER — LAB REQUISITION (OUTPATIENT)
Dept: LAB | Facility: CLINIC | Age: 83
End: 2023-05-07
Payer: MEDICARE

## 2023-05-07 ENCOUNTER — TRANSFERRED RECORDS (OUTPATIENT)
Dept: HEALTH INFORMATION MANAGEMENT | Facility: CLINIC | Age: 83
End: 2023-05-07

## 2023-05-07 ENCOUNTER — HEALTH MAINTENANCE LETTER (OUTPATIENT)
Age: 83
End: 2023-05-07

## 2023-05-07 DIAGNOSIS — R31.9 HEMATURIA, UNSPECIFIED: ICD-10-CM

## 2023-05-07 PROCEDURE — 87088 URINE BACTERIA CULTURE: CPT | Mod: ORL | Performed by: NURSE PRACTITIONER

## 2023-05-10 LAB — BACTERIA UR CULT: ABNORMAL

## 2023-05-22 ENCOUNTER — TELEPHONE (OUTPATIENT)
Dept: CARDIOLOGY | Facility: CLINIC | Age: 83
End: 2023-05-22
Payer: MEDICARE

## 2023-05-22 DIAGNOSIS — I48.0 PAROXYSMAL ATRIAL FIBRILLATION (H): Primary | ICD-10-CM

## 2023-05-22 NOTE — TELEPHONE ENCOUNTER
[facility] Call Center    Phone Message    May a detailed message be left on voicemail: yes     Reason for Call: Other: Ashly called requesting to speak to Dr. Mcallister or a member of his care team about switching from Xarelto to Warfarin because of the price of the medication. Ashly only has enough Xarelto to last to Thursday. Please reach out to discuss as soon as possible. Thank you!     Action Taken: Other: Cardiology    Travel Screening: Not Applicable     Thank you!  [facility]

## 2023-05-22 NOTE — TELEPHONE ENCOUNTER
"Claudia,     Pt last seen 1 21/23 for atrial fibrillation.   [id number] score of 4 for female status, age over 75, hypertension and on Xarelto 20 mg daily.    She's calling today to report Xarelto has gotten too expensive and she would like to switch to warfarin.  She's updated her PMD who told her its \"too dangerous\".  She would like to hear your thoughts.    She's out of Xarelto starting on Thursday and understands you are out of the office until tomorrow.    Let me know your thoughts and I can place a referral to AC team if warfarin is indicated.    Thank you!  Skylar"

## 2023-05-23 PROBLEM — I47.10 SUPRAVENTRICULAR TACHYCARDIA (H): Status: ACTIVE | Noted: 2023-03-14

## 2023-05-23 PROBLEM — Z96.651 PRESENCE OF RIGHT ARTIFICIAL KNEE JOINT: Status: ACTIVE | Noted: 2023-03-14

## 2023-05-23 PROBLEM — R26.2 DIFFICULTY IN WALKING, NOT ELSEWHERE CLASSIFIED: Status: ACTIVE | Noted: 2023-03-14

## 2023-05-23 PROBLEM — R53.81 OTHER MALAISE: Status: ACTIVE | Noted: 2023-03-14

## 2023-05-23 PROBLEM — E89.0 POSTPROCEDURAL HYPOTHYROIDISM: Status: ACTIVE | Noted: 2023-03-14

## 2023-05-23 PROBLEM — R26.81 UNSTEADINESS ON FEET: Status: ACTIVE | Noted: 2023-03-14

## 2023-05-23 RX ORDER — AMOXICILLIN 500 MG/1
TABLET, FILM COATED ORAL
COMMUNITY
Start: 2023-04-17

## 2023-05-23 NOTE — TELEPHONE ENCOUNTER
Claudia Yoo, Rosario Batista, RN  Caller: Unspecified (Yesterday,  3:05 PM)  Since I have not seen her since January, let's just get her scheduled for an office visit this week for a follow up appointment and I can discuss it with her then.     Claudia Valladares

## 2023-05-23 NOTE — TELEPHONE ENCOUNTER
Claudia Yoo, Rosario Batista, RN  Caller: Unspecified (Yesterday,  3:05 PM)  Yes, video visit is preferred, avoid phone if possible. EKG and lab work are up to date.     Thanks,   Claudia

## 2023-05-24 ENCOUNTER — VIRTUAL VISIT (OUTPATIENT)
Dept: CARDIOLOGY | Facility: CLINIC | Age: 83
End: 2023-05-24
Payer: MEDICARE

## 2023-05-24 DIAGNOSIS — I48.0 PAROXYSMAL ATRIAL FIBRILLATION (H): Primary | ICD-10-CM

## 2023-05-24 DIAGNOSIS — I50.32 CHRONIC HEART FAILURE WITH PRESERVED EJECTION FRACTION (HFPEF) (H): ICD-10-CM

## 2023-05-24 DIAGNOSIS — I10 ESSENTIAL HYPERTENSION: ICD-10-CM

## 2023-05-24 DIAGNOSIS — G47.33 OBSTRUCTIVE SLEEP APNEA: ICD-10-CM

## 2023-05-24 PROCEDURE — 99214 OFFICE O/P EST MOD 30 MIN: CPT | Mod: VID | Performed by: NURSE PRACTITIONER

## 2023-05-24 NOTE — LETTER
5/24/2023    Yoanna Mcpherson MD  [address]  [place] [address]    RE: Debra Manriquez       Dear Colleague,     I had the pleasure of seeing Debra Manriquez in the [facility].              Appointment is being conducted as a telephone visit, to reduce risk of exposure given the current status of Coronovirus in our community. This telephone visit is being conducted via a call between the patient and physician/provider. Health care needs are being provided without a physical exam.     Assessment/Recommendations   Assessment/Plan:  Diagnoses and all orders for this visit:  Paroxysmal atrial fibrillation (H)    new diagnosis in 11/2022    we discussed the ongoing importance of lifestyle modification (maintaining a healthy weight, sleep apnea diagnosis and management, alcohol avoidance) as part of a long term strategy for atrial fibrillation management    She is wanting to transition to Coumadin from Xarelto as Xarelto is too expensive for her. She does take multiple OTC vitamin supplements which could potentially interact with her Coumadin decreasing the effectiveness of the medication. We discussed the risks of this along with the need for INR monitoring.  After our discussion today weighing the risks and benefits, patient has decided to continue with the Xarelto due to her moderate stroke risk.     She has a history of bradycardia on betablocker therapy, carvedilol was previously discontinued    She will continue to monitor her atrial fibrillation episodes using her Apple Watch.  Atrial fibrillation episodes are very brief when they do occur and last under 2 minutes, she is asymptomatic when they do occur.      Chronic heart failure with preserved ejection fraction (HFpEF) (H)    TTE screen last year showed preserved ventricular and valvular function but diastolic dysfxn.   - continue w/ risk factor modfication  - continue lasix 20mg daily and it has helped w/ pedal edema, ARB   She  continues to monitor daily weights, she reports her weight being 246 pounds today    Essential hypertension    Blood pressure was checked yesterday by home care staff, she reports blood pressure being 111/62.  She will continue on her current dosing of losartan 50 mg daily.    Obstructive sleep apnea    She is compliant with CPAP use, averages 7 to 8 hours per night        [id number] score of 6 for female status, age over 75, HF, vascular insufficiency, hypertension and on Xarelto 20 mg daily         Follow Up Plan:  6 months with Claudia Yoo CNP for paroxysmal Afib.   I have reviewed the note as documented.  This accurately captures the substance of my conversation with the patient.    Total time of video between patient and provider was 35 minutes   Start Time: 841  Stop Time: 917    Originating Location (pt. Location): Home  Distant Location (provider location):  [facility]     Mode of Communication:  Video Conference via [facility]       History of Present Illness/Subjective    Debra Manriquez is a 83 year old female who is being evaluated via a billable video visit and has consented to a video visit.    Debra Manriquez is a very pleasant 82 year old female who presents via phone visit for follow up regarding her paroxsymal atrial fibrillation. Has a known history of chronic fatigue syndrome, HFpEF, JADYN with CPAP, obesity, thyroidectomy, lymphedema BLE, osteopenia, hypertension.  Ashly complains of shortness of breath with minimal walking. She performs hours of tutoring every day.      On November 4, Debra noted notification of A. fib on her apple watch.  She has had a previous episode in June looking back in data per her daughter.  She reverted back to normal rhythm on her own.  They did not start her on aspirin or anticoagulation in urgent care due to concern about fall risk.  She ambulates with a walker or hangs onto things around her house.  They recommended metoprolol, but she  did not start this.  She had a ER visit on December 3 which she awoke with pain in her back which went up to the back of her neck.  She had some pressure in her chest.  She went to ER and was worked up and discharged.  She did not have a stress test.  She had a CT which showed no aortic enlargement or tear.  She is questioning GERD.  On questioning she had no other anginal symptoms.  She is not very active.        She remains compliant with CPAP use every night for treating her sleep apnea, she does average about 7 hours per night.   She is medicating with 2 extra think Tylenol, melatonin, magnesium and tart cherry juice at night for management of her insomnia. No recent falls but she has high risk for falls due to her degenerative disease in her knee, she did undergo knee surgery on March 7 through [facility].  Unfortunately after her knee replacement, she developed cellulitis infection and she remained in the transitional care unit and then discharged home on April 18.  Currently, she is averaging about 500 steps per day and working on strengthening exercises along with balance.  From an atrial fibrillation standpoint, she continues to experience some shortness of breath on exertion due to deconditioning otherwise she denies any recent dizziness, lightheadedness, chest pain, palpitations, orthopnea or PND.  The last known episode of atrial fibrillation per her Apple watch was 10 days ago where her heart rate got into the 130s and lasted for under 2 minutes.  She remained asymptomatic the entire time.  She remains off beta-blocker or calcium channel blocker therapy.  She is currently anticoagulated on Xarelto.  She did want have a discussion today about transitioning to Coumadin.  The Xarelto is just too expensive for her at this time.  Based on her medication list, she takes multiple over-the-counter supplements so I did explain to her that I worry about the multiple interactions that she would have with  "the Coumadin that would decrease the effectiveness of the medication. She has a [id number] score of 6 which makes her a moderate stroke risk.  She is taking the Xarelto at night after dinner.  She denies any recent falls or bleeding episodes since being on the Xarelto.  She has required NSAIDs for pain control at this time due to her cellulitis infection.  In regards to her heart failure, she is monitoring daily weights, current weight is 246.  She remains on furosemide 20 mg daily and is wearing compression stockings.  She does monitor her sodium intake.     Cardiographics (reviewed):    EKG 12/3/22 shows sinus bradycardia ventricular rate 57 QT/QTC: 426/414 ms          ECHO 7/15/22    Interpretation Summary     1. Normal left ventricular size and systolic performance with a visually  estimated ejection fraction of 65%.  2. There is mild concentric increase in left ventricular wall thickness.  3. There is mild aortic insufficiency.  4. Normal right ventricular size and systolic performance.     When compared to the prior real-time echocardiogram dated 5 March 2021, the  findings are felt to be fairly similar on both examinations.    6/28/21 [facility] [place]       MULTI-DAY PATIENT ACTIVATED MONITOR (RASHIDA) REPORT     Results:    Indication for study: Bradycardia    Time monitored: 26 days and 8 hours.  Time analyzed: 25 days and 21 hours.      The baseline rhythm transmission demonstrated normal sinus rhythm.  HI interval, QRS duration and QT intervals are all normal.    The patient had 1 manually activated rhythm recordings.  Symptoms were not specifically reported other than \"other\".  The patient's rhythm demonstrated normal sinus rhythm with heart rates in the 70s.  There was an isolated PAC.    The patient had 74 auto triggered recordings.  Symptoms were not reported.  The patient's rhythm demonstrated a multitude of problems largely PACs and short runs of ectopic atrial tachycardia.  The ectopic atrial tachycardia had " episodes as long as 29   beats and as fast as 176 bpm.  Additionally there was a 2.7-second pause on termination of 1 of these episodes as well as a 2.9-second spontaneous sinus pause.  There were no sustained atrial or ventricular tachyarrhythmias..     Impression:    Abnormal multi day patient activated monitor by virtue of the frequent atrial ectopy.  The patient demonstrates frequent runs of nonsustained ectopic atrial tachycardia as well as some evidence of early sinus node dysfunction with asymptomatic (not   clinically significant) pauses of up to 2.9 seconds.    The patient appears to be at increased risk for developing persistent atrial arrhythmia such as atrial fibrillation or flutter.    No sustained atrial or ventricular tachyarrhythmia.    No profound bradycardia or significant pauses.           Physical Examination performed via live video encounter Review of Systems   General Appearance:   No exam due to phone visit.    ENT/Mouth:    EYES:     Neck:    Chest/Lungs:      Cardiovascular:      Abdomen:     Extremities:    Skin:    Neurologic:    Psychiatric:                                               Medical History  Surgical History Family History Social History   Past Medical History:   Diagnosis Date    Antiplatelet or antithrombotic long-term use     Chronic acquired lymphedema     Edema     Falls     Fibrocystic breast disease     Fibrocystic breast disease     Foot pain     Gastroesophageal reflux disease     GERD (gastroesophageal reflux disease)     Hyperlipidemia     Hypertension     Irregular heart beat     Lymphedema     Mitral valve disorder     Obese     JADYN (obstructive sleep apnea)     Osteoarthritis of knee     hx of knee replacement and pending second    Osteopenia     Skin cancer     Skin cancer, basal cell     Sleep apnea     Thyroid nodule     Thyroid nodule     Vitamin B deficiency     Past Surgical History:   Procedure Laterality Date    ARTHROPLASTY KNEE Right 3/7/2023     Procedure: RIGHT TOTAL KNEE ARTHROPLASTY;  Surgeon: Juan Corbin MD;  Location: [facility] Main OR    CATARACT EXTRACTION  2011, 2012    COLONOSCOPY      EXCISE TOENAIL(S) Bilateral 8/16/2019    Procedure: MATRIXECTOMY BILATERAL FEET; TOES 1,2,3,4 ON LEFT FOOT,  1,2 ON RIGHT FOOT;  BOTH NAIL BORDERS;  Surgeon: Opal Kam DPM;  Location:  OR    EYE SURGERY      cataract    GI SURGERY      GYN SURGERY      hysterectomy    INJECT STEROID (LOCATION) Right 8/16/2019    Procedure: CORTIZONE INJECTION RIGHT HEEL;  Surgeon: Opal Kam DPM;  Location:  OR    ORTHOPEDIC SURGERY Left     knee replacement    PARTIAL HYSTERECTOMY      STRIP VEIN Bilateral 1975    TOTAL KNEE ARTHROPLASTY Left 2011    Family History   Problem Relation Age of Onset    No Known Problems Mother     No Known Problems Father     Alzheimer Disease Maternal Uncle     Heart Disease Sister     Heart Disease Brother     No Known Problems Daughter     No Known Problems Son     No Known Problems Brother     No Known Problems Son     No Known Problems Son       Social History     Socioeconomic History    Marital status:      Spouse name: Not on file    Number of children: Not on file    Years of education: Not on file    Highest education level: Not on file   Occupational History    Not on file   Tobacco Use    Smoking status: Never    Smokeless tobacco: Never   Vaping Use    Vaping status: Never Used   Substance and Sexual Activity    Alcohol use: Yes     Alcohol/week: 0.8 standard drinks of alcohol     Comment: Alcoholic Drinks/day: 1-2 glasses per month    Drug use: No    Sexual activity: Yes   Other Topics Concern    Parent/sibling w/ CABG, MI or angioplasty before 65F 55M? Not Asked   Social History Narrative    . 4 kids.  Teaches college English, reading.      Social Determinants of Health     Financial Resource Strain: Not on file   Food Insecurity: Not on file   Transportation Needs: Not on file    Physical Activity: Not on file   Stress: Not on file   Social Connections: Not on file   Intimate Partner Violence: Not on file   Housing Stability: Not on file          Medications  Allergies   Current Outpatient Medications   Medication Sig Dispense Refill    acetaminophen (TYLENOL) 500 MG tablet Take 1,000 mg by mouth 3 times daily 0700, 1400, 2100  No PRN      alpha-lipoic acid 600 MG capsule Take 1 capsule by mouth daily      amoxicillin (AMOXIL) 500 MG tablet TAKE 4 TABLETS BY MOUTH 1 HOUR BEFORE DENTAL APPOINTMENT      B Complex-C (VITAMIN B COMPLEX W/VITAMIN C) TABS tablet Take 1 tablet by mouth daily      biotin 1000 MCG TABS tablet Take 1,000 mcg by mouth daily      cholecalciferol (VITAMIN D3) 5000 units TABS tablet Take 2 tablets by mouth daily      Coenzyme Q10 400 MG CAPS Take 800 mg by mouth daily      cyanocobalamin (VITAMIN B-12) 1000 MCG tablet Take 1,000 mcg by mouth daily      denosumab (PROLIA) 60 MG/ML SOSY injection As of 3/14/2023, please hold and pause this medication while at the transitional care unit      fish oil-omega-3 fatty acids 1000 MG capsule Take 2 g by mouth daily      folic acid 800 MCG tablet Take 800 mcg by mouth daily      furosemide (LASIX) 20 MG tablet Take 20 mg by mouth daily      gabapentin (NEURONTIN) 300 MG capsule Take 300 mg by mouth At Bedtime At bedtime for neuropathic pain until 4/18      hydrocortisone (CORTAID) 1 % external cream Apply topically 2 times daily      Krill Oil (OMEGA-3) 500 MG CAPS Take 1 capsule by mouth once      lactobacillus TABS Take 2 capsules by mouth daily      levothyroxine (SYNTHROID/LEVOTHROID) 112 MCG tablet Take 112 mcg by mouth daily      losartan (COZAAR) 50 MG tablet Take 50 mg by mouth 2 times daily      MAGNESIUM PO Take 2 capsules by mouth 2 times daily      Menatetrenone 100 MCG TABS Take 100 mcg by mouth daily      Multiple Vitamins-Minerals (IMMUNE SUPPORT) CHEW Take 1 tablet by mouth once      Nutritional Supplements  (PROTEOLYTIC FORMULA) TABS Take 3 tablets by mouth once      Resveratrol 100 MG CAPS Take 1,200 mg by mouth daily      rivaroxaban ANTICOAGULANT (XARELTO) 20 MG TABS tablet Take 1 tablet (20 mg) by mouth daily (with dinner) 30 tablet 0    rosuvastatin (CRESTOR) 20 MG tablet Take 1 tablet (20 mg) by mouth daily 30 tablet 12    Selenium 100 MCG TABS Take 1 tablet by mouth daily      senna-docusate (SENOKOT-S/PERICOLACE) 8.6-50 MG tablet Take 1-2 tablets by mouth 2 times daily Take while on oral narcotics to prevent or treat constipation. 30 tablet 0    Sodium Hyaluronate, oral, (HYALURONIC ACID) 100 MG CAPS Take 200 mg by mouth once      Thiamine HCl 50 MG CAPS Take 1 capsule by mouth once      traZODone (DESYREL) 50 MG tablet Take 100 mg by mouth At Bedtime May give an additional 25 mg PRN dose if not asleep by midnight.      TURMERIC PO Take 1,250 capsules by mouth once      Vitamin A 3 MG (16717 UT) TABS Take 10,000 tablets by mouth daily      vitamin C (ASCORBIC ACID) 250 MG tablet Take 500 mg by mouth daily      vitamin E 400 units TABS Take 400 Units by mouth daily      Allergies   Allergen Reactions    Lisinopril Cough    Reglan [Metoclopramide] Other (See Comments)     depression         Lab Results    Chemistry/lipid CBC Cardiac Enzymes/BNP/TSH/INR   Lab Results   Component Value Date    CHOL 168 05/26/2021    HDL 66 05/26/2021    TRIG 68 05/26/2021    BUN 15.8 03/17/2023     03/17/2023    CO2 22 03/17/2023    Lab Results   Component Value Date    WBC 5.7 03/17/2023    HGB 11.4 (L) 03/20/2023    HCT 35.2 03/17/2023    MCV 90 03/17/2023     03/17/2023    Lab Results   Component Value Date    TSH 1.68 02/22/2023      Total Time- 35 minutes spent on date of encounter doing chart review, history and exam, documentation and further activities as noted above.        Claudia Yoo NP RN, CNP  Clinical Cardiac Electrophysiology  [facility]  Office: [phone]  Fax:  [phone]   Nurses: [phone]         Thank you for allowing me to participate in the care of your patient.      Sincerely,     Claudia Yoo NP     [facility]  cc:   Claudia Yoo NP  [address], [address]  [place] [address]

## 2023-05-24 NOTE — PROGRESS NOTES
Appointment is being conducted as a telephone visit, to reduce risk of exposure given the current status of Coronovirus in our community. This telephone visit is being conducted via a call between the patient and physician/provider. Health care needs are being provided without a physical exam.     Assessment/Recommendations   Assessment/Plan:  Diagnoses and all orders for this visit:  Paroxysmal atrial fibrillation (H)    new diagnosis in 11/2022    we discussed the ongoing importance of lifestyle modification (maintaining a healthy weight, sleep apnea diagnosis and management, alcohol avoidance) as part of a long term strategy for atrial fibrillation management    She is wanting to transition to Coumadin from Xarelto as Xarelto is too expensive for her. She does take multiple OTC vitamin supplements which could potentially interact with her Coumadin decreasing the effectiveness of the medication. We discussed the risks of this along with the need for INR monitoring.  After our discussion today weighing the risks and benefits, patient has decided to continue with the Xarelto due to her moderate stroke risk.     She has a history of bradycardia on betablocker therapy, carvedilol was previously discontinued    She will continue to monitor her atrial fibrillation episodes using her Apple Watch.  Atrial fibrillation episodes are very brief when they do occur and last under 2 minutes, she is asymptomatic when they do occur.      Chronic heart failure with preserved ejection fraction (HFpEF) (H)    TTE screen last year showed preserved ventricular and valvular function but diastolic dysfxn.   - continue w/ risk factor modfication  - continue lasix 20mg daily and it has helped w/ pedal edema, ARB   She continues to monitor daily weights, she reports her weight being 246 pounds today    Essential hypertension    Blood pressure was checked yesterday by home care staff, she reports blood pressure being 111/62.   She will continue on her current dosing of losartan 50 mg daily.    Obstructive sleep apnea    She is compliant with CPAP use, averages 7 to 8 hours per night        [id number] score of 6 for female status, age over 75, HF, vascular insufficiency, hypertension and on Xarelto 20 mg daily         Follow Up Plan:  6 months with Claudia Yoo CNP for paroxysmal Afib.   I have reviewed the note as documented.  This accurately captures the substance of my conversation with the patient.    Total time of video between patient and provider was 35 minutes   Start Time: 841  Stop Time: 917    Originating Location (pt. Location): Home  Distant Location (provider location):  [facility]     Mode of Communication:  Video Conference via [employer]       History of Present Illness/Subjective    Debra Manriquez is a 83 year old female who is being evaluated via a billable video visit and has consented to a video visit.    Debra Manriquez is a very pleasant 82 year old female who presents via phone visit for follow up regarding her paroxsymal atrial fibrillation. Has a known history of chronic fatigue syndrome, HFpEF, JADYN with CPAP, obesity, thyroidectomy, lymphedema BLE, osteopenia, hypertension.  Ashly complains of shortness of breath with minimal walking. She performs hours of tutoring every day.      On November 4, Debra noted notification of A. fib on her apple watch.  She has had a previous episode in June looking back in data per her daughter.  She reverted back to normal rhythm on her own.  They did not start her on aspirin or anticoagulation in urgent care due to concern about fall risk.  She ambulates with a walker or hangs onto things around her house.  They recommended metoprolol, but she did not start this.  She had a ER visit on December 3 which she awoke with pain in her back which went up to the back of her neck.  She had some pressure in her chest.  She went to ER and was worked up and  discharged.  She did not have a stress test.  She had a CT which showed no aortic enlargement or tear.  She is questioning GERD.  On questioning she had no other anginal symptoms.  She is not very active.        She remains compliant with CPAP use every night for treating her sleep apnea, she does average about 7 hours per night.   She is medicating with 2 extra think Tylenol, melatonin, magnesium and tart cherry juice at night for management of her insomnia. No recent falls but she has high risk for falls due to her degenerative disease in her knee, she did undergo knee surgery on March 7 through [facility].  Unfortunately after her knee replacement, she developed cellulitis infection and she remained in the transitional care unit and then discharged home on April 18.  Currently, she is averaging about 500 steps per day and working on strengthening exercises along with balance.  From an atrial fibrillation standpoint, she continues to experience some shortness of breath on exertion due to deconditioning otherwise she denies any recent dizziness, lightheadedness, chest pain, palpitations, orthopnea or PND.  The last known episode of atrial fibrillation per her Apple watch was 10 days ago where her heart rate got into the 130s and lasted for under 2 minutes.  She remained asymptomatic the entire time.  She remains off beta-blocker or calcium channel blocker therapy.  She is currently anticoagulated on Xarelto.  She did want have a discussion today about transitioning to Coumadin.  The Xarelto is just too expensive for her at this time.  Based on her medication list, she takes multiple over-the-counter supplements so I did explain to her that I worry about the multiple interactions that she would have with the Coumadin that would decrease the effectiveness of the medication. She has a [id number] score of 6 which makes her a moderate stroke risk.  She is taking the Xarelto at night after dinner.  She denies  "any recent falls or bleeding episodes since being on the Xarelto.  She has required NSAIDs for pain control at this time due to her cellulitis infection.  In regards to her heart failure, she is monitoring daily weights, current weight is 246.  She remains on furosemide 20 mg daily and is wearing compression stockings.  She does monitor her sodium intake.     Cardiographics (reviewed):    EKG 12/3/22 shows sinus bradycardia ventricular rate 57 QT/QTC: 426/414 ms          ECHO 7/15/22    Interpretation Summary     1. Normal left ventricular size and systolic performance with a visually  estimated ejection fraction of 65%.  2. There is mild concentric increase in left ventricular wall thickness.  3. There is mild aortic insufficiency.  4. Normal right ventricular size and systolic performance.     When compared to the prior real-time echocardiogram dated 5 March 2021, the  findings are felt to be fairly similar on both examinations.    6/28/21 [facility]       MULTI-DAY PATIENT ACTIVATED MONITOR (RASHIDA) REPORT     Results:    Indication for study: Bradycardia    Time monitored: 26 days and 8 hours.  Time analyzed: 25 days and 21 hours.      The baseline rhythm transmission demonstrated normal sinus rhythm.  NV interval, QRS duration and QT intervals are all normal.    The patient had 1 manually activated rhythm recordings.  Symptoms were not specifically reported other than \"other\".  The patient's rhythm demonstrated normal sinus rhythm with heart rates in the 70s.  There was an isolated PAC.    The patient had 74 auto triggered recordings.  Symptoms were not reported.  The patient's rhythm demonstrated a multitude of problems largely PACs and short runs of ectopic atrial tachycardia.  The ectopic atrial tachycardia had episodes as long as 29   beats and as fast as 176 bpm.  Additionally there was a 2.7-second pause on termination of 1 of these episodes as well as a 2.9-second spontaneous sinus pause.  There were no sustained " atrial or ventricular tachyarrhythmias..     Impression:    Abnormal multi day patient activated monitor by virtue of the frequent atrial ectopy.  The patient demonstrates frequent runs of nonsustained ectopic atrial tachycardia as well as some evidence of early sinus node dysfunction with asymptomatic (not   clinically significant) pauses of up to 2.9 seconds.    The patient appears to be at increased risk for developing persistent atrial arrhythmia such as atrial fibrillation or flutter.    No sustained atrial or ventricular tachyarrhythmia.    No profound bradycardia or significant pauses.           Physical Examination performed via live video encounter Review of Systems   General Appearance:   No exam due to phone visit.    ENT/Mouth:    EYES:     Neck:    Chest/Lungs:      Cardiovascular:      Abdomen:     Extremities:    Skin:    Neurologic:    Psychiatric:                                               Medical History  Surgical History Family History Social History   Past Medical History:   Diagnosis Date     Antiplatelet or antithrombotic long-term use      Chronic acquired lymphedema      Edema      Falls      Fibrocystic breast disease      Fibrocystic breast disease      Foot pain      Gastroesophageal reflux disease      GERD (gastroesophageal reflux disease)      Hyperlipidemia      Hypertension      Irregular heart beat      Lymphedema      Mitral valve disorder      Obese      JADYN (obstructive sleep apnea)      Osteoarthritis of knee     hx of knee replacement and pending second     Osteopenia      Skin cancer      Skin cancer, basal cell      Sleep apnea      Thyroid nodule      Thyroid nodule      Vitamin B deficiency     Past Surgical History:   Procedure Laterality Date     ARTHROPLASTY KNEE Right 3/7/2023    Procedure: RIGHT TOTAL KNEE ARTHROPLASTY;  Surgeon: Juan Corbin MD;  Location: [facility] Main OR     CATARACT EXTRACTION  2011, 2012     COLONOSCOPY       EXCISE TOENAIL(S) Bilateral  8/16/2019    Procedure: MATRIXECTOMY BILATERAL FEET; TOES 1,2,3,4 ON LEFT FOOT,  1,2 ON RIGHT FOOT;  BOTH NAIL BORDERS;  Surgeon: Opal Kam DPM;  Location:  OR     EYE SURGERY      cataract     GI SURGERY       GYN SURGERY      hysterectomy     INJECT STEROID (LOCATION) Right 8/16/2019    Procedure: CORTIZONE INJECTION RIGHT HEEL;  Surgeon: Opal Kam DPM;  Location:  OR     ORTHOPEDIC SURGERY Left     knee replacement     PARTIAL HYSTERECTOMY       STRIP VEIN Bilateral 1975     TOTAL KNEE ARTHROPLASTY Left 2011    Family History   Problem Relation Age of Onset     No Known Problems Mother      No Known Problems Father      Alzheimer Disease Maternal Uncle      Heart Disease Sister      Heart Disease Brother      No Known Problems Daughter      No Known Problems Son      No Known Problems Brother      No Known Problems Son      No Known Problems Son       Social History     Socioeconomic History     Marital status:      Spouse name: Not on file     Number of children: Not on file     Years of education: Not on file     Highest education level: Not on file   Occupational History     Not on file   Tobacco Use     Smoking status: Never     Smokeless tobacco: Never   Vaping Use     Vaping status: Never Used   Substance and Sexual Activity     Alcohol use: Yes     Alcohol/week: 0.8 standard drinks of alcohol     Comment: Alcoholic Drinks/day: 1-2 glasses per month     Drug use: No     Sexual activity: Yes   Other Topics Concern     Parent/sibling w/ CABG, MI or angioplasty before 65F 55M? Not Asked   Social History Narrative    . 4 kids.  Teaches college English, reading.      Social Determinants of Health     Financial Resource Strain: Not on file   Food Insecurity: Not on file   Transportation Needs: Not on file   Physical Activity: Not on file   Stress: Not on file   Social Connections: Not on file   Intimate Partner Violence: Not on file   Housing Stability: Not on  file          Medications  Allergies   Current Outpatient Medications   Medication Sig Dispense Refill     acetaminophen (TYLENOL) 500 MG tablet Take 1,000 mg by mouth 3 times daily 0700, 1400, 2100  No PRN       alpha-lipoic acid 600 MG capsule Take 1 capsule by mouth daily       amoxicillin (AMOXIL) 500 MG tablet TAKE 4 TABLETS BY MOUTH 1 HOUR BEFORE DENTAL APPOINTMENT       B Complex-C (VITAMIN B COMPLEX W/VITAMIN C) TABS tablet Take 1 tablet by mouth daily       biotin 1000 MCG TABS tablet Take 1,000 mcg by mouth daily       cholecalciferol (VITAMIN D3) 5000 units TABS tablet Take 2 tablets by mouth daily       Coenzyme Q10 400 MG CAPS Take 800 mg by mouth daily       cyanocobalamin (VITAMIN B-12) 1000 MCG tablet Take 1,000 mcg by mouth daily       denosumab (PROLIA) 60 MG/ML SOSY injection As of 3/14/2023, please hold and pause this medication while at the transitional care unit       fish oil-omega-3 fatty acids 1000 MG capsule Take 2 g by mouth daily       folic acid 800 MCG tablet Take 800 mcg by mouth daily       furosemide (LASIX) 20 MG tablet Take 20 mg by mouth daily       gabapentin (NEURONTIN) 300 MG capsule Take 300 mg by mouth At Bedtime At bedtime for neuropathic pain until 4/18       hydrocortisone (CORTAID) 1 % external cream Apply topically 2 times daily       Krill Oil (OMEGA-3) 500 MG CAPS Take 1 capsule by mouth once       lactobacillus TABS Take 2 capsules by mouth daily       levothyroxine (SYNTHROID/LEVOTHROID) 112 MCG tablet Take 112 mcg by mouth daily       losartan (COZAAR) 50 MG tablet Take 50 mg by mouth 2 times daily       MAGNESIUM PO Take 2 capsules by mouth 2 times daily       Menatetrenone 100 MCG TABS Take 100 mcg by mouth daily       Multiple Vitamins-Minerals (IMMUNE SUPPORT) CHEW Take 1 tablet by mouth once       Nutritional Supplements (PROTEOLYTIC FORMULA) TABS Take 3 tablets by mouth once       Resveratrol 100 MG CAPS Take 1,200 mg by mouth daily       rivaroxaban  ANTICOAGULANT (XARELTO) 20 MG TABS tablet Take 1 tablet (20 mg) by mouth daily (with dinner) 30 tablet 0     rosuvastatin (CRESTOR) 20 MG tablet Take 1 tablet (20 mg) by mouth daily 30 tablet 12     Selenium 100 MCG TABS Take 1 tablet by mouth daily       senna-docusate (SENOKOT-S/PERICOLACE) 8.6-50 MG tablet Take 1-2 tablets by mouth 2 times daily Take while on oral narcotics to prevent or treat constipation. 30 tablet 0     Sodium Hyaluronate, oral, (HYALURONIC ACID) 100 MG CAPS Take 200 mg by mouth once       Thiamine HCl 50 MG CAPS Take 1 capsule by mouth once       traZODone (DESYREL) 50 MG tablet Take 100 mg by mouth At Bedtime May give an additional 25 mg PRN dose if not asleep by midnight.       TURMERIC PO Take 1,250 capsules by mouth once       Vitamin A 3 MG (77545 UT) TABS Take 10,000 tablets by mouth daily       vitamin C (ASCORBIC ACID) 250 MG tablet Take 500 mg by mouth daily       vitamin E 400 units TABS Take 400 Units by mouth daily      Allergies   Allergen Reactions     Lisinopril Cough     Reglan [Metoclopramide] Other (See Comments)     depression         Lab Results    Chemistry/lipid CBC Cardiac Enzymes/BNP/TSH/INR   Lab Results   Component Value Date    CHOL 168 05/26/2021    HDL 66 05/26/2021    TRIG 68 05/26/2021    BUN 15.8 03/17/2023     03/17/2023    CO2 22 03/17/2023    Lab Results   Component Value Date    WBC 5.7 03/17/2023    HGB 11.4 (L) 03/20/2023    HCT 35.2 03/17/2023    MCV 90 03/17/2023     03/17/2023    Lab Results   Component Value Date    TSH 1.68 02/22/2023      Total Time- 35 minutes spent on date of encounter doing chart review, history and exam, documentation and further activities as noted above.        Claudia Yoo NP RN, CNP  Clinical Cardiac Electrophysiology  [facility]  Office: [phone]  Fax: [phone]   Nurses: [phone]

## 2023-05-24 NOTE — PATIENT INSTRUCTIONS
Debra Manriquez,    It was a pleasure to see you today at the [facility].     My recommendations after this visit include:    Continue on current medications  After our discussion, you have decided to remain on Xarelto due to your stroke risk and less interactions based on all of the supplements that you are currently taking. I think this is the best course of action for you.   I will see you back in clinic for your next follow up in 6 months  Continue to monitor your Afib using your Apple Watch, feel free to message me on My Chart if you have any concerns or questions.     Claudia Yoo CNP  [facility], Electrophysiology  [phone]  EP nurses [phone]

## 2023-06-15 ENCOUNTER — LAB REQUISITION (OUTPATIENT)
Dept: LAB | Facility: CLINIC | Age: 83
End: 2023-06-15
Payer: MEDICARE

## 2023-06-15 DIAGNOSIS — R31.9 HEMATURIA, UNSPECIFIED: ICD-10-CM

## 2023-06-15 PROCEDURE — 87086 URINE CULTURE/COLONY COUNT: CPT | Mod: ORL | Performed by: STUDENT IN AN ORGANIZED HEALTH CARE EDUCATION/TRAINING PROGRAM

## 2023-06-17 LAB — BACTERIA UR CULT: ABNORMAL

## 2023-06-20 ENCOUNTER — LAB REQUISITION (OUTPATIENT)
Dept: LAB | Facility: CLINIC | Age: 83
End: 2023-06-20
Payer: MEDICARE

## 2023-06-20 DIAGNOSIS — R31.0 GROSS HEMATURIA: ICD-10-CM

## 2023-06-20 PROCEDURE — 80048 BASIC METABOLIC PNL TOTAL CA: CPT | Mod: ORL | Performed by: PHYSICIAN ASSISTANT

## 2023-06-21 ENCOUNTER — LAB REQUISITION (OUTPATIENT)
Dept: LAB | Facility: CLINIC | Age: 83
End: 2023-06-21
Payer: MEDICARE

## 2023-06-21 DIAGNOSIS — R31.0 GROSS HEMATURIA: ICD-10-CM

## 2023-06-21 LAB
ALBUMIN UR-MCNC: 300 MG/DL
ANION GAP SERPL CALCULATED.3IONS-SCNC: 16 MMOL/L (ref 7–15)
APPEARANCE UR: CLEAR
BILIRUB UR QL STRIP: NEGATIVE
BUN SERPL-MCNC: 16.2 MG/DL (ref 8–23)
CALCIUM SERPL-MCNC: 9.5 MG/DL (ref 8.8–10.2)
CHLORIDE SERPL-SCNC: 108 MMOL/L (ref 98–107)
COLOR UR AUTO: YELLOW
CREAT SERPL-MCNC: 0.68 MG/DL (ref 0.51–0.95)
DEPRECATED HCO3 PLAS-SCNC: 20 MMOL/L (ref 22–29)
GFR SERPL CREATININE-BSD FRML MDRD: 85 ML/MIN/1.73M2
GLUCOSE SERPL-MCNC: 107 MG/DL (ref 70–99)
GLUCOSE UR STRIP-MCNC: NEGATIVE MG/DL
HGB UR QL STRIP: ABNORMAL
KETONES UR STRIP-MCNC: NEGATIVE MG/DL
LEUKOCYTE ESTERASE UR QL STRIP: NEGATIVE
MUCOUS THREADS #/AREA URNS LPF: PRESENT /LPF
NITRATE UR QL: NEGATIVE
PH UR STRIP: 6.5 [PH] (ref 5–7)
POTASSIUM SERPL-SCNC: 4.3 MMOL/L (ref 3.4–5.3)
RBC URINE: >182 /HPF
SODIUM SERPL-SCNC: 144 MMOL/L (ref 136–145)
SP GR UR STRIP: 1.02 (ref 1–1.03)
UROBILINOGEN UR STRIP-MCNC: NORMAL MG/DL
WBC URINE: 0 /HPF

## 2023-06-21 PROCEDURE — 81001 URINALYSIS AUTO W/SCOPE: CPT | Mod: ORL | Performed by: PHYSICIAN ASSISTANT

## 2023-06-22 ENCOUNTER — TELEPHONE (OUTPATIENT)
Dept: CARDIOLOGY | Facility: CLINIC | Age: 83
End: 2023-06-22
Payer: MEDICARE

## 2023-06-22 NOTE — TELEPHONE ENCOUNTER
Pt called into EP RN office to discuss re-starting her Xarelto.   Recently pt [facility] to [facility] by Opal Quispe, who she reports paused her Xarelto on 6/20 due to having blood in urine and location of bleeding unknown.    Pt seen by Urology today and diagnosed with a Thrombosed chronicle. She was told this could heal while remaining on Xarelto but Urology did not give the OK to re-start her Xarelto as they were not the provider to pause medication.    Asked pt if she has contacted her [facility] for this as they gave order to pause Xarelto. Dtr and pt decided it best contact PCP and wait for response as they have been involved with her recent concerns and medication changes. She will call heart clinic back if they are unable to get answers from PCP office.    BIANKAI- F/U appt for Urology on 7/8.    Rosalina Jamil RN

## 2023-08-11 ENCOUNTER — OFFICE VISIT (OUTPATIENT)
Dept: PHARMACY | Facility: PHYSICIAN GROUP | Age: 83
End: 2023-08-11
Payer: COMMERCIAL

## 2023-08-11 DIAGNOSIS — I47.19 ATRIAL TACHYCARDIA, PAROXYSMAL (H): ICD-10-CM

## 2023-08-11 DIAGNOSIS — Z78.9 TAKES DIETARY SUPPLEMENTS: ICD-10-CM

## 2023-08-11 DIAGNOSIS — I10 HYPERTENSION, ESSENTIAL: ICD-10-CM

## 2023-08-11 DIAGNOSIS — I50.32 CHRONIC HEART FAILURE WITH PRESERVED EJECTION FRACTION (HFPEF) (H): Primary | ICD-10-CM

## 2023-08-11 DIAGNOSIS — E03.9 HYPOTHYROIDISM, UNSPECIFIED TYPE: ICD-10-CM

## 2023-08-11 PROCEDURE — 99207 PR NO CHARGE LOS: CPT | Performed by: PHARMACIST

## 2023-08-11 RX ORDER — ESTRADIOL 0.1 MG/G
CREAM VAGINAL
COMMUNITY
Start: 2023-06-22

## 2023-08-11 NOTE — PATIENT INSTRUCTIONS
Recommendations from today's MTM visit:                                                      I would advise stopping the Proteolytic enzyme, Japanese knotwood, fish oil, zinc with Vitamin E and the turmeric with Vitamin E due to increased risk of bleeding while on Xarelto    I would suggest picking just one collagen supplement.    Ok to stop the hyaluronic acid, since you aren't having current issues with joint pain and vitamin C you can reduce to as needed for cold symptoms. Everything else we put in your blue bag we determined you do not need.        Follow-up: as needed for medication questions or concerns.    Rosie Gay, Pharm.D., BCACP  Medication Therapy Management Pharmacist  [phone]    It was great to speak with you today.  I value your experience and would be very thankful for your time with providing feedback on our clinic survey. You may receive a survey via email or text message in the next few days.     To schedule another MTM appointment, please call the clinic directly or you may call the MTM scheduling line at [phone]    My Clinical Pharmacist's contact information:                                                      Please feel free to contact me with any questions or concerns you have.      Rosie Gay, Lyric., BCACP  Medication Therapy Management Pharmacist

## 2023-08-11 NOTE — PROGRESS NOTES
Medication Therapy Management (MTM) Encounter    ASSESSMENT:                            Medication Adherence/Access: See below for considerations    HFpEF:  Stable.    Afib/Hypertension/CAD:    Stable.    Hypothyroidism:   Stable. Last TSH is within normal limits.     Supplements:  There is no clear indication for most of the supplements brought in by the patient today and she could significantly reduce the number of pills she has to swallow and the expense associated with the supplements by discontinuing them.  Additionally, we reviewed that several supplements have the potential to increase bleeding risk alone and in combination with Xarelto including: Prolia lytic enzyme, Yi not would, fish oil, zinc with vitamin E, and turmeric with vitamin E.    PLAN:                            I would advise stopping the Proteolytic enzyme, Japanese knotwood, fish oil, zinc with Vitamin E and the turmeric with Vitamin E due to increased risk of bleeding while on Xarelto    I would suggest picking just one collagen supplement.    Ok to stop the hyaluronic acid, since you aren't having current issues with joint pain and vitamin C you can reduce to as needed for cold symptoms. Everything else we put in your blue bag we determined you do not need.        Follow-up: as needed for medication questions or concerns.    Rosie Gay, Pharm.D., [facility]  Medication Therapy Management Pharmacist  [phone]      SUBJECTIVE/OBJECTIVE:                          Debra Manriquez is a 83 year old female coming in for an initial visit. She was referred to me from Yoanna Mcpherson. Presents today with her daughter, Blanca.    Reason for visit: Questions about supplements.  Started estrogen cream per urology    Allergies/ADRs: Reviewed in chart  Past Medical History: Reviewed in chart  Tobacco: She reports that she has never smoked. She has never used smokeless tobacco.  Alcohol: Less than 1 beverages / week    Medication  Adherence/Access: no issues reported    HFpEF:   losartan 50mg twice daily  furosemide 20mg daily  Patient reports no current medication side effects.     ECHO:  Date 7/15/2022, EF 65%  Patient is not complaining of HF symptoms .    Patient is measuring daily weights  and reports stable.   BP Readings from Last 3 Encounters:   23 (!) 160/80   23 127/80   04/10/23 127/62      Pulse Readings from Last 3 Encounters:   23 57   23 59   04/10/23 59        Afib/Hypertension/CAD:  Xarelto 20mg daily for stroke prevention  Rosuvastatin 20mg daily   Follows with cardiology at [facility], last visit 23  Patient reports no current medication side effects.   Patient does not have a history of GI bleed.   [id number] score of 6 for female status, age over 75, HF, vascular insufficiency, hypertension.  BP Readings from Last 3 Encounters:   23 (!) 160/80   23 127/80   04/10/23 127/62     Pulse Readings from Last 3 Encounters:   23 57   23 59   04/10/23 59     Lab Test 21   CHOL 168   HDL 66   LDL 88   TRIG 68     Hypothyroidism:   Levothyroxine 112 mcg daily.   Patient is having the following symptoms: none.   TSH   Date Value Ref Range Status   2023 1.68 0.30 - 4.20 uIU/mL Final   2022 2.55 0.30 - 5.00 uIU/mL Final     Supplements:   Debra and Blanca brought in two paper grocery bags full of various supplements. We went through both bags and I was only able to document a few of the supplements below. By and large, Debra could not recall the purpose of most of the supplements, and many of them were . A few of them have potential interactions with Xarelto  Calcium 500mg twice daily   Collagen Peptide powder every morning  Magnesium L-threonate - feels this helps her sleep well  Probiotic  B12 (methylcobalamin) 6000mcg daily  Tart Sears - for sleep?  Japanese Knotwood Root Extract - unsure why taking?  Vitamin D3  Glutathione      Today's Vitals: There were no  vitals taken for this visit.  ----------------      I spent 60 minutes with this patient today. All changes were made via collaborative practice agreement with Yoanna Mcpherson MD. A copy of the visit note was provided to the patient's provider(s).    A summary of these recommendations was given to the patient.    Rosie Gay, Pharm.D., [facility]  Medication Therapy Management Pharmacist  [phone]        Medication Therapy Recommendations  Takes dietary supplements    Current Medication: fish oil-omega-3 fatty acids 1000 MG capsule (Discontinued)   Rationale: Medication interaction - Adverse medication event - Safety   Recommendation: Discontinue Medication   Status: Patient Agreed - Adherence/Education

## 2023-08-24 NOTE — PROGRESS NOTES
[facility]        Patient is here for a follow up  to discuss Lymphedema, BLE swelling and truncal swelling. The patient states he/she does  wear compressions. Patient has Biotab Pump.-has not been using Biotab since knee surgery. Swelling is observed in both lower extremities.    Had Venaseal in May-has not had trouble with lymphedema since. Should she still use the Biotab pump? March had bilateral knee replacements.    Pt is currently taking Statin and Xarelto.    BP (!) 143/72   Pulse 90   Temp 97.7  F (36.5  C)   Resp 20   Wt 244 lb 8 oz (110.9 kg)   BMI 40.69 kg/m      The provider has been notified that the patient has no concerns.     Questions patient would like addressed today are: N/A.    Refills are needed: No    Has homecare services and agency name:  No

## 2023-08-24 NOTE — PATIENT INSTRUCTIONS
Matt Richardson,    Thank you for entrusting your care with us today. After your visit today with MD Weston Hitchcock this is the plan that was discussed at your appointment.    Follow up in 1 year for routine follow up.    After your next CTA please call our clinic and schedule a telephone visit with Dr. Hitchcock.    Please obtain the other images from your prior CT scan and CTA for Dr. Hitchcock to review.    I am including additional information on these things and our contact information if you have any questions or concerns.   Please do not hesitate to reach out to us if you felt we did not answer your questions or you are unsure of the treatment plan after your visit today. Our number is [phone].Thank you for trusting us with your care.         Again thank you for your time.

## 2023-08-31 ENCOUNTER — OFFICE VISIT (OUTPATIENT)
Dept: VASCULAR SURGERY | Facility: CLINIC | Age: 83
End: 2023-08-31
Attending: HOSPITALIST
Payer: MEDICARE

## 2023-08-31 VITALS
TEMPERATURE: 97.7 F | WEIGHT: 244.5 LBS | SYSTOLIC BLOOD PRESSURE: 143 MMHG | RESPIRATION RATE: 20 BRPM | HEART RATE: 90 BPM | DIASTOLIC BLOOD PRESSURE: 72 MMHG | BODY MASS INDEX: 40.69 KG/M2

## 2023-08-31 DIAGNOSIS — I72.8 SPLENIC ARTERY ANEURYSM (H): ICD-10-CM

## 2023-08-31 DIAGNOSIS — E66.01 MORBID OBESITY (H): ICD-10-CM

## 2023-08-31 DIAGNOSIS — I89.0 LYMPHEDEMA OF BOTH LOWER EXTREMITIES: Primary | ICD-10-CM

## 2023-08-31 PROCEDURE — G0463 HOSPITAL OUTPT CLINIC VISIT: HCPCS | Performed by: HOSPITALIST

## 2023-08-31 PROCEDURE — 99214 OFFICE O/P EST MOD 30 MIN: CPT | Performed by: HOSPITALIST

## 2023-08-31 ASSESSMENT — PAIN SCALES - GENERAL: PAINLEVEL: NO PAIN (0)

## 2023-08-31 NOTE — Clinical Note
Randy Richardson is going to get another CTA that was ordered by a different provider regarding a splenic aneurysm and Dr. Hitchcock would like a telephone visit scheduled after this is complete. Patient/family is understanding and will call when the CTA is done. They are also working on getting a disk from a prior CTA and CT scan that was done in the past. They will bring in to clinic for Dr. Hitchcock. Thanks, Amalia

## 2023-09-06 NOTE — PROGRESS NOTES
VASCULAR MEDICINE CONSULT NOTE          LOCATION:  [facility]       Date of Service: 8/31/2023      PRIMARY CARE PROVIDER: Yoanna Mcpherson  Referring provider;  No ref. provider found      Reason for the visit/chief complaint:   Lymphedema  Splenic artery aneurysm      HPI:  Debra Manriquez is a pleasant 83 year old female who presents to our [facility] accompanied by her caregiver and her daughter on a speaker phone with the above mentioned complaints.    She has past medical history significant for bilateral leg swelling due to venous stasis and hypertension complicated by morbid obesity, previous puncture wounds in lower extremities that have completely healed, acquired lymphedema, atrial fibrillation (diagnosed December 2022) on rivaroxaban therapy for stroke prophylaxis.    With regards to her lymphedema, she was previously following up with Dr. Le.  Last seen September 2022. She completed lymphedema therapy last year in May 2022. She underwent Venaseal with Dr. Wilde which was helpful per her report. She has been wearing her compression stockings (20-30 mmHg knee high) however has not been doing her lymphedema pump regularly.  She reports this causes discomfort for her and wondering if she should continue on it daily.  She feels like her swelling has been is stable without it.     She was previously evaluated by Dr. Hair and by vein clinic through [facility] for her venous insufficiency and was recommended conservative management.    Denies any new numbness or weakness to lower extremities.  Denies any new wounds or rashes.  She has been having some charley horses on her right lower extremity that started 3 days ago but this has since improved.    On the other hand, she was recently incidentally found to have splenic artery aneurysm.  She wanted to discuss this as well.        REVIEW OF SYSTEMS:    A 12 point ROS was reviewed and is negative  except what is mentioned in HPI.       Past medical history, surgical history, medications, family history, social history and allergies were reviewed. Pertinent points mentioned under HPI.          OBJECTIVE:    Vital signs:  BP (!) 143/72   Pulse 90   Temp 97.7  F (36.5  C)   Resp 20   Wt 244 lb 8 oz   BMI 40.69 kg/m    Wt Readings from Last 1 Encounters:   08/31/23 244 lb 8 oz     Body mass index is 40.69 kg/m .    Physical exam:  General appearance: Pleasant female in no apparent distress.    HEENT: NC/AT.    Neck: Carotids +2/2 bilaterally without bruits.  No jugular venous distension.   Heart: RRR. Normal S1, S2.   Chest: Clear to auscultation bilaterally.  Abdomen: Soft, nontender, nondistended. No pulsatile mass.  No bruits.   Extremities: Lower extremities with mild swelling.  Positive Stemmer sign.  Please see pictures below.  Normal DP and PT 3/3 bilaterally.  Skin: Corona phlebectatica and spider veins noted bilaterally.  No redness, increased warmth or tenderness.  No ulcers.  Neurological: Alert, awake and oriented             DIAGNOSTIC STUDIES:   Labs and diagnostics reviewed including outside records. Pertinent points are mentioned under HPI and assessment and plan sections.      ASSESSMENT AND PLAN:    Lower extremity lymphedema   Splenic artery aneurysm  Atrial fibrillation on Rivaroxaban    With regards to her lymphedema, this appears to be stable.  She was encouraged to continue with her compression stockings.  Denied the need for new prescription.  We discussed that if her swelling remains controlled, she does not necessarily need to be doing the pumps daily if it does make her uncomfortable.      Regarding the splenic artery aneurysm Howard this was recently detected on CT that was done with IV contrast in May.  We do not have this study images or the report.  The patient provided the report through her phone we were able to review it.  When looking into a CTA chest abdomen pelvis done  December 3 of last year 2022, splenic aneurysm was not mentioned only calcified granuloma in the spleen noted.  We will obtain CTA abdomen pelvis in 6 months from the date of her previous scan in May which will be expected around end of the year (end of November).  We discussed about splenic artery aneurysms and that most of these are incidental findings and remains somewhat stable.  We discussed that we really end up fixing them surgically we do consider these in certain circumstances such as cirrhosis of the liver or childbearing age.      Recommendations:  Obtain outside CT abdomen pelvis done in May for our review.  Repeat CTA abdomen pelvis in 6 months around November 2023.  Continue the current lymphedema management.    It was a pleasure meeting with Ms. Manriquez and her caregiver today.    Weston Hitchcock MD  Vascular Medicine  August 31, 2023

## 2024-02-02 DIAGNOSIS — M17.11 PRIMARY OSTEOARTHRITIS OF RIGHT KNEE: ICD-10-CM

## 2024-02-02 RX ORDER — RIVAROXABAN 20 MG/1
TABLET, FILM COATED ORAL
Qty: 90 TABLET | Refills: 3 | Status: SHIPPED | OUTPATIENT
Start: 2024-02-02 | End: 2024-05-13

## 2024-02-02 NOTE — TELEPHONE ENCOUNTER
Noted on 6/22/23 Pt was not taking Xarelto due to pause request from her PCP. Last conversation had pt and dtr were going to discuss resting Xarelto with her PCP. No fernandez back noted with OK to restart.     Called pt and LM requesting return call to discuss this further.     Rosalina Jamil RN

## 2024-02-08 NOTE — TELEPHONE ENCOUNTER
Attempted to contact daughter and pt x2, unable to reach and VM was left.  Request sent to pharmacy to have pt call prior to refill.  2/8/2024 9:37 AM  Rosario Cornejo RN

## 2024-02-22 ENCOUNTER — LAB REQUISITION (OUTPATIENT)
Dept: LAB | Facility: CLINIC | Age: 84
End: 2024-02-22
Payer: MEDICARE

## 2024-02-22 ENCOUNTER — TRANSFERRED RECORDS (OUTPATIENT)
Dept: HEALTH INFORMATION MANAGEMENT | Facility: CLINIC | Age: 84
End: 2024-02-22

## 2024-02-22 DIAGNOSIS — E78.5 HYPERLIPIDEMIA, UNSPECIFIED: ICD-10-CM

## 2024-02-22 LAB
AST SERPL W P-5'-P-CCNC: 26 U/L (ref 0–45)
CHOLEST SERPL-MCNC: 129 MG/DL
FASTING STATUS PATIENT QL REPORTED: NORMAL
HDLC SERPL-MCNC: 61 MG/DL
LDLC SERPL CALC-MCNC: 52 MG/DL
NONHDLC SERPL-MCNC: 68 MG/DL
TRIGL SERPL-MCNC: 78 MG/DL

## 2024-02-22 PROCEDURE — 84450 TRANSFERASE (AST) (SGOT): CPT | Mod: ORL | Performed by: FAMILY MEDICINE

## 2024-02-22 PROCEDURE — 80061 LIPID PANEL: CPT | Mod: ORL | Performed by: FAMILY MEDICINE

## 2024-04-25 ENCOUNTER — TRANSFERRED RECORDS (OUTPATIENT)
Dept: HEALTH INFORMATION MANAGEMENT | Facility: CLINIC | Age: 84
End: 2024-04-25

## 2024-04-29 DIAGNOSIS — E78.5 HYPERLIPIDEMIA LDL GOAL <70: ICD-10-CM

## 2024-04-29 RX ORDER — ROSUVASTATIN CALCIUM 20 MG/1
20 TABLET, COATED ORAL DAILY
Qty: 90 TABLET | Refills: 0 | Status: SHIPPED | OUTPATIENT
Start: 2024-04-29 | End: 2024-05-13

## 2024-04-29 NOTE — TELEPHONE ENCOUNTER
Last seen by Dr Mcallister 2/13/23 - rec 1 yr follow-up.  90 day supply sent.  Message sent to scheduling.  -ral

## 2024-05-03 ENCOUNTER — TELEPHONE (OUTPATIENT)
Dept: CARDIOLOGY | Facility: CLINIC | Age: 84
End: 2024-05-03
Payer: MEDICARE

## 2024-05-03 DIAGNOSIS — E78.5 HYPERLIPIDEMIA LDL GOAL <70: ICD-10-CM

## 2024-05-03 RX ORDER — ROSUVASTATIN CALCIUM 20 MG/1
20 TABLET, COATED ORAL DAILY
Qty: 90 TABLET | Refills: 0 | OUTPATIENT
Start: 2024-05-03

## 2024-05-03 NOTE — TELEPHONE ENCOUNTER
[facility] Call Center    Phone Message    May a detailed message be left on voicemail: no     Reason for Call: Medication Question or concern regarding medication   Prescription Clarification  Name of Medication: Xarelto  Prescribing Provider: Claudia Yoo   Pharmacy:   [id number] - [place], [place] - [id number]        What on the order needs clarification? Pt is wondering if she can be switched to Eliquis due to the cost being half compared to Xarelto.  Please call pt to clarify if possible.       Action Taken: Other: cardio    Travel Screening: Not Applicable

## 2024-05-07 DIAGNOSIS — I48.91 ATRIAL FIBRILLATION (H): Primary | ICD-10-CM

## 2024-05-07 NOTE — TELEPHONE ENCOUNTER
Spoke with pt and dtr. They report someone already spoke with them yesterday about appts and medications and took care of everything. They were still confused about EP and general Cardiology. Discussed difference and why it is recommended pt have a gen card. Verbalized understanding. No further questions or concerns.    Rosalina Jamil RN

## 2024-05-13 ENCOUNTER — OFFICE VISIT (OUTPATIENT)
Dept: CARDIOLOGY | Facility: CLINIC | Age: 84
End: 2024-05-13
Attending: NURSE PRACTITIONER
Payer: MEDICARE

## 2024-05-13 ENCOUNTER — ORDERS ONLY (AUTO-RELEASED) (OUTPATIENT)
Dept: CARDIOLOGY | Facility: CLINIC | Age: 84
End: 2024-05-13
Payer: MEDICARE

## 2024-05-13 VITALS
DIASTOLIC BLOOD PRESSURE: 74 MMHG | RESPIRATION RATE: 16 BRPM | SYSTOLIC BLOOD PRESSURE: 126 MMHG | HEART RATE: 64 BPM | HEIGHT: 66 IN | BODY MASS INDEX: 43.44 KG/M2 | WEIGHT: 270.3 LBS

## 2024-05-13 DIAGNOSIS — I50.32 CHRONIC HEART FAILURE WITH PRESERVED EJECTION FRACTION (HFPEF) (H): ICD-10-CM

## 2024-05-13 DIAGNOSIS — I48.0 PAROXYSMAL ATRIAL FIBRILLATION (H): ICD-10-CM

## 2024-05-13 DIAGNOSIS — I48.0 PAROXYSMAL ATRIAL FIBRILLATION (H): Primary | ICD-10-CM

## 2024-05-13 DIAGNOSIS — I10 ESSENTIAL HYPERTENSION: ICD-10-CM

## 2024-05-13 DIAGNOSIS — G47.33 OBSTRUCTIVE SLEEP APNEA: ICD-10-CM

## 2024-05-13 DIAGNOSIS — E78.5 HYPERLIPIDEMIA LDL GOAL <70: ICD-10-CM

## 2024-05-13 LAB
ANION GAP SERPL CALCULATED.3IONS-SCNC: 11 MMOL/L (ref 7–15)
BUN SERPL-MCNC: 23.1 MG/DL (ref 8–23)
CALCIUM SERPL-MCNC: 9.6 MG/DL (ref 8.8–10.2)
CHLORIDE SERPL-SCNC: 106 MMOL/L (ref 98–107)
CREAT SERPL-MCNC: 0.79 MG/DL (ref 0.51–0.95)
DEPRECATED HCO3 PLAS-SCNC: 25 MMOL/L (ref 22–29)
EGFRCR SERPLBLD CKD-EPI 2021: 72 ML/MIN/1.73M2
GLUCOSE SERPL-MCNC: 99 MG/DL (ref 70–99)
POTASSIUM SERPL-SCNC: 4.5 MMOL/L (ref 3.4–5.3)
SODIUM SERPL-SCNC: 142 MMOL/L (ref 135–145)

## 2024-05-13 PROCEDURE — 80048 BASIC METABOLIC PNL TOTAL CA: CPT | Performed by: NURSE PRACTITIONER

## 2024-05-13 PROCEDURE — G2211 COMPLEX E/M VISIT ADD ON: HCPCS | Performed by: NURSE PRACTITIONER

## 2024-05-13 PROCEDURE — 36415 COLL VENOUS BLD VENIPUNCTURE: CPT | Performed by: NURSE PRACTITIONER

## 2024-05-13 PROCEDURE — 99214 OFFICE O/P EST MOD 30 MIN: CPT | Performed by: NURSE PRACTITIONER

## 2024-05-13 RX ORDER — ROSUVASTATIN CALCIUM 20 MG/1
20 TABLET, COATED ORAL DAILY
Qty: 90 TABLET | Refills: 0 | Status: SHIPPED | OUTPATIENT
Start: 2024-05-13 | End: 2024-08-13
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
## 2024-05-13 NOTE — PATIENT INSTRUCTIONS
Debra Manriquez,    It was a pleasure to see you today at the [facility].     My recommendations after this visit include:    Check BMP today  Zio monitor x14 days to assess AF burden, ventricular rates and recurrence of AF   Continue Crestor 20 mg daily for HLD management   Follow up with Dr Mcallister, overdue to see   Switch to Eliquis 5 mg BID due to cost savings, Xarelto more expensive per patient. Will make the change starting today/tomorrow.   Follow up with me around July 8 to go over results of Zio monitor       Claudia Yoo CNP  [facility], Electrophysiology  [phone]  EP nurses [phone]
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
## 2024-05-15 ENCOUNTER — TELEPHONE (OUTPATIENT)
Dept: VASCULAR SURGERY | Facility: CLINIC | Age: 84
End: 2024-05-15
Payer: MEDICARE

## 2024-05-15 NOTE — TELEPHONE ENCOUNTER
Received telephone call from Debra with concerns that her right leg is weeping. She is having a hard time getting her compression on especially with the drainage from the leg. Discussed that Dr. Hitchcock is not a wound care provider, so patient would need to see one of our wound care providers, since all we can recommend is to keep the area clean and dry. We cannot recommend specific supplies. Patient scheduled to see Dr. Rob tomorrow

## 2024-05-15 NOTE — PATIENT INSTRUCTIONS
Wound care supplies were ordered today through [facility] and if you are not receiving your supplies or have a question on your bill please contact Jeanie Humphreys at [phone]. Please allow 2-5 business days for delivery of supplies. You may get a call from a [phone] if there are additional information [facility] needs. It is important to  or return their call. PLEASE NOTE: If you need to return your supplies, you MUST call customer service within 15 days of delivery date.     Start using your lymphedema pumps again    Wound Care Instructions    DAILY and as needed, Cleanse your right leg wound(s) with Normal saline.    Pat Dry with non-sterile gauze    Apply Lotion to the intact skin surrounding your wound and other dry skin locations. Some good lotions include: Remedy Skin Repair Cream, Sarna, Vanicream or Cetaphil    Primary Dressing: Apply zetuvit dressing into/onto the wounds    Compression: stockings    It is ok to get your wound wet in the bath or shower    Please wear your compression to each clinic visit.    It is recommended that you elevate your legs throughout the day: approx 2-3 times each day  Elevate them above the level of your heart for 30 min.   Ways to do this:   Lay on the couch or your bed and prop your legs up on pillows   Recline back as far as you can go in your recliner and prop your legs on pillows.     Doing these things will help reduce the edema in your legs.    High Protein Foods  When you have an open ulcer, your bodies protein needs are much higher, so it is recommended eat good sources of protein or take a protein supplement!    Protein Supplements  -Premier Protein  -Ensure  -Boost  -Glucerna, if diabetic    Chicken  -Chicken breast, 3.5oz.-30 grams protein  -Chicken meat, cooked, 4 oz.    Beef  -Hamburger jacek, 4 oz-28 grams protein  -Steak, 6 oz-42 grams  -Most cuts of beef- 7 grams of protein per ounce    Fish  -Most fish fillets or steaks are about 22 grams of protein for 3  1/2 oz(100 grams) of cooked fish, or 6 grams per ounce  -Tuna, 6 oz can-40 grams of protein    Pork  -Pork chop, average-22 grams protein  -Pork loin or tenderloin, 4 oz.-29 grams  -Ham, 3oz serving- 19 grams  -Montesinos, 1 slice-3 grams    Eggs and Dairy  -Egg, large-7 grams  -Milk, 1 cup-8 grams  -Cottage cheese, 1/2 cup-15 grams  -Greek yogurt, 1 cup-usually 8-12 grams, check label    Beans  -Soy milk, 1 cup-6-10 grams  -Most beans(black, gagnon, lentils, etc.) about 7-10 grams protein per half cup of cooked beans    Nuts and Seeds  -Peanut butter, 2 Tablespoons- 8 grams protein  -Almonds, 1/4 cup- 8 grams  -Peanuts, 1/4 cup-9 grams  -Sunflower seeds, 1/4 cup- 6 grams        If for some reason you are not able to get your dressing(s) changed as outlined above (due to illness, lack of supplies, lack of help) please do the following: remove old, soiled dressings; wash the wounds with saline; pat dry; apply ABD pad or other absorbant pad and secure with rolled gauze; avoid tape directly on your skin; Call the clinic as soon as possible to let us know what the current issues are in receiving wound care [phone].      SEEK MEDICAL CARE IF:  You have an increase in swelling, pain, or redness around the wound.  You have an increase in the amount of pus coming from the wound.  There is a bad smell coming from the wound.  The wound appears to be worsening/enlarging  You have a fever greater than 101.5 F      It is ok to continue current wound care treatment/products for the next 2-3 days until new wound care supplies are ordered and arrive. If longer than this please contact our office at [phone].    If you have a 2 layer or 4 layer compression wrap on these are safe to have on for ONLY 7 days. If for some reason you are not able to get the wrap(s) changed (due to illness; lack of supplies, lack of help, lack of transportation) please do the following: unwrap the old 2 or 4 layer compression wrap; avoid using scissors  as you could cut your skin and cause wounds; use tubular compression when available. Call to reschedule your home care or clinic visit appointment as soon as possible.    Please NOTE: if you are 15 minutes late to your clinic appointment you will have to be rescheduled. Please call our clinic as soon as possible if you know you will not be able to get to your appointment at [phone].    If you fail to show up to 3 scheduled clinic appointments you will be dismissed from our clinic.              We want to hear from you!  In the next few weeks, you should receive a call or email to complete a survey about your visit at [facility]. Please help us improve your appointment experience by letting us know how we did today. We strive to make your experience good and value any ways in which we could do better.      We value your input and suggestions.    Thank you for choosing the [facility]!

## 2024-05-16 ENCOUNTER — OFFICE VISIT (OUTPATIENT)
Dept: VASCULAR SURGERY | Facility: CLINIC | Age: 84
End: 2024-05-16
Payer: MEDICARE

## 2024-05-16 VITALS — RESPIRATION RATE: 18 BRPM | SYSTOLIC BLOOD PRESSURE: 138 MMHG | DIASTOLIC BLOOD PRESSURE: 72 MMHG | HEART RATE: 64 BPM

## 2024-05-16 DIAGNOSIS — M79.89 LEG SWELLING: Primary | ICD-10-CM

## 2024-05-16 PROCEDURE — 99204 OFFICE O/P NEW MOD 45 MIN: CPT | Performed by: FAMILY MEDICINE

## 2024-05-16 PROCEDURE — G0463 HOSPITAL OUTPT CLINIC VISIT: HCPCS | Performed by: FAMILY MEDICINE

## 2024-05-16 ASSESSMENT — PAIN SCALES - GENERAL: PAINLEVEL: NO PAIN (0)

## 2024-05-16 NOTE — PROGRESS NOTES
Wound Clinic Note         Visit date: 05/16/2024       Cheif Complaint:     Debra Manriquez is a 84 year old  female had concerns including Wound Check.  The patient has lower extremity edema and a right leg ulcer         HISTORY OF PRESENT ILLNESS:    Debra Manriquez reports the wound has been present since mid May 2024.  The wound began as a water blister that ruptured.   She reports prior to this she had 1 other episode where she had similar leaking from her leg and then prior to this she never had any other wounds on her legs.  She has had venous insufficiency in the past and has had bilateral greater saphenous vein VenaSeal procedures, performed about 2 years ago.    She has been bandaging the area with paper towels or incontinence pads and her compression stocking changed once a day.  There is been light to moderate serous drainage from the wound.        The pateint denies fevers or chills.  They report the pain from the wound has been 0/10 and has remained about the same recently.      The patient reports they currently do not have any routine for elevating their legs.  The patient confirms they do sleep in a bed.     She does have a pneumatic lymphedema pump but has not been using it recently.    Today the patient reports maintaining a regular diet without special attention to protein.        The patient denies a history of diabetes, smoking or chronic steroid use.         The patient has not had any symptoms of infection relating to the wound recently and is not currently on antibiotics.       Problem List:   Past Medical History:   Diagnosis Date    Antiplatelet or antithrombotic long-term use     Chronic acquired lymphedema     Edema     Falls     Fibrocystic breast disease     Fibrocystic breast disease     Foot pain     Gastroesophageal reflux disease     GERD (gastroesophageal reflux disease)     Hyperlipidemia     Hypertension     Irregular heart beat     Lymphedema     Mitral valve disorder      Obese     JADYN (obstructive sleep apnea)     Osteoarthritis of knee     hx of knee replacement and pending second    Osteopenia     Skin cancer     Skin cancer, basal cell     Sleep apnea     Thyroid nodule     Thyroid nodule     Vitamin B deficiency              Family Hx: family history includes Alzheimer Disease in her maternal uncle; Heart Disease in her brother and sister; No Known Problems in her brother, daughter, father, mother, son, son, and son.       Surgical Hx:   Past Surgical History:   Procedure Laterality Date    ARTHROPLASTY KNEE Right 3/7/2023    Procedure: RIGHT TOTAL KNEE ARTHROPLASTY;  Surgeon: Juan Corbin MD;  Location: [facility] Main OR    CATARACT EXTRACTION  2011, 2012    COLONOSCOPY      EXCISE TOENAIL(S) Bilateral 8/16/2019    Procedure: MATRIXECTOMY BILATERAL FEET; TOES 1,2,3,4 ON LEFT FOOT,  1,2 ON RIGHT FOOT;  BOTH NAIL BORDERS;  Surgeon: Opal Kam DPM;  Location:  OR    EYE SURGERY      cataract    GI SURGERY      GYN SURGERY      hysterectomy    INJECT STEROID (LOCATION) Right 8/16/2019    Procedure: CORTIZONE INJECTION RIGHT HEEL;  Surgeon: Opal Kam DPM;  Location:  OR    ORTHOPEDIC SURGERY Left     knee replacement    PARTIAL HYSTERECTOMY      STRIP VEIN Bilateral 1975    TOTAL KNEE ARTHROPLASTY Left 2011          Allergies:    Allergies   Allergen Reactions    Lisinopril Cough    Reglan [Metoclopramide] Other (See Comments)     depression              Medication History:    Current Outpatient Medications   Medication Sig Dispense Refill    amoxicillin (AMOXIL) 500 MG tablet TAKE 4 TABLETS BY MOUTH 1 HOUR BEFORE DENTAL APPOINTMENT      B Complex-C (VITAMIN B COMPLEX W/VITAMIN C) TABS tablet Take 1 tablet by mouth daily      biotin 1000 MCG TABS tablet Take 1,000 mcg by mouth daily      cholecalciferol (VITAMIN D3) 5000 units TABS tablet Take 2 tablets by mouth daily      Coenzyme Q10 400 MG CAPS Take 400 mg by mouth 2 times daily       cyanocobalamin (VITAMIN B-12) 1000 MCG tablet Take 1,000 mcg by mouth daily      denosumab (PROLIA) 60 MG/ML SOSY injection As of 3/14/2023, please hold and pause this medication while at the transitional care unit      estradiol (ESTRACE) 0.1 MG/GM vaginal cream Place vaginally three times a week      furosemide (LASIX) 20 MG tablet Take 20 mg by mouth daily      lactobacillus TABS Take 2 capsules by mouth daily      levothyroxine (SYNTHROID/LEVOTHROID) 112 MCG tablet Take 112 mcg by mouth daily      losartan (COZAAR) 50 MG tablet Take 50 mg by mouth 2 times daily      Nutritional Supplements (PROTEOLYTIC FORMULA) TABS Take 3 tablets by mouth once Alpha lipoic acid      rivaroxaban ANTICOAGULANT (XARELTO) 20 MG TABS tablet Take 1 tablet (20 mg) by mouth daily (with dinner)      rosuvastatin (CRESTOR) 20 MG tablet Take 1 tablet (20 mg) by mouth daily -- appt needed for further refills 90 tablet 0     No current facility-administered medications for this visit.         Tobacco History:  reports that she has never smoked. She has never used smokeless tobacco.       REVIEW OF SYMPTOMS:   The review of systems was negative except as noted in the HPI.           PHYSICAL EXAMINATION:     /72   Pulse 64   Resp 18            GENERAL: The patient overall appears well and is no acute distress.   HEAD: normocephalic   EYES: Sclera and conjunctiva clear   NECK: no obvious masses   LUNGS: breathing is unlabored.   EXTREMITIES: No clubbing, cyanosis or edema   SKIN: No rashes or other abnormalities except as noted under the Wound section below.   NEUROLOGICAL: normal motor and sensory function   EDEMA: Severe and Lymphedema       WOUND: The wound appears healthy with no sign of infection.   Wound bed: granulation tissue  Periwound: healthy intact skin  She has a fairly small wound on the right anterior/lateral leg with edema fluid dripping from it as I examine her.      Also see below for wound details:      "  Circumferential volume measures:            4/19/2021    11:00 AM 2/17/2022    10:00 AM 9/29/2022    10:00 AM 8/31/2023    10:00 AM 5/16/2024     1:00 PM   Circumferential Measures   Right just above MTP 24.2 24 23 23.4    Right Ankle 23.4 22.7 23.4 23.6    Right Widest Calf 44.9 44.8 43.5 42.6 44.8   Right Thigh Up 10cm 61.8  63.6 65.2    Left - just above MTP 23.7 23.1 22.6 22.6    Left Ankle 25 25.4 24.6 24.8    Left Widest Calf 44.1 44.2 42.6 42.6 43.4   Left Thigh Up 10cm 64  63.6 66        Ulceration(s)/Wound(s):   Please see the media tab under the chart review for pictures of the wounds.  Nursing staff removed dressings and cleansed wound.               No results for input(s): \"HGBA1C\", \"A1C\", \"EAG\" in the last 85971 hours.    Invalid input(s): \"DDZERCOZI9N\"       Recent Labs   Lab Test 03/16/22  1359 08/18/21  0942 05/26/21  1100   ALBUMIN 3.8 3.6 3.9         WBC Count   Date Value Ref Range Status   03/17/2023 5.7 4.0 - 11.0 10e3/uL Final     Albumin   Date Value Ref Range Status   03/16/2022 3.8 3.5 - 5.0 g/dL Final           No sharp debridement performed today.                  ASSESSMENT:   This is a 84 year old  female with a right leg ulcer, the patient also has lower extremity edema which was also managed during today's clinic visit.          PLAN:   We'll bandage the area with a Zetuvit bandage and her compression stocking changed once a day.  I will order noninvasive vascular studies to evaluate for arterial or venous insufficiency.    Separate from the wound care instructions we then discussed management strategies for lower extremity edema.  I explained the keys for managing lower extremity edema are compression and elevation.  I explained to the patient today that controlling the edema is probably the most important thing we can do to help heal the wound.  I have specifically recommended that they lay down with their legs above the level of the heart for 30 minutes at least twice a day.  I " have also encouraged the patient to continue to sleep in a bed.   I strongly encouraged her to start using her pneumatic lymphedema pump for an hour once a day.  I have explained to the patient the importance of protein intake to wound healing.  I have explained that increasing protein intake will speed wound healing.  We discussed several types of food that are high in protein and the wound care nurse gave the patient a handout that summarizes this information.  In addition to further speed wound healing I have encouraged the patient to take a protein supplement.   The patient will return to the wound clinic in 3 to 4 weeks to see me again.        45 minutes spent on the date of the encounter doing chart review, history and exam, documentation and further activities per the note, this time excludes any procedure time      Ernesto Rob MD  05/16/2024   1:28 PM   [facility]ound  [phone]    This note was electronically signed by Ernesto Rob MD

## 2024-05-16 NOTE — LETTER
[facility]  [address]  [place], [place] [address]  [phone]      Fax [phone]    [facility]           Fax: [phone]            Customer Service: [phone]        Account #: [id number]    Wound Dressing Rx and Order Form  Order Status: new  Verbal: Opal  Date: May 16, 2024     Debra Manriquez  Gender: female  : 1940  [address]    [place] [address]  [phone] (home)     Medical Record: [id number]  Primary Care Provider: Yoanna Mcpherson      ICD-10-CM    1. Leg swelling  M79.89 US Low Ext Arterial Dop Seg Pres w/o Exercise     US Venous Competency Bilateral     Wound care            Insurance Info:  INSURER: Payor: MEDICARE / Plan: MEDICARE / Product Type: Medicare /   Policy ID#:  [id number]  SECONDARY INSURANCE:  en-Gauge  Secondary Policy ID#:  [id number]        Physician Info:   Name:  HECTOR GRACE     Dept Address/Phones:   [facility], [address]  [facility] [id number] [phone]  Fax: [phone]    Lymphedema circumferential measurements (in cm):      2021    11:00 AM 2022    10:00 AM 2022    10:00 AM 2023    10:00 AM 2024     1:00 PM   Circumferential Measures   Right just above MTP 24.2 24 23 23.4    Right Ankle 23.4 22.7 23.4 23.6    Right Widest Calf 44.9 44.8 43.5 42.6 44.8   Right Thigh Up 10cm 61.8  63.6 65.2    Left - just above MTP 23.7 23.1 22.6 22.6    Left Ankle 25 25.4 24.6 24.8    Left Widest Calf 44.1 44.2 42.6 42.6 43.4   Left Thigh Up 10cm 64  63.6 66          Wound info:  VASC Wound right shin (Active)   Pre Size Length 0.4 24 1300   Pre Size Width 0.4 24 1300   Pre Size Depth 0.2 24 1300   Pre Total Sq cm 0.16 24 1300   Number of days: 0       Incision/Surgical Site 19 Bilateral Toe (Comment  which one) (Active)   Number of days: 1735       Incision/Surgical Site 23 Right Knee (Active)   Number of days:  "436        Drainage: large  Thickness:  full  Duration of Need: 30 DAYS  Days Supply: 30 DAYS  Start Date: 5/16/2024  Starter Kit, Ancillary Kit (saline, gloves, gauze): Yes   Qualifying wound/Debridement: Yes     NO SUBSTITUTIONS. Call [phone].      Dressing Type Brand Size Frequency of change  Quantity   Primary Zetuvit plus silicone border  3\"x3\" DAILY and as needed      NO SUBSTITUTIONS. Call [phone] with questions.       OK to forward to covered supplier.    Electronically Signed Physician:  HECTOR GRACE             Date: May 16, 2024    "

## 2024-05-22 ENCOUNTER — TELEPHONE (OUTPATIENT)
Dept: CARDIOLOGY | Facility: CLINIC | Age: 84
End: 2024-05-22
Payer: MEDICARE

## 2024-05-22 NOTE — TELEPHONE ENCOUNTER
[facility] Call Center    Phone Message    May a detailed message be left on voicemail: yes     Reason for Call: Other: Patient states she has a heart monitor and spoke with the company yesterday about some irritation. She continues to have the irritation and has taken everything off now. She would like to speak with her care team about this. Please call her back to discuss.      Action Taken: Other: cardiology    Travel Screening: Not Applicable   Thank you!  [facility]

## 2024-05-22 NOTE — TELEPHONE ENCOUNTER
Return call to patient, she has questions about her follow up with Dr. Medina on 6-3-24 and if she should keep this apt.  Explained she should keep this apt due to her hx of HF and HTN.  She states understanding.  Reviewed direct contact information for any additional questions.

## 2024-05-22 NOTE — TELEPHONE ENCOUNTER
[facility] Call Center    Phone Message    May a detailed message be left on voicemail: yes     Reason for Call: Other: Ashly called back because she forgot to ask some questions to nurse Moctezuma. Please reach out to Ashly again to discuss. Thank you!     Action Taken: Other: Cardiology    Travel Screening: Not Applicable    Thank you!  [facility]

## 2024-05-22 NOTE — TELEPHONE ENCOUNTER
Return call to patient.  Pt reports that she began itching terribly around the Zio patch.  She states that she had some irritation under the patch as well from the prep and she took it off this morning.  She wore the patch for about 4 days, she has put hydrocortisone cream on and it feels better.  She does not feel she could tolerate an additional patch.       Pt was seen on 5-13-24 with Claudia Yoo, hx of PAF and SND.  Pt does have an apple watch.  14 day Giorgio ordered to evaluate her rates and afib burden.      Pt continues Xarelto, is on no antiarrythmic medication.    Instructed her to send her monitor back and to contact [employer] to let them know she is returning the patch early.      Will review with Claudia and call patient with any additional instructions.

## 2024-05-23 NOTE — TELEPHONE ENCOUNTER
Claudia Yoo, NP  You4 minutes ago (8:30 AM)     KD  Notes reviewed. Unfortunate about rash and limited time with monitoring. Hopefully we collected enough information in the 4 days. Agree with follow up with Dr Medina in June.    Thanks,  Claudia Rossi

## 2024-05-31 PROCEDURE — 93244 EXT ECG>48HR<7D REV&INTERPJ: CPT | Performed by: INTERNAL MEDICINE

## 2024-06-03 ENCOUNTER — OFFICE VISIT (OUTPATIENT)
Dept: CARDIOLOGY | Facility: CLINIC | Age: 84
End: 2024-06-03
Attending: NURSE PRACTITIONER
Payer: MEDICARE

## 2024-06-03 VITALS
BODY MASS INDEX: 43.01 KG/M2 | DIASTOLIC BLOOD PRESSURE: 70 MMHG | HEART RATE: 51 BPM | WEIGHT: 266.5 LBS | OXYGEN SATURATION: 97 % | SYSTOLIC BLOOD PRESSURE: 134 MMHG | RESPIRATION RATE: 16 BRPM

## 2024-06-03 DIAGNOSIS — E78.5 HYPERLIPIDEMIA LDL GOAL <70: ICD-10-CM

## 2024-06-03 DIAGNOSIS — I10 ESSENTIAL HYPERTENSION: ICD-10-CM

## 2024-06-03 DIAGNOSIS — R06.09 DYSPNEA ON EXERTION: Primary | ICD-10-CM

## 2024-06-03 DIAGNOSIS — I50.32 CHRONIC HEART FAILURE WITH PRESERVED EJECTION FRACTION (HFPEF) (H): ICD-10-CM

## 2024-06-03 LAB — NT-PROBNP SERPL-MCNC: 143 PG/ML (ref 0–1800)

## 2024-06-03 PROCEDURE — G2211 COMPLEX E/M VISIT ADD ON: HCPCS | Performed by: INTERNAL MEDICINE

## 2024-06-03 PROCEDURE — 83880 ASSAY OF NATRIURETIC PEPTIDE: CPT | Performed by: INTERNAL MEDICINE

## 2024-06-03 PROCEDURE — 36415 COLL VENOUS BLD VENIPUNCTURE: CPT | Performed by: INTERNAL MEDICINE

## 2024-06-03 PROCEDURE — 99214 OFFICE O/P EST MOD 30 MIN: CPT | Performed by: INTERNAL MEDICINE

## 2024-06-03 NOTE — LETTER
6/3/2024    Yoanna Mcpherson MD  [address]  [place] [address]    RE: Debra Manriquez       Dear Colleague,     I had the pleasure of seeing Debra Manriquez in the [facility].    HEART CARE ENCOUNTER CONSULTATON NOTE      [facility]  [phone]      Assessment/Recommendations   Assessment:  1.  Dyspnea on exertion: Likely multifactorial due to morbid obesity, inactivity, JADYN, HFpEF, PAF.  Discussed with her and her daughter who is here with her today.  Will proceed with further workup including BNP, echo and stress testing.  She also has scheduled a follow-up in AF clinic where they can further discuss her AF management.  2.  Morbid obesity  3.  JADYN on CPAP compliant  4.  Hypertension: Well-controlled  5.  Chronic lymphedema  6.  PAF detected on a smart watch  7.  Anticoagulation: Maintained on Xarelto    Plan:  1. Continue anticoagulation with xarelto  2. Consider ablation vs antiarrhythmic therapy and has follow-up in atrial fibrillation clinic to discuss this further  3. Echocardiogram and BNP today  4.  Stress testing  Follow-up in 6 months         History of Present Illness/Subjective    HPI: Debra Manriquez is a 84 year old female with history of morbid obesity, JADYN on CPAP, heart failure with preserved ejection fraction, chronic lymphedema, PAF on anticoagulation, hypertension who I am seeing today for initial consultation to establish care.  She has been seen a couple times in AF clinic.  She has had episodes of A-fib detected on her smart watch.  Recent Zio patch was not tolerated for a long period but she was able to wear it for a few days.  She was noted to have a 22% burden of supraventricular beats but no atrial fibrillation was noted.  She does not feel that she is symptomatic with atrial fibrillation.  She is currently on Xarelto for anticoagulation.  She has chronic dyspnea on exertion that has gradually worsened.  This has been an ongoing problem.  No  increased edema.  She denies any orthopnea.  She is fairly inactive and her inactivity has greatly increased over the past year.  She denies any chest pain or palpitations.         Physical Examination  Review of Systems   Vitals: /70 (BP Location: Left arm, Patient Position: Sitting, Cuff Size: Adult Large)   Pulse 51   Resp 16   Wt 120.9 kg (266 lb 8 oz)   SpO2 97%   BMI 43.01 kg/m    BMI= Body mass index is 43.01 kg/m .  Wt Readings from Last 3 Encounters:   06/03/24 120.9 kg (266 lb 8 oz)   05/13/24 122.6 kg (270 lb 4.8 oz)   08/31/23 110.9 kg (244 lb 8 oz)       General Appearance:   no distress, normal body habitus   ENT/Mouth: membranes moist, no oral lesions or bleeding gums.      EYES:  no scleral icterus, normal conjunctivae   Neck: no carotid bruits or thyromegaly   Chest/Lungs:   lungs are clear to auscultation   Cardiovascular:   Regular. Normal first and second heart sounds with no murmur + edema bilaterally        Extremities: no cyanosis or clubbing   Skin: no xanthelasma, warm.    Neurologic: normal  bilateral, no tremors     Psychiatric: alert and oriented x3, calm        Please refer above for cardiac ROS details.        Medical History  Surgical History Family History Social History   Past Medical History:   Diagnosis Date    Antiplatelet or antithrombotic long-term use     Chronic acquired lymphedema     Edema     Falls     Fibrocystic breast disease     Fibrocystic breast disease     Foot pain     Gastroesophageal reflux disease     GERD (gastroesophageal reflux disease)     Hyperlipidemia     Hypertension     Irregular heart beat     Lymphedema     Mitral valve disorder     Obese     JADYN (obstructive sleep apnea)     Osteoarthritis of knee     hx of knee replacement and pending second    Osteopenia     Skin cancer     Skin cancer, basal cell     Sleep apnea     Thyroid nodule     Thyroid nodule     Vitamin B deficiency      Past Surgical History:   Procedure Laterality Date     ARTHROPLASTY KNEE Right 3/7/2023    Procedure: RIGHT TOTAL KNEE ARTHROPLASTY;  Surgeon: Juan Corbin MD;  Location: [facility] Main OR    CATARACT EXTRACTION  2011, 2012    COLONOSCOPY      EXCISE TOENAIL(S) Bilateral 8/16/2019    Procedure: MATRIXECTOMY BILATERAL FEET; TOES 1,2,3,4 ON LEFT FOOT,  1,2 ON RIGHT FOOT;  BOTH NAIL BORDERS;  Surgeon: Opal Kam DPM;  Location:  OR    EYE SURGERY      cataract    GI SURGERY      GYN SURGERY      hysterectomy    INJECT STEROID (LOCATION) Right 8/16/2019    Procedure: CORTIZONE INJECTION RIGHT HEEL;  Surgeon: Opal Kam DPM;  Location:  OR    ORTHOPEDIC SURGERY Left     knee replacement    PARTIAL HYSTERECTOMY      STRIP VEIN Bilateral 1975    TOTAL KNEE ARTHROPLASTY Left 2011     Family History   Problem Relation Age of Onset    No Known Problems Mother     No Known Problems Father     Alzheimer Disease Maternal Uncle     Heart Disease Sister     Heart Disease Brother     No Known Problems Daughter     No Known Problems Son     No Known Problems Brother     No Known Problems Son     No Known Problems Son         Social History     Socioeconomic History    Marital status:      Spouse name: Not on file    Number of children: Not on file    Years of education: Not on file    Highest education level: Not on file   Occupational History    Not on file   Tobacco Use    Smoking status: Never    Smokeless tobacco: Never   Vaping Use    Vaping status: Never Used   Substance and Sexual Activity    Alcohol use: Yes     Alcohol/week: 0.8 standard drinks of alcohol     Comment: Alcoholic Drinks/day: 1-2 glasses per month    Drug use: No    Sexual activity: Yes   Other Topics Concern    Parent/sibling w/ CABG, MI or angioplasty before 65F 55M? Not Asked   Social History Narrative    . 4 kids.  Teaches college English, reading.      Social Determinants of Health     Financial Resource Strain: High Risk (1/1/2022)    Received from  [facility], [facility]    Financial Resource Strain     Difficulty of Paying Living Expenses: Not on file     Difficulty of Paying Living Expenses: Not on file   Food Insecurity: Not on file   Transportation Needs: Not on file   Physical Activity: Not on file   Stress: Not on file   Social Connections: Unknown (1/1/2022)    Received from [facility], Former[facility]    Social Connections     Frequency of Communication with Friends and Family: Not on file   Interpersonal Safety: Not on file   Housing Stability: Not on file           Medications  Allergies   Current Outpatient Medications   Medication Sig Dispense Refill    amoxicillin (AMOXIL) 500 MG tablet TAKE 4 TABLETS BY MOUTH 1 HOUR BEFORE DENTAL APPOINTMENT      B Complex-C (VITAMIN B COMPLEX W/VITAMIN C) TABS tablet Take 1 tablet by mouth daily      biotin 1000 MCG TABS tablet Take 1,000 mcg by mouth daily      cholecalciferol (VITAMIN D3) 5000 units TABS tablet Take 2 tablets by mouth daily      Coenzyme Q10 400 MG CAPS Take 400 mg by mouth 2 times daily      cyanocobalamin (VITAMIN B-12) 1000 MCG tablet Take 1,000 mcg by mouth daily      denosumab (PROLIA) 60 MG/ML SOSY injection As of 3/14/2023, please hold and pause this medication while at the transitional care unit      estradiol (ESTRACE) 0.1 MG/GM vaginal cream Place vaginally three times a week      furosemide (LASIX) 20 MG tablet Take 20 mg by mouth daily      lactobacillus TABS Take 2 capsules by mouth daily      levothyroxine (SYNTHROID/LEVOTHROID) 112 MCG tablet Take 112 mcg by mouth daily      losartan (COZAAR) 50 MG tablet Take 50 mg by mouth 2 times daily      rivaroxaban ANTICOAGULANT (XARELTO) 20 MG TABS tablet Take 1 tablet (20 mg) by mouth daily (with dinner)      rosuvastatin (CRESTOR) 20 MG tablet Take 1 tablet (20 mg) by mouth daily -- appt needed for further  "refills 90 tablet 0    Nutritional Supplements (PROTEOLYTIC FORMULA) TABS Take 3 tablets by mouth once Alpha lipoic acid (Patient not taking: Reported on 6/3/2024)         Allergies   Allergen Reactions    Lisinopril Cough    Reglan [Metoclopramide] Other (See Comments)     depression          Lab Results    Chemistry/lipid CBC Cardiac Enzymes/BNP/TSH/INR   Recent Labs   Lab Test 02/22/24  1430   CHOL 129   HDL 61   LDL 52   TRIG 78     Recent Labs   Lab Test 02/22/24  1430 05/26/21  1100 11/30/20  0900   LDL 52 88 124     Recent Labs   Lab Test 05/13/24  0925      POTASSIUM 4.5   CHLORIDE 106   CO2 25   GLC 99   BUN 23.1*   CR 0.79   GFRESTIMATED 72   DAMARIS 9.6     Recent Labs   Lab Test 05/13/24  0925 06/20/23  1633 03/17/23  1138   CR 0.79 0.68 0.70     No results for input(s): \"A1C\" in the last 48625 hours.       Recent Labs   Lab Test 03/20/23  0836 03/17/23  1138   WBC  --  5.7   HGB 11.4* 10.8*   HCT  --  35.2   MCV  --  90   PLT  --  268     Recent Labs   Lab Test 03/20/23  0836 03/17/23  1138 03/10/23  0808   HGB 11.4* 10.8* 11.0*    No results for input(s): \"TROPONINI\" in the last 95060 hours.  No results for input(s): \"BNP\", \"NTBNPI\", \"NTBNP\" in the last 71846 hours.  Recent Labs   Lab Test 02/22/23  1551   TSH 1.68     No results for input(s): \"INR\" in the last 02419 hours.     Manju Medina MD    Thank you for allowing me to participate in the care of your patient.      Sincerely,     Manju Medina MD   [facility]  cc:   Claudia Yoo, NP  [address]  [place] [address]      "

## 2024-06-03 NOTE — PROGRESS NOTES
HEART CARE ENCOUNTER CONSULTATON NOTE      [facility]  [phone]      Assessment/Recommendations   Assessment:  1.  Dyspnea on exertion: Likely multifactorial due to morbid obesity, inactivity, JADYN, HFpEF, PAF.  Discussed with her and her daughter who is here with her today.  Will proceed with further workup including BNP, echo and stress testing.  She also has scheduled a follow-up in AF clinic where they can further discuss her AF management.  2.  Morbid obesity  3.  JADYN on CPAP compliant  4.  Hypertension: Well-controlled  5.  Chronic lymphedema  6.  PAF detected on a smart watch  7.  Anticoagulation: Maintained on Xarelto    Plan:  1. Continue anticoagulation with xarelto  2. Consider ablation vs antiarrhythmic therapy and has follow-up in atrial fibrillation clinic to discuss this further  3. Echocardiogram and BNP today  4.  Stress testing  Follow-up in 6 months         History of Present Illness/Subjective    HPI: Debra Manriquez is a 84 year old female with history of morbid obesity, JADYN on CPAP, heart failure with preserved ejection fraction, chronic lymphedema, PAF on anticoagulation, hypertension who I am seeing today for initial consultation to establish care.  She has been seen a couple times in AF clinic.  She has had episodes of A-fib detected on her smart watch.  Recent Zio patch was not tolerated for a long period but she was able to wear it for a few days.  She was noted to have a 22% burden of supraventricular beats but no atrial fibrillation was noted.  She does not feel that she is symptomatic with atrial fibrillation.  She is currently on Xarelto for anticoagulation.  She has chronic dyspnea on exertion that has gradually worsened.  This has been an ongoing problem.  No increased edema.  She denies any orthopnea.  She is fairly inactive and her inactivity has greatly increased over the past year.  She denies any chest pain or palpitations.    ( Has documented  nonvalvular atrial fibrillation (NVAF) and is currently on oral anticoagulant therapy Xarelto   [id number] -VASc = 5 (age >75, CHF, HTN, female gender )   HAS-BLED risk score: 2 (age and bleeding predisposition )  Indication(s) to consider non-oral anticoagulation therapy: bleeding predisposition  Pt has no contra-indication to come off oral anti-coagulant therapy if LAAC device is successfully placed.     I have personally reviewed the patients chart and discussed the following with the patient/family; 1) The choices available for reducing stroke risk from atrial fibrillation, 2) Treatment options available including respective risk/benefits, and 3) What factors are most important for the patient in making their decision.  The [facility] shared decision making tool https://www.cardiosmart.org/SDM/Decision-Aids/Find-Decision-Aids/Atrial-Fibrillation  was used to guide this conversation.   The patient was counseled that their decision could be made at this time or in the future if more time was needed to consider their decision.       The patient is an appropriate candidate to proceed with left atrial appendage screening and implant.       Physical Examination  Review of Systems   Vitals: /70 (BP Location: Left arm, Patient Position: Sitting, Cuff Size: Adult Large)   Pulse 51   Resp 16   Wt 120.9 kg (266 lb 8 oz)   SpO2 97%   BMI 43.01 kg/m    BMI= Body mass index is 43.01 kg/m .  Wt Readings from Last 3 Encounters:   06/03/24 120.9 kg (266 lb 8 oz)   05/13/24 122.6 kg (270 lb 4.8 oz)   08/31/23 110.9 kg (244 lb 8 oz)       General Appearance:   no distress, normal body habitus   ENT/Mouth: membranes moist, no oral lesions or bleeding gums.      EYES:  no scleral icterus, normal conjunctivae   Neck: no carotid bruits or thyromegaly   Chest/Lungs:   lungs are clear to auscultation   Cardiovascular:   Regular. Normal first and second heart sounds with no murmur + edema bilaterally        Extremities: no cyanosis or  clubbing   Skin: no xanthelasma, warm.    Neurologic: normal  bilateral, no tremors     Psychiatric: alert and oriented x3, calm        Please refer above for cardiac ROS details.        Medical History  Surgical History Family History Social History   Past Medical History:   Diagnosis Date    Antiplatelet or antithrombotic long-term use     Chronic acquired lymphedema     Edema     Falls     Fibrocystic breast disease     Fibrocystic breast disease     Foot pain     Gastroesophageal reflux disease     GERD (gastroesophageal reflux disease)     Hyperlipidemia     Hypertension     Irregular heart beat     Lymphedema     Mitral valve disorder     Obese     JADYN (obstructive sleep apnea)     Osteoarthritis of knee     hx of knee replacement and pending second    Osteopenia     Skin cancer     Skin cancer, basal cell     Sleep apnea     Thyroid nodule     Thyroid nodule     Vitamin B deficiency      Past Surgical History:   Procedure Laterality Date    ARTHROPLASTY KNEE Right 3/7/2023    Procedure: RIGHT TOTAL KNEE ARTHROPLASTY;  Surgeon: Juan Corbin MD;  Location: [facility] OR    CATARACT EXTRACTION  2011, 2012    COLONOSCOPY      EXCISE TOENAIL(S) Bilateral 8/16/2019    Procedure: MATRIXECTOMY BILATERAL FEET; TOES 1,2,3,4 ON LEFT FOOT,  1,2 ON RIGHT FOOT;  BOTH NAIL BORDERS;  Surgeon: Opal Kam DPM;  Location:  OR    EYE SURGERY      cataract    GI SURGERY      GYN SURGERY      hysterectomy    INJECT STEROID (LOCATION) Right 8/16/2019    Procedure: CORTIZONE INJECTION RIGHT HEEL;  Surgeon: Opal Kam DPM;  Location:  OR    ORTHOPEDIC SURGERY Left     knee replacement    PARTIAL HYSTERECTOMY      STRIP VEIN Bilateral 1975    TOTAL KNEE ARTHROPLASTY Left 2011     Family History   Problem Relation Age of Onset    No Known Problems Mother     No Known Problems Father     Alzheimer Disease Maternal Uncle     Heart Disease Sister     Heart Disease Brother     No Known  Problems Daughter     No Known Problems Son     No Known Problems Brother     No Known Problems Son     No Known Problems Son         Social History     Socioeconomic History    Marital status:      Spouse name: Not on file    Number of children: Not on file    Years of education: Not on file    Highest education level: Not on file   Occupational History    Not on file   Tobacco Use    Smoking status: Never    Smokeless tobacco: Never   Vaping Use    Vaping status: Never Used   Substance and Sexual Activity    Alcohol use: Yes     Alcohol/week: 0.8 standard drinks of alcohol     Comment: Alcoholic Drinks/day: 1-2 glasses per month    Drug use: No    Sexual activity: Yes   Other Topics Concern    Parent/sibling w/ CABG, MI or angioplasty before 65F 55M? Not Asked   Social History Narrative    . 4 kids.  Teaches college English, reading.      Social Determinants of Health     Financial Resource Strain: High Risk (1/1/2022)    [facility]    Financial Resource Strain     Difficulty of Paying Living Expenses: Not on file     Difficulty of Paying Living Expenses: Not on file   Food Insecurity: Not on file   Transportation Needs: Not on file   Physical Activity: Not on file   Stress: Not on file   Social Connections: Unknown (1/1/2022)    [facility] [employer] [employer]    Social Connections     Frequency of Communication with Friends and Family: Not on file   Interpersonal Safety: Not on file   Housing Stability: Not on file           Medications  Allergies   Current Outpatient Medications   Medication Sig Dispense Refill    amoxicillin (AMOXIL) 500 MG tablet TAKE 4 TABLETS BY MOUTH 1 HOUR BEFORE DENTAL APPOINTMENT      B Complex-C (VITAMIN B COMPLEX W/VITAMIN C) TABS tablet Take 1 tablet by mouth daily      biotin 1000 MCG TABS tablet Take  "1,000 mcg by mouth daily      cholecalciferol (VITAMIN D3) 5000 units TABS tablet Take 2 tablets by mouth daily      Coenzyme Q10 400 MG CAPS Take 400 mg by mouth 2 times daily      cyanocobalamin (VITAMIN B-12) 1000 MCG tablet Take 1,000 mcg by mouth daily      denosumab (PROLIA) 60 MG/ML SOSY injection As of 3/14/2023, please hold and pause this medication while at the transitional care unit      estradiol (ESTRACE) 0.1 MG/GM vaginal cream Place vaginally three times a week      furosemide (LASIX) 20 MG tablet Take 20 mg by mouth daily      lactobacillus TABS Take 2 capsules by mouth daily      levothyroxine (SYNTHROID/LEVOTHROID) 112 MCG tablet Take 112 mcg by mouth daily      losartan (COZAAR) 50 MG tablet Take 50 mg by mouth 2 times daily      rivaroxaban ANTICOAGULANT (XARELTO) 20 MG TABS tablet Take 1 tablet (20 mg) by mouth daily (with dinner)      rosuvastatin (CRESTOR) 20 MG tablet Take 1 tablet (20 mg) by mouth daily -- appt needed for further refills 90 tablet 0    Nutritional Supplements (PROTEOLYTIC FORMULA) TABS Take 3 tablets by mouth once Alpha lipoic acid (Patient not taking: Reported on 6/3/2024)         Allergies   Allergen Reactions    Lisinopril Cough    Reglan [Metoclopramide] Other (See Comments)     depression          Lab Results    Chemistry/lipid CBC Cardiac Enzymes/BNP/TSH/INR   Recent Labs   Lab Test 02/22/24  1430   CHOL 129   HDL 61   LDL 52   TRIG 78     Recent Labs   Lab Test 02/22/24  1430 05/26/21  1100 11/30/20  0900   LDL 52 88 124     Recent Labs   Lab Test 05/13/24  0925      POTASSIUM 4.5   CHLORIDE 106   CO2 25   GLC 99   BUN 23.1*   CR 0.79   GFRESTIMATED 72   DAMARIS 9.6     Recent Labs   Lab Test 05/13/24  0925 06/20/23  1633 03/17/23  1138   CR 0.79 0.68 0.70     No results for input(s): \"A1C\" in the last 24922 hours.       Recent Labs   Lab Test 03/20/23  0836 03/17/23  1138   WBC  --  5.7   HGB 11.4* 10.8*   HCT  --  35.2   MCV  --  90   PLT  --  268     Recent Labs " "  Lab Test 03/20/23  0836 03/17/23  1138 03/10/23  0808   HGB 11.4* 10.8* 11.0*    No results for input(s): \"TROPONINI\" in the last 44451 hours.  No results for input(s): \"BNP\", \"NTBNPI\", \"NTBNP\" in the last 41207 hours.  Recent Labs   Lab Test 02/22/23  1551   TSH 1.68     No results for input(s): \"INR\" in the last 09525 hours.     Manju Medina MD                                      "

## 2024-06-03 NOTE — PATIENT INSTRUCTIONS
Ms. Debra Manriquez,     It was a pleasure to see you in the office today. My recommendations for you include:   1. Continue anticoagulation with xarelto  2. Consider ablation vs other medications for atrial fibrillation if more AF with symptoms  3. Echocardiogram and stress test.  Checking lab BNP today     Please do not hesitate to call the [facility] clinic with any questions or concerns at [phone].    Sincerely,     Manju Medina MD

## 2024-06-26 ENCOUNTER — HOSPITAL ENCOUNTER (OUTPATIENT)
Dept: NUCLEAR MEDICINE | Facility: CLINIC | Age: 84
Discharge: HOME | End: 2024-06-26
Attending: INTERNAL MEDICINE
Payer: MEDICARE

## 2024-06-26 ENCOUNTER — HOSPITAL ENCOUNTER (OUTPATIENT)
Dept: CARDIOLOGY | Facility: CLINIC | Age: 84
Discharge: HOME | End: 2024-06-26
Attending: INTERNAL MEDICINE
Payer: MEDICARE

## 2024-06-26 DIAGNOSIS — I10 ESSENTIAL HYPERTENSION: ICD-10-CM

## 2024-06-26 DIAGNOSIS — R06.09 DYSPNEA ON EXERTION: ICD-10-CM

## 2024-06-26 DIAGNOSIS — I50.32 CHRONIC HEART FAILURE WITH PRESERVED EJECTION FRACTION (HFPEF) (H): ICD-10-CM

## 2024-06-26 LAB
CV STRESS CURRENT BP HE: NORMAL
CV STRESS CURRENT HR HE: 51
CV STRESS CURRENT HR HE: 59
CV STRESS CURRENT HR HE: 65
CV STRESS CURRENT HR HE: 66
CV STRESS CURRENT HR HE: 68
CV STRESS CURRENT HR HE: 69
CV STRESS CURRENT HR HE: 69
CV STRESS CURRENT HR HE: 70
CV STRESS CURRENT HR HE: 70
CV STRESS CURRENT HR HE: 71
CV STRESS CURRENT HR HE: 72
CV STRESS CURRENT HR HE: 73
CV STRESS CURRENT HR HE: 74
CV STRESS CURRENT HR HE: 74
CV STRESS CURRENT HR HE: 79
CV STRESS CURRENT HR HE: 80
CV STRESS DEVIATION TIME HE: NORMAL
CV STRESS ECHO PERCENT HR HE: NORMAL
CV STRESS EXERCISE STAGE HE: NORMAL
CV STRESS FINAL RESTING BP HE: NORMAL
CV STRESS FINAL RESTING HR HE: 69
CV STRESS MAX HR HE: 81
CV STRESS MAX TREADMILL GRADE HE: 0
CV STRESS MAX TREADMILL SPEED HE: 0
CV STRESS PEAK DIA BP HE: NORMAL
CV STRESS PEAK SYS BP HE: NORMAL
CV STRESS PHASE HE: NORMAL
CV STRESS PROTOCOL HE: NORMAL
CV STRESS RESTING PT POSITION HE: NORMAL
CV STRESS ST DEVIATION AMOUNT HE: NORMAL
CV STRESS ST DEVIATION ELEVATION HE: NORMAL
CV STRESS ST EVELATION AMOUNT HE: NORMAL
CV STRESS TEST TYPE HE: NORMAL
CV STRESS TOTAL STAGE TIME MIN 1 HE: NORMAL
RATE PRESSURE PRODUCT: NORMAL
STRESS ECHO BASELINE HR: 55
STRESS ECHO CALCULATED PERCENT HR: 60 %
STRESS ECHO LAST STRESS DIASTOLIC BP: 72
STRESS ECHO LAST STRESS HR: 73
STRESS ECHO LAST STRESS SYSTOLIC BP: 146
STRESS ECHO TARGET HR: 134

## 2024-06-26 PROCEDURE — 78452 HT MUSCLE IMAGE SPECT MULT: CPT | Mod: 26 | Performed by: INTERNAL MEDICINE

## 2024-06-26 PROCEDURE — 999N000208 ECHOCARDIOGRAM COMPLETE

## 2024-06-26 PROCEDURE — 255N000002 HC RX 255 OP 636: Performed by: INTERNAL MEDICINE

## 2024-06-26 PROCEDURE — 93016 CV STRESS TEST SUPVJ ONLY: CPT | Performed by: INTERNAL MEDICINE

## 2024-06-26 PROCEDURE — 93018 CV STRESS TEST I&R ONLY: CPT | Performed by: INTERNAL MEDICINE

## 2024-06-26 PROCEDURE — G1010 CDSM STANSON: HCPCS | Performed by: INTERNAL MEDICINE

## 2024-06-26 PROCEDURE — 78452 HT MUSCLE IMAGE SPECT MULT: CPT | Mod: ME

## 2024-06-26 PROCEDURE — 343N000001 HC RX 343: Performed by: INTERNAL MEDICINE

## 2024-06-26 PROCEDURE — A9500 TC99M SESTAMIBI: HCPCS | Performed by: INTERNAL MEDICINE

## 2024-06-26 PROCEDURE — 93306 TTE W/DOPPLER COMPLETE: CPT | Mod: 26 | Performed by: INTERNAL MEDICINE

## 2024-06-26 PROCEDURE — 250N000011 HC RX IP 250 OP 636: Mod: JZ | Performed by: INTERNAL MEDICINE

## 2024-06-26 PROCEDURE — 93017 CV STRESS TEST TRACING ONLY: CPT

## 2024-06-26 RX ORDER — REGADENOSON 0.08 MG/ML
0.4 INJECTION, SOLUTION INTRAVENOUS ONCE
Status: COMPLETED | OUTPATIENT
Start: 2024-06-26 | End: 2024-06-26

## 2024-06-26 RX ORDER — AMINOPHYLLINE 25 MG/ML
50-100 INJECTION, SOLUTION INTRAVENOUS
Status: DISCONTINUED | OUTPATIENT
Start: 2024-06-26 | End: 2024-06-27 | Stop reason: HOSPADM

## 2024-06-26 RX ADMIN — Medication 40.1 MILLICURIE: at 11:25

## 2024-06-26 RX ADMIN — PERFLUTREN 2 ML: 6.52 INJECTION, SUSPENSION INTRAVENOUS at 08:49

## 2024-06-26 RX ADMIN — Medication 8.51 MILLICURIE: at 10:13

## 2024-06-26 RX ADMIN — REGADENOSON 0.4 MG: 0.08 INJECTION, SOLUTION INTRAVENOUS at 11:22

## 2024-06-26 NOTE — PROGRESS NOTES
Patient here for stress test. Rhythm noted to be sinus bradycardia with up to 2 second pauses prior to stress test (see ECG under stress test tab). No blocks noted. Patient denies dizziness or any other symptoms.   Dr. Nielson consulted and he spoke with patient and decision was made to proceed with Lexiscan stress test. No pauses occurred during Lexiscan administration or recovery period. Notified ordering provider.

## 2024-07-02 NOTE — PATIENT INSTRUCTIONS
Congratulations! Your wound is healed!     Continue to wear your compression daily and use your lymphedema pumps.

## 2024-07-11 ENCOUNTER — OFFICE VISIT (OUTPATIENT)
Dept: VASCULAR SURGERY | Facility: CLINIC | Age: 84
End: 2024-07-11
Attending: FAMILY MEDICINE
Payer: MEDICARE

## 2024-07-11 ENCOUNTER — ANCILLARY PROCEDURE (OUTPATIENT)
Dept: VASCULAR ULTRASOUND | Facility: CLINIC | Age: 84
End: 2024-07-11
Attending: FAMILY MEDICINE
Payer: MEDICARE

## 2024-07-11 VITALS — OXYGEN SATURATION: 99 % | DIASTOLIC BLOOD PRESSURE: 81 MMHG | HEART RATE: 45 BPM | SYSTOLIC BLOOD PRESSURE: 151 MMHG

## 2024-07-11 DIAGNOSIS — M79.89 LEG SWELLING: ICD-10-CM

## 2024-07-11 DIAGNOSIS — L97.211 ULCER OF RIGHT CALF, LIMITED TO BREAKDOWN OF SKIN (H): Primary | ICD-10-CM

## 2024-07-11 PROCEDURE — 93970 EXTREMITY STUDY: CPT | Mod: 26 | Performed by: SURGERY

## 2024-07-11 PROCEDURE — 93970 EXTREMITY STUDY: CPT

## 2024-07-11 PROCEDURE — 93923 UPR/LXTR ART STDY 3+ LVLS: CPT | Mod: 26 | Performed by: SURGERY

## 2024-07-11 PROCEDURE — 93923 UPR/LXTR ART STDY 3+ LVLS: CPT

## 2024-07-11 PROCEDURE — G0463 HOSPITAL OUTPT CLINIC VISIT: HCPCS | Mod: 25 | Performed by: FAMILY MEDICINE

## 2024-07-11 PROCEDURE — 99214 OFFICE O/P EST MOD 30 MIN: CPT | Performed by: FAMILY MEDICINE

## 2024-07-11 ASSESSMENT — PAIN SCALES - GENERAL: PAINLEVEL: MILD PAIN (3)

## 2024-07-11 NOTE — PROGRESS NOTES
Wound Clinic Note         Visit date: 07/11/2024       Cheif Complaint:     Debra Manriquez is a 84 year old  female had concerns including WOUND CARE.  The patient has lower extremity edema and a right leg ulcer         HISTORY OF PRESENT ILLNESS:    Debra Manriquez reports the wound has been present since mid May 2024.  The wound began as a water blister that ruptured.   She reports prior to this she had 1 other episode where she had similar leaking from her leg and then prior to this she never had any other wounds on her legs.  She has had venous insufficiency in the past and has had bilateral greater saphenous vein VenaSeal procedures, performed about 2 years ago.    Shortly after her last visit with me she reports the wounds on her right leg healed and she has not been applying any bandage to the area recently.  She has continued to wear her compression stockings every day.  There has been no drainage from the area recently.        The pateint denies fevers or chills.  They report the pain from the wound has been 0/10 and has remained about the same recently.      The patient reports they currently do not have any routine for elevating their legs.  The patient confirms they do sleep in a bed.  Despite our previous conversation she still has not been making an effort to elevate her legs during the day.    She has gotten back into the habit of using her pneumatic lymphedema pump once a day.    Today the patient reports maintaining a high protein diet, but has not been taking protein supplements lately.        The patient denies a history of diabetes, smoking or chronic steroid use.         The patient has not had any symptoms of infection relating to the wound recently and is not currently on antibiotics.       Problem List:   Past Medical History:   Diagnosis Date    Antiplatelet or antithrombotic long-term use     Chronic acquired lymphedema     Edema     Falls     Fibrocystic breast disease      Fibrocystic breast disease     Foot pain     Gastroesophageal reflux disease     GERD (gastroesophageal reflux disease)     Hyperlipidemia     Hypertension     Irregular heart beat     Lymphedema     Mitral valve disorder     Obese     JADYN (obstructive sleep apnea)     Osteoarthritis of knee     hx of knee replacement and pending second    Osteopenia     Skin cancer     Skin cancer, basal cell     Sleep apnea     Thyroid nodule     Thyroid nodule     Vitamin B deficiency              Family Hx: family history includes Alzheimer Disease in her maternal uncle; Heart Disease in her brother and sister; No Known Problems in her brother, daughter, father, mother, son, son, and son.       Surgical Hx:   Past Surgical History:   Procedure Laterality Date    ARTHROPLASTY KNEE Right 3/7/2023    Procedure: RIGHT TOTAL KNEE ARTHROPLASTY;  Surgeon: Juan Corbin MD;  Location: [facility] OR    CATARACT EXTRACTION  2011, 2012    COLONOSCOPY      EXCISE TOENAIL(S) Bilateral 8/16/2019    Procedure: MATRIXECTOMY BILATERAL FEET; TOES 1,2,3,4 ON LEFT FOOT,  1,2 ON RIGHT FOOT;  BOTH NAIL BORDERS;  Surgeon: Opal Kam DPM;  Location:  OR    EYE SURGERY      cataract    GI SURGERY      GYN SURGERY      hysterectomy    INJECT STEROID (LOCATION) Right 8/16/2019    Procedure: CORTIZONE INJECTION RIGHT HEEL;  Surgeon: Opal Kam DPM;  Location:  OR    ORTHOPEDIC SURGERY Left     knee replacement    PARTIAL HYSTERECTOMY      STRIP VEIN Bilateral 1975    TOTAL KNEE ARTHROPLASTY Left 2011          Allergies:    Allergies   Allergen Reactions    Lisinopril Cough    Reglan [Metoclopramide] Other (See Comments)     depression              Medication History:    Current Outpatient Medications   Medication Sig Dispense Refill    amoxicillin (AMOXIL) 500 MG tablet TAKE 4 TABLETS BY MOUTH 1 HOUR BEFORE DENTAL APPOINTMENT      B Complex-C (VITAMIN B COMPLEX W/VITAMIN C) TABS tablet Take 1 tablet by mouth daily       biotin 1000 MCG TABS tablet Take 1,000 mcg by mouth daily      cholecalciferol (VITAMIN D3) 5000 units TABS tablet Take 2 tablets by mouth daily      Coenzyme Q10 400 MG CAPS Take 400 mg by mouth 2 times daily      cyanocobalamin (VITAMIN B-12) 1000 MCG tablet Take 1,000 mcg by mouth daily      denosumab (PROLIA) 60 MG/ML SOSY injection As of 3/14/2023, please hold and pause this medication while at the transitional care unit      estradiol (ESTRACE) 0.1 MG/GM vaginal cream Place vaginally three times a week      furosemide (LASIX) 20 MG tablet Take 20 mg by mouth daily      lactobacillus TABS Take 2 capsules by mouth daily      levothyroxine (SYNTHROID/LEVOTHROID) 112 MCG tablet Take 112 mcg by mouth daily      losartan (COZAAR) 50 MG tablet Take 50 mg by mouth 2 times daily      Nutritional Supplements (PROTEOLYTIC FORMULA) TABS Take 3 tablets by mouth once Alpha lipoic acid      rivaroxaban ANTICOAGULANT (XARELTO) 20 MG TABS tablet Take 1 tablet (20 mg) by mouth daily (with dinner)      rosuvastatin (CRESTOR) 20 MG tablet Take 1 tablet (20 mg) by mouth daily -- appt needed for further refills 90 tablet 0     No current facility-administered medications for this visit.         Tobacco History:  reports that she has never smoked. She has never used smokeless tobacco.       REVIEW OF SYMPTOMS:   The review of systems was negative except as noted in the HPI.           PHYSICAL EXAMINATION:     BP (!) 151/81   Pulse (!) 45   SpO2 99%            GENERAL: The patient overall appears well and is no acute distress.   HEAD: normocephalic   EYES: Sclera and conjunctiva clear   NECK: no obvious masses   LUNGS: breathing is unlabored.   EXTREMITIES: No clubbing, cyanosis or edema   SKIN: No rashes or other abnormalities except as noted under the Wound section below.   NEUROLOGICAL: normal motor and sensory function   EDEMA: Moderate  and Lymphedema       WOUND: Healed      Also see below for wound details:      "  Circumferential volume measures:            4/19/2021    11:00 AM 2/17/2022    10:00 AM 9/29/2022    10:00 AM 8/31/2023    10:00 AM 5/16/2024     1:00 PM   Circumferential Measures   Right just above MTP 24.2 24 23 23.4    Right Ankle 23.4 22.7 23.4 23.6    Right Widest Calf 44.9 44.8 43.5 42.6 44.8   Right Thigh Up 10cm 61.8  63.6 65.2    Left - just above MTP 23.7 23.1 22.6 22.6    Left Ankle 25 25.4 24.6 24.8    Left Widest Calf 44.1 44.2 42.6 42.6 43.4   Left Thigh Up 10cm 64  63.6 66        Ulceration(s)/Wound(s):   Please see the media tab under the chart review for pictures of the wounds.  Nursing staff removed dressings and cleansed wound.    VASC Wound right shin (Active)   Pre Size Length 0 07/11/24 1000   Pre Size Width 0 07/11/24 1000   Pre Size Depth 0 07/11/24 1000   Pre Total Sq cm 0 07/11/24 1000   Post Size Length 0 07/11/24 1000             No results for input(s): \"HGBA1C\", \"A1C\", \"EAG\" in the last 46784 hours.    Invalid input(s): \"[id number]\"       Recent Labs   Lab Test 03/16/22  1359 08/18/21  0942 05/26/21  1100   ALBUMIN 3.8 3.6 3.9         WBC Count   Date Value Ref Range Status   03/17/2023 5.7 4.0 - 11.0 10e3/uL Final     Albumin   Date Value Ref Range Status   03/16/2022 3.8 3.5 - 5.0 g/dL Final           No sharp debridement performed today.                  ASSESSMENT:   This is a 84 year old  female with a healed right leg ulcer, the patient also has lower extremity edema which was also managed during today's clinic visit.          PLAN:   No further bandages are required.  I do recommend that she continue to wear her compression stockings every day.  I then reviewed the results of her noninvasive vascular studies performed earlier today.  I just have the preliminary results today but based on that it looks like there is no arterial insufficiency and no significant superficial venous insufficiency.  I did tell her that she has a Baker's cyst on the right but it is not " symptomatic.    Separate from the wound care instructions we then discussed management strategies for lower extremity edema.  I explained the keys for managing lower extremity edema are compression and elevation.  I have again explained to the patient today that controlling the edema is probably the most important thing we can do to help heal the wound.  I have specifically recommended that they lay down with their legs above the level of the heart for 30 minutes at least twice a day.  I emphasized that if we can not control the edema we will likely have wounds develop again.  I have also encouraged the patient to continue to sleep in a bed.   I strongly encouraged her to continue using her pneumatic lymphedema pump for an hour once a day.  I have encouraged the patient to continue on their high protein diet to aid in wound healing.   The patient will return to the wound clinic on an as-needed basis if further wounds develop.        30 minutes spent on the date of the encounter doing chart review, history and exam, documentation and further activities per the note, this time excludes any procedure time      Ernesto Rob MD  07/11/2024   10:48 AM   [facility]  [phone]    This note was electronically signed by Ernesto Rob MD

## 2024-08-06 ENCOUNTER — OFFICE VISIT (OUTPATIENT)
Dept: CARDIOLOGY | Facility: CLINIC | Age: 84
End: 2024-08-06
Payer: MEDICARE

## 2024-08-06 VITALS
DIASTOLIC BLOOD PRESSURE: 62 MMHG | RESPIRATION RATE: 24 BRPM | HEART RATE: 56 BPM | WEIGHT: 266 LBS | BODY MASS INDEX: 42.93 KG/M2 | SYSTOLIC BLOOD PRESSURE: 130 MMHG | OXYGEN SATURATION: 94 %

## 2024-08-06 DIAGNOSIS — I48.0 PAROXYSMAL ATRIAL FIBRILLATION (H): Primary | ICD-10-CM

## 2024-08-06 PROCEDURE — G2211 COMPLEX E/M VISIT ADD ON: HCPCS | Performed by: INTERNAL MEDICINE

## 2024-08-06 PROCEDURE — 99215 OFFICE O/P EST HI 40 MIN: CPT | Performed by: INTERNAL MEDICINE

## 2024-08-06 PROCEDURE — 99417 PROLNG OP E/M EACH 15 MIN: CPT | Performed by: INTERNAL MEDICINE

## 2024-08-06 NOTE — PROGRESS NOTES
HEART CARE ENCOUNTER CONSULTATON NOTE      [facility]  [phone]      Assessment/Recommendations   Assessment/Plan:    Debra Manriquez is a very pleasant 84 year old female with PMH of morbid obesity, JDAYN on CPAP, heart failure with preserved ejection fraction, chronic lymphedema, PAF on anticoagulation, hypertension who presents today to the EP clinic.    Paroxysmal atrial fibrillation  - We reviewed the pathophysiology of atrial fibrillation and management considerations including stroke risk and anticoagulation, rate control, cardioversion, antiarrhythmic drug therapy, and catheter ablation. We discussed atrial fibrillation ablation procedures, anticipated success rates, the potential need for re-do ablation vs addition of anti-arrhythmic drugs, procedural risks (including groin bleeding, tamponade, phrenic or esophageal injury, stroke, pulmonary vein stenosis) and recovery expectations.  - asymptomatic from AF,   - has a low resting heart rate so she has not been able to tolerate any rate control meds, will hold off for now and continue to monitor  - we discussed the ongoing importance of lifestyle modification (maintaining a healthy weight, sleep apnea diagnosis and management, alcohol avoidance) as part of a long term strategy for atrial fibrillation management    2.  Anticoagulation  - CHADSVASC score 4-5  - HAS BLED 2 and she has a risk of falls due to chronic lymphedema and osteoarthritis (uses a walker)and frequent bruising and bleeding  -Discussed LAAO procedures and she is interested in it.     3.  JADYN  -On CPAP    4.  HFpEF  - euvolemic  - chronic lymphedema is stable    Time spent: 60 minutes spent on the date of the encounter doing chart review, history and exam, documentation and further activities as noted above.    The longitudinal plan of care for the condition(s) below were addressed during this visit. Due to the added complexity in care, I will continue support in the  subsequent management of this condition(s) and with the ongoing continuity of care of this condition(s).          History of Present Illness/Subjective    HPI: Debra Manriquez is a very pleasant 84 year old female with PMH of morbid obesity, JADYN on CPAP, heart failure with preserved ejection fraction, chronic lymphedema, PAF on anticoagulation, hypertension who presents today to the EP clinic.    Debra was diagnosed with atrial fibrillation in November 2022 by her smart watch. She usually does not have clear symptoms from her AF.  She had a recent Zio patch monitor placed in May which showed high percentage of PACs and around 70 episodes of short runs of SVT, no atrial fibrillation was noted.  She did have symptoms during this study period but they correlated with normal sinus rhythm.    She does report that she has balance issues due to her chronic lymphedema and does have frequent bruising and bleeding.  She would very much like to get off of Xarelto if possible.      Recent Echocardiogram Results (personally reviewed):    1. Normal left ventricular size and systolic performance with a visually  estimated ejection fraction of 60-65%.  2. There is mild concentric increase in left ventricular wall thickness.  3. There is mild aortic insufficiency.  4. Normal right ventricular size and systolic performance.  5. There is mild left atrial enlargement.  6. Right ventricular systolic pressure relative to right atrial pressure is  mildly increased. The pulmonary artery pressure is estimated to be 35-40 mmHg  plus right atrial pressure (the IVC is of normal caliber).     When compared to the prior real-time echocardiogram dated 15 July 2022, there  has been no major interval change.      Labs below reviewed personally     Physical Examination  Review of Systems   Vitals: /62 (BP Location: Right arm, Patient Position: Sitting, Cuff Size: Adult Large)   Pulse 56   Resp 24   Wt 120.7 kg (266 lb)   SpO2 94%   BMI  42.93 kg/m    BMI= Body mass index is 42.93 kg/m .  Wt Readings from Last 3 Encounters:   08/06/24 120.7 kg (266 lb)   06/03/24 120.9 kg (266 lb 8 oz)   05/13/24 122.6 kg (270 lb 4.8 oz)       General Appearance:   no distress, normal body habitus   ENT/Mouth: membranes moist, no oral lesions or bleeding gums.      EYES:  no scleral icterus, normal conjunctivae   Neck: no carotid bruits or thyromegaly   Chest/Lungs:   lungs are clear to auscultation, no rales or wheezing, no sternal scar, equal chest wall expansion    Cardiovascular:   Regular. Normal first and second heart sounds with no murmurs, rubs, or gallops; the carotid, radial and posterior tibial pulses are intact, chronic pedal edema bilaterally    Abdomen:  no organomegaly, masses, bruits, or tenderness; bowel sounds are present   Extremities: no cyanosis or clubbing   Skin: no xanthelasma, warm.    Neurologic: normal  bilateral, no tremors     Psychiatric: alert and oriented x3, calm        Please refer above for cardiac ROS details.        Medical History  Surgical History Family History Social History   Past Medical History:   Diagnosis Date    Antiplatelet or antithrombotic long-term use     Chronic acquired lymphedema     Edema     Falls     Fibrocystic breast disease     Fibrocystic breast disease     Foot pain     Gastroesophageal reflux disease     GERD (gastroesophageal reflux disease)     Hyperlipidemia     Hypertension     Irregular heart beat     Lymphedema     Mitral valve disorder     Obese     JADYN (obstructive sleep apnea)     Osteoarthritis of knee     hx of knee replacement and pending second    Osteopenia     Skin cancer     Skin cancer, basal cell     Sleep apnea     Thyroid nodule     Thyroid nodule     Vitamin B deficiency      Past Surgical History:   Procedure Laterality Date    ARTHROPLASTY KNEE Right 3/7/2023    Procedure: RIGHT TOTAL KNEE ARTHROPLASTY;  Surgeon: Juan Corbin MD;  Location: [facility] Main OR    CATARACT  EXTRACTION  2011, 2012    COLONOSCOPY      EXCISE TOENAIL(S) Bilateral 8/16/2019    Procedure: MATRIXECTOMY BILATERAL FEET; TOES 1,2,3,4 ON LEFT FOOT,  1,2 ON RIGHT FOOT;  BOTH NAIL BORDERS;  Surgeon: Opal Kam DPM;  Location:  OR    EYE SURGERY      cataract    GI SURGERY      GYN SURGERY      hysterectomy    INJECT STEROID (LOCATION) Right 8/16/2019    Procedure: CORTIZONE INJECTION RIGHT HEEL;  Surgeon: Opal Kam DPM;  Location:  OR    ORTHOPEDIC SURGERY Left     knee replacement    PARTIAL HYSTERECTOMY      STRIP VEIN Bilateral 1975    TOTAL KNEE ARTHROPLASTY Left 2011     Family History   Problem Relation Age of Onset    No Known Problems Mother     No Known Problems Father     Alzheimer Disease Maternal Uncle     Heart Disease Sister     Heart Disease Brother     No Known Problems Daughter     No Known Problems Son     No Known Problems Brother     No Known Problems Son     No Known Problems Son         Social History     Socioeconomic History    Marital status:      Spouse name: Not on file    Number of children: Not on file    Years of education: Not on file    Highest education level: Not on file   Occupational History    Not on file   Tobacco Use    Smoking status: Never     Passive exposure: Past    Smokeless tobacco: Never   Vaping Use    Vaping status: Never Used   Substance and Sexual Activity    Alcohol use: Yes     Alcohol/week: 0.8 standard drinks of alcohol     Comment: Alcoholic Drinks/day: 1-2 glasses per month    Drug use: Never    Sexual activity: Yes   Other Topics Concern    Parent/sibling w/ CABG, MI or angioplasty before 65F 55M? Not Asked   Social History Narrative    . 4 kids.  Teaches college English, reading.      Social Determinants of Health     Financial Resource Strain: High Risk (1/1/2022)    [facility]    Financial Resource Strain      Difficulty of Paying Living Expenses: Not on file     Difficulty of Paying Living Expenses: Not on file   Food Insecurity: Not on file   Transportation Needs: Not on file   Physical Activity: Not on file   Stress: Not on file   Social Connections: Unknown (1/1/2022)    [facility], [employer] [facility]    Social Connections     Frequency of Communication with Friends and Family: Not on file   Interpersonal Safety: Not on file   Housing Stability: Not on file           Medications  Allergies   Current Outpatient Medications   Medication Sig Dispense Refill    amoxicillin (AMOXIL) 500 MG tablet TAKE 4 TABLETS BY MOUTH 1 HOUR BEFORE DENTAL APPOINTMENT      biotin 1000 MCG TABS tablet Take 1,000 mcg by mouth daily      cholecalciferol (VITAMIN D3) 5000 units TABS tablet Take 2 tablets by mouth daily      Coenzyme Q10 400 MG CAPS Take 400 mg by mouth 2 times daily      cyanocobalamin (VITAMIN B-12) 1000 MCG tablet Take 1,000 mcg by mouth daily      denosumab (PROLIA) 60 MG/ML SOSY injection Inject 60 mg subcutaneously every 6 months As of 3/14/2023, please hold and pause this medication while at the transitional care unit      estradiol (ESTRACE) 0.1 MG/GM vaginal cream Place vaginally three times a week      furosemide (LASIX) 20 MG tablet Take 20 mg by mouth daily      lactobacillus TABS Take 2 capsules by mouth daily      levothyroxine (SYNTHROID/LEVOTHROID) 112 MCG tablet Take 112 mcg by mouth daily      losartan (COZAAR) 50 MG tablet Take 50 mg by mouth 2 times daily      rivaroxaban ANTICOAGULANT (XARELTO) 20 MG TABS tablet Take 1 tablet (20 mg) by mouth daily (with dinner)      rosuvastatin (CRESTOR) 20 MG tablet Take 1 tablet (20 mg) by mouth daily -- appt needed for further refills 90 tablet 0       Allergies   Allergen Reactions    Lisinopril Cough    Reglan [Metoclopramide] Other (See Comments)     depression          Lab Results   "  Chemistry/lipid CBC Cardiac Enzymes/BNP/TSH/INR   Recent Labs   Lab Test 02/22/24  1430   CHOL 129   HDL 61   LDL 52   TRIG 78     Recent Labs   Lab Test 02/22/24  1430 05/26/21  1100 11/30/20  0900   LDL 52 88 124     Recent Labs   Lab Test 05/13/24  0925      POTASSIUM 4.5   CHLORIDE 106   CO2 25   GLC 99   BUN 23.1*   CR 0.79   GFRESTIMATED 72   DAMARIS 9.6     Recent Labs   Lab Test 05/13/24  0925 06/20/23  1633 03/17/23  1138   CR 0.79 0.68 0.70     No results for input(s): \"A1C\" in the last 93988 hours.       Recent Labs   Lab Test 03/20/23  0836 03/17/23  1138   WBC  --  5.7   HGB 11.4* 10.8*   HCT  --  35.2   MCV  --  90   PLT  --  268     Recent Labs   Lab Test 03/20/23  0836 03/17/23  1138 03/10/23  0808   HGB 11.4* 10.8* 11.0*    No results for input(s): \"TROPONINI\" in the last 83424 hours.  Recent Labs   Lab Test 06/03/24  1112   NTBNP 143     Recent Labs   Lab Test 02/22/23  1551   TSH 1.68     No results for input(s): \"INR\" in the last 99145 hours.     Fahad Chadwick MD                                      "

## 2024-08-06 NOTE — PATIENT INSTRUCTIONS
[facility]  Cardiac Electrophysiology  [address]  [place] [address]   Office: [phone]  Fax: [phone]       Thank you for seeing us in clinic today - it is a pleasure to be a part of your care team.  Below is a summary of our plan from today's visit.      Continue current meds  We will call ou to plan the LAAO     Please do not hesitate to be in touch with our office at [phone] with any questions that may arise.      Thank you for trusting us with your care,    Fahad Chadwick MD  Clinical Cardiac Electrophysiology  [facility]  [address]  [place] [address]   Office: [phone]  Fax: [phone]

## 2024-08-06 NOTE — LETTER
8/6/2024    Yoanna Mcpherson MD  [address]  [place] [address]    RE: Debra GARCIA Mitra       Dear Colleague,     [facility] the [facility].    HEART CARE ENCOUNTER CONSULTATON NOTE      [facility]  [phone]      Assessment/Recommendations   Assessment/Plan:    Debra Manriquez is a very pleasant 84 year old female with PMH of morbid obesity, JADYN on CPAP, heart failure with preserved ejection fraction, chronic lymphedema, PAF on anticoagulation, hypertension who presents today to the EP clinic.    Paroxysmal atrial fibrillation  - We reviewed the pathophysiology of atrial fibrillation and management considerations including stroke risk and anticoagulation, rate control, cardioversion, antiarrhythmic drug therapy, and catheter ablation. We discussed atrial fibrillation ablation procedures, anticipated success rates, the potential need for re-do ablation vs addition of anti-arrhythmic drugs, procedural risks (including groin bleeding, tamponade, phrenic or esophageal injury, stroke, pulmonary vein stenosis) and recovery expectations.  - asymptomatic from AF,   - has a low resting heart rate so she has not been able to tolerate any rate control meds, will hold off for now and continue to monitor  - we discussed the ongoing importance of lifestyle modification (maintaining a healthy weight, sleep apnea diagnosis and management, alcohol avoidance) as part of a long term strategy for atrial fibrillation management    2.  Anticoagulation  - CHADSVASC score 4-5  - HAS BLED 2 and she has a risk of falls due to chronic lymphedema and osteoarthritis (uses a walker)and frequent bruising and bleeding  -Discussed LAAO procedures and she is interested in it.     3.  JADYN  -On CPAP    4.  HFpEF  - euvolemic  - chronic lymphedema is stable    Time spent: 60 minutes spent on the date of the encounter doing chart review, history and exam, documentation  and further activities as noted above.    The longitudinal plan of care for the condition(s) below were addressed during this visit. Due to the added complexity in care, I will continue support in the subsequent management of this condition(s) and with the ongoing continuity of care of this condition(s).          History of Present Illness/Subjective    HPI: Debra Manriquez is a very pleasant 84 year old female with PMH of morbid obesity, JADYN on CPAP, heart failure with preserved ejection fraction, chronic lymphedema, PAF on anticoagulation, hypertension who presents today to the EP clinic.    Debra was diagnosed with atrial fibrillation in November 2022 by her smart watch. She usually does not have clear symptoms from her AF.  She had a recent Zio patch monitor placed in May which showed high percentage of PACs and around 70 episodes of short runs of SVT, no atrial fibrillation was noted.  She did have symptoms during this study period but they correlated with normal sinus rhythm.    She does report that she has balance issues due to her chronic lymphedema and does have frequent bruising and bleeding.  She would very much like to get off of Xarelto if possible.      Recent Echocardiogram Results (personally reviewed):    1. Normal left ventricular size and systolic performance with a visually  estimated ejection fraction of 60-65%.  2. There is mild concentric increase in left ventricular wall thickness.  3. There is mild aortic insufficiency.  4. Normal right ventricular size and systolic performance.  5. There is mild left atrial enlargement.  6. Right ventricular systolic pressure relative to right atrial pressure is  mildly increased. The pulmonary artery pressure is estimated to be 35-40 mmHg  plus right atrial pressure (the IVC is of normal caliber).     When compared to the prior real-time echocardiogram dated 15 July 2022, there  has been no major interval change.      Labs below reviewed personally      Physical Examination  Review of Systems   Vitals: /62 (BP Location: Right arm, Patient Position: Sitting, Cuff Size: Adult Large)   Pulse 56   Resp 24   Wt 120.7 kg (266 lb)   SpO2 94%   BMI 42.93 kg/m    BMI= Body mass index is 42.93 kg/m .  Wt Readings from Last 3 Encounters:   08/06/24 120.7 kg (266 lb)   06/03/24 120.9 kg (266 lb 8 oz)   05/13/24 122.6 kg (270 lb 4.8 oz)       General Appearance:   no distress, normal body habitus   ENT/Mouth: membranes moist, no oral lesions or bleeding gums.      EYES:  no scleral icterus, normal conjunctivae   Neck: no carotid bruits or thyromegaly   Chest/Lungs:   lungs are clear to auscultation, no rales or wheezing, no sternal scar, equal chest wall expansion    Cardiovascular:   Regular. Normal first and second heart sounds with no murmurs, rubs, or gallops; the carotid, radial and posterior tibial pulses are intact, chronic pedal edema bilaterally    Abdomen:  no organomegaly, masses, bruits, or tenderness; bowel sounds are present   Extremities: no cyanosis or clubbing   Skin: no xanthelasma, warm.    Neurologic: normal  bilateral, no tremors     Psychiatric: alert and oriented x3, calm        Please refer above for cardiac ROS details.        Medical History  Surgical History Family History Social History   Past Medical History:   Diagnosis Date     Antiplatelet or antithrombotic long-term use      Chronic acquired lymphedema      Edema      Falls      Fibrocystic breast disease      Fibrocystic breast disease      Foot pain      Gastroesophageal reflux disease      GERD (gastroesophageal reflux disease)      Hyperlipidemia      Hypertension      Irregular heart beat      Lymphedema      Mitral valve disorder      Obese      JADYN (obstructive sleep apnea)      Osteoarthritis of knee     hx of knee replacement and pending second     Osteopenia      Skin cancer      Skin cancer, basal cell      Sleep apnea      Thyroid nodule      Thyroid nodule       Vitamin B deficiency      Past Surgical History:   Procedure Laterality Date     ARTHROPLASTY KNEE Right 3/7/2023    Procedure: RIGHT TOTAL KNEE ARTHROPLASTY;  Surgeon: Juan Corbin MD;  Location: [facility] Main OR     CATARACT EXTRACTION  2011, 2012     COLONOSCOPY       EXCISE TOENAIL(S) Bilateral 8/16/2019    Procedure: MATRIXECTOMY BILATERAL FEET; TOES 1,2,3,4 ON LEFT FOOT,  1,2 ON RIGHT FOOT;  BOTH NAIL BORDERS;  Surgeon: Opal Kam DPM;  Location:  OR     EYE SURGERY      cataract     GI SURGERY       GYN SURGERY      hysterectomy     INJECT STEROID (LOCATION) Right 8/16/2019    Procedure: CORTIZONE INJECTION RIGHT HEEL;  Surgeon: Opal Kam DPM;  Location:  OR     ORTHOPEDIC SURGERY Left     knee replacement     PARTIAL HYSTERECTOMY       STRIP VEIN Bilateral 1975     TOTAL KNEE ARTHROPLASTY Left 2011     Family History   Problem Relation Age of Onset     No Known Problems Mother      No Known Problems Father      Alzheimer Disease Maternal Uncle      Heart Disease Sister      Heart Disease Brother      No Known Problems Daughter      No Known Problems Son      No Known Problems Brother      No Known Problems Son      No Known Problems Son         Social History     Socioeconomic History     Marital status:      Spouse name: Not on file     Number of children: Not on file     Years of education: Not on file     Highest education level: Not on file   Occupational History     Not on file   Tobacco Use     Smoking status: Never     Passive exposure: Past     Smokeless tobacco: Never   Vaping Use     Vaping status: Never Used   Substance and Sexual Activity     Alcohol use: Yes     Alcohol/week: 0.8 standard drinks of alcohol     Comment: Alcoholic Drinks/day: 1-2 glasses per month     Drug use: Never     Sexual activity: Yes   Other Topics Concern     Parent/sibling w/ CABG, MI or angioplasty before 65F 55M? Not Asked   Social History Narrative    . 4 kids.   [employer], reading.      Social Determinants of Health     Financial Resource Strain: High Risk (1/1/2022)    [facility], [employer] [facility]    Financial Resource Strain      Difficulty of Paying Living Expenses: Not on file      Difficulty of Paying Living Expenses: Not on file   Food Insecurity: Not on file   Transportation Needs: Not on file   Physical Activity: Not on file   Stress: Not on file   Social Connections: Unknown (1/1/2022)    Received from [employer] [facility], [facility]    Social Connections      Frequency of Communication with Friends and Family: Not on file   Interpersonal Safety: Not on file   Housing Stability: Not on file           Medications  Allergies   Current Outpatient Medications   Medication Sig Dispense Refill     amoxicillin (AMOXIL) 500 MG tablet TAKE 4 TABLETS BY MOUTH 1 HOUR BEFORE DENTAL APPOINTMENT       biotin 1000 MCG TABS tablet Take 1,000 mcg by mouth daily       cholecalciferol (VITAMIN D3) 5000 units TABS tablet Take 2 tablets by mouth daily       Coenzyme Q10 400 MG CAPS Take 400 mg by mouth 2 times daily       cyanocobalamin (VITAMIN B-12) 1000 MCG tablet Take 1,000 mcg by mouth daily       denosumab (PROLIA) 60 MG/ML SOSY injection Inject 60 mg subcutaneously every 6 months As of 3/14/2023, please hold and pause this medication while at the transitional care unit       estradiol (ESTRACE) 0.1 MG/GM vaginal cream Place vaginally three times a week       furosemide (LASIX) 20 MG tablet Take 20 mg by mouth daily       lactobacillus TABS Take 2 capsules by mouth daily       levothyroxine (SYNTHROID/LEVOTHROID) 112 MCG tablet Take 112 mcg by mouth daily       losartan (COZAAR) 50 MG tablet Take 50 mg by mouth 2 times daily       rivaroxaban ANTICOAGULANT (XARELTO) 20 MG TABS tablet Take 1 tablet (20 mg) by mouth daily (with dinner)  "      rosuvastatin (CRESTOR) 20 MG tablet Take 1 tablet (20 mg) by mouth daily -- appt needed for further refills 90 tablet 0       Allergies   Allergen Reactions     Lisinopril Cough     Reglan [Metoclopramide] Other (See Comments)     depression          Lab Results    Chemistry/lipid CBC Cardiac Enzymes/BNP/TSH/INR   Recent Labs   Lab Test 02/22/24  1430   CHOL 129   HDL 61   LDL 52   TRIG 78     Recent Labs   Lab Test 02/22/24  1430 05/26/21  1100 11/30/20  0900   LDL 52 88 124     Recent Labs   Lab Test 05/13/24  0925      POTASSIUM 4.5   CHLORIDE 106   CO2 25   GLC 99   BUN 23.1*   CR 0.79   GFRESTIMATED 72   DAMARIS 9.6     Recent Labs   Lab Test 05/13/24  0925 06/20/23  1633 03/17/23  1138   CR 0.79 0.68 0.70     No results for input(s): \"A1C\" in the last 44693 hours.       Recent Labs   Lab Test 03/20/23  0836 03/17/23  1138   WBC  --  5.7   HGB 11.4* 10.8*   HCT  --  35.2   MCV  --  90   PLT  --  268     Recent Labs   Lab Test 03/20/23  0836 03/17/23  1138 03/10/23  0808   HGB 11.4* 10.8* 11.0*    No results for input(s): \"TROPONINI\" in the last 84026 hours.  Recent Labs   Lab Test 06/03/24  1112   NTBNP 143     Recent Labs   Lab Test 02/22/23  1551   TSH 1.68     No results for input(s): \"INR\" in the last 36459 hours.     Fahad Chadwick MD                                        Thank you for allowing me to participate in the care of your patient.      Sincerely,     Fahad Chadwick MD     [facility]  cc:   Referred Self, MD  No address on file      "

## 2024-08-13 DIAGNOSIS — E78.5 HYPERLIPIDEMIA LDL GOAL <70: ICD-10-CM

## 2024-08-13 RX ORDER — ROSUVASTATIN CALCIUM 20 MG/1
20 TABLET, COATED ORAL DAILY
Qty: 90 TABLET | Refills: 2 | Status: SHIPPED | OUTPATIENT
Start: 2024-08-13

## 2024-08-14 ENCOUNTER — OFFICE VISIT (OUTPATIENT)
Dept: CARDIOLOGY | Facility: CLINIC | Age: 84
End: 2024-08-14
Payer: MEDICARE

## 2024-08-14 VITALS
BODY MASS INDEX: 42.27 KG/M2 | RESPIRATION RATE: 18 BRPM | SYSTOLIC BLOOD PRESSURE: 128 MMHG | WEIGHT: 263 LBS | HEIGHT: 66 IN | OXYGEN SATURATION: 95 % | DIASTOLIC BLOOD PRESSURE: 80 MMHG | HEART RATE: 73 BPM

## 2024-08-14 DIAGNOSIS — I10 ESSENTIAL HYPERTENSION: ICD-10-CM

## 2024-08-14 DIAGNOSIS — I48.0 PAROXYSMAL ATRIAL FIBRILLATION (H): Primary | ICD-10-CM

## 2024-08-14 DIAGNOSIS — I50.32 CHRONIC HEART FAILURE WITH PRESERVED EJECTION FRACTION (HFPEF) (H): ICD-10-CM

## 2024-08-14 PROCEDURE — 99215 OFFICE O/P EST HI 40 MIN: CPT | Performed by: PHYSICIAN ASSISTANT

## 2024-08-14 NOTE — LETTER
8/14/2024    Yoanna Mcpherson MD  [address]  [place] [address]    RE: Debra Manriquez       Dear Colleague,     I had the pleasure of seeing Debra Manriquez in the [facility].  HEART CARE ENCOUNTER NOTE       [facility]  [phone]      Assessment/Recommendations   1.  Paroxysmal atrial fibrillation: I have personally reviewed this patient's chart and have spoken with the patient about the treatment options, including PATEL device.  She has a [id number] score of at least 5 for age >75, CHF, HTN, female gender.  She has a HAS-BLED score of 2 for age and bleeding predisposition.   She is not a good candidate for long-term anticoagulation due to balance issues which increases her risk for falls and some issues with vaginal and rectal bleeding..    Once scheduled, the patient will stay on Xarelto up until implant. 2 weeks prior to the procedure She will also start aspirin 81 mg daily. After implant, will remain on aspirin 81 mg daily in addition to Xarelto for 6 weeks. The patient will then stop Xarelto and start DAPT with aspirin 81 mg daily and Plavix 75 mg daily until 6 months post procedure. She will then stop Plavix and remain on aspirin 81 mg daily indefinitely.  She will have a CECILIA or CTA approximately 3 months and 1 year post-implant.  She understands that the risks of the procedure are <2% and include, but are not limited to device embolization, air embolism, myocardial perforation, device thrombosis, ASD, stroke, or death.  We discussed expected recovery and follow-up.       The patient is a good candidate for proceeding with left atrial appendage implant.  Her questions were answered to her satisfaction.      2.  Chronic heart failure with preserved ejection fraction - compensated.  Continue Lasix 20 mg daily    3.  Hypertension - controlled.  Continue current dose of losartan       History of Present Illness/Subjective    Debra JOSE Manriquez is a 84 year old  female who comes in today for Watchman Consult.  She is accompanied by her caregiver Eden in person and her daughter Blanca over the phone for today's visit.    Debra Manriquez has a past history of paroxysmal atrial fibrillation, chronic heart failure with preserved ejection fraction, hypertension, obstructive sleep apnea (on CPAP), chronic lymphedema    She was diagnosed with paroxysmal atrial fibrillation in November 2022 by [employer].  She is currently on chronic anticoagulation with Xarelto.  She has had some issues with vaginal bleeding for which she has been treated with over the past several years.  She also reports occasional rectal bleeding.  She denies history of hospitalization or blood transfusions for significant bleeding.  She reports occasional bruising.  She does have some balance issues and requires a walker for ambulation.  She did have a fall 1 year ago when trying to sit down in a chair.  She did not sustain any injuries from this fall.  She fortunately denies having any recent falls.  She denies NSAIDs, aspirin, or alcohol use.  She denies any painful or difficulty swallowing.  She denies a previous history of stroke, DVT, or PE.  She denies any immediate family history of frequent nosebleeds or prolonged bleeding.  Her daughter reports patient has had some issues with ambulation after a knee procedure in the past and felt patient was discharged too soon. Patient would prefer to have Dr. Mcallister or Dr. Chadwick perform Watchman procedure.  She has some chronic swelling to her lower extremities.  She reports dyspnea on exertion and has had cardiac workup for this. Her daughter reports patient will have more follow-up for her ROMAN.    Medical, surgical, family, social history, and medications were reviewed and updated as necessary.    ECHO results (from 6/26/2024):  Interpretation Summary     1. Normal left ventricular size and systolic performance with a visually  estimated ejection  "fraction of 60-65%.  2. There is mild concentric increase in left ventricular wall thickness.  3. There is mild aortic insufficiency.  4. Normal right ventricular size and systolic performance.  5. There is mild left atrial enlargement.  6. Right ventricular systolic pressure relative to right atrial pressure is  mildly increased. The pulmonary artery pressure is estimated to be 35-40 mmHg  plus right atrial pressure (the IVC is of normal caliber).         Physical Examination Review of Systems   Vitals: /80 (BP Location: Right arm, Patient Position: Sitting, Cuff Size: Adult Large)   Pulse 73   Resp 18   Ht 1.676 m (5' 6\")   Wt 119.3 kg (263 lb)   SpO2 95%   BMI 42.45 kg/m    BMI= Body mass index is 42.45 kg/m .  Wt Readings from Last 3 Encounters:   08/14/24 119.3 kg (263 lb)   08/06/24 120.7 kg (266 lb)   06/03/24 120.9 kg (266 lb 8 oz)       General Appearance:   Alert, cooperative and in no acute distress   ENT/Mouth: membranes moist, no oral lesions or bleeding gums.      EYES:  no scleral icterus, normal conjunctivae   Neck: Thyroid not visualized   Chest/Lungs:   lungs are clear to auscultation, no rales or wheezing   Cardiovascular:   Irregular. Normal first and second heart sounds with no murmurs, rubs or gallops; the carotid, radial and posterior tibial pulses are intact, mild edema bilaterally    Abdomen:  Soft and nontender. Bowel sounds are present in all quadrants   Extremities: no cyanosis or clubbing   Skin: no xanthelasma, warm.    Neurologic: normal gait, normal  bilateral, no tremors   Psychiatric: Normal mood and affect       Please refer above for cardiac ROS details.      Medical History  Surgical History Family History Social History   Past Medical History:   Diagnosis Date     Antiplatelet or antithrombotic long-term use      Chronic acquired lymphedema      Edema      Falls      Fibrocystic breast disease      Fibrocystic breast disease      Foot pain      Gastroesophageal " reflux disease      GERD (gastroesophageal reflux disease)      Hyperlipidemia      Hypertension      Irregular heart beat      Lymphedema      Mitral valve disorder      Obese      JADYN (obstructive sleep apnea)      Osteoarthritis of knee     hx of knee replacement and pending second     Osteopenia      Skin cancer      Skin cancer, basal cell      Sleep apnea      Thyroid nodule      Thyroid nodule      Vitamin B deficiency      Past Surgical History:   Procedure Laterality Date     ARTHROPLASTY KNEE Right 3/7/2023    Procedure: RIGHT TOTAL KNEE ARTHROPLASTY;  Surgeon: Juan Corbin MD;  Location: [facility] Main OR     CATARACT EXTRACTION  2011, 2012     COLONOSCOPY       EXCISE TOENAIL(S) Bilateral 8/16/2019    Procedure: MATRIXECTOMY BILATERAL FEET; TOES 1,2,3,4 ON LEFT FOOT,  1,2 ON RIGHT FOOT;  BOTH NAIL BORDERS;  Surgeon: Opal Kam DPM;  Location:  OR     EYE SURGERY      cataract     GI SURGERY       GYN SURGERY      hysterectomy     INJECT STEROID (LOCATION) Right 8/16/2019    Procedure: CORTIZONE INJECTION RIGHT HEEL;  Surgeon: Opal Kam DPM;  Location:  OR     ORTHOPEDIC SURGERY Left     knee replacement     PARTIAL HYSTERECTOMY       STRIP VEIN Bilateral 1975     TOTAL KNEE ARTHROPLASTY Left 2011     Family History   Problem Relation Age of Onset     No Known Problems Mother      No Known Problems Father      Alzheimer Disease Maternal Uncle      Heart Disease Sister      Heart Disease Brother      No Known Problems Daughter      No Known Problems Son      No Known Problems Brother      No Known Problems Son      No Known Problems Son     Social History     Socioeconomic History     Marital status:      Spouse name: Not on file     Number of children: Not on file     Years of education: Not on file     Highest education level: Not on file   Occupational History     Not on file   Tobacco Use     Smoking status: Never     Passive exposure: Past     Smokeless  tobacco: Never   Vaping Use     Vaping status: Never Used   Substance and Sexual Activity     Alcohol use: Yes     Alcohol/week: 0.8 standard drinks of alcohol     Comment: Alcoholic Drinks/day: 1-2 glasses per month     Drug use: Never     Sexual activity: Yes   Other Topics Concern     Parent/sibling w/ CABG, MI or angioplasty before 65F 55M? Not Asked   Social History Narrative    . 4 kids.  Teaches college English, reading.      Social Determinants of Health     Financial Resource Strain: High Risk (1/1/2022)    [facility]    Financial Resource Strain      Difficulty of Paying Living Expenses: Not on file      Difficulty of Paying Living Expenses: Not on file   Food Insecurity: Not on file   Transportation Needs: Not on file   Physical Activity: Not on file   Stress: Not on file   Social Connections: Unknown (1/1/2022)    [facility] [place]    Social Connections      Frequency of Communication with Friends and Family: Not on file   Interpersonal Safety: Not on file   Housing Stability: Not on file          Medications  Allergies   Current Outpatient Medications   Medication Sig Dispense Refill     amoxicillin (AMOXIL) 500 MG tablet TAKE 4 TABLETS BY MOUTH 1 HOUR BEFORE DENTAL APPOINTMENT       biotin 1000 MCG TABS tablet Take 1,000 mcg by mouth daily       cholecalciferol (VITAMIN D3) 5000 units TABS tablet Take 2 tablets by mouth daily       Coenzyme Q10 400 MG CAPS Take 400 mg by mouth 2 times daily       cyanocobalamin (VITAMIN B-12) 1000 MCG tablet Take 1,000 mcg by mouth daily       denosumab (PROLIA) 60 MG/ML SOSY injection Inject 60 mg subcutaneously every 6 months As of 3/14/2023, please hold and pause this medication while at the transitional care unit       estradiol (ESTRACE) 0.1 MG/GM vaginal cream Place vaginally  "three times a week       furosemide (LASIX) 20 MG tablet Take 20 mg by mouth daily       lactobacillus TABS Take 2 capsules by mouth daily       levothyroxine (SYNTHROID/LEVOTHROID) 112 MCG tablet Take 112 mcg by mouth daily       losartan (COZAAR) 50 MG tablet Take 50 mg by mouth 2 times daily       rivaroxaban ANTICOAGULANT (XARELTO) 20 MG TABS tablet Take 1 tablet (20 mg) by mouth daily (with dinner)       rosuvastatin (CRESTOR) 20 MG tablet Take 1 tablet (20 mg) by mouth daily 90 tablet 2    Allergies   Allergen Reactions     Lisinopril Cough     Reglan [Metoclopramide] Other (See Comments)     depression         Lab Results    Chemistry/lipid CBC Cardiac Enzymes/BNP/TSH/INR   Recent Labs   Lab Test 02/22/24  1430   CHOL 129   HDL 61   LDL 52   TRIG 78     Recent Labs   Lab Test 02/22/24  1430 05/26/21  1100 11/30/20  0900   LDL 52 88 124     Recent Labs   Lab Test 05/13/24  0925      POTASSIUM 4.5   CHLORIDE 106   CO2 25   GLC 99   BUN 23.1*   CR 0.79   GFRESTIMATED 72   DAAMRIS 9.6     Recent Labs   Lab Test 05/13/24  0925 06/20/23  1633 03/17/23  1138   CR 0.79 0.68 0.70     No results for input(s): \"A1C\" in the last 66702 hours. Recent Labs   Lab Test 03/20/23  0836 03/17/23  1138   WBC  --  5.7   HGB 11.4* 10.8*   HCT  --  35.2   MCV  --  90   PLT  --  268     Recent Labs   Lab Test 03/20/23  0836 03/17/23  1138 03/10/23  0808   HGB 11.4* 10.8* 11.0*    No results for input(s): \"TROPONINI\" in the last 05665 hours.  Recent Labs   Lab Test 06/03/24  1112   NTBNP 143     Recent Labs   Lab Test 02/22/23  1551   TSH 1.68     No results for input(s): \"INR\" in the last 26559 hours.     45 minutes spent on the date of encounter doing education, chart prep/review, review of outside records, review of test results, interpretation with above tests, patient visit, documentation, and discussion with family.      This note has been dictated using voice recognition software. Any grammatical or context distortions are " unintentional and inherent to the software.    Gloria Rico PA-C  Structural Heart Program  [facility]      Thank you for allowing me to participate in the care of your patient.      Sincerely,     Gloria Rico PA-C     [facility]  cc:   Fahad Chadwick MD  [address] [place] [id number]

## 2024-08-14 NOTE — PROGRESS NOTES
HEART CARE ENCOUNTER NOTE       [facility]  [phone]      Assessment/Recommendations   1.  Paroxysmal atrial fibrillation: I have personally reviewed this patient's chart and have spoken with the patient about the treatment options, including PATEL device.  She has a [id number] score of at least 5 for age >75, CHF, HTN, female gender.  She has a HAS-BLED score of 2 for age and bleeding predisposition.   She is not a good candidate for long-term anticoagulation due to balance issues which increases her risk for falls and some issues with vaginal and rectal bleeding..    Once scheduled, the patient will stay on Xarelto up until implant. 2 weeks prior to the procedure She will also start aspirin 81 mg daily. After implant, will remain on aspirin 81 mg daily in addition to Xarelto for 6 weeks. The patient will then stop Xarelto and start DAPT with aspirin 81 mg daily and Plavix 75 mg daily until 6 months post procedure. She will then stop Plavix and remain on aspirin 81 mg daily indefinitely.  She will have a CECILIA or CTA approximately 3 months and 1 year post-implant.  She understands that the risks of the procedure are <2% and include, but are not limited to device embolization, air embolism, myocardial perforation, device thrombosis, ASD, stroke, or death.  We discussed expected recovery and follow-up.       The patient is a good candidate for proceeding with left atrial appendage implant.  Her questions were answered to her satisfaction.      2.  Chronic heart failure with preserved ejection fraction - compensated.  Continue Lasix 20 mg daily    3.  Hypertension - controlled.  Continue current dose of losartan       History of Present Illness/Subjective    Debra Manriquez is a 84 year old female who comes in today for Watchman Consult.  She is accompanied by her caregiver Eden in person and her daughter Blanca over the phone for today's visit.    Debra Manriquez has a past history of  paroxysmal atrial fibrillation, chronic heart failure with preserved ejection fraction, hypertension, obstructive sleep apnea (on CPAP), chronic lymphedema    She was diagnosed with paroxysmal atrial fibrillation in November 2022 by [employer].  She is currently on chronic anticoagulation with Xarelto.  She has had some issues with vaginal bleeding for which she has been treated with over the past several years.  She also reports occasional rectal bleeding.  She denies history of hospitalization or blood transfusions for significant bleeding.  She reports occasional bruising.  She does have some balance issues and requires a walker for ambulation.  She did have a fall 1 year ago when trying to sit down in a chair.  She did not sustain any injuries from this fall.  She fortunately denies having any recent falls.  She denies NSAIDs, aspirin, or alcohol use.  She denies any painful or difficulty swallowing.  She denies a previous history of stroke, DVT, or PE.  She denies any immediate family history of frequent nosebleeds or prolonged bleeding.  Her daughter reports patient has had some issues with ambulation after a knee procedure in the past and felt patient was discharged too soon. Patient would prefer to have Dr. Mcallister or Dr. Chadwick perform Watchman procedure.  She has some chronic swelling to her lower extremities.  She reports dyspnea on exertion and has had cardiac workup for this. Her daughter reports patient will have more follow-up for her ROMAN.    Medical, surgical, family, social history, and medications were reviewed and updated as necessary.    ECHO results (from 6/26/2024):  Interpretation Summary     1. Normal left ventricular size and systolic performance with a visually  estimated ejection fraction of 60-65%.  2. There is mild concentric increase in left ventricular wall thickness.  3. There is mild aortic insufficiency.  4. Normal right ventricular size and systolic performance.  5. There is  "mild left atrial enlargement.  6. Right ventricular systolic pressure relative to right atrial pressure is  mildly increased. The pulmonary artery pressure is estimated to be 35-40 mmHg  plus right atrial pressure (the IVC is of normal caliber).         Physical Examination Review of Systems   Vitals: /80 (BP Location: Right arm, Patient Position: Sitting, Cuff Size: Adult Large)   Pulse 73   Resp 18   Ht 1.676 m (5' 6\")   Wt 119.3 kg (263 lb)   SpO2 95%   BMI 42.45 kg/m    BMI= Body mass index is 42.45 kg/m .  Wt Readings from Last 3 Encounters:   08/14/24 119.3 kg (263 lb)   08/06/24 120.7 kg (266 lb)   06/03/24 120.9 kg (266 lb 8 oz)       General Appearance:   Alert, cooperative and in no acute distress   ENT/Mouth: membranes moist, no oral lesions or bleeding gums.      EYES:  no scleral icterus, normal conjunctivae   Neck: Thyroid not visualized   Chest/Lungs:   lungs are clear to auscultation, no rales or wheezing   Cardiovascular:   Irregular. Normal first and second heart sounds with no murmurs, rubs or gallops; the carotid, radial and posterior tibial pulses are intact, mild edema bilaterally    Abdomen:  Soft and nontender. Bowel sounds are present in all quadrants   Extremities: no cyanosis or clubbing   Skin: no xanthelasma, warm.    Neurologic: normal gait, normal  bilateral, no tremors   Psychiatric: Normal mood and affect       Please refer above for cardiac ROS details.      Medical History  Surgical History Family History Social History   Past Medical History:   Diagnosis Date    Antiplatelet or antithrombotic long-term use     Chronic acquired lymphedema     Edema     Falls     Fibrocystic breast disease     Fibrocystic breast disease     Foot pain     Gastroesophageal reflux disease     GERD (gastroesophageal reflux disease)     Hyperlipidemia     Hypertension     Irregular heart beat     Lymphedema     Mitral valve disorder     Obese     JADYN (obstructive sleep apnea)     " Osteoarthritis of knee     hx of knee replacement and pending second    Osteopenia     Skin cancer     Skin cancer, basal cell     Sleep apnea     Thyroid nodule     Thyroid nodule     Vitamin B deficiency      Past Surgical History:   Procedure Laterality Date    ARTHROPLASTY KNEE Right 3/7/2023    Procedure: RIGHT TOTAL KNEE ARTHROPLASTY;  Surgeon: Juan Corbin MD;  Location: [facility] Main OR    CATARACT EXTRACTION  2011, 2012    COLONOSCOPY      EXCISE TOENAIL(S) Bilateral 8/16/2019    Procedure: MATRIXECTOMY BILATERAL FEET; TOES 1,2,3,4 ON LEFT FOOT,  1,2 ON RIGHT FOOT;  BOTH NAIL BORDERS;  Surgeon: Opal Kam DPM;  Location:  OR    EYE SURGERY      cataract    GI SURGERY      GYN SURGERY      hysterectomy    INJECT STEROID (LOCATION) Right 8/16/2019    Procedure: CORTIZONE INJECTION RIGHT HEEL;  Surgeon: Opal Kam DPM;  Location:  OR    ORTHOPEDIC SURGERY Left     knee replacement    PARTIAL HYSTERECTOMY      STRIP VEIN Bilateral 1975    TOTAL KNEE ARTHROPLASTY Left 2011     Family History   Problem Relation Age of Onset    No Known Problems Mother     No Known Problems Father     Alzheimer Disease Maternal Uncle     Heart Disease Sister     Heart Disease Brother     No Known Problems Daughter     No Known Problems Son     No Known Problems Brother     No Known Problems Son     No Known Problems Son     Social History     Socioeconomic History    Marital status:      Spouse name: Not on file    Number of children: Not on file    Years of education: Not on file    Highest education level: Not on file   Occupational History    Not on file   Tobacco Use    Smoking status: Never     Passive exposure: Past    Smokeless tobacco: Never   Vaping Use    Vaping status: Never Used   Substance and Sexual Activity    Alcohol use: Yes     Alcohol/week: 0.8 standard drinks of alcohol     Comment: Alcoholic Drinks/day: 1-2 glasses per month    Drug use: Never    Sexual activity:  Yes   Other Topics Concern    Parent/sibling w/ CABG, MI or angioplasty before 65F 55M? Not Asked   Social History Narrative    . 4 kids.  Teaches college English, reading.      Social Determinants of Health     Financial Resource Strain: High Risk (1/1/2022)    [facility]    Financial Resource Strain     Difficulty of Paying Living Expenses: Not on file     Difficulty of Paying Living Expenses: Not on file   Food Insecurity: Not on file   Transportation Needs: Not on file   Physical Activity: Not on file   Stress: Not on file   Social Connections: Unknown (1/1/2022)    Received from [facility], [facility]    Social Connections     Frequency of Communication with Friends and Family: Not on file   Interpersonal Safety: Not on file   Housing Stability: Not on file          Medications  Allergies   Current Outpatient Medications   Medication Sig Dispense Refill    amoxicillin (AMOXIL) 500 MG tablet TAKE 4 TABLETS BY MOUTH 1 HOUR BEFORE DENTAL APPOINTMENT      biotin 1000 MCG TABS tablet Take 1,000 mcg by mouth daily      cholecalciferol (VITAMIN D3) 5000 units TABS tablet Take 2 tablets by mouth daily      Coenzyme Q10 400 MG CAPS Take 400 mg by mouth 2 times daily      cyanocobalamin (VITAMIN B-12) 1000 MCG tablet Take 1,000 mcg by mouth daily      denosumab (PROLIA) 60 MG/ML SOSY injection Inject 60 mg subcutaneously every 6 months As of 3/14/2023, please hold and pause this medication while at the transitional care unit      estradiol (ESTRACE) 0.1 MG/GM vaginal cream Place vaginally three times a week      furosemide (LASIX) 20 MG tablet Take 20 mg by mouth daily      lactobacillus TABS Take 2 capsules by mouth daily      levothyroxine (SYNTHROID/LEVOTHROID) 112 MCG tablet Take 112 mcg by mouth daily      losartan (COZAAR) 50 MG tablet Take 50 mg  "by mouth 2 times daily      rivaroxaban ANTICOAGULANT (XARELTO) 20 MG TABS tablet Take 1 tablet (20 mg) by mouth daily (with dinner)      rosuvastatin (CRESTOR) 20 MG tablet Take 1 tablet (20 mg) by mouth daily 90 tablet 2    Allergies   Allergen Reactions    Lisinopril Cough    Reglan [Metoclopramide] Other (See Comments)     depression         Lab Results    Chemistry/lipid CBC Cardiac Enzymes/BNP/TSH/INR   Recent Labs   Lab Test 02/22/24  1430   CHOL 129   HDL 61   LDL 52   TRIG 78     Recent Labs   Lab Test 02/22/24  1430 05/26/21  1100 11/30/20  0900   LDL 52 88 124     Recent Labs   Lab Test 05/13/24  0925      POTASSIUM 4.5   CHLORIDE 106   CO2 25   GLC 99   BUN 23.1*   CR 0.79   GFRESTIMATED 72   DAMARIS 9.6     Recent Labs   Lab Test 05/13/24  0925 06/20/23  1633 03/17/23  1138   CR 0.79 0.68 0.70     No results for input(s): \"A1C\" in the last 64416 hours. Recent Labs   Lab Test 03/20/23  0836 03/17/23  1138   WBC  --  5.7   HGB 11.4* 10.8*   HCT  --  35.2   MCV  --  90   PLT  --  268     Recent Labs   Lab Test 03/20/23  0836 03/17/23  1138 03/10/23  0808   HGB 11.4* 10.8* 11.0*    No results for input(s): \"TROPONINI\" in the last 74300 hours.  Recent Labs   Lab Test 06/03/24  1112   NTBNP 143     Recent Labs   Lab Test 02/22/23  1551   TSH 1.68     No results for input(s): \"INR\" in the last 33499 hours.     45 minutes spent on the date of encounter doing education, chart prep/review, review of outside records, review of test results, interpretation with above tests, patient visit, documentation, and discussion with family.      This note has been dictated using voice recognition software. Any grammatical or context distortions are unintentional and inherent to the software.    Gloria Rico PA-C  Structural Heart Program  [employer]    "

## 2024-08-14 NOTE — PATIENT INSTRUCTIONS
Debra Manriquez,    It was a pleasure to see you today in the clinic regarding your Watchman Consult.     My recommendations after this visit include:     - no medication changes      If you have questions or concerns, please call using the numbers below:    After Hours/Scheduling  [phone]    Otherwise you can dial the nurse directly at:                JEAN CLAUDE Boyle RN  [phone]    Gloria Rico PA-C  Structural Heart Program  [facility]

## 2024-08-29 ENCOUNTER — LAB REQUISITION (OUTPATIENT)
Dept: LAB | Facility: CLINIC | Age: 84
End: 2024-08-29
Payer: MEDICARE

## 2024-08-29 ENCOUNTER — TRANSFERRED RECORDS (OUTPATIENT)
Dept: HEALTH INFORMATION MANAGEMENT | Facility: CLINIC | Age: 84
End: 2024-08-29

## 2024-08-29 DIAGNOSIS — Z00.00 ENCOUNTER FOR GENERAL ADULT MEDICAL EXAMINATION WITHOUT ABNORMAL FINDINGS: ICD-10-CM

## 2024-08-29 LAB
ALBUMIN SERPL BCG-MCNC: 4.1 G/DL (ref 3.5–5.2)
ALP SERPL-CCNC: 67 U/L (ref 40–150)
ALT SERPL W P-5'-P-CCNC: 23 U/L (ref 0–50)
ANION GAP SERPL CALCULATED.3IONS-SCNC: 12 MMOL/L (ref 7–15)
AST SERPL W P-5'-P-CCNC: 22 U/L (ref 0–45)
BILIRUB SERPL-MCNC: 0.8 MG/DL
BUN SERPL-MCNC: 22 MG/DL (ref 8–23)
CALCIUM SERPL-MCNC: 9 MG/DL (ref 8.8–10.4)
CHLORIDE SERPL-SCNC: 110 MMOL/L (ref 98–107)
CHOLEST SERPL-MCNC: 120 MG/DL
CREAT SERPL-MCNC: 0.73 MG/DL (ref 0.51–0.95)
EGFRCR SERPLBLD CKD-EPI 2021: 80 ML/MIN/1.73M2
FASTING STATUS PATIENT QL REPORTED: ABNORMAL
FASTING STATUS PATIENT QL REPORTED: NORMAL
GLUCOSE SERPL-MCNC: 91 MG/DL (ref 70–99)
HCO3 SERPL-SCNC: 26 MMOL/L (ref 22–29)
HDLC SERPL-MCNC: 51 MG/DL
LDLC SERPL CALC-MCNC: 56 MG/DL
NONHDLC SERPL-MCNC: 69 MG/DL
POTASSIUM SERPL-SCNC: 4.7 MMOL/L (ref 3.4–5.3)
PROT SERPL-MCNC: 7 G/DL (ref 6.4–8.3)
SODIUM SERPL-SCNC: 148 MMOL/L (ref 135–145)
TRIGL SERPL-MCNC: 67 MG/DL
VIT D+METAB SERPL-MCNC: 76 NG/ML (ref 20–50)

## 2024-08-29 PROCEDURE — 80053 COMPREHEN METABOLIC PANEL: CPT | Mod: ORL | Performed by: FAMILY MEDICINE

## 2024-08-29 PROCEDURE — 82306 VITAMIN D 25 HYDROXY: CPT | Mod: ORL | Performed by: FAMILY MEDICINE

## 2024-08-29 PROCEDURE — 80061 LIPID PANEL: CPT | Mod: ORL | Performed by: FAMILY MEDICINE

## 2024-09-03 ENCOUNTER — TELEPHONE (OUTPATIENT)
Dept: CARDIOLOGY | Facility: CLINIC | Age: 84
End: 2024-09-03
Payer: MEDICARE

## 2024-09-03 DIAGNOSIS — I48.0 PAROXYSMAL ATRIAL FIBRILLATION (H): Primary | ICD-10-CM

## 2024-09-03 NOTE — TELEPHONE ENCOUNTER
"LM for return call. [facility]    ----- Message from Manju Medina sent at 8/30/2024  4:55 PM CDT -----  Regarding: RE: SDM for LAAC  done  ----- Message -----  From: Donna Vu RN  Sent: 8/14/2024  12:37 PM CDT  To: Manju Medina MD  Subject: SDM for LAAC                                     Hi Dr Medina,    Pt interested in moving forward with Watchman procedure.  She saw you in clinic on 6/03/24, please addend your office visit note to include \".[email or web address].\"     Thank you,    Donna Newell RN  Structural Heart Coordinator  "

## 2024-09-04 NOTE — TELEPHONE ENCOUNTER
H&P:  Card OV  Date:  Structural MAURO [x] LAAC  Order Case Req Y  Order Set: to be placed on completion of pre-op Intra-Op CECILIA Order? Y 3 month post-LAAC imaging order? Y     AC Xarelto   Antiplatelet ASA 81-Start two weeks prior to procedure   DM/GLP-1 DM Meds- None  GLP-1- None   ED Meds NONE - NONE     Debra Manriquez, 1940, [id number]  Home:[phone] (home) Cell:[phone] (mobile)  Emergency Contact: Blanca Blake   PCP: Yoanna Mcpherson, [phone]    Important patient information for CSC/Cath Lab staff : None    LAAC Cath Lab Procedure Order   LAAC Type:  [facility]-WM  Implanting Provider: Next available (no preference)  Date Ordered and Prepped: 9/4/2024 Rachel Jacobo RN    Scheduling Information:  Anticipated Case Per Day:  Standard WM (4 cases per day)   Scheduling Timeframe:   Patient would like to go before end of September if there are openings  Scheduling Restrictions: Pt to start antiplatelet 81 mg ASA two weeks prior to procedure, please schedule accordingly to allow time for this.  and Patient has Heart Failure.   Shared Decision Making Completed?: Done 6/3/2024 by Dr. Medina  Current Device/Device Co Needed for Procedure:  None - None  Intra-Op CECILIA needed?: Yes, order placed  Anesthesia: General Anesthesia  Overnight stay required?:[facility] - WM case, no overnight stay anticipated      [facility] EP Cath Lab Prep   H&P:  Schedule H&P with Structural MAURO, RN Teach, and Labs within 30 days of LAAC  Pre-op Labs: CBC, BMP, Lab 276 (T&S), and INR if on Warfarin, if not completed at pre-op H&P within 7 days of procedure.  Medical Records Pertinent for Procedure:  NONE  Iodinated Contrast Dye Allergies (Does not include Shellfish, Egg, and/or Iodine Allergy): No known allergy to contrast  GLP-1 Protocol: If on Dulaglutide (Trulicity) (weekly)- Injection hold 7 days prior to procedure  , Exenatide extended release (Bydureon bcise) (weekly)- Injection hold 7 days prior to procedure, Exenatide (Byetta)  (twice daily)- Oral Tablet hold day prior and morning of procedure and for Injection hold 7 days prior to procedure, Semaglutide (Ozempic) (weekly)- Injection and Oral hold 7 days prior to procedure, Liraglutide (Victoza, Saxenda) (daily)- Injection hold day prior and morning of procedure  Post-LAAC RN Phone Call: PING - WM: please place on LAAC RN schedule for the day following LAAC, time based on case order  Post-LAAC Follow Up Plan: All patients, regardless of vendor, to be seen at 45 days post-LAAC with Structural MAURO in clinic  Post-LAAC Imaging?: CT Pulm Vein at 3 months, and again at one year post-implant.      Allergies   Allergen Reactions    Lisinopril Cough    Reglan [Metoclopramide] Other (See Comments)     depression       Current Outpatient Medications:     amoxicillin (AMOXIL) 500 MG tablet, TAKE 4 TABLETS BY MOUTH 1 HOUR BEFORE DENTAL APPOINTMENT, Disp: , Rfl:     biotin 1000 MCG TABS tablet, Take 1,000 mcg by mouth daily, Disp: , Rfl:     cholecalciferol (VITAMIN D3) 5000 units TABS tablet, Take 2 tablets by mouth daily, Disp: , Rfl:     Coenzyme Q10 400 MG CAPS, Take 400 mg by mouth 2 times daily, Disp: , Rfl:     cyanocobalamin (VITAMIN B-12) 1000 MCG tablet, Take 1,000 mcg by mouth daily, Disp: , Rfl:     denosumab (PROLIA) 60 MG/ML SOSY injection, Inject 60 mg subcutaneously every 6 months As of 3/14/2023, please hold and pause this medication while at the transitional care unit, Disp: , Rfl:     estradiol (ESTRACE) 0.1 MG/GM vaginal cream, Place vaginally three times a week, Disp: , Rfl:     furosemide (LASIX) 20 MG tablet, Take 20 mg by mouth daily, Disp: , Rfl:     lactobacillus TABS, Take 2 capsules by mouth daily, Disp: , Rfl:     levothyroxine (SYNTHROID/LEVOTHROID) 112 MCG tablet, Take 112 mcg by mouth daily, Disp: , Rfl:     losartan (COZAAR) 50 MG tablet, Take 50 mg by mouth 2 times daily, Disp: , Rfl:     rivaroxaban ANTICOAGULANT (XARELTO) 20 MG TABS tablet, Take 1 tablet (20 mg) by  mouth daily (with dinner), Disp: , Rfl:     rosuvastatin (CRESTOR) 20 MG tablet, Take 1 tablet (20 mg) by mouth daily, Disp: 90 tablet, Rfl: 2    Documentation Date:9/4/2024 2:22 PM  Rachel Jacobo RN

## 2024-09-04 NOTE — TELEPHONE ENCOUNTER
Phone call to patient daughter Blanca. She and patient are very interested in LAAC scheduling. They would ideally like to go before the end of the month. Will place orders and let scheduling know patient is eager to be scheduled before end of month. She is appreciative and has writer's number for further questions or concerns. No further questions at this time. [facility]   Pre/Post Treatment Evaluation      Pre-Treatment Lung Evaluation: Clear to auscultation bilaterally    Left Ear Pre-Treatment Evaluation  Left Ear Clear: Yes  Left Ear Intact: Yes  Left Cerumen Removal: No  Pre Left teed stgstrstastdstest:st st1st grade    Right Ear Pre-Treatment Evaluation  Right Ear Clear: Yes  Right Ear Intact: Yes  Right Cerumen Removal: No  Pre Right teed stgstrstastdstest:st st1st grade      Post-Treatment Lung Evaluation: Clear to auscultation bilaterally    Left Ear Post-Treatment Evaluation  Left Ear Clear: Yes  Left Ear Intact: Yes  Left Cerumen Removal: No  Post Left teed stgstrstastdstest:st st1st grade    Right Ear Post-Treatment Evaluation  Right Ear Clear: Yes  Right Ear Intact: Yes  Right Cerumen Removal: No  Post Right teed stgstrstastdstest:st st1st grade      I supervised this Hyperbaric treatment and was immediately available throughout the course of the treatment.  There is a trained emergency support team and ICU available at this facility to assist with complications.    Juan C Crandall MD, 01/03/24, 1:06 PM

## 2024-09-13 DIAGNOSIS — I48.0 PAROXYSMAL ATRIAL FIBRILLATION (H): Primary | ICD-10-CM

## 2024-09-16 LAB
ABO/RH(D): NORMAL
ANTIBODY SCREEN: NEGATIVE
SPECIMEN EXPIRATION DATE: NORMAL

## 2024-09-17 ENCOUNTER — LAB (OUTPATIENT)
Dept: CARDIOLOGY | Facility: CLINIC | Age: 84
End: 2024-09-17
Payer: MEDICARE

## 2024-09-17 ENCOUNTER — OFFICE VISIT (OUTPATIENT)
Dept: CARDIOLOGY | Facility: CLINIC | Age: 84
End: 2024-09-17
Payer: MEDICARE

## 2024-09-17 ENCOUNTER — DOCUMENTATION ONLY (OUTPATIENT)
Dept: CARDIOLOGY | Facility: CLINIC | Age: 84
End: 2024-09-17

## 2024-09-17 ENCOUNTER — ALLIED HEALTH/NURSE VISIT (OUTPATIENT)
Dept: CARDIOLOGY | Facility: CLINIC | Age: 84
End: 2024-09-17
Payer: MEDICARE

## 2024-09-17 ENCOUNTER — TRANSCRIBE ORDERS (OUTPATIENT)
Dept: CARDIOLOGY | Facility: CLINIC | Age: 84
End: 2024-09-17

## 2024-09-17 ENCOUNTER — PREP FOR PROCEDURE (OUTPATIENT)
Dept: CARDIOLOGY | Facility: CLINIC | Age: 84
End: 2024-09-17

## 2024-09-17 VITALS
HEART RATE: 58 BPM | DIASTOLIC BLOOD PRESSURE: 80 MMHG | SYSTOLIC BLOOD PRESSURE: 138 MMHG | WEIGHT: 265 LBS | HEIGHT: 66 IN | RESPIRATION RATE: 16 BRPM | BODY MASS INDEX: 42.59 KG/M2

## 2024-09-17 DIAGNOSIS — I48.0 PAROXYSMAL ATRIAL FIBRILLATION (H): Primary | ICD-10-CM

## 2024-09-17 DIAGNOSIS — I50.32 CHRONIC HEART FAILURE WITH PRESERVED EJECTION FRACTION (HFPEF) (H): ICD-10-CM

## 2024-09-17 DIAGNOSIS — I48.0 PAROXYSMAL ATRIAL FIBRILLATION (H): ICD-10-CM

## 2024-09-17 DIAGNOSIS — I10 ESSENTIAL HYPERTENSION: ICD-10-CM

## 2024-09-17 LAB
ANION GAP SERPL CALCULATED.3IONS-SCNC: 10 MMOL/L (ref 7–15)
ATRIAL RATE - MUSE: 58 BPM
BUN SERPL-MCNC: 18.4 MG/DL (ref 8–23)
CALCIUM SERPL-MCNC: 8.7 MG/DL (ref 8.8–10.4)
CHLORIDE SERPL-SCNC: 108 MMOL/L (ref 98–107)
CREAT SERPL-MCNC: 0.79 MG/DL (ref 0.51–0.95)
DIASTOLIC BLOOD PRESSURE - MUSE: NORMAL MMHG
EGFRCR SERPLBLD CKD-EPI 2021: 72 ML/MIN/1.73M2
ERYTHROCYTE [DISTWIDTH] IN BLOOD BY AUTOMATED COUNT: 14.6 % (ref 10–15)
GLUCOSE SERPL-MCNC: 94 MG/DL (ref 70–99)
HCO3 SERPL-SCNC: 25 MMOL/L (ref 22–29)
HCT VFR BLD AUTO: 43.5 % (ref 35–47)
HGB BLD-MCNC: 14.6 G/DL (ref 11.7–15.7)
INTERPRETATION ECG - MUSE: NORMAL
MCH RBC QN AUTO: 29 PG (ref 26.5–33)
MCHC RBC AUTO-ENTMCNC: 33.6 G/DL (ref 31.5–36.5)
MCV RBC AUTO: 86 FL (ref 78–100)
P AXIS - MUSE: 55 DEGREES
PLATELET # BLD AUTO: 144 10E3/UL (ref 150–450)
POTASSIUM SERPL-SCNC: 5 MMOL/L (ref 3.4–5.3)
PR INTERVAL - MUSE: 172 MS
QRS DURATION - MUSE: 92 MS
QT - MUSE: 446 MS
QTC - MUSE: 437 MS
R AXIS - MUSE: -31 DEGREES
RBC # BLD AUTO: 5.04 10E6/UL (ref 3.8–5.2)
SODIUM SERPL-SCNC: 143 MMOL/L (ref 135–145)
SYSTOLIC BLOOD PRESSURE - MUSE: NORMAL MMHG
T AXIS - MUSE: 45 DEGREES
VENTRICULAR RATE- MUSE: 58 BPM
WBC # BLD AUTO: 5.6 10E3/UL (ref 4–11)

## 2024-09-17 PROCEDURE — 36415 COLL VENOUS BLD VENIPUNCTURE: CPT | Performed by: PHYSICIAN ASSISTANT

## 2024-09-17 PROCEDURE — 86900 BLOOD TYPING SEROLOGIC ABO: CPT | Performed by: PHYSICIAN ASSISTANT

## 2024-09-17 PROCEDURE — 93000 ELECTROCARDIOGRAM COMPLETE: CPT | Performed by: STUDENT IN AN ORGANIZED HEALTH CARE EDUCATION/TRAINING PROGRAM

## 2024-09-17 PROCEDURE — 99207 PR NO CHARGE NURSE ONLY: CPT

## 2024-09-17 PROCEDURE — 85027 COMPLETE CBC AUTOMATED: CPT | Performed by: PHYSICIAN ASSISTANT

## 2024-09-17 PROCEDURE — 99215 OFFICE O/P EST HI 40 MIN: CPT | Performed by: PHYSICIAN ASSISTANT

## 2024-09-17 PROCEDURE — 86901 BLOOD TYPING SEROLOGIC RH(D): CPT | Performed by: PHYSICIAN ASSISTANT

## 2024-09-17 PROCEDURE — G2211 COMPLEX E/M VISIT ADD ON: HCPCS | Performed by: PHYSICIAN ASSISTANT

## 2024-09-17 PROCEDURE — 80048 BASIC METABOLIC PNL TOTAL CA: CPT | Performed by: PHYSICIAN ASSISTANT

## 2024-09-17 PROCEDURE — 86850 RBC ANTIBODY SCREEN: CPT | Performed by: PHYSICIAN ASSISTANT

## 2024-09-17 RX ORDER — LIDOCAINE 40 MG/G
CREAM TOPICAL
Status: CANCELLED | OUTPATIENT
Start: 2024-09-17

## 2024-09-17 RX ORDER — ASPIRIN 81 MG/1
81 TABLET ORAL ONCE
Status: CANCELLED | OUTPATIENT
Start: 2024-09-17 | End: 2024-09-17

## 2024-09-17 RX ORDER — CEFAZOLIN SODIUM/WATER 3 G/30 ML
3 SYRINGE (ML) INTRAVENOUS
Status: CANCELLED | OUTPATIENT
Start: 2024-09-18

## 2024-09-17 NOTE — LETTER
9/17/2024    Yoanna Mcpherson MD  [address] [facility] [address]    RE: Debra Manriquez       Dear Colleague,     I had the pleasure of seeing Debra Manriquez in the [facility]alth [facility].  HEART CARE ENCOUNTER NOTE       [facility]  [phone]      Assessment/Recommendations   1.  Paroxysmal atrial fibrillation: I have personally reviewed this patient's chart and have spoken with the patient about the treatment options, including PATEL device.  She has a [id number]-YENd score of at least 5 for age >75, CHF, HTN, female gender.  She has a HAS-BLED score of 2 for age and bleeding predisposition.   She is not a good candidate for long-term anticoagulation due tobalance issues which increases her risk for falls.    Once scheduled, the patient will stay on Xarelto up until implant.She has been taking aspirin 81 mg daily in anticipation for the procedure.  After implant, the patient will remain on aspirin 81 mg daily indefinitely and Xarelto for 6 weeks.  The patient will then stop Xarelto and start Plavix 75 mg daily for approximately 4 months. She will then stop Plavix and remain on aspirin 81 mg daily indefinitely.  She will have a CECILIA or CTA approximately 3 months and 1 year post-implant.  She understands that the risks of the procedure are <2% and include, but are not limited to device embolization, air embolism, myocardial perforation, device thrombosis, ASD, stroke, or death.  We discussed expected recovery and follow-up.       The patient is a good candidate for proceeding with left atrial appendage screening and implant.  Her questions were answered to her satisfaction.  Written consents have been signed and witnessed by me.  Her labs will be reviewed when resulted.  Her EKG has been reviewed.     2.  Chronic heart failure with preserved ejection fraction - currently compensated. Continue Lasix 20 mg daily    3.  Hypertension -reasonably controlled on losartan 50 mg twice  daily    The longitudinal plan of care for the diagnosis(es)/condition(s) as documented were addressed during this visit. Due to the added complexity in care, I will continue to support Debra in the subsequent management and with ongoing continuity of care.        History of Present Illness/Subjective    Debra Manriquez is a 84 year old female who comes in today for history and physical prior to insertion of left atrial appendage occulsion device.  She is accompanied by her caregiver for today's visit.    Debra Manriquez has a past history of paroxysmal atrial fibrillation, chronic heart failure with preserved ejection fraction, hypertension, obstructive sleep apnea (on CPAP), chronic lymphedema, hypothyroidism    She is currently taking Xarelto and has recently started taking aspirin 81 mg daily in anticipation for the procedure.  She currently denies bleeding issues.  But she has had issues with vaginal and rectal bleeding in the past.  She does use a walker to assist with ambulation.  She has not had any recent falls.  She did have a fall approximately 1 year ago when trying to sit down in a chair.  She denies any painful or difficulty swallowing.  She reports chronic dyspnea on exertion which she reports has been stable.  She does feel that her lack of exercise contributes to this.  She does report sleeping at a slight incline at night and this may help with her breathing.  She wears a CPAP at night.  She denies chest discomfort, dizziness, lightheadedness.  Her weight has been stable.  She reports a good appetite.    Medical, surgical, family, social history, and medications were reviewed and updated as necessary.    ECHO results (from 6/26/2024):  Interpretation Summary     1. Normal left ventricular size and systolic performance with a visually  estimated ejection fraction of 60-65%.  2. There is mild concentric increase in left ventricular wall thickness.  3. There is mild aortic insufficiency.  4. Normal  "right ventricular size and systolic performance.  5. There is mild left atrial enlargement.  6. Right ventricular systolic pressure relative to right atrial pressure is  mildly increased. The pulmonary artery pressure is estimated to be 35-40 mmHg  plus right atrial pressure (the IVC is of normal caliber).     When compared to the prior real-time echocardiogram dated 15 July 2022, there  has been no major interval change.    EKG (personally reviewed and interpreted):  Sinus bradycardia  Ventricular rate 58, , QRS 92, QT/QTc 446/437       Physical Examination Review of Systems   Vitals: /80 (BP Location: Left arm, Patient Position: Sitting, Cuff Size: Adult Large)   Pulse 58   Resp 16   Ht 1.676 m (5' 6\")   Wt 120.2 kg (265 lb)   BMI 42.77 kg/m    BMI= Body mass index is 42.77 kg/m .  Wt Readings from Last 3 Encounters:   09/17/24 120.2 kg (265 lb)   08/14/24 119.3 kg (263 lb)   08/06/24 120.7 kg (266 lb)       General Appearance:   Alert, cooperative and in no acute distress   ENT/Mouth: membranes moist, no oral lesions or bleeding gums.      EYES:  no scleral icterus, normal conjunctivae   Neck: Thyroid not visualized   Chest/Lungs:   lungs are clear to auscultation, no rales or wheezing   Cardiovascular:   Regular. Normal first and second heart sounds with no murmurs, rubs or gallops; the carotid, radial and posterior tibial pulses are intact, mild edema bilaterally    Abdomen:  Soft and nontender. Bowel sounds are present in all quadrants   Extremities: no cyanosis or clubbing   Skin: no xanthelasma, warm.    Neurologic: normal gait, normal  bilateral, no tremors   Psychiatric: Normal mood and affect       Please refer above for cardiac ROS details.      Medical History  Surgical History Family History Social History   Past Medical History:   Diagnosis Date     Antiplatelet or antithrombotic long-term use      Chronic acquired lymphedema      Edema      Falls      Fibrocystic breast disease  "     Fibrocystic breast disease      Foot pain      Gastroesophageal reflux disease      GERD (gastroesophageal reflux disease)      Hyperlipidemia      Hypertension      Irregular heart beat      Lymphedema      Mitral valve disorder      Obese      JADYN (obstructive sleep apnea)      Osteoarthritis of knee     hx of knee replacement and pending second     Osteopenia      Skin cancer      Skin cancer, basal cell      Sleep apnea      Thyroid nodule      Thyroid nodule      Vitamin B deficiency      Past Surgical History:   Procedure Laterality Date     ARTHROPLASTY KNEE Right 3/7/2023    Procedure: RIGHT TOTAL KNEE ARTHROPLASTY;  Surgeon: Juan Corbin MD;  Location: [facility] Main OR     CATARACT EXTRACTION  2011, 2012     COLONOSCOPY       EXCISE TOENAIL(S) Bilateral 8/16/2019    Procedure: MATRIXECTOMY BILATERAL FEET; TOES 1,2,3,4 ON LEFT FOOT,  1,2 ON RIGHT FOOT;  BOTH NAIL BORDERS;  Surgeon: Opal Kam DPM;  Location:  OR     EYE SURGERY      cataract     GI SURGERY       GYN SURGERY      hysterectomy     INJECT STEROID (LOCATION) Right 8/16/2019    Procedure: CORTIZONE INJECTION RIGHT HEEL;  Surgeon: Opal Kam DPM;  Location:  OR     ORTHOPEDIC SURGERY Left     knee replacement     PARTIAL HYSTERECTOMY       STRIP VEIN Bilateral 1975     TOTAL KNEE ARTHROPLASTY Left 2011     Family History   Problem Relation Age of Onset     No Known Problems Mother      No Known Problems Father      Alzheimer Disease Maternal Uncle      Heart Disease Sister      Heart Disease Brother      No Known Problems Daughter      No Known Problems Son      No Known Problems Brother      No Known Problems Son      No Known Problems Son     Social History     Socioeconomic History     Marital status:      Spouse name: Not on file     Number of children: Not on file     Years of education: Not on file     Highest education level: Not on file   Occupational History     Not on file   Tobacco Use      Smoking status: Never     Passive exposure: Past     Smokeless tobacco: Never   Vaping Use     Vaping status: Never Used   Substance and Sexual Activity     Alcohol use: Yes     Alcohol/week: 0.8 standard drinks of alcohol     Comment: Alcoholic Drinks/day: 1-2 glasses per month     Drug use: Never     Sexual activity: Yes   Other Topics Concern     Parent/sibling w/ CABG, MI or angioplasty before 65F 55M? Not Asked   Social History Narrative    . 4 kids.  Teaches college English, reading.      Social Determinants of Health     Financial Resource Strain: High Risk (1/1/2022)    Received from [employer]    Financial Resource Strain      Difficulty of Paying Living Expenses: Not on file      Difficulty of Paying Living Expenses: Not on file   Food Insecurity: Not on file   Transportation Needs: Not on file   Physical Activity: Not on file   Stress: Not on file   Social Connections: Unknown (1/1/2022)    Received from [employer]    Social Connections      Frequency of Communication with Friends and Family: Not on file   Interpersonal Safety: Not on file   Housing Stability: Not on file          Medications  Allergies   Current Outpatient Medications   Medication Sig Dispense Refill     amoxicillin (AMOXIL) 500 MG tablet TAKE 4 TABLETS BY MOUTH 1 HOUR BEFORE DENTAL APPOINTMENT       Coenzyme Q10 400 MG CAPS Take 400 mg by mouth 2 times daily       cyanocobalamin (VITAMIN B-12) 1000 MCG tablet Take 1,000 mcg by mouth daily       denosumab (PROLIA) 60 MG/ML SOSY injection Inject 60 mg subcutaneously every 6 months As of 3/14/2023, please hold and pause this medication while at the transitional care unit       estradiol (ESTRACE) 0.1 MG/GM vaginal cream Place vaginally three times a week       furosemide (LASIX) 20 MG tablet Take 20 mg by mouth daily    "    lactobacillus TABS Take 2 capsules by mouth daily       levothyroxine (SYNTHROID/LEVOTHROID) 112 MCG tablet Take 112 mcg by mouth daily       losartan (COZAAR) 50 MG tablet Take 50 mg by mouth 2 times daily       rivaroxaban ANTICOAGULANT (XARELTO) 20 MG TABS tablet Take 1 tablet (20 mg) by mouth daily (with dinner)       rosuvastatin (CRESTOR) 20 MG tablet Take 1 tablet (20 mg) by mouth daily 90 tablet 2     biotin 1000 MCG TABS tablet Take 1,000 mcg by mouth daily (Patient not taking: Reported on 9/17/2024)       cholecalciferol (VITAMIN D3) 5000 units TABS tablet Take 2 tablets by mouth daily (Patient not taking: Reported on 9/17/2024)      Allergies   Allergen Reactions     Lisinopril Cough     Reglan [Metoclopramide] Other (See Comments)     depression         Lab Results    Chemistry/lipid CBC Cardiac Enzymes/BNP/TSH/INR   Recent Labs   Lab Test 08/29/24  1056   CHOL 120   HDL 51   LDL 56   TRIG 67     Recent Labs   Lab Test 08/29/24  1056 02/22/24  1430 05/26/21  1100   LDL 56 52 88     Recent Labs   Lab Test 08/29/24  1056   *   POTASSIUM 4.7   CHLORIDE 110*   CO2 26   GLC 91   BUN 22.0   CR 0.73   GFRESTIMATED 80   DAMARIS 9.0     Recent Labs   Lab Test 08/29/24  1056 05/13/24  0925 06/20/23  1633   CR 0.73 0.79 0.68     No results for input(s): \"A1C\" in the last 21168 hours. Recent Labs   Lab Test 03/20/23  0836 03/17/23  1138   WBC  --  5.7   HGB 11.4* 10.8*   HCT  --  35.2   MCV  --  90   PLT  --  268     Recent Labs   Lab Test 03/20/23  0836 03/17/23  1138 03/10/23  0808   HGB 11.4* 10.8* 11.0*    No results for input(s): \"TROPONINI\" in the last 22417 hours.  Recent Labs   Lab Test 06/03/24  1112   NTBNP 143     Recent Labs   Lab Test 02/22/23  1551   TSH 1.68     No results for input(s): \"INR\" in the last 41249 hours.     40 minutes spent on the date of encounter doing education, consent signing, chart prep/review, review of outside records, review of test results, interpretation with above " tests, patient visit, documentation, and discussion with family.      This note has been dictated using voice recognition software. Any grammatical or context distortions are unintentional and inherent to the software.    Gloria Rico PA-C  Structural Heart Program  [facility]       Thank you for allowing me to participate in the care of your patient.      Sincerely,     Gloria Rico PA-C     [facility]  cc:   No referring provider defined for this encounter.

## 2024-09-17 NOTE — H&P (VIEW-ONLY)
HEART CARE ENCOUNTER NOTE       [facility]  [phone]      Assessment/Recommendations   1.  Paroxysmal atrial fibrillation: I have personally reviewed this patient's chart and have spoken with the patient about the treatment options, including PATEL device.  She has a [id number] score of at least 5 for age >75, CHF, HTN, female gender.  She has a HAS-BLED score of 2 for age and bleeding predisposition.   She is not a good candidate for long-term anticoagulation due tobalance issues which increases her risk for falls.    Once scheduled, the patient will stay on Xarelto up until implant.She has been taking aspirin 81 mg daily in anticipation for the procedure.  After implant, the patient will remain on aspirin 81 mg daily indefinitely and Xarelto for 6 weeks.  The patient will then stop Xarelto and start Plavix 75 mg daily for approximately 4 months. She will then stop Plavix and remain on aspirin 81 mg daily indefinitely.  She will have a CECILIA or CTA approximately 3 months and 1 year post-implant.  She understands that the risks of the procedure are <2% and include, but are not limited to device embolization, air embolism, myocardial perforation, device thrombosis, ASD, stroke, or death.  We discussed expected recovery and follow-up.       The patient is a good candidate for proceeding with left atrial appendage screening and implant.  Her questions were answered to her satisfaction.  Written consents have been signed and witnessed by me.  Her labs will be reviewed when resulted.  Her EKG has been reviewed.     2.  Chronic heart failure with preserved ejection fraction - currently compensated. Continue Lasix 20 mg daily    3.  Hypertension -reasonably controlled on losartan 50 mg twice daily    The longitudinal plan of care for the diagnosis(es)/condition(s) as documented were addressed during this visit. Due to the added complexity in care, I will continue to support Debra in the subsequent  management and with ongoing continuity of care.        History of Present Illness/Subjective    Debra Manriquez is a 84 year old female who comes in today for history and physical prior to insertion of left atrial appendage occulsion device.  She is accompanied by her caregiver for today's visit.    Debra Manriquez has a past history of paroxysmal atrial fibrillation, chronic heart failure with preserved ejection fraction, hypertension, obstructive sleep apnea (on CPAP), chronic lymphedema, hypothyroidism    She is currently taking Xarelto and has recently started taking aspirin 81 mg daily in anticipation for the procedure.  She currently denies bleeding issues.  But she has had issues with vaginal and rectal bleeding in the past.  She does use a walker to assist with ambulation.  She has not had any recent falls.  She did have a fall approximately 1 year ago when trying to sit down in a chair.  She denies any painful or difficulty swallowing.  She reports chronic dyspnea on exertion which she reports has been stable.  She does feel that her lack of exercise contributes to this.  She does report sleeping at a slight incline at night and this may help with her breathing.  She wears a CPAP at night.  She denies chest discomfort, dizziness, lightheadedness.  Her weight has been stable.  She reports a good appetite.    Medical, surgical, family, social history, and medications were reviewed and updated as necessary.    ECHO results (from 6/26/2024):  Interpretation Summary     1. Normal left ventricular size and systolic performance with a visually  estimated ejection fraction of 60-65%.  2. There is mild concentric increase in left ventricular wall thickness.  3. There is mild aortic insufficiency.  4. Normal right ventricular size and systolic performance.  5. There is mild left atrial enlargement.  6. Right ventricular systolic pressure relative to right atrial pressure is  mildly increased. The pulmonary artery  "pressure is estimated to be 35-40 mmHg  plus right atrial pressure (the IVC is of normal caliber).     When compared to the prior real-time echocardiogram dated 15 July 2022, there  has been no major interval change.    EKG (personally reviewed and interpreted):  Sinus bradycardia  Ventricular rate 58, , QRS 92, QT/QTc 446/437       Physical Examination Review of Systems   Vitals: /80 (BP Location: Left arm, Patient Position: Sitting, Cuff Size: Adult Large)   Pulse 58   Resp 16   Ht 1.676 m (5' 6\")   Wt 120.2 kg (265 lb)   BMI 42.77 kg/m    BMI= Body mass index is 42.77 kg/m .  Wt Readings from Last 3 Encounters:   09/17/24 120.2 kg (265 lb)   08/14/24 119.3 kg (263 lb)   08/06/24 120.7 kg (266 lb)       General Appearance:   Alert, cooperative and in no acute distress   ENT/Mouth: membranes moist, no oral lesions or bleeding gums.      EYES:  no scleral icterus, normal conjunctivae   Neck: Thyroid not visualized   Chest/Lungs:   lungs are clear to auscultation, no rales or wheezing   Cardiovascular:   Regular. Normal first and second heart sounds with no murmurs, rubs or gallops; the carotid, radial and posterior tibial pulses are intact, mild edema bilaterally    Abdomen:  Soft and nontender. Bowel sounds are present in all quadrants   Extremities: no cyanosis or clubbing   Skin: no xanthelasma, warm.    Neurologic: normal gait, normal  bilateral, no tremors   Psychiatric: Normal mood and affect       Please refer above for cardiac ROS details.      Medical History  Surgical History Family History Social History   Past Medical History:   Diagnosis Date    Antiplatelet or antithrombotic long-term use     Chronic acquired lymphedema     Edema     Falls     Fibrocystic breast disease     Fibrocystic breast disease     Foot pain     Gastroesophageal reflux disease     GERD (gastroesophageal reflux disease)     Hyperlipidemia     Hypertension     Irregular heart beat     Lymphedema     Mitral " valve disorder     Obese     JADYN (obstructive sleep apnea)     Osteoarthritis of knee     hx of knee replacement and pending second    Osteopenia     Skin cancer     Skin cancer, basal cell     Sleep apnea     Thyroid nodule     Thyroid nodule     Vitamin B deficiency      Past Surgical History:   Procedure Laterality Date    ARTHROPLASTY KNEE Right 3/7/2023    Procedure: RIGHT TOTAL KNEE ARTHROPLASTY;  Surgeon: Juan Corbin MD;  Location: [facility] Main OR    CATARACT EXTRACTION  2011, 2012    COLONOSCOPY      EXCISE TOENAIL(S) Bilateral 8/16/2019    Procedure: MATRIXECTOMY BILATERAL FEET; TOES 1,2,3,4 ON LEFT FOOT,  1,2 ON RIGHT FOOT;  BOTH NAIL BORDERS;  Surgeon: Opal Kam DPM;  Location:  OR    EYE SURGERY      cataract    GI SURGERY      GYN SURGERY      hysterectomy    INJECT STEROID (LOCATION) Right 8/16/2019    Procedure: CORTIZONE INJECTION RIGHT HEEL;  Surgeon: Opal Kam DPM;  Location:  OR    ORTHOPEDIC SURGERY Left     knee replacement    PARTIAL HYSTERECTOMY      STRIP VEIN Bilateral 1975    TOTAL KNEE ARTHROPLASTY Left 2011     Family History   Problem Relation Age of Onset    No Known Problems Mother     No Known Problems Father     Alzheimer Disease Maternal Uncle     Heart Disease Sister     Heart Disease Brother     No Known Problems Daughter     No Known Problems Son     No Known Problems Brother     No Known Problems Son     No Known Problems Son     Social History     Socioeconomic History    Marital status:      Spouse name: Not on file    Number of children: Not on file    Years of education: Not on file    Highest education level: Not on file   Occupational History    Not on file   Tobacco Use    Smoking status: Never     Passive exposure: Past    Smokeless tobacco: Never   Vaping Use    Vaping status: Never Used   Substance and Sexual Activity    Alcohol use: Yes     Alcohol/week: 0.8 standard drinks of alcohol     Comment: Alcoholic  Drinks/day: 1-2 glasses per month    Drug use: Never    Sexual activity: Yes   Other Topics Concern    Parent/sibling w/ CABG, MI or angioplasty before 65F 55M? Not Asked   Social History Narrative    . 4 kids.  Teaches college English, reading.      Social Determinants of Health     Financial Resource Strain: High Risk (1/1/2022)    [facility], [facility]    Financial Resource Strain     Difficulty of Paying Living Expenses: Not on file     Difficulty of Paying Living Expenses: Not on file   Food Insecurity: Not on file   Transportation Needs: Not on file   Physical Activity: Not on file   Stress: Not on file   Social Connections: Unknown (1/1/2022)    Received from [facility], [facility]    Social Connections     Frequency of Communication with Friends and Family: Not on file   Interpersonal Safety: Not on file   Housing Stability: Not on file          Medications  Allergies   Current Outpatient Medications   Medication Sig Dispense Refill    amoxicillin (AMOXIL) 500 MG tablet TAKE 4 TABLETS BY MOUTH 1 HOUR BEFORE DENTAL APPOINTMENT      Coenzyme Q10 400 MG CAPS Take 400 mg by mouth 2 times daily      cyanocobalamin (VITAMIN B-12) 1000 MCG tablet Take 1,000 mcg by mouth daily      denosumab (PROLIA) 60 MG/ML SOSY injection Inject 60 mg subcutaneously every 6 months As of 3/14/2023, please hold and pause this medication while at the transitional care unit      estradiol (ESTRACE) 0.1 MG/GM vaginal cream Place vaginally three times a week      furosemide (LASIX) 20 MG tablet Take 20 mg by mouth daily      lactobacillus TABS Take 2 capsules by mouth daily      levothyroxine (SYNTHROID/LEVOTHROID) 112 MCG tablet Take 112 mcg by mouth daily      losartan (COZAAR) 50 MG tablet Take 50 mg by mouth 2 times daily      rivaroxaban ANTICOAGULANT (XARELTO) 20 MG TABS  "tablet Take 1 tablet (20 mg) by mouth daily (with dinner)      rosuvastatin (CRESTOR) 20 MG tablet Take 1 tablet (20 mg) by mouth daily 90 tablet 2    biotin 1000 MCG TABS tablet Take 1,000 mcg by mouth daily (Patient not taking: Reported on 9/17/2024)      cholecalciferol (VITAMIN D3) 5000 units TABS tablet Take 2 tablets by mouth daily (Patient not taking: Reported on 9/17/2024)      Allergies   Allergen Reactions    Lisinopril Cough    Reglan [Metoclopramide] Other (See Comments)     depression         Lab Results    Chemistry/lipid CBC Cardiac Enzymes/BNP/TSH/INR   Recent Labs   Lab Test 08/29/24  1056   CHOL 120   HDL 51   LDL 56   TRIG 67     Recent Labs   Lab Test 08/29/24  1056 02/22/24  1430 05/26/21  1100   LDL 56 52 88     Recent Labs   Lab Test 08/29/24  1056   *   POTASSIUM 4.7   CHLORIDE 110*   CO2 26   GLC 91   BUN 22.0   CR 0.73   GFRESTIMATED 80   DAMARIS 9.0     Recent Labs   Lab Test 08/29/24  1056 05/13/24  0925 06/20/23  1633   CR 0.73 0.79 0.68     No results for input(s): \"A1C\" in the last 09202 hours. Recent Labs   Lab Test 03/20/23  0836 03/17/23  1138   WBC  --  5.7   HGB 11.4* 10.8*   HCT  --  35.2   MCV  --  90   PLT  --  268     Recent Labs   Lab Test 03/20/23  0836 03/17/23  1138 03/10/23  0808   HGB 11.4* 10.8* 11.0*    No results for input(s): \"TROPONINI\" in the last 02963 hours.  Recent Labs   Lab Test 06/03/24  1112   NTBNP 143     Recent Labs   Lab Test 02/22/23  1551   TSH 1.68     No results for input(s): \"INR\" in the last 95767 hours.     40 minutes spent on the date of encounter doing education, consent signing, chart prep/review, review of outside records, review of test results, interpretation with above tests, patient visit, documentation, and discussion with family.      This note has been dictated using voice recognition software. Any grammatical or context distortions are unintentional and inherent to the software.    Gloria Rico PA-C  Structural Heart Program  [facility] " [facility]
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
## 2024-09-17 NOTE — PATIENT INSTRUCTIONS
Debra Manriquez,    It was a pleasure to see you today in the clinic regarding your pre-op for [facility].     My recommendations after this visit include:     - Please go to [facility] at the instructed time tomorrow    If you have questions or concerns, please call using the numbers below:    After Hours/Scheduling  [phone]    Otherwise you can dial the nurse directly at:  JEAN CLAUDE Boyle RN  [phone]    Gloria Rico PA-C  Structural Heart Program  [facility]

## 2024-09-17 NOTE — PROGRESS NOTES
HEART CARE ENCOUNTER NOTE       [facility]  [phone]      Assessment/Recommendations   1.  Paroxysmal atrial fibrillation: I have personally reviewed this patient's chart and have spoken with the patient about the treatment options, including PATEL device.  She has a [id number] score of at least 5 for age >75, CHF, HTN, female gender.  She has a HAS-BLED score of 2 for age and bleeding predisposition.   She is not a good candidate for long-term anticoagulation due tobalance issues which increases her risk for falls.    Once scheduled, the patient will stay on Xarelto up until implant.She has been taking aspirin 81 mg daily in anticipation for the procedure.  After implant, the patient will remain on aspirin 81 mg daily indefinitely and Xarelto for 6 weeks.  The patient will then stop Xarelto and start Plavix 75 mg daily for approximately 4 months. She will then stop Plavix and remain on aspirin 81 mg daily indefinitely.  She will have a CECILIA or CTA approximately 3 months and 1 year post-implant.  She understands that the risks of the procedure are <2% and include, but are not limited to device embolization, air embolism, myocardial perforation, device thrombosis, ASD, stroke, or death.  We discussed expected recovery and follow-up.       The patient is a good candidate for proceeding with left atrial appendage screening and implant.  Her questions were answered to her satisfaction.  Written consents have been signed and witnessed by me.  Her labs will be reviewed when resulted.  Her EKG has been reviewed.     2.  Chronic heart failure with preserved ejection fraction - currently compensated. Continue Lasix 20 mg daily    3.  Hypertension -reasonably controlled on losartan 50 mg twice daily    The longitudinal plan of care for the diagnosis(es)/condition(s) as documented were addressed during this visit. Due to the added complexity in care, I will continue to support Debra in the subsequent  management and with ongoing continuity of care.        History of Present Illness/Subjective    Debra Manriquez is a 84 year old female who comes in today for history and physical prior to insertion of left atrial appendage occulsion device.  She is accompanied by her caregiver for today's visit.    Debra Manriquez has a past history of paroxysmal atrial fibrillation, chronic heart failure with preserved ejection fraction, hypertension, obstructive sleep apnea (on CPAP), chronic lymphedema, hypothyroidism    She is currently taking Xarelto and has recently started taking aspirin 81 mg daily in anticipation for the procedure.  She currently denies bleeding issues.  But she has had issues with vaginal and rectal bleeding in the past.  She does use a walker to assist with ambulation.  She has not had any recent falls.  She did have a fall approximately 1 year ago when trying to sit down in a chair.  She denies any painful or difficulty swallowing.  She reports chronic dyspnea on exertion which she reports has been stable.  She does feel that her lack of exercise contributes to this.  She does report sleeping at a slight incline at night and this may help with her breathing.  She wears a CPAP at night.  She denies chest discomfort, dizziness, lightheadedness.  Her weight has been stable.  She reports a good appetite.    Medical, surgical, family, social history, and medications were reviewed and updated as necessary.    ECHO results (from 6/26/2024):  Interpretation Summary     1. Normal left ventricular size and systolic performance with a visually  estimated ejection fraction of 60-65%.  2. There is mild concentric increase in left ventricular wall thickness.  3. There is mild aortic insufficiency.  4. Normal right ventricular size and systolic performance.  5. There is mild left atrial enlargement.  6. Right ventricular systolic pressure relative to right atrial pressure is  mildly increased. The pulmonary artery  "pressure is estimated to be 35-40 mmHg  plus right atrial pressure (the IVC is of normal caliber).     When compared to the prior real-time echocardiogram dated 15 July 2022, there  has been no major interval change.    EKG (personally reviewed and interpreted):  Sinus bradycardia  Ventricular rate 58, , QRS 92, QT/QTc 446/437       Physical Examination Review of Systems   Vitals: /80 (BP Location: Left arm, Patient Position: Sitting, Cuff Size: Adult Large)   Pulse 58   Resp 16   Ht 1.676 m (5' 6\")   Wt 120.2 kg (265 lb)   BMI 42.77 kg/m    BMI= Body mass index is 42.77 kg/m .  Wt Readings from Last 3 Encounters:   09/17/24 120.2 kg (265 lb)   08/14/24 119.3 kg (263 lb)   08/06/24 120.7 kg (266 lb)       General Appearance:   Alert, cooperative and in no acute distress   ENT/Mouth: membranes moist, no oral lesions or bleeding gums.      EYES:  no scleral icterus, normal conjunctivae   Neck: Thyroid not visualized   Chest/Lungs:   lungs are clear to auscultation, no rales or wheezing   Cardiovascular:   Regular. Normal first and second heart sounds with no murmurs, rubs or gallops; the carotid, radial and posterior tibial pulses are intact, mild edema bilaterally    Abdomen:  Soft and nontender. Bowel sounds are present in all quadrants   Extremities: no cyanosis or clubbing   Skin: no xanthelasma, warm.    Neurologic: normal gait, normal  bilateral, no tremors   Psychiatric: Normal mood and affect       Please refer above for cardiac ROS details.      Medical History  Surgical History Family History Social History   Past Medical History:   Diagnosis Date    Antiplatelet or antithrombotic long-term use     Chronic acquired lymphedema     Edema     Falls     Fibrocystic breast disease     Fibrocystic breast disease     Foot pain     Gastroesophageal reflux disease     GERD (gastroesophageal reflux disease)     Hyperlipidemia     Hypertension     Irregular heart beat     Lymphedema     Mitral " valve disorder     Obese     JADYN (obstructive sleep apnea)     Osteoarthritis of knee     hx of knee replacement and pending second    Osteopenia     Skin cancer     Skin cancer, basal cell     Sleep apnea     Thyroid nodule     Thyroid nodule     Vitamin B deficiency      Past Surgical History:   Procedure Laterality Date    ARTHROPLASTY KNEE Right 3/7/2023    Procedure: RIGHT TOTAL KNEE ARTHROPLASTY;  Surgeon: Juan Corbin MD;  Location: [facility] Main OR    CATARACT EXTRACTION  2011, 2012    COLONOSCOPY      EXCISE TOENAIL(S) Bilateral 8/16/2019    Procedure: MATRIXECTOMY BILATERAL FEET; TOES 1,2,3,4 ON LEFT FOOT,  1,2 ON RIGHT FOOT;  BOTH NAIL BORDERS;  Surgeon: Opal Kam DPM;  Location:  OR    EYE SURGERY      cataract    GI SURGERY      GYN SURGERY      hysterectomy    INJECT STEROID (LOCATION) Right 8/16/2019    Procedure: CORTIZONE INJECTION RIGHT HEEL;  Surgeon: Opal Kam DPM;  Location:  OR    ORTHOPEDIC SURGERY Left     knee replacement    PARTIAL HYSTERECTOMY      STRIP VEIN Bilateral 1975    TOTAL KNEE ARTHROPLASTY Left 2011     Family History   Problem Relation Age of Onset    No Known Problems Mother     No Known Problems Father     Alzheimer Disease Maternal Uncle     Heart Disease Sister     Heart Disease Brother     No Known Problems Daughter     No Known Problems Son     No Known Problems Brother     No Known Problems Son     No Known Problems Son     Social History     Socioeconomic History    Marital status:      Spouse name: Not on file    Number of children: Not on file    Years of education: Not on file    Highest education level: Not on file   Occupational History    Not on file   Tobacco Use    Smoking status: Never     Passive exposure: Past    Smokeless tobacco: Never   Vaping Use    Vaping status: Never Used   Substance and Sexual Activity    Alcohol use: Yes     Alcohol/week: 0.8 standard drinks of alcohol     Comment: Alcoholic  Drinks/day: 1-2 glasses per month    Drug use: Never    Sexual activity: Yes   Other Topics Concern    Parent/sibling w/ CABG, MI or angioplasty before 65F 55M? Not Asked   Social History Narrative    . 4 kids.  Teaches college English, reading.      Social Determinants of Health     Financial Resource Strain: High Risk (1/1/2022)    [facility], [facility]    Financial Resource Strain     Difficulty of Paying Living Expenses: Not on file     Difficulty of Paying Living Expenses: Not on file   Food Insecurity: Not on file   Transportation Needs: Not on file   Physical Activity: Not on file   Stress: Not on file   Social Connections: Unknown (1/1/2022)    Received from [facility], [facility]    Social Connections     Frequency of Communication with Friends and Family: Not on file   Interpersonal Safety: Not on file   Housing Stability: Not on file          Medications  Allergies   Current Outpatient Medications   Medication Sig Dispense Refill    amoxicillin (AMOXIL) 500 MG tablet TAKE 4 TABLETS BY MOUTH 1 HOUR BEFORE DENTAL APPOINTMENT      Coenzyme Q10 400 MG CAPS Take 400 mg by mouth 2 times daily      cyanocobalamin (VITAMIN B-12) 1000 MCG tablet Take 1,000 mcg by mouth daily      denosumab (PROLIA) 60 MG/ML SOSY injection Inject 60 mg subcutaneously every 6 months As of 3/14/2023, please hold and pause this medication while at the transitional care unit      estradiol (ESTRACE) 0.1 MG/GM vaginal cream Place vaginally three times a week      furosemide (LASIX) 20 MG tablet Take 20 mg by mouth daily      lactobacillus TABS Take 2 capsules by mouth daily      levothyroxine (SYNTHROID/LEVOTHROID) 112 MCG tablet Take 112 mcg by mouth daily      losartan (COZAAR) 50 MG tablet Take 50 mg by mouth 2 times daily      rivaroxaban ANTICOAGULANT (XARELTO) 20 MG TABS  "tablet Take 1 tablet (20 mg) by mouth daily (with dinner)      rosuvastatin (CRESTOR) 20 MG tablet Take 1 tablet (20 mg) by mouth daily 90 tablet 2    biotin 1000 MCG TABS tablet Take 1,000 mcg by mouth daily (Patient not taking: Reported on 9/17/2024)      cholecalciferol (VITAMIN D3) 5000 units TABS tablet Take 2 tablets by mouth daily (Patient not taking: Reported on 9/17/2024)      Allergies   Allergen Reactions    Lisinopril Cough    Reglan [Metoclopramide] Other (See Comments)     depression         Lab Results    Chemistry/lipid CBC Cardiac Enzymes/BNP/TSH/INR   Recent Labs   Lab Test 08/29/24  1056   CHOL 120   HDL 51   LDL 56   TRIG 67     Recent Labs   Lab Test 08/29/24  1056 02/22/24  1430 05/26/21  1100   LDL 56 52 88     Recent Labs   Lab Test 08/29/24  1056   *   POTASSIUM 4.7   CHLORIDE 110*   CO2 26   GLC 91   BUN 22.0   CR 0.73   GFRESTIMATED 80   DAMARIS 9.0     Recent Labs   Lab Test 08/29/24  1056 05/13/24  0925 06/20/23  1633   CR 0.73 0.79 0.68     No results for input(s): \"A1C\" in the last 48507 hours. Recent Labs   Lab Test 03/20/23  0836 03/17/23  1138   WBC  --  5.7   HGB 11.4* 10.8*   HCT  --  35.2   MCV  --  90   PLT  --  268     Recent Labs   Lab Test 03/20/23  0836 03/17/23  1138 03/10/23  0808   HGB 11.4* 10.8* 11.0*    No results for input(s): \"TROPONINI\" in the last 35681 hours.  Recent Labs   Lab Test 06/03/24  1112   NTBNP 143     Recent Labs   Lab Test 02/22/23  1551   TSH 1.68     No results for input(s): \"INR\" in the last 09685 hours.     40 minutes spent on the date of encounter doing education, consent signing, chart prep/review, review of outside records, review of test results, interpretation with above tests, patient visit, documentation, and discussion with family.      This note has been dictated using voice recognition software. Any grammatical or context distortions are unintentional and inherent to the software.    Gloria Rico PA-C  Structural Heart Program  M " [facility]

## 2024-09-17 NOTE — PROGRESS NOTES
MODIFIED TI SCALE   Timepoint: Pre-LAAC    Previous score: initial TI    Score Description   0 No symptoms at all   1 No significant disability despite symptoms; able to carry out all usual duties and activities   2 Slight disability; unable to carry out all previous activities, but able to look after own affairs without assistance   3 Moderate disability; requiring some help, but able to walk without assistance   4 Moderately severe disability; unable to walk without assistance and unable to attend to own bodily needs without assistance   5 Severe disability; bedridden, incontinent and requiring constant nursing care and attention   6 Dead    Total score (0 - 6):  0    Change in score if s/p LAAC? N/A  If yes, notify implanting cardiologist.    Donna Vu RN  Structural Heart Coordinator   [facility]  [phone]

## 2024-09-18 ENCOUNTER — HOSPITAL ENCOUNTER (INPATIENT)
Facility: HOSPITAL | Age: 84
LOS: 1 days | Discharge: HOME | DRG: 274 | End: 2024-09-18
Attending: INTERNAL MEDICINE | Admitting: INTERNAL MEDICINE
Payer: MEDICARE

## 2024-09-18 ENCOUNTER — ANESTHESIA EVENT (OUTPATIENT)
Dept: CARDIOLOGY | Facility: HOSPITAL | Age: 84
DRG: 274 | End: 2024-09-18
Payer: MEDICARE

## 2024-09-18 ENCOUNTER — ANESTHESIA (OUTPATIENT)
Dept: CARDIOLOGY | Facility: HOSPITAL | Age: 84
DRG: 274 | End: 2024-09-18
Payer: MEDICARE

## 2024-09-18 ENCOUNTER — HOSPITAL ENCOUNTER (OUTPATIENT)
Dept: CARDIOLOGY | Facility: HOSPITAL | Age: 84
Discharge: HOME | DRG: 274 | End: 2024-09-18
Attending: PHYSICIAN ASSISTANT | Admitting: INTERNAL MEDICINE
Payer: MEDICARE

## 2024-09-18 VITALS
WEIGHT: 265 LBS | RESPIRATION RATE: 34 BRPM | HEART RATE: 65 BPM | BODY MASS INDEX: 42.59 KG/M2 | OXYGEN SATURATION: 96 % | DIASTOLIC BLOOD PRESSURE: 66 MMHG | HEIGHT: 66 IN | TEMPERATURE: 97.8 F | SYSTOLIC BLOOD PRESSURE: 150 MMHG

## 2024-09-18 DIAGNOSIS — I48.0 PAROXYSMAL ATRIAL FIBRILLATION (H): ICD-10-CM

## 2024-09-18 DIAGNOSIS — Z95.818 PRESENCE OF WATCHMAN LEFT ATRIAL APPENDAGE CLOSURE DEVICE: Primary | ICD-10-CM

## 2024-09-18 LAB
ACT BLD: 450 SECONDS (ref 74–150)
CREAT SERPL-MCNC: 0.71 MG/DL (ref 0.51–0.95)
EGFRCR SERPLBLD CKD-EPI 2021: 82 ML/MIN/1.73M2
HGB BLD-MCNC: 13.2 G/DL (ref 11.7–15.7)
LVEF ECHO: NORMAL

## 2024-09-18 PROCEDURE — 02L73DK OCCLUSION OF LEFT ATRIAL APPENDAGE WITH INTRALUMINAL DEVICE, PERCUTANEOUS APPROACH: ICD-10-PCS | Performed by: INTERNAL MEDICINE

## 2024-09-18 PROCEDURE — 250N000011 HC RX IP 250 OP 636: Performed by: STUDENT IN AN ORGANIZED HEALTH CARE EDUCATION/TRAINING PROGRAM

## 2024-09-18 PROCEDURE — 250N000013 HC RX MED GY IP 250 OP 250 PS 637: Performed by: INTERNAL MEDICINE

## 2024-09-18 PROCEDURE — 85018 HEMOGLOBIN: CPT | Performed by: INTERNAL MEDICINE

## 2024-09-18 PROCEDURE — C1894 INTRO/SHEATH, NON-LASER: HCPCS | Performed by: INTERNAL MEDICINE

## 2024-09-18 PROCEDURE — C1760 CLOSURE DEV, VASC: HCPCS | Performed by: INTERNAL MEDICINE

## 2024-09-18 PROCEDURE — 258N000003 HC RX IP 258 OP 636: Performed by: STUDENT IN AN ORGANIZED HEALTH CARE EDUCATION/TRAINING PROGRAM

## 2024-09-18 PROCEDURE — 272N000001 HC OR GENERAL SUPPLY STERILE: Performed by: INTERNAL MEDICINE

## 2024-09-18 PROCEDURE — 93355 ECHO TRANSESOPHAGEAL (TEE): CPT | Performed by: INTERNAL MEDICINE

## 2024-09-18 PROCEDURE — 85347 COAGULATION TIME ACTIVATED: CPT

## 2024-09-18 PROCEDURE — 33340 PERQ CLSR TCAT L ATR APNDGE: CPT | Performed by: ANESTHESIOLOGY

## 2024-09-18 PROCEDURE — 250N000009 HC RX 250: Performed by: STUDENT IN AN ORGANIZED HEALTH CARE EDUCATION/TRAINING PROGRAM

## 2024-09-18 PROCEDURE — 370N000017 HC ANESTHESIA TECHNICAL FEE, PER MIN: Performed by: INTERNAL MEDICINE

## 2024-09-18 PROCEDURE — 36415 COLL VENOUS BLD VENIPUNCTURE: CPT | Performed by: INTERNAL MEDICINE

## 2024-09-18 PROCEDURE — 250N000011 HC RX IP 250 OP 636: Performed by: INTERNAL MEDICINE

## 2024-09-18 PROCEDURE — 250N000009 HC RX 250: Performed by: INTERNAL MEDICINE

## 2024-09-18 PROCEDURE — 33340 PERQ CLSR TCAT L ATR APNDGE: CPT | Performed by: STUDENT IN AN ORGANIZED HEALTH CARE EDUCATION/TRAINING PROGRAM

## 2024-09-18 PROCEDURE — 99100 ANES PT EXTEME AGE<1 YR&>70: CPT | Performed by: STUDENT IN AN ORGANIZED HEALTH CARE EDUCATION/TRAINING PROGRAM

## 2024-09-18 PROCEDURE — 258N000003 HC RX IP 258 OP 636: Performed by: INTERNAL MEDICINE

## 2024-09-18 PROCEDURE — 255N000002 HC RX 255 OP 636: Performed by: INTERNAL MEDICINE

## 2024-09-18 PROCEDURE — 82565 ASSAY OF CREATININE: CPT | Performed by: INTERNAL MEDICINE

## 2024-09-18 PROCEDURE — 120N000001 HC R&B MED SURG/OB

## 2024-09-18 PROCEDURE — C1889 IMPLANT/INSERT DEVICE, NOC: HCPCS | Performed by: INTERNAL MEDICINE

## 2024-09-18 PROCEDURE — 93355 ECHO TRANSESOPHAGEAL (TEE): CPT

## 2024-09-18 PROCEDURE — 33340 PERQ CLSR TCAT L ATR APNDGE: CPT | Performed by: INTERNAL MEDICINE

## 2024-09-18 PROCEDURE — 33340 PERQ CLSR TCAT L ATR APNDGE: CPT | Mod: Q0 | Performed by: INTERNAL MEDICINE

## 2024-09-18 PROCEDURE — 710N000010 HC RECOVERY PHASE 1, LEVEL 2, PER MIN

## 2024-09-18 DEVICE — OCCLUDER CV WATCHMAN FLX OD27 MM M635WU50270: Type: IMPLANTABLE DEVICE | Site: HEART | Status: FUNCTIONAL

## 2024-09-18 RX ORDER — LIDOCAINE HYDROCHLORIDE 10 MG/ML
INJECTION, SOLUTION INFILTRATION; PERINEURAL PRN
Status: DISCONTINUED | OUTPATIENT
Start: 2024-09-18 | End: 2024-09-18

## 2024-09-18 RX ORDER — IODIXANOL 320 MG/ML
INJECTION, SOLUTION INTRAVASCULAR
Status: DISCONTINUED | OUTPATIENT
Start: 2024-09-18 | End: 2024-09-18 | Stop reason: HOSPADM

## 2024-09-18 RX ORDER — ASPIRIN 81 MG/1
81 TABLET ORAL DAILY
COMMUNITY
Start: 2024-09-18

## 2024-09-18 RX ORDER — FENTANYL CITRATE-0.9 % NACL/PF 10 MCG/ML
PLASTIC BAG, INJECTION (ML) INTRAVENOUS PRN
Status: DISCONTINUED | OUTPATIENT
Start: 2024-09-18 | End: 2024-09-18

## 2024-09-18 RX ORDER — HEPARIN SODIUM 1000 [USP'U]/ML
INJECTION, SOLUTION INTRAVENOUS; SUBCUTANEOUS
Status: DISCONTINUED | OUTPATIENT
Start: 2024-09-18 | End: 2024-09-18 | Stop reason: HOSPADM

## 2024-09-18 RX ORDER — SODIUM CHLORIDE 9 MG/ML
INJECTION, SOLUTION INTRAVENOUS CONTINUOUS
Status: ACTIVE | OUTPATIENT
Start: 2024-09-18 | End: 2024-09-18

## 2024-09-18 RX ORDER — PROPOFOL 10 MG/ML
INJECTION, EMULSION INTRAVENOUS PRN
Status: DISCONTINUED | OUTPATIENT
Start: 2024-09-18 | End: 2024-09-18

## 2024-09-18 RX ORDER — NALOXONE HYDROCHLORIDE 0.4 MG/ML
0.4 INJECTION, SOLUTION INTRAMUSCULAR; INTRAVENOUS; SUBCUTANEOUS
Status: DISCONTINUED | OUTPATIENT
Start: 2024-09-18 | End: 2024-09-18 | Stop reason: HOSPADM

## 2024-09-18 RX ORDER — OXYCODONE HYDROCHLORIDE 5 MG/1
10 TABLET ORAL EVERY 4 HOURS PRN
Status: DISCONTINUED | OUTPATIENT
Start: 2024-09-18 | End: 2024-09-18 | Stop reason: HOSPADM

## 2024-09-18 RX ORDER — LIDOCAINE HYDROCHLORIDE AND EPINEPHRINE 10; 10 MG/ML; UG/ML
INJECTION, SOLUTION INFILTRATION; PERINEURAL
Status: DISCONTINUED | OUTPATIENT
Start: 2024-09-18 | End: 2024-09-18 | Stop reason: HOSPADM

## 2024-09-18 RX ORDER — PROTAMINE SULFATE 10 MG/ML
INJECTION, SOLUTION INTRAVENOUS PRN
Status: DISCONTINUED | OUTPATIENT
Start: 2024-09-18 | End: 2024-09-18

## 2024-09-18 RX ORDER — ONDANSETRON 2 MG/ML
INJECTION INTRAMUSCULAR; INTRAVENOUS PRN
Status: DISCONTINUED | OUTPATIENT
Start: 2024-09-18 | End: 2024-09-18

## 2024-09-18 RX ORDER — CEFAZOLIN SODIUM/WATER 3 G/30 ML
3 SYRINGE (ML) INTRAVENOUS
Status: DISCONTINUED | OUTPATIENT
Start: 2024-09-18 | End: 2024-09-18 | Stop reason: HOSPADM

## 2024-09-18 RX ORDER — SODIUM CHLORIDE 9 MG/ML
INJECTION, SOLUTION INTRAVENOUS CONTINUOUS PRN
Status: DISCONTINUED | OUTPATIENT
Start: 2024-09-18 | End: 2024-09-18

## 2024-09-18 RX ORDER — ASPIRIN 81 MG/1
81 TABLET ORAL ONCE
Status: COMPLETED | OUTPATIENT
Start: 2024-09-18 | End: 2024-09-18

## 2024-09-18 RX ORDER — ONDANSETRON 4 MG/1
4 TABLET, ORALLY DISINTEGRATING ORAL EVERY 6 HOURS PRN
Status: DISCONTINUED | OUTPATIENT
Start: 2024-09-18 | End: 2024-09-18 | Stop reason: HOSPADM

## 2024-09-18 RX ORDER — ACETAMINOPHEN 325 MG/1
650 TABLET ORAL EVERY 4 HOURS PRN
Status: DISCONTINUED | OUTPATIENT
Start: 2024-09-18 | End: 2024-09-18 | Stop reason: HOSPADM

## 2024-09-18 RX ORDER — OXYCODONE HYDROCHLORIDE 5 MG/1
5 TABLET ORAL EVERY 4 HOURS PRN
Status: DISCONTINUED | OUTPATIENT
Start: 2024-09-18 | End: 2024-09-18 | Stop reason: HOSPADM

## 2024-09-18 RX ORDER — NALOXONE HYDROCHLORIDE 0.4 MG/ML
0.2 INJECTION, SOLUTION INTRAMUSCULAR; INTRAVENOUS; SUBCUTANEOUS
Status: DISCONTINUED | OUTPATIENT
Start: 2024-09-18 | End: 2024-09-18 | Stop reason: HOSPADM

## 2024-09-18 RX ORDER — SODIUM CHLORIDE, SODIUM LACTATE, POTASSIUM CHLORIDE, CALCIUM CHLORIDE 600; 310; 30; 20 MG/100ML; MG/100ML; MG/100ML; MG/100ML
INJECTION, SOLUTION INTRAVENOUS CONTINUOUS PRN
Status: DISCONTINUED | OUTPATIENT
Start: 2024-09-18 | End: 2024-09-18

## 2024-09-18 RX ORDER — DEXAMETHASONE SODIUM PHOSPHATE 10 MG/ML
INJECTION, SOLUTION INTRAMUSCULAR; INTRAVENOUS PRN
Status: DISCONTINUED | OUTPATIENT
Start: 2024-09-18 | End: 2024-09-18

## 2024-09-18 RX ORDER — FENTANYL CITRATE 50 UG/ML
INJECTION, SOLUTION INTRAMUSCULAR; INTRAVENOUS PRN
Status: DISCONTINUED | OUTPATIENT
Start: 2024-09-18 | End: 2024-09-18

## 2024-09-18 RX ORDER — ONDANSETRON 2 MG/ML
4 INJECTION INTRAMUSCULAR; INTRAVENOUS EVERY 6 HOURS PRN
Status: DISCONTINUED | OUTPATIENT
Start: 2024-09-18 | End: 2024-09-18 | Stop reason: HOSPADM

## 2024-09-18 RX ORDER — LIDOCAINE 40 MG/G
CREAM TOPICAL
Status: DISCONTINUED | OUTPATIENT
Start: 2024-09-18 | End: 2024-09-18 | Stop reason: HOSPADM

## 2024-09-18 RX ADMIN — ONDANSETRON 4 MG: 2 INJECTION INTRAMUSCULAR; INTRAVENOUS at 09:46

## 2024-09-18 RX ADMIN — PROTAMINE SULFATE 25 MG: 10 INJECTION, SOLUTION INTRAVENOUS at 09:46

## 2024-09-18 RX ADMIN — Medication 100 MCG: at 09:31

## 2024-09-18 RX ADMIN — DEXAMETHASONE SODIUM PHOSPHATE 4 MG: 10 INJECTION, SOLUTION INTRAMUSCULAR; INTRAVENOUS at 09:08

## 2024-09-18 RX ADMIN — Medication 3 G: at 09:08

## 2024-09-18 RX ADMIN — FENTANYL CITRATE 100 MCG: 50 INJECTION INTRAMUSCULAR; INTRAVENOUS at 09:00

## 2024-09-18 RX ADMIN — PROPOFOL 150 MG: 10 INJECTION, EMULSION INTRAVENOUS at 09:00

## 2024-09-18 RX ADMIN — LIDOCAINE HYDROCHLORIDE 50 MG: 10 INJECTION, SOLUTION INFILTRATION; PERINEURAL at 09:00

## 2024-09-18 RX ADMIN — PROPOFOL 50 MG: 10 INJECTION, EMULSION INTRAVENOUS at 09:10

## 2024-09-18 RX ADMIN — ASPIRIN 81 MG: 81 TABLET, COATED ORAL at 07:28

## 2024-09-18 RX ADMIN — SODIUM CHLORIDE 500 ML: 9 INJECTION, SOLUTION INTRAVENOUS at 07:28

## 2024-09-18 RX ADMIN — PHENYLEPHRINE HYDROCHLORIDE 0.2 MCG/KG/MIN: 10 INJECTION INTRAVENOUS at 09:31

## 2024-09-18 RX ADMIN — SODIUM CHLORIDE: 9 INJECTION, SOLUTION INTRAVENOUS at 08:46

## 2024-09-18 RX ADMIN — ROCURONIUM BROMIDE 50 MG: 50 INJECTION, SOLUTION INTRAVENOUS at 09:00

## 2024-09-18 RX ADMIN — SUGAMMADEX 400 MG: 100 INJECTION, SOLUTION INTRAVENOUS at 09:50

## 2024-09-18 ASSESSMENT — ACTIVITIES OF DAILY LIVING (ADL)
ADLS_ACUITY_SCORE: 38

## 2024-09-18 NOTE — ANESTHESIA POSTPROCEDURE EVALUATION
Patient: Debra Manriquez    Procedure: Procedure(s):  Left Atrial Appendage Closure - WM       Anesthesia Type:  General    Note:     Postop Pain Control: Uneventful            Sign Out: Well controlled pain   PONV: No   Neuro/Psych: Uneventful            Sign Out: Acceptable/Baseline neuro status   Airway/Respiratory: Uneventful            Sign Out: Acceptable/Baseline resp. status   CV/Hemodynamics: Uneventful            Sign Out: Acceptable CV status; No obvious hypovolemia; No obvious fluid overload   Other NRE: NONE   DID A NON-ROUTINE EVENT OCCUR? No           Last vitals:  Vitals Value Taken Time   BP     Temp     Pulse     Resp     SpO2         Electronically Signed By: Torito Dunbar MD  September 18, 2024  10:05 AM

## 2024-09-18 NOTE — Clinical Note
0 : 25. 45 : 22. 90 : 16. 135 : 23. 0 : 29. 45 : 20. 90 : 21. 135 : 18. 0 : 18.7 . 45 : 19.8 . 90 : 18.6 . 135 : 20.1 . PATEL type used: Chicken WingPosition: Passed. [place]: Passed. Size: Accepted. Seal: Complete. Jet: 0.Demdex FLX  27mm  GTIN: [id number]  REF: [id number]  LOT: [id number]  EXP: 03/14/2027.

## 2024-09-18 NOTE — ANESTHESIA CARE TRANSFER NOTE
Patient: Debra Manriquez    Procedure: Procedure(s):  Left Atrial Appendage Closure - WM       Diagnosis: other  Diagnosis Additional Information: No value filed.    Anesthesia Type:   General     Note:    Oropharynx: oropharynx clear of all foreign objects and spontaneously breathing  Level of Consciousness: awake  Oxygen Supplementation: face mask  Level of Supplemental Oxygen (L/min / FiO2): 6  Independent Airway: airway patency satisfactory and stable  Dentition: dentition unchanged  Vital Signs Stable: post-procedure vital signs reviewed and stable  Report to RN Given: handoff report given  Patient transferred to: Cardiac Special Care          Vitals:  Vitals Value Taken Time   /71 09/18/24 1005   Temp 97.6    Pulse 63 09/18/24 1005   Resp 18 09/18/24 1005   SpO2 100 % 09/18/24 1005   Vitals shown include unfiled device data.    Electronically Signed By: PHAM Wright CRNA  September 18, 2024  10:07 AM

## 2024-09-18 NOTE — DISCHARGE SUMMARY
HEART CARE INPATIENT ENCOUNTER NOTE      Structural Discharge Summary    Primary Care Physician:  Yoanna Mcpherson    Discharge Provider: СВЕТЛАНА STEINER PA-C     Admission Date: 9/18/2024. Admission Diagnoses: Paroxysmal atrial fibrillation (H) [I48.0]   Discharge Date: September 18, 2024   Disposition: Home   Condition at Discharge: Stable  Code Status: Full Code     Principal Diagnosis:  Paroxysmal atrial fibrillation    Discharge Diagnoses:  Active Problems:    Venous stasis    Venous insufficiency of both lower extremities    Class 2 obesity in adult    Peripheral neuropathy    Essential hypertension    Obstructive sleep apnea    Paroxysmal atrial fibrillation (H)    Chronic heart failure with preserved ejection fraction (HFpEF) (H)    Presence of Watchman left atrial appendage closure device      Consult/s: none  Significant Diagnostic Studies:   Procedural CECILIA - Interpretation Summary     Structural CECILIA for Left Atrial Appendage Occlusion Device     Pre-Device:  1. Normal left ventricular size and systolic function. LVEF: 60-65%  2. No left atrial thrombus or spontaneous contrast. Moderate left atrial  enlargement.  3. No ASD or PFO by color flow.  4. No pericardial effusion.     Post Device:  1. Well-seated PINNACLE FLXÂ  Device in left atrial appendage by 2D and 3D  imaging. No color doppler evidence of flow around device at ostium insertion  before or after device release. No change in mitral valve function. No  thrombus noted on device, LA or PATEL.     PATEL device measurements:  Device compression diameter:  Pre-deployment.  0Â : 25 mm  45Â : 22 mm  90Â : 16 mm  135 Â : 23 mm     Post-deployment  0Â : 18.7 mm  45Â : 19.8 mm  90Â : 18.6 mm  135 Â : 20.1 mm     2. Normal left ventricular size and systolic function. LVEF: 60-65%  3. Small ASD with unidirectional flow (left to right) by color flow doppler.  4. No post procedural pericardial effusion.       Treatments: procedures: LAAO implant (Watchman  FLX)    Discharge Medications:   Discharge Medication List as of 9/18/2024 12:29 PM        START taking these medications    Details   aspirin 81 MG EC tablet Take 1 tablet (81 mg) by mouth daily., OTC           CONTINUE these medications which have NOT CHANGED    Details   biotin 1000 MCG TABS tablet Take 1,000 mcg by mouth daily., Historical      cholecalciferol (VITAMIN D3) 5000 units TABS tablet Take 2 tablets by mouth daily., Historical      Coenzyme Q10 400 MG CAPS Take 400 mg by mouth 2 times daily, Historical      cyanocobalamin (VITAMIN B-12) 1000 MCG tablet Take 1,000 mcg by mouth daily, Historical      furosemide (LASIX) 20 MG tablet Take 20 mg by mouth daily, Historical      lactobacillus TABS Take 2 capsules by mouth daily, Historical      levothyroxine (SYNTHROID/LEVOTHROID) 112 MCG tablet Take 112 mcg by mouth daily, Historical      losartan (COZAAR) 50 MG tablet Take 50 mg by mouth 2 times daily, Historical      rivaroxaban ANTICOAGULANT (XARELTO) 20 MG TABS tablet Take 1 tablet (20 mg) by mouth daily (with dinner), No Print Out      rosuvastatin (CRESTOR) 20 MG tablet Take 1 tablet (20 mg) by mouth daily, Disp-90 tablet, R-2, E-Prescribe      TART CHERRY PO Take by mouth., Historical      Tryptophan 500 MG TABS Take by mouth., Historical      amoxicillin (AMOXIL) 500 MG tablet TAKE 4 TABLETS BY MOUTH 1 HOUR BEFORE DENTAL APPOINTMENT, Historical      denosumab (PROLIA) 60 MG/ML SOSY injection Inject 60 mg subcutaneously every 6 months As of 3/14/2023, please hold and pause this medication while at the transitional care unit, Historical      estradiol (ESTRACE) 0.1 MG/GM vaginal cream Place vaginally three times a weekHistorical             Discharge Instructions:  Follow up appointment with Maria De Jesus El PA-C in 45 days (October 30)    Follow up CTA in ~3 months (December 19)    Diet: Regular diet. Increase fluids over the next 2 days.   Activity: Activity as tolerated   Restrictions: No lifting  ">10 lbs or vigorous exercise for 5 days. No driving for 3 days. May return to work in 5 days  Wound / drain care: OK to shower next day. Keep wound clean and dry. Ok to use Ice packs PRN for discomfort. No baths, hot tubs or swimming pools for 5 days.    Hospital Summary:   Ms. Debra Manriquez is a 84 year old female who underwent successful LAAO implant with a 27 mm Watchman FLX device. The patient was recovered in [facility], on bedrest for 2 hours.  The patient then dangled at the edge of bed and ambulated; she voided without difficulty.  The vascular access site remained stable prior to discharge.  Post procedure the patient denied chest pain/pressure, palpitations, or shortness of breath.      Repeat labs were reviewed and were stable.  Activity restrictions and reportable signs and symptoms were discussed with the patient who verbalizes understanding.  She will continue taking Xarelto and ASA daily for the next 6 weeks.  After 6 weeks, she will come off Xarelto and take DAPT (ASA/Plavix) through March 18. At that time she will be on single antiplatelet therapy indefinitely.     Follow up is arranged as above.        Physical Examination   BP (!) 150/66 (BP Location: Right arm)   Pulse 65   Temp 97.8  F (36.6  C) (Axillary)   Resp (!) 34   Ht 1.676 m (5' 6\")   Wt 120.2 kg (265 lb)   SpO2 96%   BMI 42.77 kg/m    Body mass index is 42.77 kg/m .  Wt Readings from Last 3 Encounters:   09/18/24 120.2 kg (265 lb)   09/17/24 120.2 kg (265 lb)   08/14/24 119.3 kg (263 lb)       Intake/Output Summary (Last 24 hours) at 9/18/2024 0946  Last data filed at 9/18/2024 0921  Gross per 24 hour   Intake 500 ml   Output --   Net 500 ml     General Appearance:   no distress, normal body habitus   Chest/Lungs:   Normal respiratory effort. Respirations are even and unlabored. Lungs are clear to auscultation, no rales or wheezing. No chest wall tenderness.    Cardiovascular:   Regular. Normal S1, S2 with no murmurs, rubs, or " gallops; the carotid, radial, dorsalis pedis and posterior tibial pulses are intact   Abdomen:  obese, soft, non-tender to palpation, non-distended. + bowel sounds.   Extremities: No edema    Skin: Right groin site WNL   Neurologic: Alert and oriented x3, calm and able to move all 4 extremities appropriately            Lab Results    Chemistry/lipid CBC    Creat 9/18/2024 - 0.71 Hgb 9/18/2024 - 13.2

## 2024-09-18 NOTE — PROGRESS NOTES
Patient is kept comfortable during post-procedure stay. VSS. Denies pain. Right femoral access site remains dry & free from signs of bleeding. Tolerated food and fluids. Ambulated without issues. Appointments made & included in AVS. Dr. Herrera was able to speak with patient post procedure. Post-op instructions reviewed and packet given to patient & spouse. Able to ask questions. Verbalized no concerns. Belongings returned. Discharged in stable condition.

## 2024-09-18 NOTE — DISCHARGE INSTRUCTIONS
Going home after Left Atrial Appendage Occlusion Device Implant     Once Home:  You should arrange for someone to stay with you for the first 24 hours after discharge  Make sure you are taking all of your medications as directed in your discharge instructions.  Do not stop taking these medications without speaking to your provider.   Increase fluid intake for two days     No lifting more than 10 pounds for 5 days  No aggressive exercise for 5 days  No driving x 3 day  You may shower tomorrow; no bath x 5 days  Return to work in 3 days if you have a sedentary job or 5 days if you do manual labor        Care of groin site  It is normal to have a small bruise or lump at the site.  Do not scrub the site.  For the first 2 days: Do not stoop or squat. When you cough, sneeze or move your bowels, hold your hand over the puncture site and press gently.  Do not use lotion or powder near the puncture site for 3 days.  Ok to use ice packs at groin sites 20 minutes at a time for groin discomfort     If you start bleeding from the site in your groin, lie down flat and press firmly on the site. Call your doctor as soon as you can.     Call your doctor if:  You have a large or growing hard lump around the site.  The site is red, swollen, hot or tender.  Blood or fluid is draining from the site.  You have chills or a fever greater than 101 F (38 C).  Your leg or arm feels numb or cool.  You have hives, a rash or unusual itching.     To reduce the risk of infection, avoid dental procedures (including cleanings) for the first 6 weeks.  Contact your cardiology clinic for an antibiotic SHOULD YOU need to see the dentist prior to that 6 week waiting period.     Dial 911 if you have bleeding that is heavy or does not stop OR for any NEW signs/symptoms of a stroke:  Visual disturbance  Problems with talking  Smile only occurs on half your face  Numbness on one side of your face or body  Sudden headache  Confusion  Problems with walking or  balance     Follow up appointments:   6 Week Post Implant Visit Date: October 30 at 9:50 am with Maria De Jesus El PA-C  At [facility]  [address], [address]  [facility] [address]     Post-Procedure CTA:  Date: December 19 at 2 pm -  Location: [facility]        Your Procedural Physician was: Dr. Herrera     To reach the [facility] nurse coordinators please call:   [phone]        If you have issues tonight when you get home:      Call [phone] and tell them you had a Watchman.  Maria De Jesus, the PA will call you back.       If after 9 pm, call the [facility] at [phone] and you will be connected to the on call doctor                                                                    If you are calling after hours, please listen to the entire voicemail, a live  will answer at the end of the message

## 2024-09-18 NOTE — INTERVAL H&P NOTE
"I have reviewed the surgical (or preoperative) H&P that is linked to this encounter, and examined the patient. There are no significant changes    Clinical Conditions Present on Arrival:  Clinically Significant Risk Factors Present on Admission      # Drug Induced Coagulation Defect: home medication list includes an anticoagulant medication       # Severe Obesity: Estimated body mass index is 42.77 kg/m  as calculated from the following:    Height as of this encounter: 1.676 m (5' 6\").    Weight as of this encounter: 120.2 kg (265 lb).           "

## 2024-09-18 NOTE — ANESTHESIA PROCEDURE NOTES
Airway         Procedure Start/Stop Times: 9/18/2024 9:04 AM  Staff -        CRNA: Librado Santos APRN CRNA       Performed By: CRNA  Consent for Airway        Urgency: elective  Indications and Patient Condition       Indications for airway management: deandra-procedural       Induction type:intravenous       Mask difficulty assessment: 2 - vent by mask + OA or adjuvant +/- NMBA    Final Airway Details       Final airway type: endotracheal airway       Successful airway: ETT - single  Endotracheal Airway Details        ETT size (mm): 7.0       Cuffed: yes       Successful intubation technique: video laryngoscopy       VL Blade Size: Glidescope 3       Grade View of Cords: 1       Adjucts: stylet       Position: Right       Measured from: gums/teeth       Secured at (cm): 22       Bite block used: None    Post intubation assessment        Placement verified by: capnometry, equal breath sounds and chest rise        Number of attempts at approach: 1       Number of other approaches attempted: 0       Secured with: tape       Ease of procedure: easy       Dentition: Unchanged    Medication(s) Administered   Medication Administration Time: 9/18/2024 9:04 AM

## 2024-09-18 NOTE — ANESTHESIA PREPROCEDURE EVALUATION
Anesthesia Pre-Procedure Evaluation    Patient: Debra Manriquez   MRN: [id number] : 1940        Procedure : Procedure(s):  PATEL          Past Medical History:   Diagnosis Date    Antiplatelet or antithrombotic long-term use     Chronic acquired lymphedema     Edema     Falls     Fibrocystic breast disease     Fibrocystic breast disease     Foot pain     Gastroesophageal reflux disease     GERD (gastroesophageal reflux disease)     Hyperlipidemia     Hypertension     Irregular heart beat     Lymphedema     Mitral valve disorder     Obese     JADYN (obstructive sleep apnea)     Osteoarthritis of knee     hx of knee replacement and pending second    Osteopenia     Skin cancer     Skin cancer, basal cell     Sleep apnea     Thyroid nodule     Thyroid nodule     Vitamin B deficiency       Past Surgical History:   Procedure Laterality Date    ARTHROPLASTY KNEE Right 3/7/2023    Procedure: RIGHT TOTAL KNEE ARTHROPLASTY;  Surgeon: Juan Corbin MD;  Location: [facility] OR    CATARACT EXTRACTION  ,     COLONOSCOPY      EXCISE TOENAIL(S) Bilateral 2019    Procedure: MATRIXECTOMY BILATERAL FEET; TOES 1,2,3,4 ON LEFT FOOT,  1,2 ON RIGHT FOOT;  BOTH NAIL BORDERS;  Surgeon: Opal Kam DPM;  Location:  OR    EYE SURGERY      cataract    GI SURGERY      GYN SURGERY      hysterectomy    INJECT STEROID (LOCATION) Right 2019    Procedure: CORTIZONE INJECTION RIGHT HEEL;  Surgeon: Opal Kam DPM;  Location:  OR    ORTHOPEDIC SURGERY Left     knee replacement    PARTIAL HYSTERECTOMY      STRIP VEIN Bilateral     TOTAL KNEE ARTHROPLASTY Left       Allergies   Allergen Reactions    Lisinopril Cough    Reglan [Metoclopramide] Other (See Comments)     depression      Social History     Tobacco Use    Smoking status: Never     Passive exposure: Past    Smokeless tobacco: Never   Substance Use Topics    Alcohol use: Yes     Alcohol/week: 0.8 standard drinks of alcohol     " Comment: Alcoholic Drinks/day: 1-2 glasses per month      Wt Readings from Last 1 Encounters:   09/17/24 120.2 kg (265 lb)        Anesthesia Evaluation            ROS/MED HX  ENT/Pulmonary:  - neg pulmonary ROS   (+) sleep apnea,                                       Neurologic:  - neg neurologic ROS     Cardiovascular:  - neg cardiovascular ROS   (+)  hypertension- -   -  - -   Taking blood thinners                       Irregular Heartbeat/Palpitations,            METS/Exercise Tolerance: >4 METS    Hematologic:  - neg hematologic  ROS     Musculoskeletal:  - neg musculoskeletal ROS     GI/Hepatic:  - neg GI/hepatic ROS   (+) GERD,                   Renal/Genitourinary:  - neg Renal ROS     Endo:  - neg endo ROS   (+)          thyroid problem,     Obesity,       Psychiatric/Substance Use:  - neg psychiatric ROS     Infectious Disease:  - neg infectious disease ROS     Malignancy:  - neg malignancy ROS     Other:  - neg other ROS          Physical Exam    Airway        Mallampati: II    Neck ROM: full     Respiratory Devices and Support         Dental           Cardiovascular   cardiovascular exam normal          Pulmonary   pulmonary exam normal                OUTSIDE LABS:  CBC:   Lab Results   Component Value Date    WBC 5.6 09/17/2024    WBC 5.7 03/17/2023    HGB 14.6 09/17/2024    HGB 11.4 (L) 03/20/2023    HCT 43.5 09/17/2024    HCT 35.2 03/17/2023     (L) 09/17/2024     03/17/2023     BMP:   Lab Results   Component Value Date     09/17/2024     (H) 08/29/2024    POTASSIUM 5.0 09/17/2024    POTASSIUM 4.7 08/29/2024    CHLORIDE 108 (H) 09/17/2024    CHLORIDE 110 (H) 08/29/2024    CO2 25 09/17/2024    CO2 26 08/29/2024    BUN 18.4 09/17/2024    BUN 22.0 08/29/2024    CR 0.79 09/17/2024    CR 0.73 08/29/2024    GLC 94 09/17/2024    GLC 91 08/29/2024     COAGS: No results found for: \"PTT\", \"INR\", \"FIBR\"  POC: No results found for: \"BGM\", \"HCG\", \"HCGS\"  HEPATIC:   Lab Results   Component " Value Date    ALBUMIN 4.1 08/29/2024    PROTTOTAL 7.0 08/29/2024    ALT 23 08/29/2024    AST 22 08/29/2024    ALKPHOS 67 08/29/2024    BILITOTAL 0.8 08/29/2024     OTHER:   Lab Results   Component Value Date    LACT 1.5 03/09/2023    DAMARIS 8.7 (L) 09/17/2024    MAG 2.3 03/17/2023    TSH 1.68 02/22/2023    T4 1.54 08/18/2021       Anesthesia Plan    ASA Status:  3    NPO Status:  NPO Appropriate    Anesthesia Type: General.     - Airway: ETT   Induction: Intravenous, Propofol.           Consents    Anesthesia Plan(s) and associated risks, benefits, and realistic alternatives discussed. Questions answered and patient/representative(s) expressed understanding.     - Discussed: Risks, Benefits and Alternatives for BOTH SEDATION and the PROCEDURE were discussed     - Discussed with:  Patient            Postoperative Care       PONV prophylaxis: Ondansetron (or other 5HT-3), Dexamethasone or Solumedrol     Comments:                Torito Dunbar MD

## 2024-09-19 ENCOUNTER — VIRTUAL VISIT (OUTPATIENT)
Dept: CARDIOLOGY | Facility: CLINIC | Age: 84
End: 2024-09-19
Payer: MEDICARE

## 2024-09-19 DIAGNOSIS — Z95.818 PRESENCE OF WATCHMAN LEFT ATRIAL APPENDAGE CLOSURE DEVICE: Primary | ICD-10-CM

## 2024-09-19 PROCEDURE — 99207 PR NO CHARGE NURSE ONLY: CPT | Mod: 93

## 2024-09-19 NOTE — PROGRESS NOTES
Phone call to patient for post op 27 mm Watchman FLX device placement with Dr. Herrera on 9/18/24.    Pt denies CP/SOB.    No peripheral edema noted, no abdominal bloating or discomfort.     Pt denies any neurological changes at this time.    Pt denies generalized or localized pain at this time.   Patient has not yet had a bowel movement since arriving home from procedure. Discussed hydration and use of fiber to prevent constipation. Discussed use of OTC stool softeners if constipation occurs.    Patient has removed bandage. Reports no bruising, no swelling or hardness, no drainage, no warmth or tenderness, no pain. Patient was given explicit instructions on what to monitor for once bandage removed and who to call if there are concerns. Patient has both writer's direct number and number for on call Cardiology service.    Pt continues anticoagulation medications: Xarelto 20 mg daily and daily 81 mg Aspirin, per post-LAAC Watchman protocol.    Reviewed post op LAAC healing process, f/u appts, physical restrictions, nutritional requirements, when to contact the heart clinic, and contact information was given to the pt for further concerns or questions.  Pt verbalized understanding.     Donna Vu RN   Structural Heart Coordinator   [facility]  [phone]    The following information was relayed to the patient over the phone / sent via [employer]:    Debra,     Here is some information regarding aftercare following your Watchman implant, including what to monitor for, when to give us a call, and our contact information at the bottom.     Your anticoagulation medication (Xarelto 20 mg once daily):     It is important to remain on your anticoagulation medication uninterrupted after your Watchman device implant to reduce your risk of a stroke or heart attack, DO NOT STOP THIS MEDICATION.  You will remain on once daily 81 mg Aspirin for the remainder of your life  You will  continue your anticoagulation medication until you see the Structural MAURO in clinic to discuss further medication changes.     Healing from your left atrial closure device implant:     Stay well hydrated, it is important to increase your fluid intake during your recovery period.  Eat foods high in protein to help the healing process.  Gradually increase your activity over the several days, back to baseline;  No aggressive exercise for 5 days post-procedure.  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  No lifting more that 10 lb for 5 days after procedure.  No driving for 3 days post-procedure.   Keep your incision site clean, dry and open to air.  Ok to use ice packs at groin sites for 20 minutes at a time for groin discomfort.   Please delay any dental procedures until 6 weeks post implant. You will not require anti-biotic prophylaxis treatment after this time frame.  If you need urgent dental care prior to the 6 week post-procedure restriction, please contact your cardiologist for prophylactic antibiotic.      Please call me if any of the following occur:     Shortness of breath, dizziness, or chest pain.  Changes in your groin sites including:  Swelling, hardening, drainage, increase in bruising or an increase in pain.           Dial 911 for signs/symptoms of a stroke:     New onset visual disturbance.  New problems with talking/speech.  Smile only occurs on half your face.  Numbness on one side of your body or face.  Sudden headache.  New onset of confusion.  New problems with walking or balance.      Important information regarding your future follow up appointments:     45 Day post-implant office visit with our Structural Advance Practice Practitioner (MAURO)  3 month post-implant CT to assess your Watchman Device.  You will be contacted after your CT is complete to discuss results within 7-10 days days by a Structural RN Coordinator.  6 month in clinic office visit  with our Structural MAURO  1 year post-implant imaging study to assess your Watchman Device  1 year post-implant office visit with our Structural MAURO  2 year post-implant office visit with our Structural MAURO     Thank you,     Donna Vu, Structural Heart Coordinator -[phone]     After hours please contact the on call service at [phone]

## 2024-09-25 ENCOUNTER — TRANSFERRED RECORDS (OUTPATIENT)
Dept: HEALTH INFORMATION MANAGEMENT | Facility: CLINIC | Age: 84
End: 2024-09-25

## 2024-09-25 ENCOUNTER — LAB REQUISITION (OUTPATIENT)
Dept: LAB | Facility: CLINIC | Age: 84
End: 2024-09-25
Payer: MEDICARE

## 2024-09-25 DIAGNOSIS — E87.0 HYPEROSMOLALITY AND HYPERNATREMIA: ICD-10-CM

## 2024-09-25 LAB
ANION GAP SERPL CALCULATED.3IONS-SCNC: 12 MMOL/L (ref 7–15)
BUN SERPL-MCNC: 23.9 MG/DL (ref 8–23)
CALCIUM SERPL-MCNC: 9 MG/DL (ref 8.8–10.4)
CHLORIDE SERPL-SCNC: 106 MMOL/L (ref 98–107)
CREAT SERPL-MCNC: 0.78 MG/DL (ref 0.51–0.95)
EGFRCR SERPLBLD CKD-EPI 2021: 74 ML/MIN/1.73M2
GLUCOSE SERPL-MCNC: 100 MG/DL (ref 70–99)
HCO3 SERPL-SCNC: 23 MMOL/L (ref 22–29)
POTASSIUM SERPL-SCNC: 4.3 MMOL/L (ref 3.4–5.3)
SODIUM SERPL-SCNC: 141 MMOL/L (ref 135–145)

## 2024-09-25 PROCEDURE — 80048 BASIC METABOLIC PNL TOTAL CA: CPT | Mod: ORL | Performed by: FAMILY MEDICINE

## 2024-10-04 ENCOUNTER — TELEPHONE (OUTPATIENT)
Dept: CARDIOLOGY | Facility: CLINIC | Age: 84
End: 2024-10-04
Payer: MEDICARE

## 2024-10-04 NOTE — TELEPHONE ENCOUNTER
Pt called - received covid vaccine 9/25/24 and felt horrible the day after.  She feels fine now, but is concerned because her HR has been all over the place - .  Pt is not on any medication for rate control, and normally does not know when she is in a-fib.  Pt is able to sleep without issue, with her cpap on, she has no weight gain, or new edema, she has no new SOB.  HR currently 67.  BP has been 117/83, 110/85.  Pt said she has no appetite and hasn't been getting enough fluids.    Suggested pt may be in a-fib.  Advised pt to reach out to primary cards if HR is sustained >100, or if she develops new shortness of breath, weight gain, or swelling.  Pt will continue to monitor, verbalized understanding, and had no additional questions.

## 2024-10-15 ENCOUNTER — TELEPHONE (OUTPATIENT)
Dept: CARDIOLOGY | Facility: CLINIC | Age: 84
End: 2024-10-15
Payer: MEDICARE

## 2024-10-15 DIAGNOSIS — I48.0 PAROXYSMAL ATRIAL FIBRILLATION (H): Primary | ICD-10-CM

## 2024-10-15 RX ORDER — METOPROLOL TARTRATE 25 MG/1
25 TABLET, FILM COATED ORAL 2 TIMES DAILY
Qty: 90 TABLET | Refills: 3 | Status: SHIPPED | OUTPATIENT
Start: 2024-10-15 | End: 2024-10-30

## 2024-10-15 NOTE — TELEPHONE ENCOUNTER
Returned call to daughter. She reports her resting HR is usually low in the 60's. Her HR now is sitting just under 100. She reports this has been going on over the past few days and noticed the change post watchman procedure. She denies any symptoms except for her irregular HR.     This morning her HR went up to 157 and this only lasted about 10 min, then going back down to right around 100 sitting at 92 right now. Pt reports her apple watch is capable of obtaining an EKG. She will send an EKG via [employer].    Rosalina Jamil RN

## 2024-10-15 NOTE — TELEPHONE ENCOUNTER
Spoke with dtr and discussed feedback from provider. They would like to try low dose Metoprolol as recommended. Order placed and sent to [facility].     Rosalina Jamil RN

## 2024-10-15 NOTE — TELEPHONE ENCOUNTER
[facility] Call Center    Phone Message    May a detailed message be left on voicemail: yes     Reason for Call: Other: pt's daughter is calling to report pt is having irregular heart rhythms per her Apple watch. They have been monitoring vitals and BP has been fine but pulse has been up over 100 when it has been checked. These are checked every morning. Pt is nonsymptomatic/nothing out of the usual for pt. Please give daughter a call back to discuss       Action Taken: Other: cardiology     Travel Screening: Not Applicable        Thank you!  [facility]        Date of Service:

## 2024-10-15 NOTE — TELEPHONE ENCOUNTER
Fahad Chadwick MD Norris, Rachel A, RN  Caller: Unspecified (Today, 10:02 AM)  It is not uncommon to have an increase in A-fib burden post Watchman device.  I recommend starting metoprolol tartrate 25 mg twice daily.  At this low dose,significant bradycardia should be less likely.    If she does not want to start this medication, we can just continue to monitor for now.

## 2024-10-16 ENCOUNTER — TELEPHONE (OUTPATIENT)
Dept: CARDIOLOGY | Facility: CLINIC | Age: 84
End: 2024-10-16
Payer: MEDICARE

## 2024-10-16 NOTE — TELEPHONE ENCOUNTER
[facility] Call Center    Phone Message    May a detailed message be left on voicemail: yes     Reason for Call: Other: Blanca would like a call back to discuss concerns Debra has about taking the metoprolol which was recently prescribed due to her past history.     Action Taken: Other: Cardiology    Travel Screening: Not Applicable     Thank you!  [facility]

## 2024-10-16 NOTE — TELEPHONE ENCOUNTER
Noted, consent to communicate reviewed, phone call to patients daughter Blanca.  She states that the patient is really nervous to try metoprolol tartrate 25 mg bid as instructed yesterday due to possible bradycardia.    Blanca states that the patient wears an apple watch and her heart rates are in the 50-70 range, sometimes up to 140 and as low as the high 40's.      Reviewed the instructions from Dr. Chadwick that were given yesterday.  She states that she thinks her mom got overly excited about her heart rates yesterday, is feeling fine today.   Explained that metoprolol tartrate is short acting and that is why is it prescribed twice daily, explained it is a low dose and likely will not lower her heart rates to a dangerous or uncomfortable rate.  Should she decide to start she should continue to monitor her heart rates with her apple watch and assess for bradycardia.  Reviewed contact information.

## 2024-10-30 ENCOUNTER — OFFICE VISIT (OUTPATIENT)
Dept: CARDIOLOGY | Facility: CLINIC | Age: 84
End: 2024-10-30
Payer: MEDICARE

## 2024-10-30 VITALS — SYSTOLIC BLOOD PRESSURE: 138 MMHG | HEART RATE: 56 BPM | DIASTOLIC BLOOD PRESSURE: 72 MMHG | RESPIRATION RATE: 24 BRPM

## 2024-10-30 DIAGNOSIS — Z95.818 PRESENCE OF WATCHMAN LEFT ATRIAL APPENDAGE CLOSURE DEVICE: Primary | ICD-10-CM

## 2024-10-30 PROCEDURE — 99214 OFFICE O/P EST MOD 30 MIN: CPT | Performed by: INTERNAL MEDICINE

## 2024-10-30 RX ORDER — CLOPIDOGREL BISULFATE 75 MG/1
75 TABLET ORAL DAILY
Qty: 30 TABLET | Refills: 4 | Status: SHIPPED | OUTPATIENT
Start: 2024-10-30

## 2024-10-30 NOTE — PATIENT INSTRUCTIONS
Debra Manriquez,    It was a pleasure to see you today in the clinic regarding your recent Watchman .     My recommendations after this visit include:     -  Plavix from pharmacy.  If you get it this morning, take first dose and then don't take your Xarelto anymore.  If you pick it up late, then take your Xarelto one last time tonight and start the Plavix tomorrow morning along with your baby aspirin    - follow CT on Dec 6 to check the Watchman device    - okay to reschedule your appointment with Dr. Medina on Dec 3 to late Feb or early March - that would then count for the 6 month Watchman visit          If you have questions or concerns, please call using the numbers below:    PATEL (Watchman/Amulet) clinic phone   [phone]     After Hours/Scheduling  [phone]    Otherwise you can dial the nurse directly at:                  Rachel Jacobo RN  [phone]    Donna Vu RN  [phone]

## 2024-10-30 NOTE — LETTER
10/30/2024    Yoanna Mcpherson MD  [address]  [place] [address]    RE: Debra Manriquez       Dear Colleague,     I had the pleasure of seeing Debra Manriquez in the [facility].  HEART CARE ENCOUNTER NOTE       [facility]  [phone]      Assessment/Recommendations   1.  Paroxysmal atrial fibrillation - with increased episodes since procedure, but these episodes are becoming more infrequent as time goes on.     She underwent watchman implant on 9/18/24 with a 27 mm watchman FLX device.  She is currently taking aspirin 81 mg daily and Xarelto.  She can stop Xarelto.  Continue ASA and start taking Plavix 75 mg daily (new Rx sent to pharmacy today).  Continue Plavix until March 18, then stop it and continue ASA 81 mg daily indefinitely. She will have repeat imaging on 12/6/24 to check the device.  We will see her for a 6-month follow-up in March and again at 1 year post implant.      MODIFIED TI SCALE   Timepoint: 4-6 wk Post-LAAC    Previous score: 0    Score Description   0 No symptoms at all   1 No significant disability despite symptoms; able to carry out all usual duties and activities   2 Slight disability; unable to carry out all previous activities, but able to look after own affairs without assistance   3 Moderate disability; requiring some help, but able to walk without assistance   4 Moderately severe disability; unable to walk without assistance and unable to attend to own bodily needs without assistance   5 Severe disability; bedridden, incontinent and requiring constant nursing care and attention   6 Dead    Total score (0 - 6):  0    Change in score if s/p LAAC? No      2.  Chronic heart failure with preserved ejection fraction - currently compensated. Continue Lasix 20 mg daily    3.  Hypertension - well controlled on losartan 50 mg twice daily    4. Hyperlipidemia - managed on rosuvastatin with last LDL 56 and total cholesterol 120.       She should  see Dr. Medina in late Feb/early March for follow up (would like to reschedule the December visit that is already scheduled) and this can be her 6 month Watchman visit as well.        History of Present Illness/Subjective    Debra Manriquez is a 84 year old female who comes in today for her 6-week follow-up visit after watchman implant    Debra Manriquez has a past history of paroxysmal atrial fibrillation, chronic heart failure with preserved ejection fraction, hypertension, obstructive sleep apnea (on CPAP), chronic lymphedema, hypothyroidism    She had the Watchman implanted on September 18th.  Since that time, she reports some increased episodes of atrial fibrillation with higher heart rates, but in looking at her Apple Watch data on her phone, these episodes are becoming more infrequent as time goes by.      Prior to watchman implant, she was having around 10% A-fib and it is nearing that alfonzo again.  She is asymptomatic when it comes to her atrial fibrillation, but does note some minor fatigue and dizziness.  She was seen previously by Dr. Chadwick and he felt that ablation would not benefit her at this time.    She denies shortness of breath or chest pain.  She has had no stroke like symptoms or events.    Medical, surgical, family, social history, and medications were reviewed and updated as necessary.    Procedural CECILIA from 9/18/2024 (report reviewed):  Interpretation Summary     Structural CECILIA for Left Atrial Appendage Occlusion Device     Pre-Device:  1. Normal left ventricular size and systolic function. LVEF: 60-65%  2. No left atrial thrombus or spontaneous contrast. Moderate left atrial  enlargement.  3. No ASD or PFO by color flow.  4. No pericardial effusion.     Post Device:  1. Well-seated PINNACLE FLXÂ  Device in left atrial appendage by 2D and 3D  imaging. No color doppler evidence of flow around device at ostium insertion  before or after device release. No change in mitral valve function.  No  thrombus noted on device, LA or PATEL.     PATEL device measurements:  Device compression diameter:  Pre-deployment.  0Â : 25 mm  45Â : 22 mm  90Â : 16 mm  135 Â : 23 mm     Post-deployment  0Â : 18.7 mm  45Â : 19.8 mm  90Â : 18.6 mm  135 Â : 20.1 mm     2. Normal left ventricular size and systolic function. LVEF: 60-65%  3. Small ASD with unidirectional flow (left to right) by color flow doppler.  4. No post procedural pericardial effusion.       Physical Examination Review of Systems   Vitals: /72 (BP Location: Right arm, Patient Position: Sitting, Cuff Size: Adult Large)   Pulse 56   Resp 24   BMI= There is no height or weight on file to calculate BMI.  Wt Readings from Last 3 Encounters:   09/18/24 120.2 kg (265 lb)   09/17/24 120.2 kg (265 lb)   08/14/24 119.3 kg (263 lb)       General Appearance:   Alert, cooperative and in no acute distress   ENT/Mouth: membranes moist, no oral lesions or bleeding gums.      EYES:  no scleral icterus, normal conjunctivae   Neck: Thyroid not visualized   Chest/Lungs:   lungs are clear to auscultation, no rales or wheezing   Cardiovascular:   Regular. Normal first and second heart sounds with no murmurs, rubs or gallops; the carotid, radial and posterior tibial pulses are intact, mild edema bilaterally    Abdomen:  Soft and nontender. Bowel sounds are present in all quadrants   Extremities: no cyanosis or clubbing   Skin: no xanthelasma, warm.    Neurologic: normal gait, normal  bilateral, no tremors   Psychiatric: Normal mood and affect       Please refer above for cardiac ROS details.      Medical History  Surgical History Family History Social History   Past Medical History:   Diagnosis Date     Antiplatelet or antithrombotic long-term use      Chronic acquired lymphedema      Edema      Falls      Fibrocystic breast disease      Fibrocystic breast disease      Foot pain      Gastroesophageal reflux disease      GERD (gastroesophageal reflux disease)       Hyperlipidemia      Hypertension      Irregular heart beat      Lymphedema      Mitral valve disorder      Obese      JADYN (obstructive sleep apnea)      Osteoarthritis of knee     hx of knee replacement and pending second     Osteopenia      Skin cancer      Skin cancer, basal cell      Sleep apnea      Thyroid nodule      Thyroid nodule      Vitamin B deficiency      Past Surgical History:   Procedure Laterality Date     ARTHROPLASTY KNEE Right 3/7/2023    Procedure: RIGHT TOTAL KNEE ARTHROPLASTY;  Surgeon: Juan Corbin MD;  Location: [facility] Main OR     CATARACT EXTRACTION  2011, 2012     COLONOSCOPY       CV LEFT ATRIAL APPENDAGE CLOSURE N/A 9/18/2024    Procedure: Left Atrial Appendage Closure - WM;  Surgeon: Murphy Herrera MD;  Location: [facility] CV     EXCISE TOENAIL(S) Bilateral 8/16/2019    Procedure: MATRIXECTOMY BILATERAL FEET; TOES 1,2,3,4 ON LEFT FOOT,  1,2 ON RIGHT FOOT;  BOTH NAIL BORDERS;  Surgeon: Opal Kam DPM;  Location:  OR     EYE SURGERY      cataract     GI SURGERY       GYN SURGERY      hysterectomy     INJECT STEROID (LOCATION) Right 8/16/2019    Procedure: CORTIZONE INJECTION RIGHT HEEL;  Surgeon: Opal Kam DPM;  Location:  OR     ORTHOPEDIC SURGERY Left     knee replacement     PARTIAL HYSTERECTOMY       STRIP VEIN Bilateral 1975     TOTAL KNEE ARTHROPLASTY Left 2011     Family History   Problem Relation Age of Onset     No Known Problems Mother      No Known Problems Father      Alzheimer Disease Maternal Uncle      Heart Disease Sister      Heart Disease Brother      No Known Problems Daughter      No Known Problems Son      No Known Problems Brother      No Known Problems Son      No Known Problems Son     Social History     Socioeconomic History     Marital status:      Spouse name: Not on file     Number of children: Not on file     Years of education: Not on file     Highest education level: Not on file   Occupational  History     Not on file   Tobacco Use     Smoking status: Never     Passive exposure: Past     Smokeless tobacco: Never   Vaping Use     Vaping status: Never Used   Substance and Sexual Activity     Alcohol use: Yes     Alcohol/week: 0.8 standard drinks of alcohol     Comment: Alcoholic Drinks/day: 1-2 glasses per month     Drug use: Never     Sexual activity: Yes   Other Topics Concern     Parent/sibling w/ CABG, MI or angioplasty before 65F 55M? Not Asked   Social History Narrative    . 4 kids.  Teaches college English, reading.      Social Drivers of Health     Financial Resource Strain: High Risk (1/1/2022)    [facility]    Financial Resource Strain      Difficulty of Paying Living Expenses: Not on file      Difficulty of Paying Living Expenses: Not on file   Food Insecurity: Not on file   Transportation Needs: Not on file   Physical Activity: Not on file   Stress: Not on file   Social Connections: Unknown (1/1/2022)    [facility]    Social Connections      Frequency of Communication with Friends and Family: Not on file   Interpersonal Safety: Low Risk  (9/18/2024)    Interpersonal Safety      Do you feel physically and emotionally safe where you currently live?: Yes      Within the past 12 months, have you been hit, slapped, kicked or otherwise physically hurt by someone?: Not on file      Within the past 12 months, have you been humiliated or emotionally abused in other ways by your partner or ex-partner?: No   Housing Stability: Not on file          Medications  Allergies   Current Outpatient Medications   Medication Sig Dispense Refill     amoxicillin (AMOXIL) 500 MG tablet TAKE 4 TABLETS BY MOUTH 1 HOUR BEFORE DENTAL APPOINTMENT       aspirin 81 MG EC tablet Take 1 tablet (81 mg) by mouth daily.       biotin 1000  "MCG TABS tablet Take 1,000 mcg by mouth daily.       clopidogrel (PLAVIX) 75 MG tablet Take 1 tablet (75 mg) by mouth daily. 30 tablet 4     Coenzyme Q10 400 MG CAPS Take 400 mg by mouth 2 times daily       cyanocobalamin (VITAMIN B-12) 1000 MCG tablet Take 1,000 mcg by mouth daily       denosumab (PROLIA) 60 MG/ML SOSY injection Inject 60 mg subcutaneously every 6 months As of 3/14/2023, please hold and pause this medication while at the transitional care unit       estradiol (ESTRACE) 0.1 MG/GM vaginal cream Place vaginally three times a week       furosemide (LASIX) 20 MG tablet Take 20 mg by mouth daily       lactobacillus TABS Take 2 capsules by mouth daily       levothyroxine (SYNTHROID/LEVOTHROID) 112 MCG tablet Take 112 mcg by mouth daily       losartan (COZAAR) 50 MG tablet Take 50 mg by mouth 2 times daily       rosuvastatin (CRESTOR) 20 MG tablet Take 1 tablet (20 mg) by mouth daily 90 tablet 2     TART CHERRY PO Take by mouth.       Tryptophan 500 MG TABS Take by mouth.      Allergies   Allergen Reactions     Lisinopril Cough     Reglan [Metoclopramide] Other (See Comments)     depression         Lab Results    Chemistry/lipid CBC Cardiac Enzymes/BNP/TSH/INR   Recent Labs   Lab Test 08/29/24  1056   CHOL 120   HDL 51   LDL 56   TRIG 67     Recent Labs   Lab Test 08/29/24  1056 02/22/24  1430 05/26/21  1100   LDL 56 52 88     Last Comprehensive Metabolic Panel:  Lab Results   Component Value Date     09/25/2024    POTASSIUM 4.3 09/25/2024    CHLORIDE 106 09/25/2024    CO2 23 09/25/2024    ANIONGAP 12 09/25/2024     (H) 09/25/2024    BUN 23.9 (H) 09/25/2024    CR 0.78 09/25/2024    GFRESTIMATED 74 09/25/2024    DAMARIS 9.0 09/25/2024           No results for input(s): \"A1C\" in the last 29028 hours. CBC RESULTS:   Recent Labs   Lab Test 09/18/24  1032 09/17/24  0844   WBC  --  5.6   RBC  --  5.04   HGB 13.2 14.6   HCT  --  43.5   MCV  --  86   MCH  --  29.0   MCHC  --  33.6   RDW  --  14.6   PLT  " "--  144*        No results for input(s): \"TROPONINI\" in the last 92208 hours.  Recent Labs   Lab Test 06/03/24  1112   NTBNP 143     Recent Labs   Lab Test 02/22/23  1551   TSH 1.68     No results for input(s): \"INR\" in the last 67834 hours.     40 minutes spent on the date of encounter doing education, consent signing, chart prep/review, review of outside records, review of test results, interpretation with above tests, patient visit, documentation, and discussion with family.      This note has been dictated using voice recognition software. Any grammatical or context distortions are unintentional and inherent to the software.          Thank you for allowing me to participate in the care of your patient.      Sincerely,     TRACEY HENRIQUEZ [facility]  cc:   No referring provider defined for this encounter.      "

## 2024-10-30 NOTE — PROGRESS NOTES
HEART CARE ENCOUNTER NOTE       [facility]  [phone]      Assessment/Recommendations   1.  Paroxysmal atrial fibrillation - with increased episodes since procedure, but these episodes are becoming more infrequent as time goes on.     She underwent watchman implant on 9/18/24 with a 27 mm watchman FLX device.  She is currently taking aspirin 81 mg daily and Xarelto.  She can stop Xarelto.  Continue ASA and start taking Plavix 75 mg daily (new Rx sent to pharmacy today).  Continue Plavix until March 18, then stop it and continue ASA 81 mg daily indefinitely. She will have repeat imaging on 12/6/24 to check the device.  We will see her for a 6-month follow-up in March and again at 1 year post implant.      MODIFIED TI SCALE   Timepoint: 4-6 wk Post-LAAC    Previous score: 0    Score Description   0 No symptoms at all   1 No significant disability despite symptoms; able to carry out all usual duties and activities   2 Slight disability; unable to carry out all previous activities, but able to look after own affairs without assistance   3 Moderate disability; requiring some help, but able to walk without assistance   4 Moderately severe disability; unable to walk without assistance and unable to attend to own bodily needs without assistance   5 Severe disability; bedridden, incontinent and requiring constant nursing care and attention   6 Dead    Total score (0 - 6):  0    Change in score if s/p LAAC? No      2.  Chronic heart failure with preserved ejection fraction - currently compensated. Continue Lasix 20 mg daily    3.  Hypertension - well controlled on losartan 50 mg twice daily    4. Hyperlipidemia - managed on rosuvastatin with last LDL 56 and total cholesterol 120.       She should see Dr. Medina in late Feb/early March for follow up (would like to reschedule the December visit that is already scheduled) and this can be her 6 month [facility] visit as well.        History of Present  Illness/Subjective    Debra Manriquez is a 84 year old female who comes in today for her 6-week follow-up visit after watchman implant    Debra Manriquez has a past history of paroxysmal atrial fibrillation, chronic heart failure with preserved ejection fraction, hypertension, obstructive sleep apnea (on CPAP), chronic lymphedema, hypothyroidism    She had the Watchman implanted on September 18th.  Since that time, she reports some increased episodes of atrial fibrillation with higher heart rates, but in looking at her Apple Watch data on her phone, these episodes are becoming more infrequent as time goes by.      Prior to watchman implant, she was having around 10% A-fib and it is nearing that alfonzo again.  She is asymptomatic when it comes to her atrial fibrillation, but does note some minor fatigue and dizziness.  She was seen previously by Dr. Chadwick and he felt that ablation would not benefit her at this time.    She denies shortness of breath or chest pain.  She has had no stroke like symptoms or events.    Medical, surgical, family, social history, and medications were reviewed and updated as necessary.    Procedural CECILIA from 9/18/2024 (report reviewed):  Interpretation Summary     Structural CECILIA for Left Atrial Appendage Occlusion Device     Pre-Device:  1. Normal left ventricular size and systolic function. LVEF: 60-65%  2. No left atrial thrombus or spontaneous contrast. Moderate left atrial  enlargement.  3. No ASD or PFO by color flow.  4. No pericardial effusion.     Post Device:  1. Well-seated PINNACLE FLXÂ  Device in left atrial appendage by 2D and 3D  imaging. No color doppler evidence of flow around device at ostium insertion  before or after device release. No change in mitral valve function. No  thrombus noted on device, LA or PATEL.     PATEL device measurements:  Device compression diameter:  Pre-deployment.  0Â : 25 mm  45Â : 22 mm  90Â : 16 mm  135 Â : 23 mm     Post-deployment  0Â : 18.7  mm  45Â : 19.8 mm  90Â : 18.6 mm  135 Â : 20.1 mm     2. Normal left ventricular size and systolic function. LVEF: 60-65%  3. Small ASD with unidirectional flow (left to right) by color flow doppler.  4. No post procedural pericardial effusion.       Physical Examination Review of Systems   Vitals: /72 (BP Location: Right arm, Patient Position: Sitting, Cuff Size: Adult Large)   Pulse 56   Resp 24   BMI= There is no height or weight on file to calculate BMI.  Wt Readings from Last 3 Encounters:   09/18/24 120.2 kg (265 lb)   09/17/24 120.2 kg (265 lb)   08/14/24 119.3 kg (263 lb)       General Appearance:   Alert, cooperative and in no acute distress   ENT/Mouth: membranes moist, no oral lesions or bleeding gums.      EYES:  no scleral icterus, normal conjunctivae   Neck: Thyroid not visualized   Chest/Lungs:   lungs are clear to auscultation, no rales or wheezing   Cardiovascular:   Regular. Normal first and second heart sounds with no murmurs, rubs or gallops; the carotid, radial and posterior tibial pulses are intact, mild edema bilaterally    Abdomen:  Soft and nontender. Bowel sounds are present in all quadrants   Extremities: no cyanosis or clubbing   Skin: no xanthelasma, warm.    Neurologic: normal gait, normal  bilateral, no tremors   Psychiatric: Normal mood and affect       Please refer above for cardiac ROS details.      Medical History  Surgical History Family History Social History   Past Medical History:   Diagnosis Date    Antiplatelet or antithrombotic long-term use     Chronic acquired lymphedema     Edema     Falls     Fibrocystic breast disease     Fibrocystic breast disease     Foot pain     Gastroesophageal reflux disease     GERD (gastroesophageal reflux disease)     Hyperlipidemia     Hypertension     Irregular heart beat     Lymphedema     Mitral valve disorder     Obese     JADYN (obstructive sleep apnea)     Osteoarthritis of knee     hx of knee replacement and pending second     Osteopenia     Skin cancer     Skin cancer, basal cell     Sleep apnea     Thyroid nodule     Thyroid nodule     Vitamin B deficiency      Past Surgical History:   Procedure Laterality Date    ARTHROPLASTY KNEE Right 3/7/2023    Procedure: RIGHT TOTAL KNEE ARTHROPLASTY;  Surgeon: Juan Corbin MD;  Location: [facility] Main OR    CATARACT EXTRACTION  2011, 2012    COLONOSCOPY      CV LEFT ATRIAL APPENDAGE CLOSURE N/A 9/18/2024    Procedure: Left Atrial Appendage Closure - WM;  Surgeon: Murphy Herrera MD;  Location: [facility] CV    EXCISE TOENAIL(S) Bilateral 8/16/2019    Procedure: MATRIXECTOMY BILATERAL FEET; TOES 1,2,3,4 ON LEFT FOOT,  1,2 ON RIGHT FOOT;  BOTH NAIL BORDERS;  Surgeon: Opal Kam DPM;  Location:  OR    EYE SURGERY      cataract    GI SURGERY      GYN SURGERY      hysterectomy    INJECT STEROID (LOCATION) Right 8/16/2019    Procedure: CORTIZONE INJECTION RIGHT HEEL;  Surgeon: Opal Kam DPM;  Location:  OR    ORTHOPEDIC SURGERY Left     knee replacement    PARTIAL HYSTERECTOMY      STRIP VEIN Bilateral 1975    TOTAL KNEE ARTHROPLASTY Left 2011     Family History   Problem Relation Age of Onset    No Known Problems Mother     No Known Problems Father     Alzheimer Disease Maternal Uncle     Heart Disease Sister     Heart Disease Brother     No Known Problems Daughter     No Known Problems Son     No Known Problems Brother     No Known Problems Son     No Known Problems Son     Social History     Socioeconomic History    Marital status:      Spouse name: Not on file    Number of children: Not on file    Years of education: Not on file    Highest education level: Not on file   Occupational History    Not on file   Tobacco Use    Smoking status: Never     Passive exposure: Past    Smokeless tobacco: Never   Vaping Use    Vaping status: Never Used   Substance and Sexual Activity    Alcohol use: Yes     Alcohol/week: 0.8 standard drinks of alcohol      Comment: Alcoholic Drinks/day: 1-2 glasses per month    Drug use: Never    Sexual activity: Yes   Other Topics Concern    Parent/sibling w/ CABG, MI or angioplasty before 65F 55M? Not Asked   Social History Narrative    . 4 kids.  Teaches college English, reading.      Social Drivers of Health     Financial Resource Strain: High Risk (1/1/2022)    [facility]    Financial Resource Strain     Difficulty of Paying Living Expenses: Not on file     Difficulty of Paying Living Expenses: Not on file   Food Insecurity: Not on file   Transportation Needs: Not on file   Physical Activity: Not on file   Stress: Not on file   Social Connections: Unknown (1/1/2022)    Received from [employer] [facility], [employer] [facility]    Social Connections     Frequency of Communication with Friends and Family: Not on file   Interpersonal Safety: Low Risk  (9/18/2024)    Interpersonal Safety     Do you feel physically and emotionally safe where you currently live?: Yes     Within the past 12 months, have you been hit, slapped, kicked or otherwise physically hurt by someone?: Not on file     Within the past 12 months, have you been humiliated or emotionally abused in other ways by your partner or ex-partner?: No   Housing Stability: Not on file          Medications  Allergies   Current Outpatient Medications   Medication Sig Dispense Refill    amoxicillin (AMOXIL) 500 MG tablet TAKE 4 TABLETS BY MOUTH 1 HOUR BEFORE DENTAL APPOINTMENT      aspirin 81 MG EC tablet Take 1 tablet (81 mg) by mouth daily.      biotin 1000 MCG TABS tablet Take 1,000 mcg by mouth daily.      clopidogrel (PLAVIX) 75 MG tablet Take 1 tablet (75 mg) by mouth daily. 30 tablet 4    Coenzyme Q10 400 MG CAPS Take 400 mg by mouth 2 times daily      cyanocobalamin (VITAMIN B-12) 1000 MCG tablet Take 1,000 mcg by mouth daily    "   denosumab (PROLIA) 60 MG/ML SOSY injection Inject 60 mg subcutaneously every 6 months As of 3/14/2023, please hold and pause this medication while at the transitional care unit      estradiol (ESTRACE) 0.1 MG/GM vaginal cream Place vaginally three times a week      furosemide (LASIX) 20 MG tablet Take 20 mg by mouth daily      lactobacillus TABS Take 2 capsules by mouth daily      levothyroxine (SYNTHROID/LEVOTHROID) 112 MCG tablet Take 112 mcg by mouth daily      losartan (COZAAR) 50 MG tablet Take 50 mg by mouth 2 times daily      rosuvastatin (CRESTOR) 20 MG tablet Take 1 tablet (20 mg) by mouth daily 90 tablet 2    TART CHERRY PO Take by mouth.      Tryptophan 500 MG TABS Take by mouth.      Allergies   Allergen Reactions    Lisinopril Cough    Reglan [Metoclopramide] Other (See Comments)     depression         Lab Results    Chemistry/lipid CBC Cardiac Enzymes/BNP/TSH/INR   Recent Labs   Lab Test 08/29/24  1056   CHOL 120   HDL 51   LDL 56   TRIG 67     Recent Labs   Lab Test 08/29/24  1056 02/22/24  1430 05/26/21  1100   LDL 56 52 88     Last Comprehensive Metabolic Panel:  Lab Results   Component Value Date     09/25/2024    POTASSIUM 4.3 09/25/2024    CHLORIDE 106 09/25/2024    CO2 23 09/25/2024    ANIONGAP 12 09/25/2024     (H) 09/25/2024    BUN 23.9 (H) 09/25/2024    CR 0.78 09/25/2024    GFRESTIMATED 74 09/25/2024    DAMARIS 9.0 09/25/2024           No results for input(s): \"A1C\" in the last 34788 hours. CBC RESULTS:   Recent Labs   Lab Test 09/18/24  1032 09/17/24  0844   WBC  --  5.6   RBC  --  5.04   HGB 13.2 14.6   HCT  --  43.5   MCV  --  86   MCH  --  29.0   MCHC  --  33.6   RDW  --  14.6   PLT  --  144*        No results for input(s): \"TROPONINI\" in the last 80309 hours.  Recent Labs   Lab Test 06/03/24  1112   NTBNP 143     Recent Labs   Lab Test 02/22/23  1551   TSH 1.68     No results for input(s): \"INR\" in the last 16994 hours.       This note has been dictated using voice " recognition software. Any grammatical or context distortions are unintentional and inherent to the software.

## 2024-11-06 ENCOUNTER — VIRTUAL VISIT (OUTPATIENT)
Dept: PHARMACY | Facility: PHYSICIAN GROUP | Age: 84
End: 2024-11-06
Payer: COMMERCIAL

## 2024-11-06 VITALS
BODY MASS INDEX: 44.05 KG/M2 | SYSTOLIC BLOOD PRESSURE: 125 MMHG | HEART RATE: 68 BPM | DIASTOLIC BLOOD PRESSURE: 72 MMHG | WEIGHT: 264.4 LBS | HEIGHT: 65 IN

## 2024-11-06 DIAGNOSIS — I47.19 ATRIAL TACHYCARDIA, PAROXYSMAL (H): ICD-10-CM

## 2024-11-06 DIAGNOSIS — E03.9 HYPOTHYROIDISM, UNSPECIFIED TYPE: ICD-10-CM

## 2024-11-06 DIAGNOSIS — I50.32 CHRONIC HEART FAILURE WITH PRESERVED EJECTION FRACTION (HFPEF) (H): Primary | ICD-10-CM

## 2024-11-06 DIAGNOSIS — Z78.9 TAKES DIETARY SUPPLEMENTS: ICD-10-CM

## 2024-11-06 PROCEDURE — 99207 PR NO CHARGE LOS: CPT | Mod: 93 | Performed by: PHARMACIST

## 2024-11-06 NOTE — PATIENT INSTRUCTIONS
Recommendations from today's MTM visit:                                                      ASSESSMENT:                            Medication Adherence/Access: See below for considerations    HFpEF:  Stable.    Afib/CAD:    Stable.    Hypothyroidism:   Stable.    Supplements:  There is no clear indication for several of her current supplements. We can reduce daily pill burden by stopping extraneous supplements and encourage healthy diet choices for adequate nutrition.     PLAN:                            I would stop the glutathione and the B1 supplement, since you aren't sure what you are taking these for.    Focus on getting vitamins, minerals, and benefits of whole foods in your diet. If making healthy diet choices, you can stop the green food supplement. We discussed that eating the whole food provides more benefits for your body than a powder form.    Follow-up: Return for as needed for medication questions or concerns.    It was great to speak with you today.  I value your experience and would be very thankful for your time with providing feedback on our clinic survey. You may receive a survey via email or text message in the next few days.     To schedule another MTM appointment, please call the clinic directly or you may call the scheduling line at [phone].    My Clinical Pharmacist's contact information:                                                      Please feel free to contact me with any questions or concerns you have.      Rosie Gay, Pharm.D., BCACP  Medication Therapy Management Pharmacist  [phone]

## 2024-11-06 NOTE — PROGRESS NOTES
Medication Therapy Management (MTM) Encounter    ASSESSMENT:                            Medication Adherence/Access: See below for considerations    HFpEF:  Stable.    Afib/CAD:    Stable.    Hypothyroidism:   Stable.    Supplements:  There is no clear indication for several of her current supplements. We can reduce daily pill burden by stopping extraneous supplements and encourage healthy diet choices for adequate nutrition.     PLAN:                            I would stop the glutathione and the B1 supplement, since you aren't sure what you are taking these for.    Focus on getting vitamins, minerals, and benefits of whole foods in your diet. If making healthy diet choices, you can stop the green food supplement. We discussed that eating the whole food provides more benefits for your body than a powder form.    Follow-up: Return for as needed for medication questions or concerns.      SUBJECTIVE/OBJECTIVE:                          Debra Manriquez is a 84 year old female seen for a follow-up visit.    Reason for visit: Questions about supplements.    Allergies/ADRs: Reviewed in chart  Past Medical History: Reviewed in chart  Tobacco: She reports that she has never smoked. She has been exposed to tobacco smoke. She has never used smokeless tobacco.  Alcohol: Less than 1 beverages / week    Medication Adherence/Access: no issues reported    HFpEF:   losartan 50mg twice daily  furosemide 20mg daily  Patient reports no current medication side effects.     ECHO:  Date 7/15/2022, EF 65%  Patient is not complaining of HF symptoms.    Patient is checking BP at home: 123/78, 133/80mmHg and usually around there  Patient is measuring daily weights and reports stable.   BP Readings from Last 3 Encounters:   08/29/24 125/72   10/30/24 138/72   09/18/24 (!) 150/66      Pulse Readings from Last 3 Encounters:   08/29/24 68   10/30/24 56   09/18/24 65      Wt Readings from Last 2 Encounters:   08/29/24 264 lb 6.4 oz (119.9 kg)  "  09/18/24 265 lb (120.2 kg)       Afib/CAD:  Rosuvastatin 20mg daily   Clopidogrel 75mg daily x6 weeks s/p watchman implant  Aspirin 81mg daily  No longer on Xarelto  Watchman implant 9/18/24  Follows with cardiology at [facility], last visit 10/30/24  Patient reports no current medication side effects.   Patient does not have a history of GI bleed.   BP Readings from Last 3 Encounters:   08/29/24 125/72   10/30/24 138/72   09/18/24 (!) 150/66     Pulse Readings from Last 3 Encounters:   08/29/24 68   10/30/24 56   09/18/24 65     Recent Labs   Lab Test 08/29/24  1056 02/22/24  1430   CHOL 120 129   HDL 51 61   LDL 56 52   TRIG 67 78       Hypothyroidism:   Levothyroxine 112 mcg daily.   Patient is having the following symptoms: none.   TSH   Date Value Ref Range Status   02/22/2023 1.68 0.30 - 4.20 uIU/mL Final   03/16/2022 2.55 0.30 - 5.00 uIU/mL Final     Supplements:   L-tryptophan 1000mg twice daily - sleep and BM, feels well when taking it  Theanine 200mg twice daily - has been helpful for sleep   B12 QAM  - nerve health  B1 QAM - can't recall why she started  Calcium 500mg + Magnesium 200mg + D2 250IU 2 caps daily  Magnesium salts at bedtime - helpful for sleep and regular BM  Tart Sears - takes for gout prevention and reports it has completely gotten rid of her gout  Glutathione 200mg + Vitamin C 100mg - can't remember why she was taking this  Lutein and Zeazanthin - eye doctor said \"it won't hurt anything\"  KEVIN 250mg daily - takes for anxiety, \"I do need this\"  Green Food Supplement 1 capsule daily - barely, wheat, blue-green algae, spirulina, takes because she doesn't think she gets enough veggies in her diet    Diet is \"ok\", but doesn't think she eats enough green vegetables. She does get broccoli, squash      Today's Vitals: /72   Pulse 68   Ht 5' 5\" (1.651 m)   Wt 264 lb 6.4 oz (119.9 kg)   BMI 44.00 kg/m    ----------------      I spent 47 minutes with this patient today. All changes were made " via collaborative practice agreement with Yoanna Mcpherson MD. A copy of the visit note was provided to the patient's provider(s).    A summary of these recommendations was given to the patient.    Rosie Gay, Pharm.D., [facility]  Medication Therapy Management Pharmacist  [phone]     Telemedicine Visit Details  The patient's medications can be safely assessed via a telemedicine encounter.  Type of service:  Telephone visit  Originating Location (pt. Location): Home    Distant Location (provider location):  On-site  Start Time: 9:08 AM  End Time: 9:55 AM       Medication Therapy Recommendations  Takes dietary supplements   1 Rationale: No medical indication at this time - Unnecessary medication therapy - Indication   Recommendation: Discontinue Medication - Stop glutathione, B1, Green foods supplement   Status: Accepted - no CPA Needed   Identified Date: 11/6/2024 Completed Date: 11/6/2024

## 2025-01-13 ENCOUNTER — HOSPITAL ENCOUNTER (OUTPATIENT)
Dept: CT IMAGING | Facility: CLINIC | Age: 85
Discharge: HOME | End: 2025-01-13
Attending: PHYSICIAN ASSISTANT | Admitting: PHYSICIAN ASSISTANT
Payer: MEDICARE

## 2025-01-13 ENCOUNTER — TELEPHONE (OUTPATIENT)
Dept: CARDIOLOGY | Facility: CLINIC | Age: 85
End: 2025-01-13

## 2025-01-13 DIAGNOSIS — I48.0 PAROXYSMAL ATRIAL FIBRILLATION (H): ICD-10-CM

## 2025-01-13 LAB
BSA FOR ECHO PROCEDURE: 0 M2
CREAT BLD-MCNC: 0.8 MG/DL (ref 0.6–1.1)
EGFRCR SERPLBLD CKD-EPI 2021: >60 ML/MIN/1.73M2
RADIOLOGIST FLAGS: ABNORMAL

## 2025-01-13 PROCEDURE — 75572 CT HRT W/3D IMAGE: CPT | Mod: 26 | Performed by: INTERNAL MEDICINE

## 2025-01-13 PROCEDURE — 82565 ASSAY OF CREATININE: CPT

## 2025-01-13 PROCEDURE — 250N000011 HC RX IP 250 OP 636: Performed by: PHYSICIAN ASSISTANT

## 2025-01-13 PROCEDURE — 75572 CT HRT W/3D IMAGE: CPT

## 2025-01-13 RX ORDER — IOPAMIDOL 755 MG/ML
100 INJECTION, SOLUTION INTRAVASCULAR ONCE
Status: DISCONTINUED | OUTPATIENT
Start: 2025-01-13 | End: 2025-01-13

## 2025-01-13 RX ORDER — IOPAMIDOL 755 MG/ML
120 INJECTION, SOLUTION INTRAVASCULAR ONCE
Status: COMPLETED | OUTPATIENT
Start: 2025-01-13 | End: 2025-01-13

## 2025-01-13 RX ADMIN — IOPAMIDOL 120 ML: 755 INJECTION, SOLUTION INTRAVENOUS at 10:23

## 2025-01-13 NOTE — TELEPHONE ENCOUNTER
Pt had CT Angio Pulmonary Vein 1/13/25 for LAAC 3 mo follow-up imaging.    DETAILED [facility] EXTRACARDIAC OVERREAD OF CARDIAC CT showed Newly conspicuous and indeterminate adjacent right breast nodules. Recommend dedicated breast imaging and mammography.     Result routed to and called to PCP.  -ral

## 2025-01-13 NOTE — TELEPHONE ENCOUNTER
"Patient s/p LAAC BSCI - WM FLX, 9/18/24. Per post-LAAC medication protocol, patient to remain on once daily 81 mg ASA for life and once daily 75 mg Plavix until six months post-LAAC, which will be 3/18/25. Imaging interpretation summary shows:    \"Watchman device seats well. No thrombus is identified on the surface of Watchman. There is no blood flow communication between left atrium and left atrial appendage.\"    Phone call from patient daughter, discussed results. Informed daughter overread results were sent and called to PCP as they showed breast nodules, and she should reach out to PCP for further recommendations regarding.  Post-LAAC Imaging shows no concern for DRT or leak around edges of device. Patient will remain on medications as prescribed, otherwise patient will be seen at 6 month alfonzo to discuss medication changes. Daughter has writer's direct office number for questions or concerns. 6 mo follow up scheduled with Dr Medina 3/14/25.  No further questions at this time.   "

## 2025-02-12 ENCOUNTER — TRANSFERRED RECORDS (OUTPATIENT)
Dept: HEALTH INFORMATION MANAGEMENT | Facility: CLINIC | Age: 85
End: 2025-02-12
Payer: MEDICARE

## 2025-02-26 ENCOUNTER — TRANSFERRED RECORDS (OUTPATIENT)
Dept: HEALTH INFORMATION MANAGEMENT | Facility: CLINIC | Age: 85
End: 2025-02-26
Payer: MEDICARE

## 2025-03-11 ENCOUNTER — TRANSFERRED RECORDS (OUTPATIENT)
Dept: HEALTH INFORMATION MANAGEMENT | Facility: CLINIC | Age: 85
End: 2025-03-11
Payer: MEDICARE

## 2025-03-12 ENCOUNTER — TELEPHONE (OUTPATIENT)
Dept: CARDIOLOGY | Facility: CLINIC | Age: 85
End: 2025-03-12
Payer: MEDICARE

## 2025-03-12 NOTE — TELEPHONE ENCOUNTER
Discussed with daughter - pt having MRI, asking about Watchman and safety in MRI scan.  Information sent via [facility].

## 2025-03-12 NOTE — TELEPHONE ENCOUNTER
[facility] Call Center    Phone Message    May a detailed message be left on voicemail: yes     Reason for Call: Other: Pt Daughter would like a call back to discuss the watchman device as they need all info for a breast MRI as pt has breast cancer     Action Taken: Other: Cardio    Travel Screening: Not Applicable     Date of Service:

## 2025-03-18 ENCOUNTER — TELEPHONE (OUTPATIENT)
Dept: CARDIOLOGY | Facility: CLINIC | Age: 85
End: 2025-03-18
Payer: MEDICARE

## 2025-03-18 ENCOUNTER — TRANSFERRED RECORDS (OUTPATIENT)
Dept: HEALTH INFORMATION MANAGEMENT | Facility: CLINIC | Age: 85
End: 2025-03-18

## 2025-03-18 NOTE — TELEPHONE ENCOUNTER
MODIFIED TI SCALE   Timepoint: 6mo Post-LAAC    Previous score: 0    Score Description   0 No symptoms at all   1 No significant disability despite symptoms; able to carry out all usual duties and activities   2 Slight disability; unable to carry out all previous activities, but able to look after own affairs without assistance   3 Moderate disability; requiring some help, but able to walk without assistance   4 Moderately severe disability; unable to walk without assistance and unable to attend to own bodily needs without assistance   5 Severe disability; bedridden, incontinent and requiring constant nursing care and attention   6 Dead    Total score (0 - 6):  0    Change in score if s/p LAAC? No  If yes, notify implanting cardiologist.    Donna Vu RN  Structural Heart Coordinator   [facility]  [phone]

## 2025-04-02 ENCOUNTER — TRANSFERRED RECORDS (OUTPATIENT)
Dept: HEALTH INFORMATION MANAGEMENT | Facility: CLINIC | Age: 85
End: 2025-04-02

## 2025-04-28 ENCOUNTER — LAB REQUISITION (OUTPATIENT)
Dept: LAB | Facility: CLINIC | Age: 85
End: 2025-04-28
Payer: MEDICARE

## 2025-04-28 DIAGNOSIS — I11.0 HYPERTENSIVE HEART DISEASE WITH HEART FAILURE (H): ICD-10-CM

## 2025-04-28 LAB
ANION GAP SERPL CALCULATED.3IONS-SCNC: 13 MMOL/L (ref 7–15)
BUN SERPL-MCNC: 19.9 MG/DL (ref 8–23)
CALCIUM SERPL-MCNC: 9.3 MG/DL (ref 8.8–10.4)
CHLORIDE SERPL-SCNC: 107 MMOL/L (ref 98–107)
CREAT SERPL-MCNC: 0.74 MG/DL (ref 0.51–0.95)
EGFRCR SERPLBLD CKD-EPI 2021: 78 ML/MIN/1.73M2
GLUCOSE SERPL-MCNC: 106 MG/DL (ref 70–99)
HCO3 SERPL-SCNC: 23 MMOL/L (ref 22–29)
POTASSIUM SERPL-SCNC: 4 MMOL/L (ref 3.4–5.3)
SODIUM SERPL-SCNC: 143 MMOL/L (ref 135–145)

## 2025-04-28 PROCEDURE — 80048 BASIC METABOLIC PNL TOTAL CA: CPT | Mod: ORL | Performed by: FAMILY MEDICINE

## 2025-05-06 ENCOUNTER — TRANSFERRED RECORDS (OUTPATIENT)
Dept: HEALTH INFORMATION MANAGEMENT | Facility: CLINIC | Age: 85
End: 2025-05-06
Payer: MEDICARE

## 2025-05-15 ENCOUNTER — TRANSFERRED RECORDS (OUTPATIENT)
Dept: HEALTH INFORMATION MANAGEMENT | Facility: CLINIC | Age: 85
End: 2025-05-15
Payer: MEDICARE

## 2025-05-20 ENCOUNTER — TRANSFERRED RECORDS (OUTPATIENT)
Dept: HEALTH INFORMATION MANAGEMENT | Facility: CLINIC | Age: 85
End: 2025-05-20
Payer: MEDICARE

## 2025-05-27 NOTE — TELEPHONE ENCOUNTER
RECORDS STATUS - BREAST    Action    Action Taken 5/27/25  Spoke w/ Aggie @ [facility] records - they will push imaging, and fax reports shortly.     Reports received from [facility], sent to HIM for STAT upload, Emailed to NN Email, CC Marliee DU     Imaging received, email sent to HIM to resolve MG/US to PACS  11:00 AM      RECORDS REQUESTED FROM: [facility], [facility], [facility]   DATE REQUESTED:    NOTES DETAILS STATUS   OFFICE NOTE from referring provider [facility]  Dr. Isis Gibbons   OFFICE NOTE from surgeon [facility] Dr. Gibbons: 5/20/25   OPERATIVE REPORT CE - [facility] 5/15/25: RIGHT ultrasound-guided wire localization x 2 and wide local excisions of RIGHT breast cancers,   RIGHT lymphatic mapping and RIGHT sentinel lymph node biopsy   MEDICATION LIST CE [facility]   LABS     PATHOLOGY REPORTS  (Tissue diagnosis, Stage, ER/PA percentage positive and intensity of staining, HER2 IHC, FISH, and all biopsies from breast and any distant metastasis)                 [facility], Reports in CE, slides requested 5/27 5/15/25: [id number]  2/26/25: [id number]   PATHOLOGY [facility] TRACKING:   TRACKING #: [id number]   GENONOMIC TESTING     TYPE:   (Next Generation Sequencing, including Foundation One testing, and Oncotype score)     IMAGING (NEED IMAGES & REPORT)     MRI PACS [facility]  3/18/25   MAMMO PACS [facility]  2/26/25,    ULTRASOUND PACS [facility]  4/2/25, 2/26/25, 2/12/25 5/21/21

## 2025-05-30 ENCOUNTER — TRANSFERRED RECORDS (OUTPATIENT)
Dept: HEALTH INFORMATION MANAGEMENT | Facility: CLINIC | Age: 85
End: 2025-05-30

## 2025-05-30 ENCOUNTER — LAB REQUISITION (OUTPATIENT)
Dept: LAB | Facility: CLINIC | Age: 85
End: 2025-05-30
Payer: MEDICARE

## 2025-05-30 PROCEDURE — 88321 CONSLTJ&REPRT SLD PREP ELSWR: CPT | Performed by: PATHOLOGY

## 2025-06-04 NOTE — TELEPHONE ENCOUNTER
MEDICAL RECORDS REQUEST   Radiation Oncology  [address] [place] [address]  Fax: [phone]          FUTURE VISIT INFORMATION                                                   Debra Manriquez, : 1940 scheduled for future visit at [facility] Radiation Oncology    RECORDS REQUESTED FOR VISIT                                                     BREAST     OFFICE NOTE from medical oncologist Epic 2025 - Dr. Rosie Lloyd   OFFICE NOTE from surgeon/plastic surgeon Ext: [facility] 2025 - Dr. Isis Gibbons   OPERATIVE/BREAST BIOPSY REPORTS [facility] 5/15/2025 - RIGHT ultrasound-guided wire localization x 2 and wide local excisions of RIGHT breast cancers,   RIGHT lymphatic mapping and RIGHT sentinel lymph node biopsy    MEDICATION LIST [facility]    LABS     PATHOLOGY REPORTS Reports in -[facility], path consult pending 5/15/25: [id number]  25: [id number]    ANYTHING RELATED TO DIAGNOSIS Epic 2025   IMAGING (NEED IMAGES & REPORT)     MRI PACS MR Breast: 3/18/2025   MAMMO/SURGICAL BREAST IMGS/SPECIMEN RADIOGRAPH PACS 2025-2021   ULTRASOUND PACS US Breast: 2025-2025

## 2025-06-04 NOTE — PROGRESS NOTES
Oncology Consultation:  Date on this visit: 6/5/2025    Debra Manriquez is referred by Dr.Elizabeth TERESITA Gibbons for an oncology consultation. She requires evaluation for new diagnosis of upper outer right breast cancer.    Primary Physician: Yoanna Mcpherson     History Of Present Illness:  Ms. Manriquez is an 85 year old female with multifocal right breast cancer.  She had a cardiac CT performed 1/13/2024 which incidentally showed two adjacent right breast nodules measuring 1.5 cm and 1.6 cm.  Right breast mammogram confirmed the upper outer right breast masses.  Ultrasound showed a 1.6 cm mass at 10:00, 5 cm from the nipple and a second 1.2 cm mass at 10:00, 12 cm from the nipple.  Right axillary ultrasound was without lymphadenopathy.  A cyst in the upper inner left breast was also seen. Biopsy of the mass 12 cm from the nipple was c/w a grade 2 invasive ductal carcinoma, ER strong in 96%, VT moderate in 62%, and HER2 negative (IHC 1+) with a Ki-67 of 18%.  Biopsy of the mass 5 cm from the nipple showed grade 2 invasive ductal carcinoma, ER strong in 98%, VT moderate in 67%, and HER2 equivocal by IHC (2+) and negative by FISH, with a Ki-67 of 23%.    Patient underwent right breast double lumpectomies and right axillary sentinel lymph node biopsy on 5/15/2025 under the care of Dr. Isis Gibbons.  Pathology of the tumor 12 cm from the nipple showed grade 2 invasive carcinoma measuring  2.1 cm with associated DCIS and perineural invasion.  Pathology of the tumor 5 cm from the nipple was with multifocal invasive ductal carcinoma with a grade 2 tumor measuring 2 cm and 3 subcentimeter foci of grade 1 carcinoma measuring 8 mm, 7 mm, and 4 mm respectively.  Again, there was associated DCIS.  Both invasive and DCIS margins were close, but negative.  A single sentinel lymph node was negative for malignancy.    Debra presents to clinic today alongside her daughter and her personal aide to discuss adjuvant treatment  options.  She reports that overall the surgery went well for her.  She has healed well.  She notes her health is okay.  Mentally, she is very sharp.  Physically, she has some limitations.  She ambulates with a walker.  She notes this is largely due to balances issues.  She has a history of neck issues, is s/p multiple neck injections, and feels her balance issues may be related to this.  She also notes dyspnea on exertion.  She has been doing cancer rehab following her surgery and states she was only able to ambulate 3 out of 6 planned minutes of a walk test.  She has arthritis.  She had left knee replacement surgery in 2011 and right knee replacement in 2023.  She also notes pain at the base of her right thumb.  She has a h/o osteoporosis for which she is on treatment with Prolia.  She has occasional acid reflux and notes a history of hypertension.     Past Medical/Surgical History:  Past Medical History:   Diagnosis Date    Antiplatelet or antithrombotic long-term use     Chronic acquired lymphedema     Edema     Falls     Fibrocystic breast disease     Fibrocystic breast disease     Foot pain     Gastroesophageal reflux disease     GERD (gastroesophageal reflux disease)     Hyperlipidemia     Hypertension     Irregular heart beat     Lymphedema     Mitral valve disorder     Obese     JADYN (obstructive sleep apnea)     Osteoarthritis of knee     hx of knee replacement and pending second    Osteopenia     Skin cancer     Skin cancer, basal cell     Sleep apnea     Thyroid nodule     Thyroid nodule     Vitamin B deficiency      Past Surgical History:   Procedure Laterality Date    ARTHROPLASTY KNEE Right 3/7/2023    Procedure: RIGHT TOTAL KNEE ARTHROPLASTY;  Surgeon: Juan Corbin MD;  Location: [facility]    CATARACT EXTRACTION  2011, 2012    COLONOSCOPY      CV LEFT ATRIAL APPENDAGE CLOSURE N/A 9/18/2024    Procedure: Left Atrial Appendage Closure - ;  Surgeon: Murphy Herrera MD;  Location:   [facility] CV    EXCISE TOENAIL(S) Bilateral 8/16/2019    Procedure: MATRIXECTOMY BILATERAL FEET; TOES 1,2,3,4 ON LEFT FOOT,  1,2 ON RIGHT FOOT;  BOTH NAIL BORDERS;  Surgeon: Opal Kam DPM;  Location:  OR    EYE SURGERY      cataract    GI SURGERY      GYN SURGERY      hysterectomy    INJECT STEROID (LOCATION) Right 8/16/2019    Procedure: CORTIZONE INJECTION RIGHT HEEL;  Surgeon: Opal Kam DPM;  Location:  OR    ORTHOPEDIC SURGERY Left     knee replacement    PARTIAL HYSTERECTOMY      STRIP VEIN Bilateral 1975    TOTAL KNEE ARTHROPLASTY Left 2011     Allergies:  Allergies as of 06/05/2025 - Reviewed 03/14/2025   Allergen Reaction Noted    Lisinopril Cough 02/19/2018    Reglan [metoclopramide] Other (See Comments) 08/15/2019     Current Medications:  Current Outpatient Medications   Medication Sig Dispense Refill    amoxicillin (AMOXIL) 500 MG tablet TAKE 4 TABLETS BY MOUTH 1 HOUR BEFORE DENTAL APPOINTMENT      aspirin 81 MG EC tablet Take 1 tablet (81 mg) by mouth daily.      calcium carbonate 500 mg, elemental, (OSCAL) 500 MG tablet 1 tablet with meals Orally Twice a day for 30 day(s)      Coenzyme Q10 400 MG CAPS Take 400 mg by mouth 2 times daily      cyanocobalamin (VITAMIN B-12) 1000 MCG tablet Take 1,000 mcg by mouth daily      denosumab (PROLIA) 60 MG/ML SOSY injection Inject 60 mg subcutaneously every 6 months As of 3/14/2023, please hold and pause this medication while at the transitional care unit      estradiol (ESTRACE) 0.1 MG/GM vaginal cream Place vaginally three times a week (Patient not taking: Reported on 3/14/2025)      furosemide (LASIX) 20 MG tablet Take 20 mg by mouth daily      Gamma-Aminobutyric Acid (KEVIN PO) Take 250 mg by mouth daily.      levothyroxine (SYNTHROID/LEVOTHROID) 112 MCG tablet Take 112 mcg by mouth daily      losartan (COZAAR) 50 MG tablet Take 50 mg by mouth 2 times daily      Magnesium Citrate 100 MG CAPS 1 capsule with food Orally  Twice a day      rosuvastatin (CRESTOR) 20 MG tablet Take 1 tablet (20 mg) by mouth daily 90 tablet 2    solifenacin (VESICARE) 5 MG tablet Take 5 mg by mouth daily.      TART CHERRY PO Take by mouth.      THEANINE 200 PO Take 1 capsule by mouth 2 times daily.      Tryptophan 500 MG TABS Take by mouth.        Family History:  Mother with breast cancer at 76 yo.  Daughter with a h/o thyroid cancer.  Brother with a h/o colon cancer.  Maternal grandmother with breast cancer at 65 and with [employer] heritage.    Social History:  Patient has 4 children.  She presents to clinic with her daughter, Blanca today.  She lives independently with the help of a personal care assistant.  She is a lifelong nonsmoker.  Rarely will have a drink with alcohol in it.    Physical Exam:  /74 (BP Location: Right arm, Patient Position: Sitting, Cuff Size: Adult Large)   Pulse 57   Temp 98  F (36.7  C) (Oral)   Resp 18   Wt 119.1 kg (262 lb 8 oz)   SpO2 95%   BMI 42.37 kg/m    General:  Well appearing adult female in NAD.  Alert and oriented x 3.  HEENT:  Normocephalic.  Sclera anicteric.  MMM.    Lymph:  No palpable cervical, supraclavicular, or axillary LAD.  Chest:  CTA bilaterally.  No wheezes or crackles.  CV:  RRR.  Nl S1 and S2.  No audible m/r/g.  Breast:  Breasts were examined with patient in a seated position.  Right breast lumpectomy changes with well healed incisions.  There are no dominant or discretely palpable masses in either breast.  Bilateral nipples are everted and without discharge.  Abd:  Soft/ND.    Musculo:  Moves all 4 extremities appropriately.  Neuro:  Cranial nerves grossly intact.  No tremors or dyskinetic movements.  Psych:  Mood and affect appear normal.  Skin:  No visible concerning skin rashes or lesions.    Laboratory/Imaging Studies  I personally reviewed the below labs and images while in clinic today:    I personally reviewed, interpreted and summarized the findings in the HPI of bilateral  diagnostic mammogram and right breast ultrasound performed on 2/12/2025 and breast MRI performed on 3/18/2025, these outside imaging studies have been scanned into the EMR    1/3/2019 DEXA bone density scan:  Result at the right femoral neck:   BMD (gm/cm2): 0.712   T score: -2.3     Result at the right total hip:   BMD (gm/cm2): 0.759   T score: -2.0     Result at the forearm:   BMD (gm/cm2): 0.646   T score: -2.6   Z score:  -0.1     Conclusion: OSTEOPOROSIS     5/15/2025 Pathology double right breast lumpectomies, right axillary sentinel lymph node procedure:  Final Diagnosis A) RIGHT BREAST, 10:00, 12 CM FROM NIPPLE, LUMPECTOMY:  1. Invasive ductal carcinoma, [place] grade II of III (tumor A1 in synoptic)      a. Size: 21 mm      b. Core biopsy site is associated with tumor      c. Perineural invasion present  2. 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 Margins:      a. Invasive carcinoma is <1 mm from the medial margin      b. DCIS is <1 mm from the medial margin  4. One intramammary lymph node, negative for metastatic carcinoma (0/1)  5. Breast Ancillary Testing: Performed on prior case ([id number] pt. A)      a. Hormone Receptors:            Estrogen receptor: Positive (96%, strong staining)            Progesterone receptor: Positive (62%, moderate staining)      b. HER2 by IHC: Negative (1+ by manual morphometry)      c. Ki-67: 18% by image analysis  6. Surrounding breast with proliferative fibrocystic change    B) SENTINEL LYMPH NODE(S), RIGHT AXILLARY SENTINEL LYMPH NODE 2, EXCISION:  1. Negative for metastatic carcinoma in one lymph node (0/1)    C) RIGHT BREAST, 10:00, 5 CM FROM NIPPLE, LUMPECTOMY:  Multifocal invasive ductal carcinoma:  1. Tumor #1: Invasive ductal carcinoma, [place] grade II of III (tumor C1 in synoptic)      a. Size: 20 mm      b. Core biopsy site is associated with tumor      c. Margins: Invasive carcinoma is 5 mm from superior  margin (not final margin; see part D)      d. Breast Ancillary Testing: Performed on prior case ([id number] pt. B)         1. Hormone Receptors:              Estrogen receptor: Positive (98%, strong staining)              Progesterone receptor: Positive (67%, moderate staining)         2. HER2 by IHC: Equivocal (2+ by manual morphometry)               HER2 by FISH: Negative, see comment                 HER2/CEP17 ratio: 1.52                 HER2 signals/cell: 4.75                 CEP17 signals/cell: 3.12         3. Ki-67: 23% by image analysis  2. Tumor #2: Invasive ductal carcinoma, [place] grade I of III (tumor C2 in synoptic)      a. Size: 8 mm      b. Margins: Invasive carcinoma is 2 mm from inferior margin      c. Breast Ancillary Testing:             Hormone Receptors:            Estrogen receptor: Positive (99%, moderate staining)            Progesterone receptor: Positive (95%, moderate staining)           HER2 by IHC: Negative (1+ by manual morphometry)           Ki-67: 5%  3. Tumor #3: Invasive ductal carcinoma, [place] grade I of III (tumor C3 in synoptic)      a. Size: 7 mm      b. Margins: Invasive carcinoma is 5 mm from anterior margin      c. Breast Ancillary Testing:           Hormone Receptors:            Estrogen receptor: Positive (95%, moderate staining)            Progesterone receptor: Positive (76%, moderate staining)           HER2 by IHC: Negative (0, ultralow by manual morphometry)           Ki-67: 2%  4. Tumor #4: Invasive ductal carcinoma, [place] grade I of III (tumor C4 in synoptic)      a. Size: 4 mm      b. Margins: Invasive carcinoma is >5 mm from nearest (posterior and medial) margins      c. Tumor 4 is similar in appearance to tumor 3; ancillary testing not repeated  4. Ductal carcinoma in situ (DCIS), nuclear grade 2, Cribriform type      a. Margins: DCIS is >5 mm from all margins      5. Surrounding breast with proliferative fibrocystic change      D) RIGHT BREAST,  10:00, 5 CM FROM NIPPLE, NEW SUPERIOR MARGIN, EXCISION WITH MARGIN EVALUATION:  1. Benign fibroadipose tissue  2. Negative for atypia or malignancy   Amendment electronically signed by Kameron Brown MD on 5/22/2025 at 1719 CDT Electronically signed by Kameron Brown MD on 5/22/2025 at 1428 CDT   Comment The smaller foci of [place] grade I tumor seen in part C (tumors 2, 3, and 4) have a similar morphologic appearance, but do not show definitive continuity with each other or the largest tumor focus (tumor 1) on additional submitted sections.      Dr. Greco reviewed the breast ancillary markers on C4 and C11.       ASSESSMENT/PLAN:  84 yo female with paroxysmal atrial fibrillation s/p Watchman device on aspirin, HFpEF, osteoporosis and clinical prognostic stage Ib, T2(5)N0M0, grade 1/2, ER positive, MT positive, HER-2 negative invasive ductal carcinomas of the right breast.    Today I discussed diagnosis, staging, and treatment options in detail with Blanca Richardson, and Madelyn.  We reviewed the pathology from her breast surgery which showed 5 separate breast cancers in the upper outer right breast.  The largest measured 2.1 and 2 cm.  She had no lymph node involvement.  She has no clinical evidence of distant metastatic disease.  Pathologic prognostic staging is stage IA.  The goal of treatment is curative.    We review the roles of surgery and radiation therapy in preventing local (in breast and regional lymph node) recurrence of breast cancer.  She is s/p bilateral lumpectomies with negative surgical margins, therefore no further surgery is indicated at this time.  I have discussed her case with Bonnie Cid and Pedro of radiation oncology.  Given multifocal disease and close surgical margins, both recommend a course of radiation citing that she is a candidate for the 5 day FAST-FORWARD regimen.   She is scheduled to see Dr. Cid next week.    We next reviewed the roles of systemic therapy in  treating micrometastatic disease and preventing distant recurrence, recurrence in the bones, liver, lungs, etc, of breast cancer.   Given her age and medical comorbidities, the risk of systemic chemotherapy outweigh the potential benefit and I do not recommend it.      We discussed a 5 year course of endocrine therapy can reduce her risk of breast cancer recurrence by half.  I recommend treatment with letrozole 2.5 mg orally daily.  We reviewed the potential side effects of letrozole including myalgias, arthralgias, hot flashes, vaginal dryness, and mood disorder.  We specifically discussed, however, that patients >69 yo when they start the medication tend to have less frequency of these side effects.      We also discussed the risk of further thinning of the bones in the context of her history of osteoporosis.  I was happy to hear that she is already on treatment with Prolia.  We reviewed Prolia will treat osteoporosis and has also been shown to reduce the risk of breast cancer bone metastases.  She will continue this medication.    Debra verbally agreed to proceed with plan for radiation and treatment with letrozole.  She will see Dr. Cid on 6/10.  We discussed starting letrozole 2 weeks after completion of radiation.  I will schedule her for a return visit in approximately 2 months to discuss how well she is tolerating the medication and to review plan for surveillance.  We did briefly discuss today plan to continue annual mammograms with first mammograms 6 months after completion of radiation.    Of note, this case was reviewed at breast conference on Friday, 5/30 and the treatment plan outlined above was agreed upon by the [facility] breast team.    Ms. Manriquez, her daughter, and her PCA had multiple questions which I answered to the best of my ability today.  I spent 90 minutes on the date of the encounter doing chart review, review of test results, interpretation of tests, patient visit, documentation,  discussion with other provider(s), and discussion with family

## 2025-06-05 ENCOUNTER — ONCOLOGY VISIT (OUTPATIENT)
Dept: ONCOLOGY | Facility: CLINIC | Age: 85
End: 2025-06-05
Attending: SURGERY
Payer: MEDICARE

## 2025-06-05 ENCOUNTER — PRE VISIT (OUTPATIENT)
Dept: ONCOLOGY | Facility: CLINIC | Age: 85
End: 2025-06-05
Payer: MEDICARE

## 2025-06-05 VITALS
BODY MASS INDEX: 42.37 KG/M2 | SYSTOLIC BLOOD PRESSURE: 130 MMHG | OXYGEN SATURATION: 95 % | WEIGHT: 262.5 LBS | RESPIRATION RATE: 18 BRPM | HEART RATE: 57 BPM | TEMPERATURE: 98 F | DIASTOLIC BLOOD PRESSURE: 74 MMHG

## 2025-06-05 DIAGNOSIS — M81.0 SENILE OSTEOPOROSIS: ICD-10-CM

## 2025-06-05 DIAGNOSIS — I50.32 CHRONIC HEART FAILURE WITH PRESERVED EJECTION FRACTION (H): ICD-10-CM

## 2025-06-05 DIAGNOSIS — Z17.0 MALIGNANT NEOPLASM OF UPPER-OUTER QUADRANT OF RIGHT BREAST IN FEMALE, ESTROGEN RECEPTOR POSITIVE (H): Primary | ICD-10-CM

## 2025-06-05 DIAGNOSIS — C50.411 MALIGNANT NEOPLASM OF UPPER-OUTER QUADRANT OF RIGHT BREAST IN FEMALE, ESTROGEN RECEPTOR POSITIVE (H): Primary | ICD-10-CM

## 2025-06-05 PROCEDURE — 99417 PROLNG OP E/M EACH 15 MIN: CPT | Performed by: INTERNAL MEDICINE

## 2025-06-05 PROCEDURE — 99205 OFFICE O/P NEW HI 60 MIN: CPT | Performed by: INTERNAL MEDICINE

## 2025-06-05 PROCEDURE — G0463 HOSPITAL OUTPT CLINIC VISIT: HCPCS | Performed by: INTERNAL MEDICINE

## 2025-06-05 RX ORDER — B-COMPLEX WITH VITAMIN C
500 TABLET ORAL DAILY
COMMUNITY

## 2025-06-05 RX ORDER — LETROZOLE 2.5 MG/1
2.5 TABLET, FILM COATED ORAL DAILY
Qty: 90 TABLET | Refills: 3 | Status: SHIPPED | OUTPATIENT
Start: 2025-06-05

## 2025-06-05 RX ORDER — CLOTRIMAZOLE 1 %
CREAM (GRAM) TOPICAL PRN
COMMUNITY
Start: 2025-02-27

## 2025-06-05 RX ORDER — CHOLECALCIFEROL (VITAMIN D3) 50 MCG
1 TABLET ORAL DAILY
COMMUNITY

## 2025-06-05 RX ORDER — NYSTATIN 100000 [USP'U]/G
1 POWDER TOPICAL PRN
COMMUNITY

## 2025-06-05 ASSESSMENT — PAIN SCALES - GENERAL: PAINLEVEL_OUTOF10: MILD PAIN (1)

## 2025-06-05 NOTE — NURSING NOTE
"Oncology Rooming Note    June 5, 2025 10:38 AM   Debra Manriquez is a 85 year old female who presents for:    Chief Complaint   Patient presents with    Oncology Clinic Visit     Initial Vitals: /74 (BP Location: Right arm, Patient Position: Sitting, Cuff Size: Adult Large)   Pulse 57   Temp 98  F (36.7  C) (Oral)   Resp 18   Wt 119.1 kg (262 lb 8 oz)   SpO2 95%   BMI 42.37 kg/m   Estimated body mass index is 42.37 kg/m  as calculated from the following:    Height as of 3/14/25: 1.676 m (5' 6\").    Weight as of this encounter: 119.1 kg (262 lb 8 oz). Body surface area is 2.36 meters squared.  Mild Pain (1) Comment: Data Unavailable   No LMP recorded. Patient has had a hysterectomy.  Allergies reviewed: Yes  Medications reviewed: Yes    Medications: Medication refills not needed today.  Pharmacy name entered into [facility]:    [id number] - [place], [place] - [address] [facility] [address] [facility] [employer] - [place], [place] - [address]    Frailty Screening:   Is the patient here for a new oncology consult visit in cancer care? 1. Yes. Over the past month, have you experienced difficulty or required a caregiver to assist with:   1. Balance, walking or general mobility (including any falls)? YES, uses a walker but states her mobility has improved since surgery.  2. Completion of self-care tasks such as bathing, dressing, toileting, grooming/hygiene?  NO  3. Concentration or memory that affects your daily life?  NO     PHQ9:  Did this patient require a PHQ9?: No      Clinical concerns: none.       Gifty Marino              "

## 2025-06-05 NOTE — LETTER
6/5/2025      Debra Manriquez  [address]  [place] [address]      Dear Colleague,    Thank you for referring your patient, Debra Manriquez, to the [facility]. Please see a copy of my visit note below.    Oncology Consultation:  Date on this visit: 6/5/2025    Debra Manriquez is referred by Dr.Elizabeth TERESITA Gibbons for an oncology consultation. She requires evaluation for new diagnosis of upper outer right breast cancer.    Primary Physician: Yoanna Mcpherson     History Of Present Illness:  Ms. Manriquez is an 85 year old female with multifocal right breast cancer.  She had a cardiac CT performed 1/13/2024 which incidentally showed two adjacent right breast nodules measuring 1.5 cm and 1.6 cm.  Right breast mammogram confirmed the upper outer right breast masses.  Ultrasound showed a 1.6 cm mass at 10:00, 5 cm from the nipple and a second 1.2 cm mass at 10:00, 12 cm from the nipple.  Right axillary ultrasound was without lymphadenopathy.  A cyst in the upper inner left breast was also seen. Biopsy of the mass 12 cm from the nipple was c/w a grade 2 invasive ductal carcinoma, ER strong in 96%, [place] moderate in 62%, and HER2 negative (IHC 1+) with a Ki-67 of 18%.  Biopsy of the mass 5 cm from the nipple showed grade 2 invasive ductal carcinoma, ER strong in 98%, MN moderate in 67%, and HER2 equivocal by IHC (2+) and negative by FISH, with a Ki-67 of 23%.    Patient underwent right breast double lumpectomies and right axillary sentinel lymph node biopsy on 5/15/2025 under the care of Dr. Isis Gibbons.  Pathology of the tumor 12 cm from the nipple showed grade 2 invasive carcinoma measuring  2.1 cm with associated DCIS and perineural invasion.  Pathology of the tumor 5 cm from the nipple was with multifocal invasive ductal carcinoma with a grade 2 tumor measuring 2 cm and 3 subcentimeter foci of grade 1 carcinoma measuring 8 mm, 7 mm, and 4 mm respectively.  Again,  there was associated DCIS.  Both invasive and DCIS margins were close, but negative.  A single sentinel lymph node was negative for malignancy.    Debra presents to clinic today alongside her daughter and her personal aide to discuss adjuvant treatment options.  She reports that overall the surgery went well for her.  She has healed well.  She notes her health is okay.  Mentally, she is very sharp.  Physically, she has some limitations.  She ambulates with a walker.  She notes this is largely due to balances issues.  She has a history of neck issues, is s/p multiple neck injections, and feels her balance issues may be related to this.  She also notes dyspnea on exertion.  She has been doing cancer rehab following her surgery and states she was only able to ambulate 3 out of 6 planned minutes of a walk test.  She has arthritis.  She had left knee replacement surgery in 2011 and right knee replacement in 2023.  She also notes pain at the base of her right thumb.  She has a h/o osteoporosis for which she is on treatment with Prolia.  She has occasional acid reflux and notes a history of hypertension.     Past Medical/Surgical History:  Past Medical History:   Diagnosis Date     Antiplatelet or antithrombotic long-term use      Chronic acquired lymphedema      Edema      Falls      Fibrocystic breast disease      Fibrocystic breast disease      Foot pain      Gastroesophageal reflux disease      GERD (gastroesophageal reflux disease)      Hyperlipidemia      Hypertension      Irregular heart beat      Lymphedema      Mitral valve disorder      Obese      JADYN (obstructive sleep apnea)      Osteoarthritis of knee     hx of knee replacement and pending second     Osteopenia      Skin cancer      Skin cancer, basal cell      Sleep apnea      Thyroid nodule      Thyroid nodule      Vitamin B deficiency      Past Surgical History:   Procedure Laterality Date     ARTHROPLASTY KNEE Right 3/7/2023    Procedure: RIGHT TOTAL KNEE  ARTHROPLASTY;  Surgeon: Juan Corbin MD;  Location: [facility] Main OR     CATARACT EXTRACTION  2011, 2012     COLONOSCOPY       CV LEFT ATRIAL APPENDAGE CLOSURE N/A 9/18/2024    Procedure: Left Atrial Appendage Closure - WM;  Surgeon: Murphy Herrera MD;  Location: [facility] CV     EXCISE TOENAIL(S) Bilateral 8/16/2019    Procedure: MATRIXECTOMY BILATERAL FEET; TOES 1,2,3,4 ON LEFT FOOT,  1,2 ON RIGHT FOOT;  BOTH NAIL BORDERS;  Surgeon: Opal Kam DPM;  Location:  OR     EYE SURGERY      cataract     GI SURGERY       GYN SURGERY      hysterectomy     INJECT STEROID (LOCATION) Right 8/16/2019    Procedure: CORTIZONE INJECTION RIGHT HEEL;  Surgeon: Opal Kam DPM;  Location:  OR     ORTHOPEDIC SURGERY Left     knee replacement     PARTIAL HYSTERECTOMY       STRIP VEIN Bilateral 1975     TOTAL KNEE ARTHROPLASTY Left 2011     Allergies:  Allergies as of 06/05/2025 - Reviewed 03/14/2025   Allergen Reaction Noted     Lisinopril Cough 02/19/2018     Reglan [metoclopramide] Other (See Comments) 08/15/2019     Current Medications:  Current Outpatient Medications   Medication Sig Dispense Refill     amoxicillin (AMOXIL) 500 MG tablet TAKE 4 TABLETS BY MOUTH 1 HOUR BEFORE DENTAL APPOINTMENT       aspirin 81 MG EC tablet Take 1 tablet (81 mg) by mouth daily.       calcium carbonate 500 mg, elemental, (OSCAL) 500 MG tablet 1 tablet with meals Orally Twice a day for 30 day(s)       Coenzyme Q10 400 MG CAPS Take 400 mg by mouth 2 times daily       cyanocobalamin (VITAMIN B-12) 1000 MCG tablet Take 1,000 mcg by mouth daily       denosumab (PROLIA) 60 MG/ML SOSY injection Inject 60 mg subcutaneously every 6 months As of 3/14/2023, please hold and pause this medication while at the transitional care unit       estradiol (ESTRACE) 0.1 MG/GM vaginal cream Place vaginally three times a week (Patient not taking: Reported on 3/14/2025)       furosemide (LASIX) 20 MG tablet Take 20 mg by  mouth daily       Gamma-Aminobutyric Acid (KEVIN PO) Take 250 mg by mouth daily.       levothyroxine (SYNTHROID/LEVOTHROID) 112 MCG tablet Take 112 mcg by mouth daily       losartan (COZAAR) 50 MG tablet Take 50 mg by mouth 2 times daily       Magnesium Citrate 100 MG CAPS 1 capsule with food Orally Twice a day       rosuvastatin (CRESTOR) 20 MG tablet Take 1 tablet (20 mg) by mouth daily 90 tablet 2     solifenacin (VESICARE) 5 MG tablet Take 5 mg by mouth daily.       TART CHERRY PO Take by mouth.       THEANINE 200 PO Take 1 capsule by mouth 2 times daily.       Tryptophan 500 MG TABS Take by mouth.        Family History:  Mother with breast cancer at 74 yo.  Daughter with a h/o thyroid cancer.  Brother with a h/o colon cancer.  Maternal grandmother with breast cancer at 65 and with Ashkenazi Gnosticism heritage.    Social History:  Patient has 4 children.  She presents to clinic with her daughter, Blanca today.  She lives independently with the help of a personal care assistant.  She is a lifelong nonsmoker.  Rarely will have a drink with alcohol in it.    Physical Exam:  /74 (BP Location: Right arm, Patient Position: Sitting, Cuff Size: Adult Large)   Pulse 57   Temp 98  F (36.7  C) (Oral)   Resp 18   Wt 119.1 kg (262 lb 8 oz)   SpO2 95%   BMI 42.37 kg/m    General:  Well appearing adult female in NAD.  Alert and oriented x 3.  HEENT:  Normocephalic.  Sclera anicteric.  MMM.    Lymph:  No palpable cervical, supraclavicular, or axillary LAD.  Chest:  CTA bilaterally.  No wheezes or crackles.  CV:  RRR.  Nl S1 and S2.  No audible m/r/g.  Breast:  Breasts were examined with patient in a seated position.  Right breast lumpectomy changes with well healed incisions.  There are no dominant or discretely palpable masses in either breast.  Bilateral nipples are everted and without discharge.  Abd:  Soft/ND.    Musculo:  Moves all 4 extremities appropriately.  Neuro:  Cranial nerves grossly intact.  No tremors or  dyskinetic movements.  Psych:  Mood and affect appear normal.  Skin:  No visible concerning skin rashes or lesions.    Laboratory/Imaging Studies  I personally reviewed the below labs and images while in clinic today:    I personally reviewed, interpreted and summarized the findings in the HPI of bilateral diagnostic mammogram and right breast ultrasound performed on 2/12/2025 and breast MRI performed on 3/18/2025, these outside imaging studies have been scanned into the EMR    1/3/2019 DEXA bone density scan:  Result at the right femoral neck:   BMD (gm/cm2): 0.712   T score: -2.3     Result at the right total hip:   BMD (gm/cm2): 0.759   T score: -2.0     Result at the forearm:   BMD (gm/cm2): 0.646   T score: -2.6   Z score:  -0.1     Conclusion: OSTEOPOROSIS     5/15/2025 Pathology double right breast lumpectomies, right axillary sentinel lymph node procedure:  Final Diagnosis A) RIGHT BREAST, 10:00, 12 CM FROM NIPPLE, LUMPECTOMY:  1. Invasive ductal carcinoma, [place] grade II of III (tumor A1 in synoptic)      a. Size: 21 mm      b. Core biopsy site is associated with tumor      c. Perineural invasion present  2. 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 Margins:      a. Invasive carcinoma is <1 mm from the medial margin      b. DCIS is <1 mm from the medial margin  4. One intramammary lymph node, negative for metastatic carcinoma (0/1)  5. Breast Ancillary Testing: Performed on prior case ([id number] pt. A)      a. Hormone Receptors:            Estrogen receptor: Positive (96%, strong staining)            Progesterone receptor: Positive (62%, moderate staining)      b. HER2 by IHC: Negative (1+ by manual morphometry)      c. Ki-67: 18% by image analysis  6. Surrounding breast with proliferative fibrocystic change    B) SENTINEL LYMPH NODE(S), RIGHT AXILLARY SENTINEL LYMPH NODE 2, EXCISION:  1. Negative for metastatic carcinoma in one lymph node (0/1)    C)  RIGHT BREAST, 10:00, 5 CM FROM NIPPLE, LUMPECTOMY:  Multifocal invasive ductal carcinoma:  1. Tumor #1: Invasive ductal carcinoma, [place] grade II of III (tumor C1 in synoptic)      a. Size: 20 mm      b. Core biopsy site is associated with tumor      c. Margins: Invasive carcinoma is 5 mm from superior margin (not final margin; see part D)      d. Breast Ancillary Testing: Performed on prior case ([id number] pt. B)         1. Hormone Receptors:              Estrogen receptor: Positive (98%, strong staining)              Progesterone receptor: Positive (67%, moderate staining)         2. HER2 by IHC: Equivocal (2+ by manual morphometry)               HER2 by FISH: Negative, see comment                 HER2/CEP17 ratio: 1.52                 HER2 signals/cell: 4.75                 CEP17 signals/cell: 3.12         3. Ki-67: 23% by image analysis  2. Tumor #2: Invasive ductal carcinoma, [place] grade I of III (tumor C2 in synoptic)      a. Size: 8 mm      b. Margins: Invasive carcinoma is 2 mm from inferior margin      c. Breast Ancillary Testing:             Hormone Receptors:            Estrogen receptor: Positive (99%, moderate staining)            Progesterone receptor: Positive (95%, moderate staining)           HER2 by IHC: Negative (1+ by manual morphometry)           Ki-67: 5%  3. Tumor #3: Invasive ductal carcinoma, [place] grade I of III (tumor C3 in synoptic)      a. Size: 7 mm      b. Margins: Invasive carcinoma is 5 mm from anterior margin      c. Breast Ancillary Testing:           Hormone Receptors:            Estrogen receptor: Positive (95%, moderate staining)            Progesterone receptor: Positive (76%, moderate staining)           HER2 by IHC: Negative (0, ultralow by manual morphometry)           Ki-67: 2%  4. Tumor #4: Invasive ductal carcinoma, [place] grade I of III (tumor C4 in synoptic)      a. Size: 4 mm      b. Margins: Invasive carcinoma is >5 mm from nearest (posterior and  medial) margins      c. Tumor 4 is similar in appearance to tumor 3; ancillary testing not repeated  4. Ductal carcinoma in situ (DCIS), nuclear grade 2, Cribriform type      a. Margins: DCIS is >5 mm from all margins      5. Surrounding breast with proliferative fibrocystic change      D) RIGHT BREAST, 10:00, 5 CM FROM NIPPLE, NEW SUPERIOR MARGIN, EXCISION WITH MARGIN EVALUATION:  1. Benign fibroadipose tissue  2. Negative for atypia or malignancy   Amendment electronically signed by Kameron Brown MD on 5/22/2025 at 1719 CDT Electronically signed by Kameron Brown MD on 5/22/2025 at 1428 CDT   Comment The smaller foci of [place] grade I tumor seen in part C (tumors 2, 3, and 4) have a similar morphologic appearance, but do not show definitive continuity with each other or the largest tumor focus (tumor 1) on additional submitted sections.      Dr. Greco reviewed the breast ancillary markers on C4 and C11.       ASSESSMENT/PLAN:  84 yo female with paroxysmal atrial fibrillation s/p Watchman device on aspirin, HFpEF, osteoporosis and clinical prognostic stage Ib, T2(5)N0M0, grade 1/2, ER positive, [place] positive, HER-2 negative invasive ductal carcinomas of the right breast.    Today I discussed diagnosis, staging, and treatment options in detail with Blanca Richardson, and Madelyn.  We reviewed the pathology from her breast surgery which showed 5 separate breast cancers in the upper outer right breast.  The largest measured 2.1 and 2 cm.  She had no lymph node involvement.  She has no clinical evidence of distant metastatic disease.  Pathologic prognostic staging is stage IA.  The goal of treatment is curative.    We review the roles of surgery and radiation therapy in preventing local (in breast and regional lymph node) recurrence of breast cancer.  She is s/p bilateral lumpectomies with negative surgical margins, therefore no further surgery is indicated at this time.  I have discussed her case with  Bonnie Cid and Pedro of radiation oncology.  Given multifocal disease and close surgical margins, both recommend a course of radiation citing that she is a candidate for the 5 day FAST-FORWARD regimen.   She is scheduled to see Dr. Cid next week.    We next reviewed the roles of systemic therapy in treating micrometastatic disease and preventing distant recurrence, recurrence in the bones, liver, lungs, etc, of breast cancer.   Given her age and medical comorbidities, the risk of systemic chemotherapy outweigh the potential benefit and I do not recommend it.      We discussed a 5 year course of endocrine therapy can reduce her risk of breast cancer recurrence by half.  I recommend treatment with letrozole 2.5 mg orally daily.  We reviewed the potential side effects of letrozole including myalgias, arthralgias, hot flashes, vaginal dryness, and mood disorder.  We specifically discussed, however, that patients >69 yo when they start the medication tend to have less frequency of these side effects.      We also discussed the risk of further thinning of the bones in the context of her history of osteoporosis.  I was happy to hear that she is already on treatment with Prolia.  We reviewed Prolia will treat osteoporosis and has also been shown to reduce the risk of breast cancer bone metastases.  She will continue this medication.    Debra verbally agreed to proceed with plan for radiation and treatment with letrozole.  She will see Dr. Cid on 6/10.  We discussed starting letrozole 2 weeks after completion of radiation.  I will schedule her for a return visit in approximately 2 months to discuss how well she is tolerating the medication and to review plan for surveillance.  We did briefly discuss today plan to continue annual mammograms with first mammograms 6 months after completion of radiation.    Of note, this case was reviewed at breast conference on Friday, 5/30 and the treatment plan outlined above was  agreed upon by the [place] breast team.    Ms. Manriquez, her daughter, and her PCA had multiple questions which I answered to the best of my ability today.  I spent 80 minutes on the date of the encounter doing chart review, review of test results, interpretation of tests, patient visit, documentation, discussion with other provider(s), and discussion with family             Again, thank you for allowing me to participate in the care of your patient.        Sincerely,        Rosie Lloyd MD    Electronically signed

## 2025-06-10 ENCOUNTER — PRE VISIT (OUTPATIENT)
Dept: RADIATION ONCOLOGY | Facility: CLINIC | Age: 85
End: 2025-06-10
Payer: MEDICARE

## 2025-06-11 ENCOUNTER — OFFICE VISIT (OUTPATIENT)
Dept: RADIATION ONCOLOGY | Facility: CLINIC | Age: 85
End: 2025-06-11
Attending: SURGERY
Payer: MEDICARE

## 2025-06-11 DIAGNOSIS — C50.411 MALIGNANT NEOPLASM OF UPPER-OUTER QUADRANT OF RIGHT BREAST IN FEMALE, ESTROGEN RECEPTOR POSITIVE (H): Primary | ICD-10-CM

## 2025-06-11 DIAGNOSIS — Z17.0 MALIGNANT NEOPLASM OF UPPER-OUTER QUADRANT OF RIGHT BREAST IN FEMALE, ESTROGEN RECEPTOR POSITIVE (H): Primary | ICD-10-CM

## 2025-06-11 PROCEDURE — 77290 THER RAD SIMULAJ FIELD CPLX: CPT | Mod: 26 | Performed by: RADIOLOGY

## 2025-06-11 PROCEDURE — 77334 RADIATION TREATMENT AID(S): CPT | Mod: 26 | Performed by: RADIOLOGY

## 2025-06-11 PROCEDURE — 77263 THER RADIOLOGY TX PLNG CPLX: CPT | Performed by: RADIOLOGY

## 2025-06-11 PROCEDURE — 77290 THER RAD SIMULAJ FIELD CPLX: CPT | Performed by: RADIOLOGY

## 2025-06-11 PROCEDURE — 77334 RADIATION TREATMENT AID(S): CPT | Performed by: RADIOLOGY

## 2025-06-11 NOTE — PROGRESS NOTES
Radiation Therapy Patient Education    Person involved with teaching: Patient and friend    Patient educational needs for self management of treatment-related side effects assessment completed.  EPIC Patient Ed tab contains Patient Learning Assessment    Education Materials Given  Skin care, sim pamphlet and schedule    Educational Topics Discussed  Side effects expected, Skin care, Activity, Nutrition and weight loss, and When to call MD/RN    Response To Teaching  Verbalizes understanding    Referrals sent: None    Chemotherapy?  No

## 2025-06-11 NOTE — LETTER
6/11/2025      Debra Manriquez  [address]  [place] [address]      Dear Colleague,    Thank you for referring your patient, Debra Manriquez, to the [facility] RADIATION ONCOLOGY. Please see a copy of my visit note below.    Radiation Therapy Patient Education    Person involved with teaching: Patient and friend    Patient educational needs for self management of treatment-related side effects assessment completed.  EPIC Patient Ed tab contains Patient Learning Assessment    Education Materials Given  Skin care, sim pamphlet and schedule    Educational Topics Discussed  Side effects expected, Skin care, Activity, Nutrition and weight loss, and When to call MD/RN    Response To Teaching  Verbalizes understanding    Referrals sent: None    Chemotherapy?  No       Again, thank you for allowing me to participate in the care of your patient.        Sincerely,        Linda Cid    Electronically signed

## 2025-06-16 ENCOUNTER — TRANSFERRED RECORDS (OUTPATIENT)
Dept: HEALTH INFORMATION MANAGEMENT | Facility: CLINIC | Age: 85
End: 2025-06-16
Payer: MEDICARE

## 2025-06-16 LAB
PATH REPORT.COMMENTS IMP SPEC: ABNORMAL
PATH REPORT.COMMENTS IMP SPEC: ABNORMAL
PATH REPORT.COMMENTS IMP SPEC: YES
PATH REPORT.FINAL DX SPEC: ABNORMAL
PATH REPORT.GROSS SPEC: ABNORMAL
PATH REPORT.MICROSCOPIC SPEC OTHER STN: ABNORMAL
PATH REPORT.RELEVANT HX SPEC: ABNORMAL
PATH REPORT.SITE OF ORIGIN SPEC: ABNORMAL

## 2025-06-18 ENCOUNTER — OFFICE VISIT (OUTPATIENT)
Dept: RADIATION ONCOLOGY | Facility: CLINIC | Age: 85
End: 2025-06-18
Attending: SURGERY
Payer: MEDICARE

## 2025-06-18 DIAGNOSIS — C50.411 MALIGNANT NEOPLASM OF UPPER-OUTER QUADRANT OF RIGHT BREAST IN FEMALE, ESTROGEN RECEPTOR POSITIVE (H): Primary | ICD-10-CM

## 2025-06-18 DIAGNOSIS — Z17.0 MALIGNANT NEOPLASM OF UPPER-OUTER QUADRANT OF RIGHT BREAST IN FEMALE, ESTROGEN RECEPTOR POSITIVE (H): Primary | ICD-10-CM

## 2025-06-18 PROCEDURE — 77280 THER RAD SIMULAJ FIELD SMPL: CPT | Performed by: RADIOLOGY

## 2025-06-18 NOTE — LETTER
6/18/2025      Debra GARCIA Mitra  [address]  [place] [address]      Dear Colleague,    Thank you for referring your patient, Debra Manriquez, to the [facility] RADIATION ONCOLOGY. Please see a copy of my visit note below.    Treatment set up verification    Again, thank you for allowing me to participate in the care of your patient.        Sincerely,        Callie Rodriguez MD    Electronically signed

## 2025-06-19 ENCOUNTER — OFFICE VISIT (OUTPATIENT)
Dept: RADIATION ONCOLOGY | Facility: CLINIC | Age: 85
End: 2025-06-19
Attending: SURGERY
Payer: MEDICARE

## 2025-06-19 DIAGNOSIS — C50.411 MALIGNANT NEOPLASM OF UPPER-OUTER QUADRANT OF RIGHT BREAST IN FEMALE, ESTROGEN RECEPTOR POSITIVE (H): Primary | ICD-10-CM

## 2025-06-19 DIAGNOSIS — Z17.0 MALIGNANT NEOPLASM OF UPPER-OUTER QUADRANT OF RIGHT BREAST IN FEMALE, ESTROGEN RECEPTOR POSITIVE (H): Primary | ICD-10-CM

## 2025-06-19 NOTE — LETTER
6/19/2025      Debra GARCIA Mitra  [address]  [place] [address]      Dear Colleague,    Thank you for referring your patient, Debra Manriquez, to the [facility] RADIATION ONCOLOGY. Please see a copy of my visit note below.    Treatment set up verification    Again, thank you for allowing me to participate in the care of your patient.        Sincerely,        Callie Rodriguez MD    Electronically signed

## 2025-06-20 ENCOUNTER — APPOINTMENT (OUTPATIENT)
Dept: RADIATION ONCOLOGY | Facility: CLINIC | Age: 85
End: 2025-06-20
Attending: RADIOLOGY
Payer: MEDICARE

## 2025-06-20 PROCEDURE — 77387 GUIDANCE FOR RADJ TX DLVR: CPT | Performed by: RADIOLOGY

## 2025-06-20 PROCEDURE — 77014 PR CT GUIDE FOR PLACEMENT RADIATION THERAPY FIELDS: CPT | Mod: 26 | Performed by: RADIOLOGY

## 2025-06-20 PROCEDURE — 77412 RADIATION TX DELIVERY LVL 3: CPT | Performed by: RADIOLOGY

## 2025-06-23 ENCOUNTER — APPOINTMENT (OUTPATIENT)
Dept: RADIATION ONCOLOGY | Facility: CLINIC | Age: 85
End: 2025-06-23
Attending: RADIOLOGY
Payer: MEDICARE

## 2025-06-23 PROCEDURE — 77014 PR CT GUIDE FOR PLACEMENT RADIATION THERAPY FIELDS: CPT | Mod: 26 | Performed by: STUDENT IN AN ORGANIZED HEALTH CARE EDUCATION/TRAINING PROGRAM

## 2025-06-23 PROCEDURE — 77412 RADIATION TX DELIVERY LVL 3: CPT | Performed by: RADIOLOGY

## 2025-06-23 PROCEDURE — 77387 GUIDANCE FOR RADJ TX DLVR: CPT | Performed by: RADIOLOGY

## 2025-06-24 ENCOUNTER — APPOINTMENT (OUTPATIENT)
Dept: RADIATION ONCOLOGY | Facility: CLINIC | Age: 85
End: 2025-06-24
Attending: RADIOLOGY
Payer: MEDICARE

## 2025-06-24 PROCEDURE — 77387 GUIDANCE FOR RADJ TX DLVR: CPT | Performed by: RADIOLOGY

## 2025-06-24 PROCEDURE — 77412 RADIATION TX DELIVERY LVL 3: CPT | Performed by: RADIOLOGY

## 2025-06-24 PROCEDURE — 77014 PR CT GUIDE FOR PLACEMENT RADIATION THERAPY FIELDS: CPT | Mod: 26 | Performed by: STUDENT IN AN ORGANIZED HEALTH CARE EDUCATION/TRAINING PROGRAM

## 2025-06-25 ENCOUNTER — OFFICE VISIT (OUTPATIENT)
Dept: RADIATION ONCOLOGY | Facility: CLINIC | Age: 85
End: 2025-06-25
Attending: SURGERY
Payer: MEDICARE

## 2025-06-25 ENCOUNTER — APPOINTMENT (OUTPATIENT)
Dept: RADIATION ONCOLOGY | Facility: CLINIC | Age: 85
End: 2025-06-25
Attending: RADIOLOGY
Payer: MEDICARE

## 2025-06-25 VITALS — DIASTOLIC BLOOD PRESSURE: 71 MMHG | OXYGEN SATURATION: 96 % | SYSTOLIC BLOOD PRESSURE: 165 MMHG | HEART RATE: 67 BPM

## 2025-06-25 DIAGNOSIS — C50.811 CARCINOMA OF OVERLAPPING SITES OF RIGHT BREAST IN FEMALE, ESTROGEN RECEPTOR POSITIVE (H): Primary | ICD-10-CM

## 2025-06-25 DIAGNOSIS — Z17.0 CARCINOMA OF OVERLAPPING SITES OF RIGHT BREAST IN FEMALE, ESTROGEN RECEPTOR POSITIVE (H): Primary | ICD-10-CM

## 2025-06-25 PROCEDURE — 77336 RADIATION PHYSICS CONSULT: CPT | Performed by: RADIOLOGY

## 2025-06-25 PROCEDURE — 1126F AMNT PAIN NOTED NONE PRSNT: CPT | Performed by: RADIOLOGY

## 2025-06-25 PROCEDURE — 77427 RADIATION TX MANAGEMENT X5: CPT | Performed by: RADIOLOGY

## 2025-06-25 PROCEDURE — 3078F DIAST BP <80 MM HG: CPT | Performed by: RADIOLOGY

## 2025-06-25 PROCEDURE — 77014 PR CT GUIDE FOR PLACEMENT RADIATION THERAPY FIELDS: CPT | Mod: 26 | Performed by: RADIOLOGY

## 2025-06-25 PROCEDURE — 3077F SYST BP >= 140 MM HG: CPT | Performed by: RADIOLOGY

## 2025-06-25 PROCEDURE — 77387 GUIDANCE FOR RADJ TX DLVR: CPT | Performed by: RADIOLOGY

## 2025-06-25 PROCEDURE — 77412 RADIATION TX DELIVERY LVL 3: CPT | Performed by: RADIOLOGY

## 2025-06-25 ASSESSMENT — PAIN SCALES - GENERAL: PAINLEVEL_OUTOF10: NO PAIN (0)

## 2025-06-25 NOTE — LETTER
2025      Debra Manriquez  [address]  [place] [address]      Dear Colleague,    Thank you for referring your patient, Debra Manriquez, to the [facility] RADIATION ONCOLOGY. Please see a copy of my visit note below.    RADIATION ONCOLOGY WEEKLY ON TREATMENT VISIT   Encounter Date: 2025     Patient Name: Debra Manriquez  MRN: [id number]  : 1940     Disease and Stage: T2N0M0 right breast cancer  Treatment Site: Right whole breast  Current Dose/Planned Total Dose: 2600 / 2600 cGy  Current Fraction/Planned Total fractions: 5 / 5  Concurrent Chemotherapy: No  Drug and Frequency: NA      ED visits/Hospitalizations:  NA    Missed Treatments:  NA    Subjective: Ms. Manriquez presents to clinic today for her weekly on-treatment visit.  She has completed radiotherapy with no concerns regarding skin reaction today.  She did feel tired yesterday afternoon.    Nursing ROS:   Nutrition Alteration  Diet Type: Patient's Preference  Skin  Skin Reaction: 0 - No changes  Skin Intervention: Pt is using aloe vera and aqauphor currently and was encouraged to start using twice a day once the skin starts showing erythema.  Pt and caregiver agreed.  Pt educated on the use of hydrocoritsone PRN as well for pruritus.        Cardiovascular  Respiratory effort: 1 - Normal - without distress        Psychosocial  Mood - Anxiety: 0 - Normal  Mood - Depression: 0 - Normal  Pain Assessment  0-10 Pain Scale: 0              Objective:   BP (!) 165/71   Pulse 67   SpO2 96%    KPS 80  Pain Score No Pain (0)/10  General: Alert and in no acute distress.  HEENT: Normocephalic, atraumatic. No visible scleral icterus.  Neck: Apparent full range of motion.  CV: Appears well-perfused, with no visible cyanosis.  Lungs: Breathing easily on room air, with no difficulty completing full sentences  Extremities:  No visible edema of the upper extremities.   Neuro: Alert and oriented; grossly nonfocal. Normal  speech. Moving upper and lower extremities equally.  Gait fast.  Skin: No visible jaundice. No suspicious lesions of the visualized integuments.  No skin reaction today.  Psych: Mood and affect are appropriate to given situation. Answers questions appropriately.        Treatment-related toxicities (CTCAE v5.0):  Fatigue: Grade 1: Fatigue relieved by rest  Pain: Grade 0: No toxicity  Dermatitis: Grade 0: No toxicity    Assessment:    Ms. Debra Manriquez is an 85-year-old woman with a recently diagnosed multifocal invasive ductal carcinoma of the 10 o'clock position of the right breast, ER 96 to 98%, AR 62 to 67%, HER2 negative, status post lumpectomies at the 10 o'clock position 12 cm from the nipple and at the 10 o'clock position, 5 cm from the nipple as well as sentinel lymph node biopsy on 5/15/2025 with Dr. Gibbons.  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  She met with Dr. Lloyd on 6/5/2025, and she will attempt letrozole for 5 years. She has now completed ultrahypofractionated whole breast radiation without incident.    Plan:   Right breast cancer:  Follow-up in 2 weeks for skin check    Pain management:  None indicated    Dermatitis:  Skin care reviewed.  Aquaphor once to twice daily.  Hydrocortisone 1% as needed for rash and should this develop.  Contact us with questions or concerns should these arise prior to her 2-week follow-up.  We reminded her that skin reaction peaks 1 to 2 weeks  after radiotherapy.    Mosaiq chart and setup information reviewed  Port images reviewed       Linda Cid MD, MPH, PhD     Department of Radiation Oncology  [facility]         Again, thank you for allowing me to participate in the care of your patient.        Sincerely,        Linda Cid    Electronically signed

## 2025-06-25 NOTE — PROGRESS NOTES
RADIATION ONCOLOGY WEEKLY ON TREATMENT VISIT   Encounter Date: 2025     Patient Name: Debra Manriquez  MRN: [id number]  : 1940     Disease and Stage: T2N0M0 right breast cancer  Treatment Site: Right whole breast  Current Dose/Planned Total Dose: 2600 / 2600 cGy  Current Fraction/Planned Total fractions: 5 / 5  Concurrent Chemotherapy: No  Drug and Frequency: NA      ED visits/Hospitalizations:  NA    Missed Treatments:  NA    Subjective: Ms. Manriquez presents to clinic today for her weekly on-treatment visit.  She has completed radiotherapy with no concerns regarding skin reaction today.  She did feel tired yesterday afternoon.    Nursing ROS:   Nutrition Alteration  Diet Type: Patient's Preference  Skin  Skin Reaction: 0 - No changes  Skin Intervention: Pt is using aloe vera and aqauphor currently and was encouraged to start using twice a day once the skin starts showing erythema.  Pt and caregiver agreed.  Pt educated on the use of hydrocoritsone PRN as well for pruritus.        Cardiovascular  Respiratory effort: 1 - Normal - without distress        Psychosocial  Mood - Anxiety: 0 - Normal  Mood - Depression: 0 - Normal  Pain Assessment  0-10 Pain Scale: 0              Objective:   BP (!) 165/71   Pulse 67   SpO2 96%    KPS 80  Pain Score No Pain (0)/10  General: Alert and in no acute distress.  HEENT: Normocephalic, atraumatic. No visible scleral icterus.  Neck: Apparent full range of motion.  CV: Appears well-perfused, with no visible cyanosis.  Lungs: Breathing easily on room air, with no difficulty completing full sentences  Extremities:  No visible edema of the upper extremities.   Neuro: Alert and oriented; grossly nonfocal. Normal speech. Moving upper and lower extremities equally.  Gait fast.  Skin: No visible jaundice. No suspicious lesions of the visualized integuments.  No skin reaction today.  Psych: Mood and affect are appropriate to given situation. Answers questions appropriately.         Treatment-related toxicities (CTCAE v5.0):  Fatigue: Grade 1: Fatigue relieved by rest  Pain: Grade 0: No toxicity  Dermatitis: Grade 0: No toxicity    Assessment:    Ms. 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 Gibbons.  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  She met with Dr. Lloyd on 6/5/2025, and she will attempt letrozole for 5 years. She has now completed ultrahypofractionated whole breast radiation without incident.    Plan:   Right breast cancer:  Follow-up in 2 weeks for skin check    Pain management:  None indicated    Dermatitis:  Skin care reviewed.  Aquaphor once to twice daily.  Hydrocortisone 1% as needed for rash and should this develop.  Contact us with questions or concerns should these arise prior to her 2-week follow-up.  We reminded her that skin reaction peaks 1 to 2 weeks after radiotherapy.    ZEturf chart and setup information reviewed  Port images reviewed       Linda Cid MD, MPH, PhD     Department of Radiation Oncology  [facility]

## 2025-06-30 ENCOUNTER — ONCOLOGY VISIT (OUTPATIENT)
Dept: RADIATION ONCOLOGY | Facility: CLINIC | Age: 85
End: 2025-06-30
Payer: MEDICARE

## 2025-06-30 ENCOUNTER — TRANSFERRED RECORDS (OUTPATIENT)
Dept: HEALTH INFORMATION MANAGEMENT | Facility: CLINIC | Age: 85
End: 2025-06-30
Payer: MEDICARE

## 2025-06-30 NOTE — LETTER
6/30/2025      Debra Manriquez  [address]  [place] [address]      Dear Colleague,    Thank you for referring your patient, Debra Manriquez, to the [facility] RADIATION ONCOLOGY. Please see a copy of my visit note below.    No notes on file    Again, thank you for allowing me to participate in the care of your patient.        Sincerely,        Linda Cid    Electronically signed

## 2025-07-03 NOTE — PROCEDURES
Radiotherapy Treatment Summary          Date of Report: 2025     PATIENT: DEBRA MANRIQUEZ  MEDICAL RECORD NO: [id number]  : 1940     DIAGNOSIS: C50.811 Malignant neoplasm of overlapping sites of right female breast  INTENT OF RADIOTHERAPY: Cure, adjuvant  PATHOLOGY: IDC                                   STAGE: T2N0M0  CONCURRENT SYSTEMIC THERAPY: No                   Details of the treatments summarized below are found in records kept in the Department of Radiation Oncology at [facility].     Treatment Summary:  Radiation Oncology - Course: 1 Protocol:   Treatment Site Current Dose Modality From To Elapsed Days Fx.  R Breast  2,600 cGy X18  2025   6  5          Dose per Fraction: 520 cGy  Total Dose: 2600 cGy              COMMENTS:                      Ms. Debra Manriquez is an 85-year-old woman with a recently diagnosed multifocal invasive ductal carcinoma of the 10 o'clock position   of the right breast, ER 96 to 98%, NM 62 to 67%, HER2 negative, status post lumpectomies at the 10 o'clock position 12 cm from the   nipple and at the 10 o'clock position, 5 cm from the nipple as well as sentinel lymph node biopsy on 5/15/2025 with Dr. Gibbons.    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  She met with Dr. Lloyd on 2025, and she will attempt letrozole for 5 years.   She has now completed ultrahypofractionated whole breast radiation without incident. She  will follow-up with our team in 2 weeks for   a skin-check. She will be seeing ADELAIDA Arauz and Dr. Lloyd to start adjuvant hormonal therapy.     ED visits/hospitalizations: None     Unplanned treatment breaks: None     Acute Toxicity Profile by CTC v5.0:  1. Fatigue: Grade 1: Fatigue relieved by rest  2. Pain: Grade 0: No toxicity  3. Dermatitis: Grade 0: No toxicity     PAIN MANAGEMENT: None                             FOLLOW UP PLAN: Follow-up with radiation oncology in 2 weeks, follow-up with medical oncology approximately 1 month post-  RT                           Resident Physician: Virgilio Garnica M.D.   Staff Physician: Linda Cid M.D.     CC: MD Isis Sinclair MD           Radiation Oncology:  [facility] [address] [place] [address]

## 2025-07-16 DIAGNOSIS — E78.5 HYPERLIPIDEMIA LDL GOAL <70: ICD-10-CM

## 2025-07-16 RX ORDER — ROSUVASTATIN CALCIUM 20 MG/1
20 TABLET, COATED ORAL DAILY
Qty: 90 TABLET | Refills: 2 | Status: SHIPPED | OUTPATIENT
Start: 2025-07-16

## 2025-07-18 ENCOUNTER — OFFICE VISIT (OUTPATIENT)
Dept: RADIATION ONCOLOGY | Facility: CLINIC | Age: 85
End: 2025-07-18
Attending: SURGERY
Payer: MEDICARE

## 2025-07-18 VITALS
SYSTOLIC BLOOD PRESSURE: 165 MMHG | RESPIRATION RATE: 16 BRPM | WEIGHT: 263.5 LBS | HEART RATE: 68 BPM | OXYGEN SATURATION: 96 % | DIASTOLIC BLOOD PRESSURE: 80 MMHG | BODY MASS INDEX: 42.53 KG/M2

## 2025-07-18 DIAGNOSIS — Z17.0 MALIGNANT NEOPLASM OF UPPER-OUTER QUADRANT OF RIGHT BREAST IN FEMALE, ESTROGEN RECEPTOR POSITIVE (H): Primary | ICD-10-CM

## 2025-07-18 DIAGNOSIS — C50.411 MALIGNANT NEOPLASM OF UPPER-OUTER QUADRANT OF RIGHT BREAST IN FEMALE, ESTROGEN RECEPTOR POSITIVE (H): Primary | ICD-10-CM

## 2025-07-18 PROCEDURE — 3077F SYST BP >= 140 MM HG: CPT | Performed by: RADIOLOGY

## 2025-07-18 PROCEDURE — 1126F AMNT PAIN NOTED NONE PRSNT: CPT | Performed by: RADIOLOGY

## 2025-07-18 PROCEDURE — 3079F DIAST BP 80-89 MM HG: CPT | Performed by: RADIOLOGY

## 2025-07-18 RX ORDER — CHOLECALCIFEROL (VITAMIN D3) 125 MCG
200 CAPSULE ORAL DAILY
COMMUNITY

## 2025-07-18 ASSESSMENT — PAIN SCALES - GENERAL: PAINLEVEL_OUTOF10: NO PAIN (0)

## 2025-07-18 NOTE — PROGRESS NOTES
"Oncology Rooming Note    July 18, 2025 9:37 AM   Debra Manriquez is a 85 year old female who presents for:    Chief Complaint   Patient presents with    Oncology Clinic Visit     Radiation Oncology Follow-Up      Initial Vitals: BP (!) 165/80   Pulse 68   Resp 16   Wt 119.5 kg (263 lb 8 oz)   SpO2 96%   BMI 42.53 kg/m   Estimated body mass index is 42.53 kg/m  as calculated from the following:    Height as of 3/14/25: 1.676 m (5' 6\").    Weight as of this encounter: 119.5 kg (263 lb 8 oz). Body surface area is 2.36 meters squared.  No Pain (0) Comment: Data Unavailable   No LMP recorded. Patient has had a hysterectomy.  Allergies reviewed: Yes  Medications reviewed: Yes    Medications: Medication refills not needed today.  Pharmacy name entered into [place]:    [id number] - [place], [place] - [address]  [id number] - [place], [place] - [address]    PHQ9:  Did this patient require a PHQ9?: No    Considerations for radiation treatment   Pregnancy status: Female age 55+     Implanted Cardiac Devices: Yes   Left atrial appendage closure device: [employer] Watchman FLX 27mm GTIN [id number] (9/18/24)    Any previous radiation therapy: Yes  R breast cancer  [place]: R whole breast received 2600 cGy in 5 fractions, completed 6/25/25.     Clinical concerns: Radiation Oncology Follow-Up    Dr. Cid was notified.      Holli Helm RN            "

## 2025-07-18 NOTE — LETTER
2025      Debra Manriquez  [address]  [place] [address]      Dear Colleague,    Thank you for referring your patient, Debra Manriquez, to the [facility] RADIATION ONCOLOGY. Please see a copy of my visit note below.    2025    Debra Manriquez  [phone] (home) [phone] (work)  : 1940  MRN: [id number]     RADIATION ONCOLOGY FOLLOW UP    CC: Multifocal R breast cancer    Identification and Purpose of Visit:  85F w/paroxysmal atrial fibrillation (post-Watchman), HFpEF, and osteoporosis who has incidentally detected multifocal right breast masses-biopsy proven grade 2 invasive ductal carcinoma ER 96-98%, NV 62-67%, HER2 negative-underwent double lumpectomy and negative sentinel node biopsy on 5/15/2025, pathology confirming pT2(m)N0M0 disease with close but negative margins s/p ultrahypofractionated whole breast radiation to 26 Gy in 5 fractions completed on 2025.    Medical oncologist: Dr. Lloyd  Surgical oncologist: Dr. Isis Gibbons    History of Present Illness:  The patient s right breast lesions were first noted incidentally on cardiac CT on 2024, which revealed two adjacent right breast nodules measuring 1.5 cm and 1.6 cm. Dedicated right breast mammography corroborated a 1.6 cm mass at the 10 o clock position, 5 cm from the nipple, and a second 1.2 cm mass at the 10 o clock position, 12 cm from the nipple. Targeted right axillary ultrasound showed no suspicious lymphadenopathy; a left breast cyst was also noted but remained stable. On 4/15/2025, stereotactic core biopsies of both right breast masses confirmed grade 2 invasive ductal carcinoma: the more proximal lesion (5 cm from nipple) was ER 98%, NV 67%, HER2 equivocal (IHC 2+/FISH negative), Ki-67 23%; the more distal lesion (12 cm from nipple) was ER 96%, NV 62%, HER2 negative (IHC 1+), Ki-67 18%.    On 5/15/2025, under the care of Dr. AD Ulloa, the patient underwent right  double lumpectomies with wire localization and right axillary sentinel lymph node biopsy. Final pathology of the 12 cm lesion demonstrated a 2.1 cm grade 2 invasive carcinoma with associated high-grade DCIS and perineural invasion; margins were close (<1 mm medially) but negative. Pathology of the 5 cm lesion revealed multifocal invasive ductal carcinoma: a 2.0 cm grade 2 focus and three additional grade 1 foci measuring 8 mm, 7 mm, and 4 mm, all with associated DCIS; margins were close (2 mm inferiorly) but negative. Lymphovascular invasion was absent. One sentinel node was negative for malignancy (0/1). Tumor staging was assigned as pT2(m)N0(sn)M0, clinical prognostic stage Ib.    Her comorbidities include paroxysmal atrial fibrillation managed with a Watchman device (on aspirin), heart failure with preserved ejection fraction, and osteoporosis. She lives independently, ambulates without assistance. Family involvement is strong: her daughter assists with follow-up and care coordination.    She received whole breast radiation to 26 Gy in 5 fractions completed on 6/25/2025.  She tolerated treatment well with development of grade 1 fatigue relieved by rest.       Ms. Manriquez is seen in short interval follow up following completion of therapy.        On interview, the patient reports feeling well without significant side effects from treatment.  She has a rash at the superior medial treatment border to which she has been applying Silvadene but has had fewer side effects than she expected.  She previously had some erythema of the inferior aspect of her treated breast that has since resolved.    Review of Systems: As per HPI; otherwise, a 10 system review is unremarkable    Past Medical History:   Diagnosis Date     Antiplatelet or antithrombotic long-term use      Chronic acquired lymphedema      Edema      Falls      Fibrocystic breast disease      Fibrocystic breast disease      Foot pain      Gastroesophageal reflux  disease      GERD (gastroesophageal reflux disease)      Hyperlipidemia      Hypertension      Irregular heart beat      Lymphedema      Mitral valve disorder      Obese      JADYN (obstructive sleep apnea)      Osteoarthritis of knee     hx of knee replacement and pending second     Osteopenia      Skin cancer      Skin cancer, basal cell      Sleep apnea      Thyroid nodule      Thyroid nodule      Vitamin B deficiency         Past Surgical History:   Procedure Laterality Date     ARTHROPLASTY KNEE Right 3/7/2023    Procedure: RIGHT TOTAL KNEE ARTHROPLASTY;  Surgeon: Juan Corbin MD;  Location: [facility] OR     CATARACT EXTRACTION  2011, 2012     COLONOSCOPY       CV LEFT ATRIAL APPENDAGE CLOSURE N/A 9/18/2024    Procedure: Left Atrial Appendage Closure - WM;  Surgeon: Murphy Herrera MD;  Location: [facility] CV     EXCISE TOENAIL(S) Bilateral 8/16/2019    Procedure: MATRIXECTOMY BILATERAL FEET; TOES 1,2,3,4 ON LEFT FOOT,  1,2 ON RIGHT FOOT;  BOTH NAIL BORDERS;  Surgeon: Opal Kam DPM;  Location:  OR     EYE SURGERY      cataract     GI SURGERY       GYN SURGERY      hysterectomy     INJECT STEROID (LOCATION) Right 8/16/2019    Procedure: CORTIZONE INJECTION RIGHT HEEL;  Surgeon: Opal Kam DPM;  Location:  OR     ORTHOPEDIC SURGERY Left     knee replacement     PARTIAL HYSTERECTOMY       STRIP VEIN Bilateral 1975     TOTAL KNEE ARTHROPLASTY Left 2011        Current Medications:    Current Outpatient Medications:      aspirin 81 MG EC tablet, Take 1 tablet (81 mg) by mouth daily., Disp: , Rfl:      calcium carbonate 500 mg, elemental, (OSCAL) 500 MG tablet, 1 tablet with meals Orally Twice a day for 30 day(s) (Patient not taking: Reported on 6/10/2025), Disp: , Rfl:      clotrimazole (LOTRIMIN) 1 % external cream, as needed., Disp: , Rfl:      Coenzyme Q10 400 MG CAPS, Take 400 mg by mouth 2 times daily, Disp: , Rfl:      cyanocobalamin (VITAMIN B-12) 1000 MCG  tablet, Take 1,000 mcg by mouth daily, Disp: , Rfl:      denosumab (PROLIA) 60 MG/ML SOSY injection, Inject 60 mg subcutaneously every 6 months As of 3/14/2023, please hold and pause this medication while at the transitional care unit, Disp: , Rfl:      furosemide (LASIX) 20 MG tablet, Take 20 mg by mouth daily, Disp: , Rfl:      letrozole (FEMARA) 2.5 MG tablet, Take 1 tablet (2.5 mg) by mouth daily. (Patient not taking: Reported on 6/10/2025), Disp: 90 tablet, Rfl: 3     levothyroxine (SYNTHROID/LEVOTHROID) 112 MCG tablet, Take 112 mcg by mouth daily, Disp: , Rfl:      losartan (COZAAR) 50 MG tablet, Take 50 mg by mouth 2 times daily, Disp: , Rfl:      Magnesium Citrate 100 MG CAPS, 1 capsule with food Orally Twice a day, Disp: , Rfl:      nystatin (MYCOSTATIN) 446410 UNIT/GM external powder, Apply 1 strip topically as needed., Disp: , Rfl:      Probiotic Product (PROBIOTIC BLEND PO), Take by mouth., Disp: , Rfl:      Pyridoxine HCl (B-6 PO), Take 100 mg by mouth daily., Disp: , Rfl:      rosuvastatin (CRESTOR) 20 MG tablet, TAKE 1 TABLET BY MOUTH EVERY DAY, Disp: 90 tablet, Rfl: 2     solifenacin (VESICARE) 5 MG tablet, Take 5 mg by mouth daily., Disp: , Rfl:      TART CHERRY PO, Take by mouth., Disp: , Rfl:      vitamin B1 (THIAMINE) 250 MG tablet, Take 500 mg by mouth daily., Disp: , Rfl:      vitamin D3 (CHOLECALCIFEROL) 50 mcg (2000 units) tablet, Take 1 tablet by mouth daily., Disp: , Rfl:     Allergies:  Allergies   Allergen Reactions     Lisinopril Cough     Reglan [Metoclopramide] Other (See Comments)     depression        Social History:  Social History     Socioeconomic History     Marital status:    Tobacco Use     Smoking status: Never     Passive exposure: Past     Smokeless tobacco: Never   Vaping Use     Vaping status: Never Used   Substance and Sexual Activity     Alcohol use: Yes     Alcohol/week: 0.8 standard drinks of alcohol     Comment: Alcoholic Drinks/day: 1-2 glasses per month      Drug use: Never     Sexual activity: Yes   Social History Narrative    . 4 kids.  Teaches college English, reading.      Social Drivers of Health      Received from [employer]    Financial Resource Strain    Received from [employer]    Social Connections   Interpersonal Safety: Low Risk  (9/18/2024)    Interpersonal Safety      Do you feel physically and emotionally safe where you currently live?: Yes      Within the past 12 months, have you been humiliated or emotionally abused in other ways by your partner or ex-partner?: No        Family History   Problem Relation Age of Onset     No Known Problems Mother      No Known Problems Father      Alzheimer Disease Maternal Uncle      Heart Disease Sister      Heart Disease Brother      No Known Problems Daughter      No Known Problems Son      No Known Problems Brother      No Known Problems Son      No Known Problems Son          Physical Exam:  KPS: 80  There were no vitals taken for this visit., Pain Score Data Unavailable/10  General: Alert and in no acute distress.  CV: Appears well-perfused, with no visible cyanosis.  Lungs: Breathing easily on room air, with no difficulty completing full sentences  Extremities:  No visible edema of the upper extremities. Full range of motion at the shoulders and elbows.    Neuro: Alert and oriented; grossly nonfocal. Normal speech. Moving upper extremities equally.  Skin: No visible jaundice. No suspicious lesions of the visualized integuments.  Psych: Mood and affect are appropriate to given situation. Answers questions appropriately.  Breast Exam:  Right breast: Papular rash of the superior medial treatment border     Labs:  Lab Results   Component Value Date    WBC 5.6 09/17/2024    HGB 13.2 09/18/2024    HCT 43.5 09/17/2024     (L) 09/17/2024    CHOL 120 08/29/2024    ALT 23 08/29/2024    AST 22 08/29/2024     04/28/2025    BUN 19.9  "04/28/2025    CO2 23 04/28/2025    TSH 1.68 02/22/2023    ALKPHOS 67 08/29/2024     Imaging:  Reviewed    Pathology:  Component 3 wk ago   Case Report Pathology Report                                  Case: [id number]                                  Authorizing Provider:  Isis Gibbons MD Collected:           05/15/2025 1208              Ordering Location:     [facility]        Received:            05/15/2025 1243                                     [facility]                                                                    Pathologist:           Kameron Brown MD                                                  Specimens:   A) - Right Breast Lump, 10 o'clock, 12 cm from nipple (stat gross, fresh breast                     protocol), with sentinal lymph node                                                                 B) - Right Axillary [place] Lymph Node 2, gross for number                                         C) - Right Breast, Right breast cancer #2, located 10 o\"clock 5cm from nipple, stat                 gross, margin assesment 63623                                                                       D) - Right Breast, Right breast new superior margin, 10:00 5cmfn, new superior margin               painted blue                                                                           Amendment 5/22/2025 - Amendment issued to incorporate ancillary studies.  5/22/2025 - Amendment issued to correct clerical error (examined negative intramammary lymph node from part A was not documented)   Final Diagnosis A) RIGHT BREAST, 10:00, 12 CM FROM NIPPLE, LUMPECTOMY:  1. Invasive ductal carcinoma, [place] grade II of III (tumor A1 in synoptic)      a. Size: 21 mm      b. Core biopsy site is associated with tumor      c. Perineural invasion present  2. 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 Margins:      a. Invasive carcinoma is " <1 mm from the medial margin      b. DCIS is <1 mm from the medial margin  4. One intramammary lymph node, negative for metastatic carcinoma (0/1)  5. Breast Ancillary Testing: Performed on prior case ([id number] pt. A)      a. Hormone Receptors:            Estrogen receptor: Positive (96%, strong staining)            Progesterone receptor: Positive (62%, moderate staining)      b. HER2 by IHC: Negative (1+ by manual morphometry)      c. Ki-67: 18% by image analysis  6. Surrounding breast with proliferative fibrocystic change    B) SENTINEL LYMPH NODE(S), RIGHT AXILLARY SENTINEL LYMPH NODE 2, EXCISION:  1. Negative for metastatic carcinoma in one lymph node (0/1)    C) RIGHT BREAST, 10:00, 5 CM FROM NIPPLE, LUMPECTOMY:  Multifocal invasive ductal carcinoma:  1. Tumor #1: Invasive ductal carcinoma, [place] grade II of III (tumor C1 in synoptic)      a. Size: 20 mm      b. Core biopsy site is associated with tumor      c. Margins: Invasive carcinoma is 5 mm from superior margin (not final margin; see part D)      d. Breast Ancillary Testing: Performed on prior case ([id number] pt. B)         1. Hormone Receptors:              Estrogen receptor: Positive (98%, strong staining)              Progesterone receptor: Positive (67%, moderate staining)         2. HER2 by IHC: Equivocal (2+ by manual morphometry)               HER2 by FISH: Negative, see comment                 HER2/CEP17 ratio: 1.52                 HER2 signals/cell: 4.75                 CEP17 signals/cell: 3.12         3. Ki-67: 23% by image analysis  2. Tumor #2: Invasive ductal carcinoma, [place] grade I of III (tumor C2 in synoptic)      a. Size: 8 mm      b. Margins: Invasive carcinoma is 2 mm from inferior margin      c. Breast Ancillary Testing:             Hormone Receptors:            Estrogen receptor: Positive (99%, moderate staining)            Progesterone receptor: Positive (95%, moderate staining)           HER2 by IHC: Negative (1+ by  manual morphometry)           Ki-67: 5%  3. Tumor #3: Invasive ductal carcinoma, Marie grade I of III (tumor C3 in synoptic)      a. Size: 7 mm      b. Margins: Invasive carcinoma is 5 mm from anterior margin      c. Breast Ancillary Testing:           Hormone Receptors:            Estrogen receptor: Positive (95%, moderate staining)            Progesterone receptor: Positive (76%, moderate staining)           HER2 by IHC: Negative (0, ultralow by manual morphometry)           Ki-67: 2%  4. Tumor #4: Invasive ductal carcinoma, [place] grade I of III (tumor C4 in synoptic)      a. Size: 4 mm      b. Margins: Invasive carcinoma is >5 mm from nearest (posterior and medial) margins      c. Tumor 4 is similar in appearance to tumor 3; ancillary testing not repeated  4. Ductal carcinoma in situ (DCIS), nuclear grade 2, Cribriform type      a. Margins: DCIS is >5 mm from all margins      5. Surrounding breast with proliferative fibrocystic change      D) RIGHT BREAST, 10:00, 5 CM FROM NIPPLE, NEW SUPERIOR MARGIN, EXCISION WITH MARGIN EVALUATION:  1. Benign fibroadipose tissue  2. Negative for atypia or malignancy   Amendment electronically signed by Kameron Brown MD on 5/22/2025 at 1719 CDT Electronically signed by Kameron Brown MD on 5/22/2025 at 1428 CDT   Comment The smaller foci of [place] grade I tumor seen in part C (tumors 2, 3, and 4) have a similar morphologic appearance, but do not show definitive continuity with each other or the largest tumor focus (tumor 1) on additional submitted sections.       Radiation Oncology Pre-Treatment Evaluation:  Pacemaker: denies  Prior radiation: denies  Pregnancy status: post menopausal    History of lupus, scleroderma, connective tissue disorders and collagen vascular disease: denies  Pain: 0/10  Pain Plan: NA  Intent of treatment: curative/palliative: curative, adjuvant  Side Effects of Radiation: We discussed in detail the management of  treatment side effects and provided educational materials regarding the self-care of the most common side effects.    Impression and Plan:  Debra Manriquez is an 85F w/paroxysmal atrial fibrillation (post-Watchman), HFpEF, and osteoporosis who has incidentally detected multifocal right breast masses-biopsy proven grade 2 invasive ductal carcinoma ER 96-98%, UT 62-67%, HER2 negative-underwent double lumpectomy and negative sentinel node biopsy on 5/15/2025, pathology confirming pT2(m)N0M0 disease with close but negative margins, and s/p ultra hypofractionated radiation therapy with 2600 cGy in 5 fractions without boost completed on 6/25/2025.     She tolerated treatment well with the development of a rash in her central chest corresponding to her skin with more previous sun exposure.  We recommend treating this area with hydrocortisone 1% which normally resolves such symptoms quickly.  We advised the patient to reach out if her symptoms do not resolve or if she develops any other symptoms in the treatment field.  Her next oncologic follow-up is following mammography in 6 months in January 2026.  We are available to assist in her care should the need arise but will not plan to schedule formal follow-up at this time.    I saw this patient and discussed this case with my attending, Dr. Linda Cid.    Micah Urena MD PhD PGY3  Department of Radiation Oncology  [facility]  Phone: [phone]    Physician Attestation   I, Linda Cid, saw this patient and agree with the findings and plan of care as documented in the note.   I was present for key portions of the history and physical exam.  Briefly, Ms. Debra Manriquez is a 85-year-old woman with a history of multifocal invasive ductal carcinoma of the 10 o'clock position of the right breast, ER 96 to 98%, UT 62 to 67%, HER2 negative, status post lumpectomies at the 10 o'clock position 12 cm from the nipple and at the 10 o'clock position, 5 cm from  the nipple as well as sentinel lymph node biopsy on 5/15/2025 with Dr. Gibbons.  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  She met with Dr. Lloyd on 6/5/2025, and she will attempt letrozole for 5 years. She has now completed ultrahypofractionated whole breast radiation to 2600 cGy in 5 fractions, completed on 6/5/2025 without incident.  A boost was not delivered in the context of tumor bed plus seroma in excess of a safely targetable boost field.  She is seen in routine follow-up with no unanticipated toxicity.  Followup as per above.     We appreciate the opportunity to participate in Ms. Manriquez's care. She was encouraged to contact me with questions or concerns should they arise.    Laboratory data and pathology as noted above was reviewed. I reviewed previous medical records, which are summarized in the HPI. A total of 20 minutes were spent (including face to face time) during this visit.     Linda Cid MD MPH PhD    Department of Radiation Oncology        Oncology Rooming Note    July 18, 2025 9:37 AM   Debra Manriquez is a 85 year old female who presents for:    Chief Complaint   Patient presents with     Oncology Clinic Visit     Radiation Oncology Follow-Up      Initial Vitals: BP (!) 165/80   Pulse 68   Resp 16   Wt 119.5 kg (263 lb 8 oz)   SpO2 96%   BMI 42.53 kg/m   Estimated body mass index is 42.53 kg/m  as calculated from the following:    Height as of  "3/14/25: 1.676 m (5' 6\").    Weight as of this encounter: 119.5 kg (263 lb 8 oz). Body surface area is 2.36 meters squared.  No Pain (0) Comment: Data Unavailable   No LMP recorded. Patient has had a hysterectomy.  Allergies reviewed: Yes  Medications reviewed: Yes    Medications: Medication refills not needed today.  Pharmacy name entered into [facility]:    [id number] - [place], [place] - [address]  [facility] - [place], [place] - [address]    PHQ9:  Did this patient require a PHQ9?: No    Considerations for radiation treatment   Pregnancy status: Female age 55+     Implanted Cardiac Devices: Yes   Left atrial appendage closure device: [employer] Watchman FLX 27mm GTIN [id number] (9/18/24)    Any previous radiation therapy: Yes  R breast cancer  [place]: R whole breast received 2600 cGy in 5 fractions, completed 6/25/25.     Clinical concerns: Radiation Oncology Follow-Up    Dr. Cid was notified.      Holli Helm RN              Again, thank you for allowing me to participate in the care of your patient.        Sincerely,        Linda Cid MD    Electronically signed"

## 2025-07-18 NOTE — LETTER
2025         RE: Debra GARCIA Mitra  [address]  [place] [address]      2025    Debra Manriquez  [phone] (home) [phone] (work)  : 1940  MRN: [id number]     RADIATION ONCOLOGY FOLLOW UP    CC: Multifocal R breast cancer    Identification and Purpose of Visit:  85F w/paroxysmal atrial fibrillation (post-Watchman), HFpEF, and osteoporosis who has incidentally detected multifocal right breast masses-biopsy proven grade 2 invasive ductal carcinoma ER 96-98%, SC 62-67%, HER2 negative-underwent double lumpectomy and negative sentinel node biopsy on 5/15/2025, pathology confirming pT2(m)N0M0 disease with close but negative margins s/p ultrahypofractionated whole breast radiation to 26 Gy in 5 fractions completed on 2025.    Medical oncologist: Dr. Lloyd  Surgical oncologist: Dr. Isis Gibbons    History of Present Illness:  The patient s right breast lesions were first noted incidentally on cardiac CT on 2024, which revealed two adjacent right breast nodules measuring 1.5 cm and 1.6 cm. Dedicated right breast mammography corroborated a 1.6 cm mass at the 10 o clock position, 5 cm from the nipple, and a second 1.2 cm mass at the 10 o clock position, 12 cm from the nipple. Targeted right axillary ultrasound showed no suspicious lymphadenopathy; a left breast cyst was also noted but remained stable. On 4/15/2025, stereotactic core biopsies of both right breast masses confirmed grade 2 invasive ductal carcinoma: the more proximal lesion (5 cm from nipple) was ER 98%, SC 67%, HER2 equivocal (IHC 2+/FISH negative), Ki-67 23%; the more distal lesion (12 cm from nipple) was ER 96%, SC 62%, HER2 negative (IHC 1+), Ki-67 18%.    On 5/15/2025, under the care of Dr. AD Ulloa, the patient underwent right double lumpectomies with wire localization and right axillary sentinel lymph node biopsy. Final pathology of the 12 cm lesion demonstrated a 2.1 cm grade 2  invasive carcinoma with associated high-grade DCIS and perineural invasion; margins were close (<1 mm medially) but negative. Pathology of the 5 cm lesion revealed multifocal invasive ductal carcinoma: a 2.0 cm grade 2 focus and three additional grade 1 foci measuring 8 mm, 7 mm, and 4 mm, all with associated DCIS; margins were close (2 mm inferiorly) but negative. Lymphovascular invasion was absent. One sentinel node was negative for malignancy (0/1). Tumor staging was assigned as pT2(m)N0(sn)M0, clinical prognostic stage Ib.    Her comorbidities include paroxysmal atrial fibrillation managed with a Watchman device (on aspirin), heart failure with preserved ejection fraction, and osteoporosis. She lives independently, ambulates without assistance. Family involvement is strong: her daughter assists with follow-up and care coordination.    She received whole breast radiation to 26 Gy in 5 fractions completed on 6/25/2025.  She tolerated treatment well with development of grade 1 fatigue relieved by rest.       Ms. Manriquez is seen in short interval follow up following completion of therapy.        On interview, the patient reports feeling well without significant side effects from treatment.  She has a rash at the superior medial treatment border to which she has been applying Silvadene but has had fewer side effects than she expected.  She previously had some erythema of the inferior aspect of her treated breast that has since resolved.    Review of Systems: As per HPI; otherwise, a 10 system review is unremarkable    Past Medical History:   Diagnosis Date     Antiplatelet or antithrombotic long-term use      Chronic acquired lymphedema      Edema      Falls      Fibrocystic breast disease      Fibrocystic breast disease      Foot pain      Gastroesophageal reflux disease      GERD (gastroesophageal reflux disease)      Hyperlipidemia      Hypertension      Irregular heart beat      Lymphedema      Mitral valve  disorder      Obese      JADYN (obstructive sleep apnea)      Osteoarthritis of knee     hx of knee replacement and pending second     Osteopenia      Skin cancer      Skin cancer, basal cell      Sleep apnea      Thyroid nodule      Thyroid nodule      Vitamin B deficiency         Past Surgical History:   Procedure Laterality Date     ARTHROPLASTY KNEE Right 3/7/2023    Procedure: RIGHT TOTAL KNEE ARTHROPLASTY;  Surgeon: Juan Corbin MD;  Location: [facility] Main OR     CATARACT EXTRACTION  2011, 2012     COLONOSCOPY       CV LEFT ATRIAL APPENDAGE CLOSURE N/A 9/18/2024    Procedure: Left Atrial Appendage Closure - WM;  Surgeon: Murphy Herrera MD;  Location: [facility] CV     EXCISE TOENAIL(S) Bilateral 8/16/2019    Procedure: MATRIXECTOMY BILATERAL FEET; TOES 1,2,3,4 ON LEFT FOOT,  1,2 ON RIGHT FOOT;  BOTH NAIL BORDERS;  Surgeon: Opal Kam DPM;  Location:  OR     EYE SURGERY      cataract     GI SURGERY       GYN SURGERY      hysterectomy     INJECT STEROID (LOCATION) Right 8/16/2019    Procedure: CORTIZONE INJECTION RIGHT HEEL;  Surgeon: Opal Kam DPM;  Location:  OR     ORTHOPEDIC SURGERY Left     knee replacement     PARTIAL HYSTERECTOMY       STRIP VEIN Bilateral 1975     TOTAL KNEE ARTHROPLASTY Left 2011        Current Medications:    Current Outpatient Medications:      aspirin 81 MG EC tablet, Take 1 tablet (81 mg) by mouth daily., Disp: , Rfl:      calcium carbonate 500 mg, elemental, (OSCAL) 500 MG tablet, 1 tablet with meals Orally Twice a day for 30 day(s) (Patient not taking: Reported on 6/10/2025), Disp: , Rfl:      clotrimazole (LOTRIMIN) 1 % external cream, as needed., Disp: , Rfl:      Coenzyme Q10 400 MG CAPS, Take 400 mg by mouth 2 times daily, Disp: , Rfl:      cyanocobalamin (VITAMIN B-12) 1000 MCG tablet, Take 1,000 mcg by mouth daily, Disp: , Rfl:      denosumab (PROLIA) 60 MG/ML SOSY injection, Inject 60 mg subcutaneously every 6 months As of  3/14/2023, please hold and pause this medication while at the transitional care unit, Disp: , Rfl:      furosemide (LASIX) 20 MG tablet, Take 20 mg by mouth daily, Disp: , Rfl:      letrozole (FEMARA) 2.5 MG tablet, Take 1 tablet (2.5 mg) by mouth daily. (Patient not taking: Reported on 6/10/2025), Disp: 90 tablet, Rfl: 3     levothyroxine (SYNTHROID/LEVOTHROID) 112 MCG tablet, Take 112 mcg by mouth daily, Disp: , Rfl:      losartan (COZAAR) 50 MG tablet, Take 50 mg by mouth 2 times daily, Disp: , Rfl:      Magnesium Citrate 100 MG CAPS, 1 capsule with food Orally Twice a day, Disp: , Rfl:      nystatin (MYCOSTATIN) 627203 UNIT/GM external powder, Apply 1 strip topically as needed., Disp: , Rfl:      Probiotic Product (PROBIOTIC BLEND PO), Take by mouth., Disp: , Rfl:      Pyridoxine HCl (B-6 PO), Take 100 mg by mouth daily., Disp: , Rfl:      rosuvastatin (CRESTOR) 20 MG tablet, TAKE 1 TABLET BY MOUTH EVERY DAY, Disp: 90 tablet, Rfl: 2     solifenacin (VESICARE) 5 MG tablet, Take 5 mg by mouth daily., Disp: , Rfl:      TART CHERRY PO, Take by mouth., Disp: , Rfl:      vitamin B1 (THIAMINE) 250 MG tablet, Take 500 mg by mouth daily., Disp: , Rfl:      vitamin D3 (CHOLECALCIFEROL) 50 mcg (2000 units) tablet, Take 1 tablet by mouth daily., Disp: , Rfl:     Allergies:  Allergies   Allergen Reactions     Lisinopril Cough     Reglan [Metoclopramide] Other (See Comments)     depression        Social History:  Social History     Socioeconomic History     Marital status:    Tobacco Use     Smoking status: Never     Passive exposure: Past     Smokeless tobacco: Never   Vaping Use     Vaping status: Never Used   Substance and Sexual Activity     Alcohol use: Yes     Alcohol/week: 0.8 standard drinks of alcohol     Comment: Alcoholic Drinks/day: 1-2 glasses per month     Drug use: Never     Sexual activity: Yes   Social History Narrative    . 4 kids.  Teaches college English, reading.      Social Drivers of  Health      [facility]    Financial Resource Strain    [facility]    Social Connections   Interpersonal Safety: Low Risk  (9/18/2024)    Interpersonal Safety      Do you feel physically and emotionally safe where you currently live?: Yes      Within the past 12 months, have you been humiliated or emotionally abused in other ways by your partner or ex-partner?: No        Family History   Problem Relation Age of Onset     No Known Problems Mother      No Known Problems Father      Alzheimer Disease Maternal Uncle      Heart Disease Sister      Heart Disease Brother      No Known Problems Daughter      No Known Problems Son      No Known Problems Brother      No Known Problems Son      No Known Problems Son          Physical Exam:  KPS: 80  There were no vitals taken for this visit., Pain Score Data Unavailable/10  General: Alert and in no acute distress.  CV: Appears well-perfused, with no visible cyanosis.  Lungs: Breathing easily on room air, with no difficulty completing full sentences  Extremities:  No visible edema of the upper extremities. Full range of motion at the shoulders and elbows.    Neuro: Alert and oriented; grossly nonfocal. Normal speech. Moving upper extremities equally.  Skin: No visible jaundice. No suspicious lesions of the visualized integuments.  Psych: Mood and affect are appropriate to given situation. Answers questions appropriately.  Breast Exam:  Right breast: Papular rash of the superior medial treatment border     Labs:  Lab Results   Component Value Date    WBC 5.6 09/17/2024    HGB 13.2 09/18/2024    HCT 43.5 09/17/2024     (L) 09/17/2024    CHOL 120 08/29/2024    ALT 23 08/29/2024    AST 22 08/29/2024     04/28/2025    BUN 19.9 04/28/2025    CO2 23 04/28/2025    TSH 1.68 02/22/2023    ALKPHOS 67 08/29/2024     Imaging:  Reviewed    Pathology:  Component 3 wk ago   Case Report  "Pathology Report                                  Case: [id number]                                  Authorizing Provider:  Isis Gibbons MD Collected:           05/15/2025 1208              Ordering Location:     [facility]        Received:            05/15/2025 1243                                     [facility]                                                                    Pathologist:           Kameron Brown MD                                                  Specimens:   A) - Right Breast Lump, 10 o'clock, 12 cm from nipple (stat gross, fresh breast                     protocol), with sentinal lymph node                                                                 B) - Right Axillary [place] Lymph Node 2, gross for number                                         C) - Right Breast, Right breast cancer #2, located 10 o\"clock 5cm from nipple, stat                 gross, margin assesment [id number]                                                                       D) - Right Breast, Right breast new superior margin, 10:00 5cmfn, new superior margin               painted blue                                                                           Amendment 5/22/2025 - Amendment issued to incorporate ancillary studies.  5/22/2025 - Amendment issued to correct clerical error (examined negative intramammary lymph node from part A was not documented)   Final Diagnosis A) RIGHT BREAST, 10:00, 12 CM FROM NIPPLE, LUMPECTOMY:  1. Invasive ductal carcinoma, Marie grade II of III (tumor A1 in synoptic)      a. Size: 21 mm      b. Core biopsy site is associated with tumor      c. Perineural invasion present  2. 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 Margins:      a. Invasive carcinoma is <1 mm from the medial margin      b. DCIS is <1 mm from the medial margin  4. One intramammary lymph node, negative for metastatic carcinoma (0/1)  5. " Breast Ancillary Testing: Performed on prior case ([id number] pt. A)      a. Hormone Receptors:            Estrogen receptor: Positive (96%, strong staining)            Progesterone receptor: Positive (62%, moderate staining)      b. HER2 by IHC: Negative (1+ by manual morphometry)      c. Ki-67: 18% by image analysis  6. Surrounding breast with proliferative fibrocystic change    B) SENTINEL LYMPH NODE(S), RIGHT AXILLARY SENTINEL LYMPH NODE 2, EXCISION:  1. Negative for metastatic carcinoma in one lymph node (0/1)    C) RIGHT BREAST, 10:00, 5 CM FROM NIPPLE, LUMPECTOMY:  Multifocal invasive ductal carcinoma:  1. Tumor #1: Invasive ductal carcinoma, Marie grade II of III (tumor C1 in synoptic)      a. Size: 20 mm      b. Core biopsy site is associated with tumor      c. Margins: Invasive carcinoma is 5 mm from superior margin (not final margin; see part D)      d. Breast Ancillary Testing: Performed on prior case ([id number] pt. B)         1. Hormone Receptors:              Estrogen receptor: Positive (98%, strong staining)              Progesterone receptor: Positive (67%, moderate staining)         2. HER2 by IHC: Equivocal (2+ by manual morphometry)               HER2 by FISH: Negative, see comment                 HER2/CEP17 ratio: 1.52                 HER2 signals/cell: 4.75                 CEP17 signals/cell: 3.12         3. Ki-67: 23% by image analysis  2. Tumor #2: Invasive ductal carcinoma, [place] grade I of III (tumor C2 in synoptic)      a. Size: 8 mm      b. Margins: Invasive carcinoma is 2 mm from inferior margin      c. Breast Ancillary Testing:             Hormone Receptors:            Estrogen receptor: Positive (99%, moderate staining)            Progesterone receptor: Positive (95%, moderate staining)           HER2 by IHC: Negative (1+ by manual morphometry)           Ki-67: 5%  3. Tumor #3: Invasive ductal carcinoma, [place] grade I of III (tumor C3 in synoptic)      a. Size: 7 mm       b. Margins: Invasive carcinoma is 5 mm from anterior margin      c. Breast Ancillary Testing:           Hormone Receptors:            Estrogen receptor: Positive (95%, moderate staining)            Progesterone receptor: Positive (76%, moderate staining)           HER2 by IHC: Negative (0, ultralow by manual morphometry)           Ki-67: 2%  4. Tumor #4: Invasive ductal carcinoma, Marie grade I of III (tumor C4 in synoptic)      a. Size: 4 mm      b. Margins: Invasive carcinoma is >5 mm from nearest (posterior and medial) margins      c. Tumor 4 is similar in appearance to tumor 3; ancillary testing not repeated  4. Ductal carcinoma in situ (DCIS), nuclear grade 2, Cribriform type      a. Margins: DCIS is >5 mm from all margins      5. Surrounding breast with proliferative fibrocystic change      D) RIGHT BREAST, 10:00, 5 CM FROM NIPPLE, NEW SUPERIOR MARGIN, EXCISION WITH MARGIN EVALUATION:  1. Benign fibroadipose tissue  2. Negative for atypia or malignancy   Amendment electronically signed by Kameron Brown MD on 5/22/2025 at 1719 CDT Electronically signed by Kameron Brown MD on 5/22/2025 at 1428 CDT   Comment The smaller foci of [place] grade I tumor seen in part C (tumors 2, 3, and 4) have a similar morphologic appearance, but do not show definitive continuity with each other or the largest tumor focus (tumor 1) on additional submitted sections.       Radiation Oncology Pre-Treatment Evaluation:  Pacemaker: denies  Prior radiation: denies  Pregnancy status: post menopausal    History of lupus, scleroderma, connective tissue disorders and collagen vascular disease: denies  Pain: 0/10  Pain Plan: NA  Intent of treatment: curative/palliative: curative, adjuvant  Side Effects of Radiation: We discussed in detail the management of treatment side effects and provided educational materials regarding the self-care of the most common side effects.    Impression and Plan:  Debra Manriquez is an  85F w/paroxysmal atrial fibrillation (post-Watchman), HFpEF, and osteoporosis who has incidentally detected multifocal right breast masses-biopsy proven grade 2 invasive ductal carcinoma ER 96-98%, IL 62-67%, HER2 negative-underwent double lumpectomy and negative sentinel node biopsy on 5/15/2025, pathology confirming pT2(m)N0M0 disease with close but negative margins, and s/p ultra hypofractionated radiation therapy with 2600 cGy in 5 fractions without boost completed on 6/25/2025.     She tolerated treatment well with the development of a rash in her central chest corresponding to her skin with more previous sun exposure.  We recommend treating this area with hydrocortisone 1% which normally resolves such symptoms quickly.  We advised the patient to reach out if her symptoms do not resolve or if she develops any other symptoms in the treatment field.  Her next oncologic follow-up is following mammography in 6 months in January 2026.  We are available to assist in her care should the need arise but will not plan to schedule formal follow-up at this time.    I saw this patient and discussed this case with my attending, Dr. Linda Cid.    Micah Urena MD PhD PGY3  Department of Radiation Oncology  [facility]  Phone: [phone]    Physician Attestation   I, Linda Cid, saw this patient and agree with the findings and plan of care as documented in the note.   I was present for key portions of the history and physical exam.  Briefly, Ms. Debra Manriquez is a 85-year-old woman with a history of multifocal invasive ductal carcinoma of the 10 o'clock position of the right breast, ER 96 to 98%, IL 62 to 67%, HER2 negative, status post lumpectomies at the 10 o'clock position 12 cm from the nipple and at the 10 o'clock position, 5 cm from the nipple as well as sentinel lymph node biopsy on 5/15/2025 with Dr. Gibbons.  Pathology revealed pT2 (m) (largest 21 mm--grade II at the 10 o'clock  "position, 12 cm from the nipple; additional foci measuring 20 mm, 8 mm, 7 mm, 4 mm at the 10 o'clock position, 5 cm from the nipple, grade 1-2), pN0 (sn) (0 out of 1 sentinel nodes involved), cM0, absent lymphovascular invasion, perineural invasion noted, associated nuclear grade 2/3 DCIS, resected to negative but close margins (medial margin less than 1 mm for invasive and DCIS components at the 10 o'clock position, 12 cm from the nipple; closest margin at the 5 cm position is 2 mm inferiorly).  She met with Dr. Lloyd on 6/5/2025, and she will attempt letrozole for 5 years. She has now completed ultrahypofractionated whole breast radiation to 2600 cGy in 5 fractions, completed on 6/5/2025 without incident.  A boost was not delivered in the context of tumor bed plus seroma in excess of a safely targetable boost field.  She is seen in routine follow-up with no unanticipated toxicity.  Followup as per above.     We appreciate the opportunity to participate in Ms. Manriquez's care. She was encouraged to contact me with questions or concerns should they arise.    Laboratory data and pathology as noted above was reviewed. I reviewed previous medical records, which are summarized in the HPI. A total of 20 minutes were spent (including face to face time) during this visit.     Linda Cid MD MPH PhD    Department of Radiation Oncology        Oncology Rooming Note    July 18, 2025 9:37 AM   Debra Manriquez is a 85 year old female who presents for:    Chief Complaint   Patient presents with     Oncology Clinic Visit     Radiation Oncology Follow-Up      Initial Vitals: BP (!) 165/80   Pulse 68   Resp 16   Wt 119.5 kg (263 lb 8 oz)   SpO2 96%   BMI 42.53 kg/m   Estimated body mass index is 42.53 kg/m  as calculated from the following:    Height as of 3/14/25: 1.676 m (5' 6\").    Weight as of this encounter: 119.5 kg (263 lb 8 oz). Body surface area is 2.36 meters squared.  No Pain (0) Comment: Data " Unavailable   No LMP recorded. Patient has had a hysterectomy.  Allergies reviewed: Yes  Medications reviewed: Yes    Medications: Medication refills not needed today.  Pharmacy name entered into Dweho:    [id number] - [place], [place] - [address]  [id number] - [place], [place] - [address]    PHQ9:  Did this patient require a PHQ9?: No    Considerations for radiation treatment   Pregnancy status: Female age 55+     Implanted Cardiac Devices: Yes   Left atrial appendage closure device: [employer] Watchman FLX 27mm GTIN [id number] (9/18/24)    Any previous radiation therapy: Yes  R breast cancer  [place]: R whole breast received 2600 cGy in 5 fractions, completed 6/25/25.     Clinical concerns: Radiation Oncology Follow-Up    Dr. Cid was notified.      Holli Helm RN                Linda Cid MD

## 2025-07-18 NOTE — PROGRESS NOTES
2025    Debra Mckeonerly  [phone] (home) [phone] (work)  : 1940  MRN: [id number]     RADIATION ONCOLOGY FOLLOW UP    CC: Multifocal R breast cancer    Identification and Purpose of Visit:  85F w/paroxysmal atrial fibrillation (post-Watchman), HFpEF, and osteoporosis who has incidentally detected multifocal right breast masses-biopsy proven grade 2 invasive ductal carcinoma ER 96-98%, [place] 62-67%, HER2 negative-underwent double lumpectomy and negative sentinel node biopsy on 5/15/2025, pathology confirming pT2(m)N0M0 disease with close but negative margins s/p ultrahypofractionated whole breast radiation to 26 Gy in 5 fractions completed on 2025.    Medical oncologist: Dr. Lloyd  Surgical oncologist: Dr. Isis Gibbons    History of Present Illness:  The patient s right breast lesions were first noted incidentally on cardiac CT on 2024, which revealed two adjacent right breast nodules measuring 1.5 cm and 1.6 cm. Dedicated right breast mammography corroborated a 1.6 cm mass at the 10 o clock position, 5 cm from the nipple, and a second 1.2 cm mass at the 10 o clock position, 12 cm from the nipple. Targeted right axillary ultrasound showed no suspicious lymphadenopathy; a left breast cyst was also noted but remained stable. On 4/15/2025, stereotactic core biopsies of both right breast masses confirmed grade 2 invasive ductal carcinoma: the more proximal lesion (5 cm from nipple) was ER 98%, ME 67%, HER2 equivocal (IHC 2+/FISH negative), Ki-67 23%; the more distal lesion (12 cm from nipple) was ER 96%, ME 62%, HER2 negative (IHC 1+), Ki-67 18%.    On 5/15/2025, under the care of Dr. AD Ulloa, the patient underwent right double lumpectomies with wire localization and right axillary sentinel lymph node biopsy. Final pathology of the 12 cm lesion demonstrated a 2.1 cm grade 2 invasive carcinoma with associated high-grade DCIS and perineural invasion; margins were close (<1 mm medially)  but negative. Pathology of the 5 cm lesion revealed multifocal invasive ductal carcinoma: a 2.0 cm grade 2 focus and three additional grade 1 foci measuring 8 mm, 7 mm, and 4 mm, all with associated DCIS; margins were close (2 mm inferiorly) but negative. Lymphovascular invasion was absent. One sentinel node was negative for malignancy (0/1). Tumor staging was assigned as pT2(m)N0(sn)M0, clinical prognostic stage Ib.    Her comorbidities include paroxysmal atrial fibrillation managed with a Watchman device (on aspirin), heart failure with preserved ejection fraction, and osteoporosis. She lives independently, ambulates without assistance. Family involvement is strong: her daughter assists with follow-up and care coordination.    She received whole breast radiation to 26 Gy in 5 fractions completed on 6/25/2025.  She tolerated treatment well with development of grade 1 fatigue relieved by rest.       Ms. Manriquez is seen in short interval follow up following completion of therapy.        On interview, the patient reports feeling well without significant side effects from treatment.  She has a rash at the superior medial treatment border to which she has been applying Silvadene but has had fewer side effects than she expected.  She previously had some erythema of the inferior aspect of her treated breast that has since resolved.    Review of Systems: As per HPI; otherwise, a 10 system review is unremarkable    Past Medical History:   Diagnosis Date    Antiplatelet or antithrombotic long-term use     Chronic acquired lymphedema     Edema     Falls     Fibrocystic breast disease     Fibrocystic breast disease     Foot pain     Gastroesophageal reflux disease     GERD (gastroesophageal reflux disease)     Hyperlipidemia     Hypertension     Irregular heart beat     Lymphedema     Mitral valve disorder     Obese     JADYN (obstructive sleep apnea)     Osteoarthritis of knee     hx of knee replacement and pending second     Osteopenia     Skin cancer     Skin cancer, basal cell     Sleep apnea     Thyroid nodule     Thyroid nodule     Vitamin B deficiency         Past Surgical History:   Procedure Laterality Date    ARTHROPLASTY KNEE Right 3/7/2023    Procedure: RIGHT TOTAL KNEE ARTHROPLASTY;  Surgeon: Juan Corbin MD;  Location: [facility] Main OR    CATARACT EXTRACTION  2011, 2012    COLONOSCOPY      CV LEFT ATRIAL APPENDAGE CLOSURE N/A 9/18/2024    Procedure: Left Atrial Appendage Closure - WM;  Surgeon: Murphy Herrera MD;  Location: [facility] CV    EXCISE TOENAIL(S) Bilateral 8/16/2019    Procedure: MATRIXECTOMY BILATERAL FEET; TOES 1,2,3,4 ON LEFT FOOT,  1,2 ON RIGHT FOOT;  BOTH NAIL BORDERS;  Surgeon: Opal Kam DPM;  Location:  OR    EYE SURGERY      cataract    GI SURGERY      GYN SURGERY      hysterectomy    INJECT STEROID (LOCATION) Right 8/16/2019    Procedure: CORTIZONE INJECTION RIGHT HEEL;  Surgeon: Opal Kam DPM;  Location:  OR    ORTHOPEDIC SURGERY Left     knee replacement    PARTIAL HYSTERECTOMY      STRIP VEIN Bilateral 1975    TOTAL KNEE ARTHROPLASTY Left 2011        Current Medications:    Current Outpatient Medications:     aspirin 81 MG EC tablet, Take 1 tablet (81 mg) by mouth daily., Disp: , Rfl:     calcium carbonate 500 mg, elemental, (OSCAL) 500 MG tablet, 1 tablet with meals Orally Twice a day for 30 day(s) (Patient not taking: Reported on 6/10/2025), Disp: , Rfl:     clotrimazole (LOTRIMIN) 1 % external cream, as needed., Disp: , Rfl:     Coenzyme Q10 400 MG CAPS, Take 400 mg by mouth 2 times daily, Disp: , Rfl:     cyanocobalamin (VITAMIN B-12) 1000 MCG tablet, Take 1,000 mcg by mouth daily, Disp: , Rfl:     denosumab (PROLIA) 60 MG/ML SOSY injection, Inject 60 mg subcutaneously every 6 months As of 3/14/2023, please hold and pause this medication while at the transitional care unit, Disp: , Rfl:     furosemide (LASIX) 20 MG tablet, Take 20 mg by mouth  daily, Disp: , Rfl:     letrozole (FEMARA) 2.5 MG tablet, Take 1 tablet (2.5 mg) by mouth daily. (Patient not taking: Reported on 6/10/2025), Disp: 90 tablet, Rfl: 3    levothyroxine (SYNTHROID/LEVOTHROID) 112 MCG tablet, Take 112 mcg by mouth daily, Disp: , Rfl:     losartan (COZAAR) 50 MG tablet, Take 50 mg by mouth 2 times daily, Disp: , Rfl:     Magnesium Citrate 100 MG CAPS, 1 capsule with food Orally Twice a day, Disp: , Rfl:     nystatin (MYCOSTATIN) 206590 UNIT/GM external powder, Apply 1 strip topically as needed., Disp: , Rfl:     Probiotic Product (PROBIOTIC BLEND PO), Take by mouth., Disp: , Rfl:     Pyridoxine HCl (B-6 PO), Take 100 mg by mouth daily., Disp: , Rfl:     rosuvastatin (CRESTOR) 20 MG tablet, TAKE 1 TABLET BY MOUTH EVERY DAY, Disp: 90 tablet, Rfl: 2    solifenacin (VESICARE) 5 MG tablet, Take 5 mg by mouth daily., Disp: , Rfl:     TART CHERRY PO, Take by mouth., Disp: , Rfl:     vitamin B1 (THIAMINE) 250 MG tablet, Take 500 mg by mouth daily., Disp: , Rfl:     vitamin D3 (CHOLECALCIFEROL) 50 mcg (2000 units) tablet, Take 1 tablet by mouth daily., Disp: , Rfl:     Allergies:  Allergies   Allergen Reactions    Lisinopril Cough    Reglan [Metoclopramide] Other (See Comments)     depression        Social History:  Social History     Socioeconomic History    Marital status:    Tobacco Use    Smoking status: Never     Passive exposure: Past    Smokeless tobacco: Never   Vaping Use    Vaping status: Never Used   Substance and Sexual Activity    Alcohol use: Yes     Alcohol/week: 0.8 standard drinks of alcohol     Comment: Alcoholic Drinks/day: 1-2 glasses per month    Drug use: Never    Sexual activity: Yes   Social History Narrative    . 4 kids.  Teaches college English, reading.      Social Drivers of Health      Received from [employer]    Financial Resource Strain    Received from [employer] [facility] [employer]    Social  Connections   Interpersonal Safety: Low Risk  (9/18/2024)    Interpersonal Safety     Do you feel physically and emotionally safe where you currently live?: Yes     Within the past 12 months, have you been humiliated or emotionally abused in other ways by your partner or ex-partner?: No        Family History   Problem Relation Age of Onset    No Known Problems Mother     No Known Problems Father     Alzheimer Disease Maternal Uncle     Heart Disease Sister     Heart Disease Brother     No Known Problems Daughter     No Known Problems Son     No Known Problems Brother     No Known Problems Son     No Known Problems Son          Physical Exam:  KPS: 80  There were no vitals taken for this visit., Pain Score Data Unavailable/10  General: Alert and in no acute distress.  CV: Appears well-perfused, with no visible cyanosis.  Lungs: Breathing easily on room air, with no difficulty completing full sentences  Extremities:  No visible edema of the upper extremities. Full range of motion at the shoulders and elbows.    Neuro: Alert and oriented; grossly nonfocal. Normal speech. Moving upper extremities equally.  Skin: No visible jaundice. No suspicious lesions of the visualized integuments.  Psych: Mood and affect are appropriate to given situation. Answers questions appropriately.  Breast Exam:  Right breast: Papular rash of the superior medial treatment border     Labs:  Lab Results   Component Value Date    WBC 5.6 09/17/2024    HGB 13.2 09/18/2024    HCT 43.5 09/17/2024     (L) 09/17/2024    CHOL 120 08/29/2024    ALT 23 08/29/2024    AST 22 08/29/2024     04/28/2025    BUN 19.9 04/28/2025    CO2 23 04/28/2025    TSH 1.68 02/22/2023    ALKPHOS 67 08/29/2024     Imaging:  Reviewed    Pathology:  Component 3 wk ago   Case Report Pathology Report                                  Case: [id number]                                  Authorizing Provider:  Isis Gibbons MD Collected:           05/15/2025  "1208              Ordering Location:     [facility]        Received:            05/15/2025 [facility]                                     [facility]                                                                    Pathologist:           Kameron Brown MD                                                  Specimens:   A) - Right Breast Lump, 10 o'clock, 12 cm from nipple (stat gross, fresh breast                     protocol), with sentinal lymph node                                                                 B) - Right Axillary [place] Lymph Node 2, gross for number                                         C) - Right Breast, Right breast cancer #2, located 10 o\"clock 5cm from nipple, stat                 gross, margin assesment [id number]                                                                       D) - Right Breast, Right breast new superior margin, 10:00 5cmfn, new superior margin               painted blue                                                                           Amendment 5/22/2025 - Amendment issued to incorporate ancillary studies.  5/22/2025 - Amendment issued to correct clerical error (examined negative intramammary lymph node from part A was not documented)   Final Diagnosis A) RIGHT BREAST, 10:00, 12 CM FROM NIPPLE, LUMPECTOMY:  1. Invasive ductal carcinoma, Marie grade II of III (tumor A1 in synoptic)      a. Size: 21 mm      b. Core biopsy site is associated with tumor      c. Perineural invasion present  2. 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 Margins:      a. Invasive carcinoma is <1 mm from the medial margin      b. DCIS is <1 mm from the medial margin  4. One intramammary lymph node, negative for metastatic carcinoma (0/1)  5. Breast Ancillary Testing: Performed on prior case ([id number] pt. A)      a. Hormone Receptors:            Estrogen receptor: Positive (96%, strong staining)            Progesterone " receptor: Positive (62%, moderate staining)      b. HER2 by IHC: Negative (1+ by manual morphometry)      c. Ki-67: 18% by image analysis  6. Surrounding breast with proliferative fibrocystic change    B) SENTINEL LYMPH NODE(S), RIGHT AXILLARY SENTINEL LYMPH NODE 2, EXCISION:  1. Negative for metastatic carcinoma in one lymph node (0/1)    C) RIGHT BREAST, 10:00, 5 CM FROM NIPPLE, LUMPECTOMY:  Multifocal invasive ductal carcinoma:  1. Tumor #1: Invasive ductal carcinoma, [place] grade II of III (tumor C1 in synoptic)      a. Size: 20 mm      b. Core biopsy site is associated with tumor      c. Margins: Invasive carcinoma is 5 mm from superior margin (not final margin; see part D)      d. Breast Ancillary Testing: Performed on prior case ([id number] pt. B)         1. Hormone Receptors:              Estrogen receptor: Positive (98%, strong staining)              Progesterone receptor: Positive (67%, moderate staining)         2. HER2 by IHC: Equivocal (2+ by manual morphometry)               HER2 by FISH: Negative, see comment                 HER2/CEP17 ratio: 1.52                 HER2 signals/cell: 4.75                 CEP17 signals/cell: 3.12         3. Ki-67: 23% by image analysis  2. Tumor #2: Invasive ductal carcinoma, [place]rick grade I of III (tumor C2 in synoptic)      a. Size: 8 mm      b. Margins: Invasive carcinoma is 2 mm from inferior margin      c. Breast Ancillary Testing:             Hormone Receptors:            Estrogen receptor: Positive (99%, moderate staining)            Progesterone receptor: Positive (95%, moderate staining)           HER2 by IHC: Negative (1+ by manual morphometry)           Ki-67: 5%  3. Tumor #3: Invasive ductal carcinoma, [place] grade I of III (tumor C3 in synoptic)      a. Size: 7 mm      b. Margins: Invasive carcinoma is 5 mm from anterior margin      c. Breast Ancillary Testing:           Hormone Receptors:            Estrogen receptor: Positive (95%, moderate  staining)            Progesterone receptor: Positive (76%, moderate staining)           HER2 by IHC: Negative (0, ultralow by manual morphometry)           Ki-67: 2%  4. Tumor #4: Invasive ductal carcinoma, [place] grade I of III (tumor C4 in synoptic)      a. Size: 4 mm      b. Margins: Invasive carcinoma is >5 mm from nearest (posterior and medial) margins      c. Tumor 4 is similar in appearance to tumor 3; ancillary testing not repeated  4. Ductal carcinoma in situ (DCIS), nuclear grade 2, Cribriform type      a. Margins: DCIS is >5 mm from all margins      5. Surrounding breast with proliferative fibrocystic change      D) RIGHT BREAST, 10:00, 5 CM FROM NIPPLE, NEW SUPERIOR MARGIN, EXCISION WITH MARGIN EVALUATION:  1. Benign fibroadipose tissue  2. Negative for atypia or malignancy   Amendment electronically signed by Kameron Brown MD on 5/22/2025 at 1719 CDT Electronically signed by Kameron Brown MD on 5/22/2025 at 1428 CDT   Comment The smaller foci of Marie grade I tumor seen in part C (tumors 2, 3, and 4) have a similar morphologic appearance, but do not show definitive continuity with each other or the largest tumor focus (tumor 1) on additional submitted sections.       Radiation Oncology Pre-Treatment Evaluation:  Pacemaker: denies  Prior radiation: denies  Pregnancy status: post menopausal    History of lupus, scleroderma, connective tissue disorders and collagen vascular disease: denies  Pain: 0/10  Pain Plan: NA  Intent of treatment: curative/palliative: curative, adjuvant  Side Effects of Radiation: We discussed in detail the management of treatment side effects and provided educational materials regarding the self-care of the most common side effects.    Impression and Plan:  Debra Manriquez is an 85F w/paroxysmal atrial fibrillation (post-Watchman), HFpEF, and osteoporosis who has incidentally detected multifocal right breast masses-biopsy proven grade 2 invasive ductal  carcinoma ER 96-98%, [place] 62-67%, HER2 negative-underwent double lumpectomy and negative sentinel node biopsy on 5/15/2025, pathology confirming pT2(m)N0M0 disease with close but negative margins, and s/p ultra hypofractionated radiation therapy with 2600 cGy in 5 fractions without boost completed on 6/25/2025.     She tolerated treatment well with the development of a rash in her central chest corresponding to her skin with more previous sun exposure.  We recommend treating this area with hydrocortisone 1% which normally resolves such symptoms quickly.  We advised the patient to reach out if her symptoms do not resolve or if she develops any other symptoms in the treatment field.  Her next oncologic follow-up is following mammography in 6 months and January 2026.  We are available to assist in her care should the need arise but will not plan to schedule formal follow-up at this time.    I saw this patient and discussed this case with my attending, Dr. Linda Cid.    Micah Urena MD PhD PGY3  Department of Radiation Oncology  [facility]  Phone: [phone]     involved), cM0, absent lymphovascular invasion, perineural invasion noted, associated nuclear grade 2/3 DCIS, resected to negative but close margins (medial margin less than 1 mm for invasive and DCIS components at the 10 o'clock position, 12 cm from the nipple; closest margin at the 5 cm position is 2 mm inferiorly).  She met with Dr. Lloyd on 6/5/2025, and she will attempt letrozole for 5 years. She has now completed ultrahypofractionated whole breast radiation to 2600 cGy in 5 fractions, completed on 6/5/2025 without incident.  A boost was not delivered in the context of tumor bed plus seroma in excess of a safely targetable boost field.  She is seen in routine follow-up with no unanticipated toxicity.  Followup as per above.     We appreciate the opportunity to participate in Ms. Manriquez's care. She was encouraged to contact me with questions or concerns should they arise.    Laboratory data and pathology as noted above was reviewed. I reviewed previous medical records, which are summarized in the HPI. A total of 20 minutes were spent (including face to face time) during this visit.     Linda Cid MD MPH PhD    Department of Radiation Oncology

## 2025-07-30 DIAGNOSIS — C50.411 MALIGNANT NEOPLASM OF UPPER-OUTER QUADRANT OF RIGHT BREAST IN FEMALE, ESTROGEN RECEPTOR POSITIVE (H): Primary | ICD-10-CM

## 2025-07-30 DIAGNOSIS — Z17.0 MALIGNANT NEOPLASM OF UPPER-OUTER QUADRANT OF RIGHT BREAST IN FEMALE, ESTROGEN RECEPTOR POSITIVE (H): Primary | ICD-10-CM

## 2025-08-28 ENCOUNTER — LAB REQUISITION (OUTPATIENT)
Dept: LAB | Facility: CLINIC | Age: 85
End: 2025-08-28
Payer: MEDICARE

## 2025-08-28 DIAGNOSIS — I11.0 HYPERTENSIVE HEART DISEASE WITH HEART FAILURE (H): ICD-10-CM

## 2025-08-28 DIAGNOSIS — M81.0 AGE-RELATED OSTEOPOROSIS WITHOUT CURRENT PATHOLOGICAL FRACTURE: ICD-10-CM

## 2025-08-28 DIAGNOSIS — E78.5 HYPERLIPIDEMIA, UNSPECIFIED: ICD-10-CM

## 2025-08-28 LAB
ALBUMIN SERPL BCG-MCNC: 4 G/DL (ref 3.5–5.2)
ALP SERPL-CCNC: 73 U/L (ref 40–150)
ALT SERPL W P-5'-P-CCNC: 37 U/L (ref 0–50)
ANION GAP SERPL CALCULATED.3IONS-SCNC: 14 MMOL/L (ref 7–15)
AST SERPL W P-5'-P-CCNC: 29 U/L (ref 0–45)
BILIRUB SERPL-MCNC: 0.6 MG/DL
BUN SERPL-MCNC: 20.3 MG/DL (ref 8–23)
CALCIUM SERPL-MCNC: 9.4 MG/DL (ref 8.8–10.4)
CHLORIDE SERPL-SCNC: 104 MMOL/L (ref 98–107)
CHOLEST SERPL-MCNC: 115 MG/DL
CREAT SERPL-MCNC: 0.75 MG/DL (ref 0.51–0.95)
EGFRCR SERPLBLD CKD-EPI 2021: 77 ML/MIN/1.73M2
FASTING STATUS PATIENT QL REPORTED: NO
FASTING STATUS PATIENT QL REPORTED: NO
GLUCOSE SERPL-MCNC: 91 MG/DL (ref 70–99)
HCO3 SERPL-SCNC: 21 MMOL/L (ref 22–29)
HDLC SERPL-MCNC: 47 MG/DL
LDLC SERPL CALC-MCNC: 52 MG/DL
NONHDLC SERPL-MCNC: 68 MG/DL
POTASSIUM SERPL-SCNC: 4.1 MMOL/L (ref 3.4–5.3)
PROT SERPL-MCNC: 6.7 G/DL (ref 6.4–8.3)
SODIUM SERPL-SCNC: 139 MMOL/L (ref 135–145)
TRIGL SERPL-MCNC: 79 MG/DL
VIT D+METAB SERPL-MCNC: 56 NG/ML (ref 20–50)

## (undated) DEVICE — SU STRATAFIX PDS PLUS 1 CT-1 18" SXPP1A404

## (undated) DEVICE — SYR ANGIOGRAPHY MULTIUSE KIT ACIST 014612

## (undated) DEVICE — SOL NACL 0.9% INJ 1000ML BAG 2B1324X

## (undated) DEVICE — CUSTOM PACK TOTAL KNEE ACCESSORY SOP5BTAHEA

## (undated) DEVICE — CAST STOCKINETTE 3" COTTON 30-7003

## (undated) DEVICE — GLOVE PROTEXIS POWDER FREE 6.5 ORTHOPEDIC 2D73ET65

## (undated) DEVICE — SHEATH WITH DILATOR ACCESS SYSTEM FXD CURVE M635TU80010

## (undated) DEVICE — CAST PADDING 3" COTTON WEBRIL UNSTERILE 9083

## (undated) DEVICE — CUFF TOURN 34IN STRL DISP

## (undated) DEVICE — DRAPE CONVERTORS U-DRAPE 60X72" 8476

## (undated) DEVICE — ADH SKIN CLOSURE PREMIERPRO EXOFIN 1.0ML 3470

## (undated) DEVICE — HOLDER LIMB VELCRO OR 0814-1533

## (undated) DEVICE — CATH ANGIO INFINITI PIGTAIL 155 6 SH 6FRX110CM 534654S

## (undated) DEVICE — PLATE GROUNDING ADULT W/CORD 9165L

## (undated) DEVICE — SOL WATER IRRIG 1000ML BOTTLE 2F7114

## (undated) DEVICE — SUTURE VICRYL+ 1 27IN CT-1 UND VCP261H

## (undated) DEVICE — DECANTER VIAL 2006S

## (undated) DEVICE — INTRO SHEATH 8FRX10CM PINNACLE RSS802

## (undated) DEVICE — DRSG GAUZE 4X4" 3033

## (undated) DEVICE — SHEATH GUIDING VERSACROSS D1 CURVE L85 CM L180 CBL VXAK0003

## (undated) DEVICE — SUTURE VICRYL+ 2 27IN CP UNDYED VCP195H

## (undated) DEVICE — BLADE KNIFE BEAVER MINI BEAVER6400

## (undated) DEVICE — SU MONOCRYL 3-0 PS-2 18" UND MCP497G

## (undated) DEVICE — DRAPE SHEET REV FOLD 3/4 9349

## (undated) DEVICE — SOL NACL 0.9% IRRIG 1000ML BOTTLE 2F7124

## (undated) DEVICE — MANIFOLD KIT ANGIO AUTOMATED 014613

## (undated) DEVICE — BLADE SAW SAGITTAL STRK DUAL CUT 4118-135-090

## (undated) DEVICE — GLOVE BIOGEL PI ULTRATOUCH G SZ 8.5 42185

## (undated) DEVICE — DRESSING MEPILEX AG SILVER 4X8 395890

## (undated) DEVICE — CUSTOM PACK TOTAL KNEE SOP5BTKHEC

## (undated) DEVICE — PACK EXTREMITY SOP15EXFSD

## (undated) DEVICE — A3 SUPPLIES- SEE NURSING INFO PAGE

## (undated) DEVICE — BNDG ELASTIC 3"X5YDS UNSTERILE 6611-30

## (undated) DEVICE — GLOVE PROTEXIS W/NEU-THERA 6.5  2D73TE65

## (undated) DEVICE — KIT HAND CONTROL ACIST 014644 AR-P54

## (undated) DEVICE — LINEN TOWEL PACK X5 5464

## (undated) DEVICE — CUSTOM PACK CORONARY SAN5BCRHEA

## (undated) DEVICE — CLOSURE DEVICE 6FR VASC PROGLIDE MEDICATED SUTURE 12673-03

## (undated) DEVICE — INTRO MICRO MINI STICK 4FR STIFF NITINOL 45-753

## (undated) DEVICE — TUBING SUCTION 12"X1/4" N612

## (undated) DEVICE — BANDAGE ELASTIC 6X550 LF DBL 593-96LF

## (undated) DEVICE — CAST PADDING 6" STERILE 9046S

## (undated) DEVICE — STPL SKIN PROXIMATE 35 WIDE PMW35

## (undated) DEVICE — ELECTRODE DEFIB CADENCE 22550R

## (undated) DEVICE — BNDG ELASTIC 2"X5YDS UNSTERILE 6611-20

## (undated) DEVICE — GLOVE BIOGEL PI ULTRATOUCH G SZ 8.0 42180

## (undated) DEVICE — SUTURE VICRYL+ 2-0 27IN CT-1 UND VCP259H

## (undated) DEVICE — SUCTION MANIFOLD NEPTUNE 2 SYS 4 PORT 0702-020-000

## (undated) DEVICE — INTRODUCER CHECK FLO 16FRX30CM .038

## (undated) DEVICE — TRANSDUCER TRAY ARTERIAL 42646-06

## (undated) DEVICE — GLOVE BIOGEL PI INDICATOR 8.0 LF 41680

## (undated) DEVICE — GLOVE UNDER INDICATOR PI SZ 8.5 LF 41685

## (undated) RX ORDER — DEXAMETHASONE SODIUM PHOSPHATE 10 MG/ML
INJECTION, EMULSION INTRAMUSCULAR; INTRAVENOUS
Status: DISPENSED
Start: 2023-03-07

## (undated) RX ORDER — GLYCOPYRROLATE 0.2 MG/ML
INJECTION, SOLUTION INTRAMUSCULAR; INTRAVENOUS
Status: DISPENSED
Start: 2019-08-16

## (undated) RX ORDER — CEFAZOLIN SODIUM 2 G/100ML
INJECTION, SOLUTION INTRAVENOUS
Status: DISPENSED
Start: 2019-08-16

## (undated) RX ORDER — PROPOFOL 10 MG/ML
INJECTION, EMULSION INTRAVENOUS
Status: DISPENSED
Start: 2019-08-16

## (undated) RX ORDER — DEXAMETHASONE SODIUM PHOSPHATE 4 MG/ML
INJECTION, SOLUTION INTRA-ARTICULAR; INTRALESIONAL; INTRAMUSCULAR; INTRAVENOUS; SOFT TISSUE
Status: DISPENSED
Start: 2019-08-16

## (undated) RX ORDER — ONDANSETRON 2 MG/ML
INJECTION INTRAMUSCULAR; INTRAVENOUS
Status: DISPENSED
Start: 2023-03-07

## (undated) RX ORDER — LIDOCAINE HYDROCHLORIDE 20 MG/ML
INJECTION, SOLUTION EPIDURAL; INFILTRATION; INTRACAUDAL; PERINEURAL
Status: DISPENSED
Start: 2019-08-16

## (undated) RX ORDER — ONDANSETRON 2 MG/ML
INJECTION INTRAMUSCULAR; INTRAVENOUS
Status: DISPENSED
Start: 2019-08-16

## (undated) RX ORDER — PROPOFOL 10 MG/ML
INJECTION, EMULSION INTRAVENOUS
Status: DISPENSED
Start: 2023-03-07

## (undated) RX ORDER — ONDANSETRON 2 MG/ML
INJECTION INTRAMUSCULAR; INTRAVENOUS
Status: DISPENSED
Start: 2024-09-18

## (undated) RX ORDER — DEXAMETHASONE SODIUM PHOSPHATE 10 MG/ML
INJECTION, SOLUTION INTRAMUSCULAR; INTRAVENOUS
Status: DISPENSED
Start: 2024-09-18

## (undated) RX ORDER — ASPIRIN 81 MG/1
TABLET ORAL
Status: DISPENSED
Start: 2024-09-18

## (undated) RX ORDER — FENTANYL CITRATE 50 UG/ML
INJECTION, SOLUTION INTRAMUSCULAR; INTRAVENOUS
Status: DISPENSED
Start: 2019-08-16

## (undated) RX ORDER — FENTANYL CITRATE 50 UG/ML
INJECTION, SOLUTION INTRAMUSCULAR; INTRAVENOUS
Status: DISPENSED
Start: 2024-09-18

## (undated) RX ORDER — PROPOFOL 10 MG/ML
INJECTION, EMULSION INTRAVENOUS
Status: DISPENSED
Start: 2024-09-18

## (undated) RX ORDER — OXYCODONE HYDROCHLORIDE 5 MG/1
TABLET ORAL
Status: DISPENSED
Start: 2019-08-16